# Patient Record
Sex: FEMALE | Race: WHITE | NOT HISPANIC OR LATINO | URBAN - METROPOLITAN AREA
[De-identification: names, ages, dates, MRNs, and addresses within clinical notes are randomized per-mention and may not be internally consistent; named-entity substitution may affect disease eponyms.]

---

## 2023-02-23 ENCOUNTER — EMERGENCY (EMERGENCY)
Facility: HOSPITAL | Age: 83
LOS: 1 days | Discharge: HOME | End: 2023-02-23
Attending: EMERGENCY MEDICINE | Admitting: EMERGENCY MEDICINE
Payer: MEDICARE

## 2023-02-23 VITALS
RESPIRATION RATE: 18 BRPM | HEART RATE: 76 BPM | DIASTOLIC BLOOD PRESSURE: 75 MMHG | TEMPERATURE: 98 F | HEIGHT: 59.84 IN | SYSTOLIC BLOOD PRESSURE: 152 MMHG | OXYGEN SATURATION: 100 % | WEIGHT: 134.04 LBS

## 2023-02-23 VITALS
TEMPERATURE: 98 F | DIASTOLIC BLOOD PRESSURE: 66 MMHG | OXYGEN SATURATION: 97 % | HEART RATE: 77 BPM | RESPIRATION RATE: 17 BRPM | SYSTOLIC BLOOD PRESSURE: 128 MMHG

## 2023-02-23 DIAGNOSIS — R91.1 SOLITARY PULMONARY NODULE: ICD-10-CM

## 2023-02-23 DIAGNOSIS — Z86.018 PERSONAL HISTORY OF OTHER BENIGN NEOPLASM: ICD-10-CM

## 2023-02-23 DIAGNOSIS — Z88.6 ALLERGY STATUS TO ANALGESIC AGENT: ICD-10-CM

## 2023-02-23 DIAGNOSIS — M40.204 UNSPECIFIED KYPHOSIS, THORACIC REGION: ICD-10-CM

## 2023-02-23 DIAGNOSIS — K44.9 DIAPHRAGMATIC HERNIA WITHOUT OBSTRUCTION OR GANGRENE: ICD-10-CM

## 2023-02-23 DIAGNOSIS — Z87.19 PERSONAL HISTORY OF OTHER DISEASES OF THE DIGESTIVE SYSTEM: ICD-10-CM

## 2023-02-23 DIAGNOSIS — R06.02 SHORTNESS OF BREATH: ICD-10-CM

## 2023-02-23 DIAGNOSIS — Z90.49 ACQUIRED ABSENCE OF OTHER SPECIFIED PARTS OF DIGESTIVE TRACT: Chronic | ICD-10-CM

## 2023-02-23 DIAGNOSIS — Z20.822 CONTACT WITH AND (SUSPECTED) EXPOSURE TO COVID-19: ICD-10-CM

## 2023-02-23 DIAGNOSIS — R10.10 UPPER ABDOMINAL PAIN, UNSPECIFIED: ICD-10-CM

## 2023-02-23 DIAGNOSIS — Z90.49 ACQUIRED ABSENCE OF OTHER SPECIFIED PARTS OF DIGESTIVE TRACT: ICD-10-CM

## 2023-02-23 DIAGNOSIS — R51.9 HEADACHE, UNSPECIFIED: ICD-10-CM

## 2023-02-23 DIAGNOSIS — R11.0 NAUSEA: ICD-10-CM

## 2023-02-23 DIAGNOSIS — I25.10 ATHEROSCLEROTIC HEART DISEASE OF NATIVE CORONARY ARTERY WITHOUT ANGINA PECTORIS: ICD-10-CM

## 2023-02-23 DIAGNOSIS — R13.10 DYSPHAGIA, UNSPECIFIED: ICD-10-CM

## 2023-02-23 DIAGNOSIS — Z88.0 ALLERGY STATUS TO PENICILLIN: ICD-10-CM

## 2023-02-23 DIAGNOSIS — Z98.890 OTHER SPECIFIED POSTPROCEDURAL STATES: Chronic | ICD-10-CM

## 2023-02-23 DIAGNOSIS — Z88.1 ALLERGY STATUS TO OTHER ANTIBIOTIC AGENTS STATUS: ICD-10-CM

## 2023-02-23 DIAGNOSIS — R07.89 OTHER CHEST PAIN: ICD-10-CM

## 2023-02-23 DIAGNOSIS — R10.13 EPIGASTRIC PAIN: ICD-10-CM

## 2023-02-23 DIAGNOSIS — I10 ESSENTIAL (PRIMARY) HYPERTENSION: ICD-10-CM

## 2023-02-23 DIAGNOSIS — R10.11 RIGHT UPPER QUADRANT PAIN: ICD-10-CM

## 2023-02-23 DIAGNOSIS — I70.0 ATHEROSCLEROSIS OF AORTA: ICD-10-CM

## 2023-02-23 DIAGNOSIS — Z86.16 PERSONAL HISTORY OF COVID-19: ICD-10-CM

## 2023-02-23 DIAGNOSIS — J47.9 BRONCHIECTASIS, UNCOMPLICATED: ICD-10-CM

## 2023-02-23 DIAGNOSIS — M95.4 ACQUIRED DEFORMITY OF CHEST AND RIB: ICD-10-CM

## 2023-02-23 PROBLEM — Z00.00 ENCOUNTER FOR PREVENTIVE HEALTH EXAMINATION: Status: ACTIVE | Noted: 2023-02-23

## 2023-02-23 LAB
ALBUMIN SERPL ELPH-MCNC: 4 G/DL — SIGNIFICANT CHANGE UP (ref 3.3–5)
ALP SERPL-CCNC: 109 U/L — SIGNIFICANT CHANGE UP (ref 40–120)
ALT FLD-CCNC: 21 U/L — SIGNIFICANT CHANGE UP (ref 10–45)
ANION GAP SERPL CALC-SCNC: 13 MMOL/L — SIGNIFICANT CHANGE UP (ref 5–17)
APPEARANCE UR: CLEAR — SIGNIFICANT CHANGE UP
APTT BLD: 33.2 SEC — SIGNIFICANT CHANGE UP (ref 27.5–35.5)
AST SERPL-CCNC: 23 U/L — SIGNIFICANT CHANGE UP (ref 10–40)
BASOPHILS # BLD AUTO: 0.03 K/UL — SIGNIFICANT CHANGE UP (ref 0–0.2)
BASOPHILS NFR BLD AUTO: 0.4 % — SIGNIFICANT CHANGE UP (ref 0–2)
BILIRUB SERPL-MCNC: 0.6 MG/DL — SIGNIFICANT CHANGE UP (ref 0.2–1.2)
BILIRUB UR-MCNC: NEGATIVE — SIGNIFICANT CHANGE UP
BUN SERPL-MCNC: 14 MG/DL — SIGNIFICANT CHANGE UP (ref 7–23)
CALCIUM SERPL-MCNC: 9.7 MG/DL — SIGNIFICANT CHANGE UP (ref 8.4–10.5)
CHLORIDE SERPL-SCNC: 100 MMOL/L — SIGNIFICANT CHANGE UP (ref 96–108)
CO2 SERPL-SCNC: 26 MMOL/L — SIGNIFICANT CHANGE UP (ref 22–31)
COLOR SPEC: YELLOW — SIGNIFICANT CHANGE UP
CREAT SERPL-MCNC: 0.96 MG/DL — SIGNIFICANT CHANGE UP (ref 0.5–1.3)
DIFF PNL FLD: NEGATIVE — SIGNIFICANT CHANGE UP
EGFR: 59 ML/MIN/1.73M2 — LOW
EOSINOPHIL # BLD AUTO: 0.2 K/UL — SIGNIFICANT CHANGE UP (ref 0–0.5)
EOSINOPHIL NFR BLD AUTO: 2.7 % — SIGNIFICANT CHANGE UP (ref 0–6)
GLUCOSE SERPL-MCNC: 129 MG/DL — HIGH (ref 70–99)
GLUCOSE UR QL: NEGATIVE — SIGNIFICANT CHANGE UP
HCT VFR BLD CALC: 41.6 % — SIGNIFICANT CHANGE UP (ref 34.5–45)
HGB BLD-MCNC: 14.1 G/DL — SIGNIFICANT CHANGE UP (ref 11.5–15.5)
IMM GRANULOCYTES NFR BLD AUTO: 0.1 % — SIGNIFICANT CHANGE UP (ref 0–0.9)
INR BLD: 1.1 — SIGNIFICANT CHANGE UP (ref 0.88–1.16)
KETONES UR-MCNC: NEGATIVE — SIGNIFICANT CHANGE UP
LEUKOCYTE ESTERASE UR-ACNC: NEGATIVE — SIGNIFICANT CHANGE UP
LIDOCAIN IGE QN: 52 U/L — SIGNIFICANT CHANGE UP (ref 7–60)
LYMPHOCYTES # BLD AUTO: 1.78 K/UL — SIGNIFICANT CHANGE UP (ref 1–3.3)
LYMPHOCYTES # BLD AUTO: 24.4 % — SIGNIFICANT CHANGE UP (ref 13–44)
MAGNESIUM SERPL-MCNC: 2.1 MG/DL — SIGNIFICANT CHANGE UP (ref 1.6–2.6)
MCHC RBC-ENTMCNC: 29.6 PG — SIGNIFICANT CHANGE UP (ref 27–34)
MCHC RBC-ENTMCNC: 33.9 GM/DL — SIGNIFICANT CHANGE UP (ref 32–36)
MCV RBC AUTO: 87.2 FL — SIGNIFICANT CHANGE UP (ref 80–100)
MONOCYTES # BLD AUTO: 0.49 K/UL — SIGNIFICANT CHANGE UP (ref 0–0.9)
MONOCYTES NFR BLD AUTO: 6.7 % — SIGNIFICANT CHANGE UP (ref 2–14)
NEUTROPHILS # BLD AUTO: 4.8 K/UL — SIGNIFICANT CHANGE UP (ref 1.8–7.4)
NEUTROPHILS NFR BLD AUTO: 65.7 % — SIGNIFICANT CHANGE UP (ref 43–77)
NITRITE UR-MCNC: NEGATIVE — SIGNIFICANT CHANGE UP
NRBC # BLD: 0 /100 WBCS — SIGNIFICANT CHANGE UP (ref 0–0)
NT-PROBNP SERPL-SCNC: 120 PG/ML — SIGNIFICANT CHANGE UP (ref 0–300)
PH UR: 6 — SIGNIFICANT CHANGE UP (ref 5–8)
PLATELET # BLD AUTO: 346 K/UL — SIGNIFICANT CHANGE UP (ref 150–400)
POTASSIUM SERPL-MCNC: 3.7 MMOL/L — SIGNIFICANT CHANGE UP (ref 3.5–5.3)
POTASSIUM SERPL-SCNC: 3.7 MMOL/L — SIGNIFICANT CHANGE UP (ref 3.5–5.3)
PROT SERPL-MCNC: 8.5 G/DL — HIGH (ref 6–8.3)
PROT UR-MCNC: NEGATIVE MG/DL — SIGNIFICANT CHANGE UP
PROTHROM AB SERPL-ACNC: 13.1 SEC — SIGNIFICANT CHANGE UP (ref 10.5–13.4)
RBC # BLD: 4.77 M/UL — SIGNIFICANT CHANGE UP (ref 3.8–5.2)
RBC # FLD: 11.9 % — SIGNIFICANT CHANGE UP (ref 10.3–14.5)
SARS-COV-2 RNA SPEC QL NAA+PROBE: NEGATIVE — SIGNIFICANT CHANGE UP
SODIUM SERPL-SCNC: 139 MMOL/L — SIGNIFICANT CHANGE UP (ref 135–145)
SP GR SPEC: <=1.005 — SIGNIFICANT CHANGE UP (ref 1–1.03)
TROPONIN T SERPL-MCNC: 0.01 NG/ML — SIGNIFICANT CHANGE UP (ref 0–0.01)
UROBILINOGEN FLD QL: 0.2 E.U./DL — SIGNIFICANT CHANGE UP
WBC # BLD: 7.31 K/UL — SIGNIFICANT CHANGE UP (ref 3.8–10.5)
WBC # FLD AUTO: 7.31 K/UL — SIGNIFICANT CHANGE UP (ref 3.8–10.5)

## 2023-02-23 PROCEDURE — 84484 ASSAY OF TROPONIN QUANT: CPT

## 2023-02-23 PROCEDURE — 96365 THER/PROPH/DIAG IV INF INIT: CPT | Mod: XU

## 2023-02-23 PROCEDURE — 70450 CT HEAD/BRAIN W/O DYE: CPT | Mod: MA

## 2023-02-23 PROCEDURE — 99285 EMERGENCY DEPT VISIT HI MDM: CPT | Mod: CS

## 2023-02-23 PROCEDURE — 99285 EMERGENCY DEPT VISIT HI MDM: CPT | Mod: 25

## 2023-02-23 PROCEDURE — 87635 SARS-COV-2 COVID-19 AMP PRB: CPT

## 2023-02-23 PROCEDURE — 71045 X-RAY EXAM CHEST 1 VIEW: CPT | Mod: 26

## 2023-02-23 PROCEDURE — 85025 COMPLETE CBC W/AUTO DIFF WBC: CPT

## 2023-02-23 PROCEDURE — 71275 CT ANGIOGRAPHY CHEST: CPT | Mod: 26,MA

## 2023-02-23 PROCEDURE — 83880 ASSAY OF NATRIURETIC PEPTIDE: CPT

## 2023-02-23 PROCEDURE — 76705 ECHO EXAM OF ABDOMEN: CPT

## 2023-02-23 PROCEDURE — 85610 PROTHROMBIN TIME: CPT

## 2023-02-23 PROCEDURE — 71045 X-RAY EXAM CHEST 1 VIEW: CPT

## 2023-02-23 PROCEDURE — 80053 COMPREHEN METABOLIC PANEL: CPT

## 2023-02-23 PROCEDURE — 93005 ELECTROCARDIOGRAM TRACING: CPT

## 2023-02-23 PROCEDURE — 76705 ECHO EXAM OF ABDOMEN: CPT | Mod: 26

## 2023-02-23 PROCEDURE — 85730 THROMBOPLASTIN TIME PARTIAL: CPT

## 2023-02-23 PROCEDURE — 36415 COLL VENOUS BLD VENIPUNCTURE: CPT

## 2023-02-23 PROCEDURE — 70450 CT HEAD/BRAIN W/O DYE: CPT | Mod: 26,MA

## 2023-02-23 PROCEDURE — 96367 TX/PROPH/DG ADDL SEQ IV INF: CPT

## 2023-02-23 PROCEDURE — 96361 HYDRATE IV INFUSION ADD-ON: CPT

## 2023-02-23 PROCEDURE — 96375 TX/PRO/DX INJ NEW DRUG ADDON: CPT | Mod: XU

## 2023-02-23 PROCEDURE — 83735 ASSAY OF MAGNESIUM: CPT

## 2023-02-23 PROCEDURE — 71275 CT ANGIOGRAPHY CHEST: CPT | Mod: MA

## 2023-02-23 PROCEDURE — 81003 URINALYSIS AUTO W/O SCOPE: CPT

## 2023-02-23 PROCEDURE — 83690 ASSAY OF LIPASE: CPT

## 2023-02-23 RX ORDER — METOCLOPRAMIDE HCL 10 MG
10 TABLET ORAL ONCE
Refills: 0 | Status: COMPLETED | OUTPATIENT
Start: 2023-02-23 | End: 2023-02-23

## 2023-02-23 RX ORDER — DEXAMETHASONE 0.5 MG/5ML
8 ELIXIR ORAL ONCE
Refills: 0 | Status: COMPLETED | OUTPATIENT
Start: 2023-02-23 | End: 2023-02-23

## 2023-02-23 RX ORDER — FAMOTIDINE 10 MG/ML
1 INJECTION INTRAVENOUS
Qty: 28 | Refills: 0
Start: 2023-02-23 | End: 2023-03-08

## 2023-02-23 RX ORDER — SUCRALFATE 1 G
1 TABLET ORAL ONCE
Refills: 0 | Status: COMPLETED | OUTPATIENT
Start: 2023-02-23 | End: 2023-02-23

## 2023-02-23 RX ORDER — MORPHINE SULFATE 50 MG/1
2 CAPSULE, EXTENDED RELEASE ORAL ONCE
Refills: 0 | Status: DISCONTINUED | OUTPATIENT
Start: 2023-02-23 | End: 2023-02-23

## 2023-02-23 RX ORDER — ACETAMINOPHEN 500 MG
1000 TABLET ORAL ONCE
Refills: 0 | Status: COMPLETED | OUTPATIENT
Start: 2023-02-23 | End: 2023-02-23

## 2023-02-23 RX ORDER — FAMOTIDINE 10 MG/ML
20 INJECTION INTRAVENOUS ONCE
Refills: 0 | Status: COMPLETED | OUTPATIENT
Start: 2023-02-23 | End: 2023-02-23

## 2023-02-23 RX ORDER — ONDANSETRON 8 MG/1
4 TABLET, FILM COATED ORAL ONCE
Refills: 0 | Status: COMPLETED | OUTPATIENT
Start: 2023-02-23 | End: 2023-02-23

## 2023-02-23 RX ORDER — ONDANSETRON 8 MG/1
1 TABLET, FILM COATED ORAL
Qty: 12 | Refills: 0
Start: 2023-02-23 | End: 2023-02-26

## 2023-02-23 RX ORDER — SUCRALFATE 1 G
10 TABLET ORAL
Qty: 200 | Refills: 0
Start: 2023-02-23 | End: 2023-02-27

## 2023-02-23 RX ORDER — SODIUM CHLORIDE 9 MG/ML
1000 INJECTION INTRAMUSCULAR; INTRAVENOUS; SUBCUTANEOUS ONCE
Refills: 0 | Status: COMPLETED | OUTPATIENT
Start: 2023-02-23 | End: 2023-02-23

## 2023-02-23 RX ADMIN — SODIUM CHLORIDE 1000 MILLILITER(S): 9 INJECTION INTRAMUSCULAR; INTRAVENOUS; SUBCUTANEOUS at 07:34

## 2023-02-23 RX ADMIN — Medication 10 MILLIGRAM(S): at 05:10

## 2023-02-23 RX ADMIN — Medication 104 MILLIGRAM(S): at 04:40

## 2023-02-23 RX ADMIN — Medication 1000 MILLIGRAM(S): at 03:57

## 2023-02-23 RX ADMIN — FAMOTIDINE 20 MILLIGRAM(S): 10 INJECTION INTRAVENOUS at 03:43

## 2023-02-23 RX ADMIN — MORPHINE SULFATE 2 MILLIGRAM(S): 50 CAPSULE, EXTENDED RELEASE ORAL at 06:34

## 2023-02-23 RX ADMIN — Medication 101.6 MILLIGRAM(S): at 03:42

## 2023-02-23 RX ADMIN — Medication 1000 MILLIGRAM(S): at 03:55

## 2023-02-23 RX ADMIN — ONDANSETRON 4 MILLIGRAM(S): 8 TABLET, FILM COATED ORAL at 03:42

## 2023-02-23 RX ADMIN — Medication 8 MILLIGRAM(S): at 04:12

## 2023-02-23 RX ADMIN — Medication 400 MILLIGRAM(S): at 03:42

## 2023-02-23 RX ADMIN — SODIUM CHLORIDE 1000 MILLILITER(S): 9 INJECTION INTRAMUSCULAR; INTRAVENOUS; SUBCUTANEOUS at 06:34

## 2023-02-23 RX ADMIN — MORPHINE SULFATE 2 MILLIGRAM(S): 50 CAPSULE, EXTENDED RELEASE ORAL at 07:00

## 2023-02-23 NOTE — ED PROVIDER NOTE - PROGRESS NOTE DETAILS
[id number] Mark    no gallstones on US, no PE on CT. pt c/o headache. states has had headache since having covid in January. will give ivf, morphine and get CT head. pt will need to f/u with GI and neuro Ree: pt received from Dr. Toth at s/o; pt p/w multiple complaints including abd pain, nausea, chest discomfort, sob, ha. Labs wnl, ekg non-ischemic, ruq sono neg for gallstones/ cholecystitis, cta chest neg for pe. Pending ct head given persistent ha ever since having covid last month. If neg for acute findings, anticipate dc home. Will continue to monitor. PA Hellerman:  CTH:  No acute intracranial hemorrhage, mass effect, or   hydrocephalus.  Discussed results with pt and pt's  with Cuban    Discussed importance of close f/u with PMD and possibly neurologist for ha.  Pt states feeling better after receiving medications.  Answered all questions.  Provided strict return precautions.  Will DC

## 2023-02-23 NOTE — ED ADULT NURSE NOTE - OBJECTIVE STATEMENT
82F hx htn, gastritis, c/o upper abd pain. pt states ongoing for past 2 days. states started after she was taking amoxicillin 875mg tid after getting a tooth pulled. states also feeling short of breath, with mid chest discomfort and difficulty swallowing for the same period of time. +nausea, no vomiting, no diarrhea. no fevers. no cough. states has not felt completely well after having had covid in early January.

## 2023-02-23 NOTE — ED PROVIDER NOTE - ENMT, MLM
Airway patent, Nasal mucosa clear. Mouth with normal mucosa. Throat has no vesicles, no oropharyngeal exudates and uvula is midline. no intraoral swelling, no drooling, no trismus, no stridor

## 2023-02-23 NOTE — ED PROVIDER NOTE - PROVIDER TOKENS
PROVIDER:[TOKEN:[[id number]]],PROVIDER:[TOKEN:[[id number]]],PROVIDER:[TOKEN:[[id number]]],PROVIDER:[TOKEN:[[id number]]],PROVIDER:[TOKEN:[[id number]]],PROVIDER:[TOKEN:[[id number]]]

## 2023-02-23 NOTE — ED PROVIDER NOTE - PATIENT PORTAL LINK FT
You can access the FollowMyHealth Patient Portal offered by [facility] by registering at the following website: [email or web address]. By joining [facility]’s FollowMyHealth portal, you will also be able to view your health information using other applications (apps) compatible with our system.

## 2023-02-23 NOTE — ED ADULT NURSE REASSESSMENT NOTE - NS ED NURSE REASSESS COMMENT FT1
Dr. Toth at bedside discussing findings with pt. and spouse
Dr. Toth at bedside for evaluation
pt. ambulated to restroom with steady gait noted, ccms sample was obtained and sent for analysis
pt. asleep, nad, resps even/unlabored, assessment on-going, awaiting further orders, spouse remains at bedside
spoke with CT tech, states ready for pt., pt. to CT accompanied by EDT and spouse
xrt at bedside, pcxr obtained
pt. medicated for nausea and h/a as noted, assessment on-going, spouse remains at bedside

## 2023-02-23 NOTE — ED ADULT NURSE NOTE - CAS ELECT INFOMATION PROVIDED
Pt verbalized understanding of d/c instructions. VSS. PIV removed. Pt ambulated out of ED with ./DC instructions

## 2023-02-23 NOTE — ED PROVIDER NOTE - CLINICAL SUMMARY MEDICAL DECISION MAKING FREE TEXT BOX
epigastric/ruq abd pain, midsternal chest discomfort, SOB and difficulty swallowing. no airway compromise, no resp distress on exam. speaking in full sentences, lungs clear on exam. no guarding, no rebound  gallstones vs. gastritis vs. pancreatitis vs. GERD. doubt allergic reaction, doubt ACS  -check labs  -ekg  -CXR  -US to evaluate RUQ  -tylenol, zofran, pepcid

## 2023-02-23 NOTE — ED PROVIDER NOTE - CARE PLAN
1 Principal Discharge DX:	Abdominal pain  Secondary Diagnosis:	SOB (shortness of breath)  Secondary Diagnosis:	Headache

## 2023-02-23 NOTE — ED PROVIDER NOTE - NSFOLLOWUPINSTRUCTIONS_ED_ALL_ED_FT
Follow-up with gastroenterology and neurology    Mary michael gastroenterologom i nevrologom      Gastroesophageal Reflux Disease, Adult    Gastroesophageal reflux (COLLIN) happens when acid from the stomach flows up into the tube that connects the mouth and the stomach (esophagus). Normally, food travels down the esophagus and stays in the stomach to be digested. With COLLIN, food and stomach acid sometimes move back up into the esophagus.    You may have a disease called gastroesophageal reflux disease (GERD) if the reflux:  •Happens often.    •Causes frequent or very bad symptoms.    •Causes problems such as damage to the esophagus.    When this happens, the esophagus becomes sore and swollen. Over time, GERD can make small holes (ulcers) in the lining of the esophagus.    What are the causes?    This condition is caused by a problem with the muscle between the esophagus and the stomach. When this muscle is weak or not normal, it does not close properly to keep food and acid from coming back up from the stomach.    The muscle can be weak because of:  •Tobacco use.     •Pregnancy.     •Having a certain type of hernia (hiatal hernia).    •Alcohol use.     •Certain foods and drinks, such as coffee, chocolate, onions, and peppermint.    What increases the risk?    •Being overweight.    •Having a disease that affects your connective tissue.    •Taking NSAIDs, such a ibuprofen.    What are the signs or symptoms?    •Heartburn.     •Difficult or painful swallowing.     •The feeling of having a lump in the throat.     •A bitter taste in the mouth.     •Bad breath.     •Having a lot of saliva.    •Having an upset or bloated stomach.     •Burping.    •Chest pain. Different conditions can cause chest pain. Make sure you see your doctor if you have chest pain.    •Shortness of breath or wheezing.    •A long-term cough or a cough at night.    •Wearing away of the surface of teeth (tooth enamel).    •Weight loss    How is this treated?    •Making changes to your diet.    •Taking medicine.    •Having surgery.    Treatment will depend on how bad your symptoms are.    Follow these instructions at home:    Eating and drinking    •Follow a diet as told by your doctor. You may need to avoid foods and drinks such as:  •Coffee and tea, with or without caffeine.    •Drinks that contain alcohol.    •Energy drinks and sports drinks.    •Bubbly (carbonated) drinks or sodas.    •Chocolate and cocoa.    •Peppermint and mint flavorings.    •Garlic and onions.    •Horseradish.    •Spicy and acidic foods. These include peppers, chili powder, argueta powder, vinegar, hot sauces, and BBQ sauce.    •Citrus fruit juices and citrus fruits, such as oranges, fareed, and limes.    •Tomato-based foods. These include red sauce, chili, salsa, and pizza with red sauce.    •Fried and fatty foods. These include donuts, french fries, potato chips, and high-fat dressings.    •High-fat meats. These include hot dogs, rib eye steak, sausage, ham, and murphy.    •High-fat dairy items, such as whole milk, butter, and cream cheese.    •Eat small meals often. Avoid eating large meals.    •Avoid drinking large amounts of liquid with your meals.    •Avoid eating meals during the 2–3 hours before bedtime.    •Avoid lying down right after you eat.    • Do not exercise right after you eat.    Lifestyle      • Do not smoke or use any products that contain nicotine or tobacco. If you need help quitting, ask your doctor.    •Try to lower your stress. If you need help doing this, ask your doctor.    •If you are overweight, lose an amount of weight that is healthy for you. Ask your doctor about a safe weight loss goal.    General instructions     •Pay attention to any changes in your symptoms.    •Take over-the-counter and prescription medicines only as told by your doctor.    • Do not take aspirin, ibuprofen, or other NSAIDs unless your doctor says it is okay.    •Wear loose clothes. Do not wear anything tight around your waist.    •Raise (elevate) the head of your bed about 6 inches (15 cm). You may need to use a wedge to do this.    •Avoid bending over if this makes your symptoms worse.    •Keep all follow-up visits.    Contact a doctor if:    •You have new symptoms.    •You lose weight and you do not know why.    •You have trouble swallowing or it hurts to swallow.    •You have wheezing or a cough that keeps happening.    •You have a hoarse voice.    •Your symptoms do not get better with treatment.    Get help right away if:    •You have sudden pain in your arms, neck, jaw, teeth, or back.    •You suddenly feel sweaty, dizzy, or light-headed.    •You have chest pain or shortness of breath.    •You vomit and the vomit is green, yellow, or black, or it looks like blood or coffee grounds.    •You faint.    •Your poop (stool) is red, bloody, or black.    •You cannot swallow, drink, or eat.    These symptoms may represent a serious problem that is an emergency. Do not wait to see if the symptoms will go away. Get medical help right away. Call your local emergency services (911 in the [place] Do not drive yourself to the hospital.     Summary    •If a person has gastroesophageal reflux disease (GERD), food and stomach acid move back up into the esophagus and cause symptoms or problems such as damage to the esophagus.    •Treatment will depend on how bad your symptoms are.    •Follow a diet as told by your doctor.    •Take all medicines only as told by your doctor.    This information is not intended to replace advice given to you by your health care provider. Make sure you discuss any questions you have with your health care provider.      General Headache    WHAT YOU NEED TO KNOW:    Headache pain may be mild or severe. Common causes include stress, medicines, and head injuries. Sleep problems, allergies, and hormone changes can also cause a headache. You may have frequent headaches that have no clear cause. Pain may start in another part of your body and move to your head. Headache pain can also move to other parts of your body. A headache can cause other symptoms, such as nausea and vomiting. A severe headache may be a sign of a stroke or other serious problem that needs immediate treatment.    DISCHARGE INSTRUCTIONS:    Have someone call your local emergency number (911 in the [place]) for any of the following:   •You have any of the following signs of a stroke: ?Numbness or drooping on one side of your face     ?Weakness in an arm or leg    ?Confusion or difficulty speaking    ?Dizziness, a severe headache, or vision loss    Return to the emergency department if:   •You have a headache with neck stiffness and a fever.    •You have a constant headache and are vomiting.    •You have severe pain that does not get better after you take pain medicine.    •You have a headache and the pain worsens when you look into light.    •You have a headache and vision changes, such as blurred vision.    •You have a headache and are forgetful or confused.    Call your doctor if:   •You have a headache each day that does not get better, even after treatment.    •You have changes in your headaches, or new symptoms that occur when you have a headache.    •Others you live or work with also have headaches.    •You have questions or concerns about your condition or care.    Medicines: You may need any of the following:   •Medicines may be given to prevent or treat headache pain. Do not wait until the pain is severe to take your medicine. Ask your healthcare provider how to take the medicine safely.    •NSAIDs, such as ibuprofen, help decrease swelling, pain, and fever. This medicine is available with or without a doctor's order. NSAIDs can cause stomach bleeding or kidney problems in certain people. If you take blood thinner medicine, always ask if NSAIDs are safe for you. Always read the medicine label and follow directions. Do not give these medicines to children younger than 6 months without direction from a healthcare provider.    •Acetaminophen decreases pain and fever. It is available without a doctor's order. Ask how much to take and how often to take it. Follow directions. Read the labels of all other medicines you are using to see if they also contain acetaminophen, or ask your doctor or pharmacist. Acetaminophen can cause liver damage if not taken correctly.    •Antinausea medicine may be given to calm your stomach and help prevent vomiting.    •Take your medicine as directed. Contact your healthcare provider if you think your medicine is not helping or if you have side effects. Tell your provider if you are allergic to any medicine. Keep a list of the medicines, vitamins, and herbs you take. Include the amounts, and when and why you take them. Bring the list or the pill bottles to follow-up visits. Carry your medicine list with you in case of an emergency.    Manage your symptoms:   •Rest in a dark and quiet room. This may help decrease your pain.    •Apply heat or ice as directed. Heat or ice may help decrease pain or muscle spasms. Apply heat or ice on the area for 20 minutes every 2 hours for as many days as directed. Your healthcare provider may recommend that you alternate heat and ice.    •Relax your muscles to help relieve a headache. Lie down in a comfortable position and close your eyes. Relax your muscles slowly. Start at your toes and work your way up your body. A massage or warm bath may also help relax your muscles.    Keep a headache record: Record the dates and times that you get headaches, and what you were doing before the headache started. Also record what you ate and drank in the 24 hours before the headache started. This might help your healthcare provider find the cause of your headaches and make a treatment plan. The record can also help you avoid headache triggers or manage your symptoms.    Get enough sleep: You should get 8 to 10 hours of sleep each night. Create a sleep schedule. Go to bed and wake up at the same times each day. It may be helpful to do something relaxing before bed. Do not watch television right before bed.    Do not smoke: Nicotine and other chemicals in cigarettes and cigars can trigger a headache or make it worse. Ask your healthcare provider for information if you currently smoke and need help to quit. E-cigarettes or smokeless tobacco still contain nicotine. Talk to your healthcare provider before you use these products.    Drink liquids as directed: You may need to drink more liquid to prevent dehydration. Dehydration can cause a headache. Ask your healthcare provider how much liquid to drink each day and which liquids are best for you.    Limit caffeine and alcohol as directed: Your headaches may be triggered by caffeine or alcohol. You may also develop a headache if you drink caffeine regularly and suddenly stop.    Eat a variety of healthy foods: Do not skip meals. Too little food can trigger a headache. Include fruits, vegetables, whole-grain breads, low-fat dairy products, beans, lean meat, and fish. Do not have trigger foods, such as chocolate and red wine. Foods that contain gluten, nitrates, MSG, or artificial sweeteners may also trigger a headache.    Follow up with your doctor as directed: Write down your questions so you remember to ask them during your visits.

## 2023-02-23 NOTE — ED PROVIDER NOTE - OBJECTIVE STATEMENT
[id number] Rina    82F hx htn, gastritis, c/o upper abd pain. pt states ongoing for past 2 days. states started after she was taking amoxicillin 875mg tid after getting a tooth pulled. states also feeling short of breath, with mid chest discomfort and difficulty swallowing for the same period of time. +nausea, no vomiting, no diarrhea. no fevers. no cough. states has not felt completely well after having had covid in early January.

## 2023-02-23 NOTE — ED PROVIDER NOTE - CARE PROVIDERS DIRECT ADDRESSES
,[email or web address],DirectAddress_Unknown,DirectAddress_Unknown,[email or web address]

## 2023-02-23 NOTE — ED PROVIDER NOTE - CARE PROVIDER_API CALL
Holland Hudson)  Gastroenterology  [address], [address]  [place] [address]  Phone: [phone]  Fax: [phone]  Follow Up Time:     Thony Evans  [facility] [address]  [address], [address]th [place] [address]  Phone: [phone]  Fax: [phone]  Follow Up Time:     Elisa Holt  GASTROENTEROLOGY  [address] [place] [address], [address]  [place] [address]  Phone: [phone]  Fax: [phone]  Follow Up Time:     Greg Cummings)  Neurology; Neuromuscular Medicine  [address], [facility] 8th [place] [address]  Phone: [phone]  Fax: [phone]  Follow Up Time:     Sienna Samuel (DO)  EEGEpilepsy; Neurology  [address], [place] [address]  Phone: [phone]  Fax: [phone]  Follow Up Time:     Serena Santana)  Neurology  [address], [address] [place] [address]  Phone: [phone]  Fax: [phone]  Follow Up Time:

## 2023-03-11 ENCOUNTER — INPATIENT (INPATIENT)
Facility: HOSPITAL | Age: 83
LOS: 2 days | Discharge: HOME | DRG: 313 | End: 2023-03-14
Attending: INTERNAL MEDICINE | Admitting: INTERNAL MEDICINE
Payer: MEDICARE

## 2023-03-11 VITALS
HEIGHT: 59.84 IN | SYSTOLIC BLOOD PRESSURE: 131 MMHG | HEART RATE: 96 BPM | OXYGEN SATURATION: 100 % | DIASTOLIC BLOOD PRESSURE: 69 MMHG | TEMPERATURE: 98 F | RESPIRATION RATE: 16 BRPM

## 2023-03-11 DIAGNOSIS — R07.9 CHEST PAIN, UNSPECIFIED: ICD-10-CM

## 2023-03-11 DIAGNOSIS — Z90.49 ACQUIRED ABSENCE OF OTHER SPECIFIED PARTS OF DIGESTIVE TRACT: Chronic | ICD-10-CM

## 2023-03-11 DIAGNOSIS — E78.5 HYPERLIPIDEMIA, UNSPECIFIED: ICD-10-CM

## 2023-03-11 DIAGNOSIS — Z98.890 OTHER SPECIFIED POSTPROCEDURAL STATES: Chronic | ICD-10-CM

## 2023-03-11 DIAGNOSIS — I20.0 UNSTABLE ANGINA: ICD-10-CM

## 2023-03-11 DIAGNOSIS — I10 ESSENTIAL (PRIMARY) HYPERTENSION: ICD-10-CM

## 2023-03-11 DIAGNOSIS — K29.70 GASTRITIS, UNSPECIFIED, WITHOUT BLEEDING: ICD-10-CM

## 2023-03-11 LAB
ALBUMIN SERPL ELPH-MCNC: 3.8 G/DL — SIGNIFICANT CHANGE UP (ref 3.3–5)
ALBUMIN SERPL ELPH-MCNC: 4 G/DL — SIGNIFICANT CHANGE UP (ref 3.3–5)
ALP SERPL-CCNC: 87 U/L — SIGNIFICANT CHANGE UP (ref 40–120)
ALP SERPL-CCNC: 98 U/L — SIGNIFICANT CHANGE UP (ref 40–120)
ALT FLD-CCNC: 21 U/L — SIGNIFICANT CHANGE UP (ref 10–45)
ALT FLD-CCNC: SIGNIFICANT CHANGE UP (ref 10–45)
ANION GAP SERPL CALC-SCNC: 10 MMOL/L — SIGNIFICANT CHANGE UP (ref 5–17)
ANION GAP SERPL CALC-SCNC: 18 MMOL/L — HIGH (ref 5–17)
AST SERPL-CCNC: 23 U/L — SIGNIFICANT CHANGE UP (ref 10–40)
AST SERPL-CCNC: SIGNIFICANT CHANGE UP (ref 10–40)
BASOPHILS # BLD AUTO: 0.01 K/UL — SIGNIFICANT CHANGE UP (ref 0–0.2)
BASOPHILS NFR BLD AUTO: 0.1 % — SIGNIFICANT CHANGE UP (ref 0–2)
BILIRUB SERPL-MCNC: 0.4 MG/DL — SIGNIFICANT CHANGE UP (ref 0.2–1.2)
BILIRUB SERPL-MCNC: 0.4 MG/DL — SIGNIFICANT CHANGE UP (ref 0.2–1.2)
BUN SERPL-MCNC: 15 MG/DL — SIGNIFICANT CHANGE UP (ref 7–23)
BUN SERPL-MCNC: 16 MG/DL — SIGNIFICANT CHANGE UP (ref 7–23)
CALCIUM SERPL-MCNC: 9.4 MG/DL — SIGNIFICANT CHANGE UP (ref 8.4–10.5)
CALCIUM SERPL-MCNC: 9.7 MG/DL — SIGNIFICANT CHANGE UP (ref 8.4–10.5)
CHLORIDE SERPL-SCNC: 105 MMOL/L — SIGNIFICANT CHANGE UP (ref 96–108)
CHLORIDE SERPL-SCNC: 106 MMOL/L — SIGNIFICANT CHANGE UP (ref 96–108)
CK MB CFR SERPL CALC: 1.4 NG/ML — SIGNIFICANT CHANGE UP (ref 0–6.7)
CK MB CFR SERPL CALC: 1.4 NG/ML — SIGNIFICANT CHANGE UP (ref 0–6.7)
CK SERPL-CCNC: 36 U/L — SIGNIFICANT CHANGE UP (ref 25–170)
CK SERPL-CCNC: 37 U/L — SIGNIFICANT CHANGE UP (ref 25–170)
CO2 SERPL-SCNC: 18 MMOL/L — LOW (ref 22–31)
CO2 SERPL-SCNC: 27 MMOL/L — SIGNIFICANT CHANGE UP (ref 22–31)
CREAT SERPL-MCNC: 0.97 MG/DL — SIGNIFICANT CHANGE UP (ref 0.5–1.3)
CREAT SERPL-MCNC: 1 MG/DL — SIGNIFICANT CHANGE UP (ref 0.5–1.3)
EGFR: 56 ML/MIN/1.73M2 — LOW
EGFR: 58 ML/MIN/1.73M2 — LOW
EOSINOPHIL # BLD AUTO: 0.18 K/UL — SIGNIFICANT CHANGE UP (ref 0–0.5)
EOSINOPHIL NFR BLD AUTO: 2.6 % — SIGNIFICANT CHANGE UP (ref 0–6)
GLUCOSE SERPL-MCNC: 118 MG/DL — HIGH (ref 70–99)
GLUCOSE SERPL-MCNC: 125 MG/DL — HIGH (ref 70–99)
HCT VFR BLD CALC: 38 % — SIGNIFICANT CHANGE UP (ref 34.5–45)
HGB BLD-MCNC: 12.9 G/DL — SIGNIFICANT CHANGE UP (ref 11.5–15.5)
IMM GRANULOCYTES NFR BLD AUTO: 0.1 % — SIGNIFICANT CHANGE UP (ref 0–0.9)
LYMPHOCYTES # BLD AUTO: 2.47 K/UL — SIGNIFICANT CHANGE UP (ref 1–3.3)
LYMPHOCYTES # BLD AUTO: 35.1 % — SIGNIFICANT CHANGE UP (ref 13–44)
MCHC RBC-ENTMCNC: 30 PG — SIGNIFICANT CHANGE UP (ref 27–34)
MCHC RBC-ENTMCNC: 33.9 GM/DL — SIGNIFICANT CHANGE UP (ref 32–36)
MCV RBC AUTO: 88.4 FL — SIGNIFICANT CHANGE UP (ref 80–100)
MONOCYTES # BLD AUTO: 0.45 K/UL — SIGNIFICANT CHANGE UP (ref 0–0.9)
MONOCYTES NFR BLD AUTO: 6.4 % — SIGNIFICANT CHANGE UP (ref 2–14)
NEUTROPHILS # BLD AUTO: 3.91 K/UL — SIGNIFICANT CHANGE UP (ref 1.8–7.4)
NEUTROPHILS NFR BLD AUTO: 55.7 % — SIGNIFICANT CHANGE UP (ref 43–77)
NRBC # BLD: 0 /100 WBCS — SIGNIFICANT CHANGE UP (ref 0–0)
PLATELET # BLD AUTO: 278 K/UL — SIGNIFICANT CHANGE UP (ref 150–400)
POTASSIUM SERPL-MCNC: 4.1 MMOL/L — SIGNIFICANT CHANGE UP (ref 3.5–5.3)
POTASSIUM SERPL-MCNC: SIGNIFICANT CHANGE UP (ref 3.5–5.3)
POTASSIUM SERPL-SCNC: 4.1 MMOL/L — SIGNIFICANT CHANGE UP (ref 3.5–5.3)
POTASSIUM SERPL-SCNC: SIGNIFICANT CHANGE UP (ref 3.5–5.3)
PROT SERPL-MCNC: 7.6 G/DL — SIGNIFICANT CHANGE UP (ref 6–8.3)
PROT SERPL-MCNC: 8 G/DL — SIGNIFICANT CHANGE UP (ref 6–8.3)
RBC # BLD: 4.3 M/UL — SIGNIFICANT CHANGE UP (ref 3.8–5.2)
RBC # FLD: 12.1 % — SIGNIFICANT CHANGE UP (ref 10.3–14.5)
SARS-COV-2 RNA SPEC QL NAA+PROBE: SIGNIFICANT CHANGE UP
SODIUM SERPL-SCNC: 141 MMOL/L — SIGNIFICANT CHANGE UP (ref 135–145)
SODIUM SERPL-SCNC: 143 MMOL/L — SIGNIFICANT CHANGE UP (ref 135–145)
TROPONIN T SERPL-MCNC: 0.01 NG/ML — SIGNIFICANT CHANGE UP (ref 0–0.01)
TROPONIN T SERPL-MCNC: 0.01 NG/ML — SIGNIFICANT CHANGE UP (ref 0–0.01)
TROPONIN T SERPL-MCNC: <0.01 NG/ML — SIGNIFICANT CHANGE UP (ref 0–0.01)
WBC # BLD: 7.03 K/UL — SIGNIFICANT CHANGE UP (ref 3.8–10.5)
WBC # FLD AUTO: 7.03 K/UL — SIGNIFICANT CHANGE UP (ref 3.8–10.5)

## 2023-03-11 PROCEDURE — 99285 EMERGENCY DEPT VISIT HI MDM: CPT | Mod: FS,CS

## 2023-03-11 PROCEDURE — 71045 X-RAY EXAM CHEST 1 VIEW: CPT | Mod: 26

## 2023-03-11 RX ORDER — ACETAMINOPHEN 500 MG
1000 TABLET ORAL ONCE
Refills: 0 | Status: COMPLETED | OUTPATIENT
Start: 2023-03-11 | End: 2023-03-11

## 2023-03-11 RX ORDER — ACETAMINOPHEN 500 MG
650 TABLET ORAL ONCE
Refills: 0 | Status: COMPLETED | OUTPATIENT
Start: 2023-03-11 | End: 2023-03-11

## 2023-03-11 RX ORDER — PANTOPRAZOLE SODIUM 20 MG/1
40 TABLET, DELAYED RELEASE ORAL
Refills: 0 | Status: DISCONTINUED | OUTPATIENT
Start: 2023-03-12 | End: 2023-03-14

## 2023-03-11 RX ORDER — AMLODIPINE BESYLATE 2.5 MG/1
10 TABLET ORAL DAILY
Refills: 0 | Status: DISCONTINUED | OUTPATIENT
Start: 2023-03-11 | End: 2023-03-13

## 2023-03-11 RX ORDER — LOSARTAN POTASSIUM 100 MG/1
25 TABLET, FILM COATED ORAL DAILY
Refills: 0 | Status: DISCONTINUED | OUTPATIENT
Start: 2023-03-11 | End: 2023-03-11

## 2023-03-11 RX ORDER — PANTOPRAZOLE SODIUM 20 MG/1
40 TABLET, DELAYED RELEASE ORAL ONCE
Refills: 0 | Status: DISCONTINUED | OUTPATIENT
Start: 2023-03-11 | End: 2023-03-11

## 2023-03-11 RX ORDER — METOPROLOL TARTRATE 50 MG
50 TABLET ORAL DAILY
Refills: 0 | Status: DISCONTINUED | OUTPATIENT
Start: 2023-03-12 | End: 2023-03-12

## 2023-03-11 RX ORDER — ATORVASTATIN CALCIUM 80 MG/1
80 TABLET, FILM COATED ORAL AT BEDTIME
Refills: 0 | Status: DISCONTINUED | OUTPATIENT
Start: 2023-03-11 | End: 2023-03-14

## 2023-03-11 RX ADMIN — AMLODIPINE BESYLATE 10 MILLIGRAM(S): 2.5 TABLET ORAL at 22:46

## 2023-03-11 RX ADMIN — Medication 1000 MILLIGRAM(S): at 19:20

## 2023-03-11 RX ADMIN — Medication 400 MILLIGRAM(S): at 18:20

## 2023-03-11 NOTE — ED ADULT NURSE NOTE - OBJECTIVE STATEMENT
Never smoker
Pt presented to ED with c/o of feeling short of breath, with mid chest discomfort and difficulty swallowing accompanied by intermittent headaches ever since she had Covid in January of this year. AOX4. VSS. Any form of distress not noted. Patient oriented to ED area. All needs attended. Pt on cardiac monitor. Rounding in progress. Fall risk precautions maintained.

## 2023-03-11 NOTE — H&P ADULT - NSICDXPASTSURGICALHX_GEN_ALL_CORE_FT
PAST SURGICAL HISTORY:  S/P appendectomy     S/P lumpectomy, left breast benign     PAST SURGICAL HISTORY:  S/P appendectomy     S/P lumpectomy, right breast

## 2023-03-11 NOTE — H&P ADULT - PROBLEM SELECTOR PLAN 1
p/w worsening ____cp radiated to the jaw and left shoulder x 30 mins, Trop 0.01 x 1, EKG   -f/u 2nd set of CE @ 6pm         -TTE ordered  -NST vs CCTA on Monday   -continue tele monitor p/w worsening chest discomfort from the jaw down to the midsternal radiating to the jaw and left shoulder x 30 mins, Trop 0.01 x 1, EKG NSR   -f/u 2nd set of CE @ 6pm   -s/p IV Tylenol in the ED  -per pt's , stated seen the cardiologist first time in 2/2023 and was scheduled for echo in late March and NST in April   -TTE ordered  -NST vs CCTA on Monday 3/13/2023  -continue tele monitor

## 2023-03-11 NOTE — ED PROVIDER NOTE - PHYSICAL EXAMINATION
CONSTITUTIONAL: NAD   SKIN: Normal color and turgor.    HEAD: NC/AT.  EYES: Conjunctiva clear. EOMI. PERRL.    ENT: Airway clear. Normal voice.   RESPIRATORY:  Normal work of breathing. Lungs CTAB.  CARDIOVASCULAR:  RRR, S1S2. No M/R/G.      GI:  Abdomen soft, nontender.    MSK: Neck supple.  No LE edema or calf tenderness. No joint swelling or ROM limitation.  NEURO: Alert; CN: grossly intact. Speech clear.  GUEVARA. Gait steady.

## 2023-03-11 NOTE — PATIENT PROFILE ADULT - FALL HARM RISK - HARM RISK INTERVENTIONS

## 2023-03-11 NOTE — H&P ADULT - PROBLEM SELECTOR PLAN 2
p/w 131/69 p/w 131/69  -Home meds: Chlorthalidone 25mg daily, Norvasc 10mg, Clonidine 0.1mg, Metoprolol Succinate 25mg, Losartan 50mg, Furosemide 20mg    -CONT Norvasc 10mg   -CONT Metoprolol Succinate  -CONT Clonidine 0.1mg   -CONT Losartan 50mg   -will hold Chlorthalidone 25mg and Furosemide 20mg and monitor bp p/w 131/69  -Home meds confirmed with pharmacy: Chlorthalidone 25mg daily, Norvasc 10mg, Clonidine 0.1mg, Metoprolol Succinate 25mg, Losartan 50mg, Furosemide 20mg. However pt stated she only take Metoprolol in the morning and Norvasc at night  -CONT Norvasc 10mg   -CONT Metoprolol Succinate  -Will hold the other medication until collateral obtained from PCP.   -Continue bp monitor

## 2023-03-11 NOTE — ED PROVIDER NOTE - CLINICAL SUMMARY MEDICAL DECISION MAKING FREE TEXT BOX
elderly female with hypertension hyperlipidemia who has been experiencing chest pain shortness of breath and headaches for several weeks; recently seen in the ER and subsequently seen by cardiologist.  Coming in with worsening chest pain and shortness of breath, also severe headaches at the onset of her pain.  Her blood pressure medications were adjusted recently but not helping.  Her cardiologist recommended stress test but she is unable to have one until April.  Hemodynamically stable in the ED with a nonischemic EKG, first troponin negative.  Given her risk factors and worsening symptoms, and inability to maintain provocative testing in a timely fashion, will admit for cardiology work-up.

## 2023-03-11 NOTE — ED PROVIDER NOTE - NS ED ROS FT
CONSTITUTIONAL: No fever, chills, or weakness  NEURO: No dizziness, no syncope; No focal weakness/tingling/numbness  EYES: No visual changes  ENT: No rhinorrhea or sore throat  PULM: No cough or hemoptysis  CV: HPI  GI: No abdominal pain, vomiting, or diarrhea  : No dysuria, hematuria, frequency  MSK: No neck pain or back pain, no joint pain  SKIN: no rash or unusual bruising

## 2023-03-11 NOTE — H&P ADULT - ASSESSMENT
83 yo Belarusian speaking female with PMHx of HTN, HLD, gastritis, COVID, ? seizure admitted to cardiology/tele to r/o ACS with plan for NST vs CCTA on Monday.

## 2023-03-11 NOTE — ED PROVIDER NOTE - OBJECTIVE STATEMENT
Patient is an 82-year-old female with a history of hypertension, hyperlipidemia, gastritis who was seen here few weeks ago for chest pain and headaches.  She reports having "heart pain" that radiates to her jaw and left shoulder, and is accompanied by severe headaches and shortness of breath.  This morning she felt like she could not swallow she had some change in her voice after taking Tylenol with codeine that her doctor prescribed a few days ago.  She has never had a cardiac stress test.   During her last ED visit she had a negative head CT scan, and a CTA PE protocol that was negative for PE, and suggestive of small airways disease.   Since then she followed up with her cardiologist and was told that she should have a cardiac stress test, however she is not able to have it until April.  She says her symptoms are getting worse, and that she has an "achy" pain in her chest almost every day; usually lasts apx half and hour before subsiding on its own.    She takes metoprolol in the morning and Norvasc in the evening.  Family Hx: both parents had HTN, no heart disease. Mother lived to age 86, father to apx age 76  Social Hx: never smoker, no drug use.

## 2023-03-11 NOTE — H&P ADULT - HISTORY OF PRESENT ILLNESS
83 yo Kittitian speaking female with PMHx of HTN, HLD, gastritis, COVID who was recently seen at [facility] ED for abdominal pain assoiate with mid chest discomfort, headache, and difficult swallowing s/p IVF and morphine and was dc'd home. Pt came back to [facility] ED and again c/o worsening midsternal cp describing it as achy that radiates to her jaw and left shoulder, lasting for about 30 mins before is subsiding on its own and have been having it for almost everyday. Associate with severe headache and SOB.  Since her last, she had followed up with her cardiologist,  _________________ and was told she need to get a cardiac stress, however she is not able to get one until April.     Upon arrival to the ED: 131/69, 96, 16, 98.2F, 100%RA. Lab significant for Trop 0.01, BUN/Cr 16/0.97. EKG___________, CXR 3/11/2023: No evidence of acute cardiopulmonary disease    CT PE protocol 2/23/2023: No PE, mild mosaic attenuation of the lungs is suggestive of small airways disease.  CTH 2/23/2023: No acute intracranial hemorrhage, mass effect, or hydrocephalus.  US abdomen 2/23/2023: Somewhat limited evaluation of gallbladder secondary to overlying bowel gas. No acute pathology is visualized.    Pt is admitted to cardiology/tele to r/o ACS   81 yo Eritrean speaking female with PMHx of HTN, HLD, gastritis, COVID, ? seizure who was recently seen at [facility] ED on 2/23/2023 for abdominal pain associate with mid chest discomfort, headache, and difficult swallowing s/p IVF and morphine and was dc'd home. Pt came back to [facility] ED and again c/o worsening chest discomfort from her jaw down to the midsternal radiating to left side of the heart and down her left arm. Describing it as aching lasting for about 30 mins before is subsiding on its own and have been having it for almost everyday.  Associate with severe headache and SOB. Per pt and pt's  stated she had one episode this morning after taking Tylenol with codeine. Since her last, she had followed up with her cardiologist (seen only once) and was told she need to get a cardiac stress, however she is not able to get one until April.     Upon arrival to the ED: 131/69, 96, 16, 98.2F, 100%RA. Lab significant for Trop 0.01, BUN/Cr 16/0.97. EKG NSR, CXR 3/11/2023: No evidence of acute cardiopulmonary disease    CT PE protocol 2/23/2023: No PE, mild mosaic attenuation of the lungs is suggestive of small airways disease.  CTH 2/23/2023: No acute intracranial hemorrhage, mass effect, or hydrocephalus.  US abdomen 2/23/2023: Somewhat limited evaluation of gallbladder secondary to overlying bowel gas. No acute pathology is visualized.    Given in the ED: IV Tylenol     Pt is admitted to cardiology/tele to r/o ACS

## 2023-03-11 NOTE — H&P ADULT - PROBLEM SELECTOR PLAN 4
-home meds: omeprazole 20mg   -s/p Maalox   -s/p Pantoprazole 40mg IV   -CONT Pantoprazole 40mg PO     F: none  E: Replete if K+ <4 and Mg <2  N: DASH diet  Dispo: NST vs CCTA -home meds: omeprazole 20mg   -s/p Maalox   -s/p Pantoprazole 40mg IV   -CONT Pantoprazole 40mg PO     F: none  E: Replete if K+ <4 and Mg <2  N: DASH diet  Dispo: NST vs CCTA on Monday 3/13/2023 -home meds: omeprazole 20mg   -Ordered Maalox 30mg PO x 1 (pt refused)   -Ordered pantoprazole 40mg IV (pt refused)  -CONT Pantoprazole 40mg PO     F: none  E: Replete if K+ <4 and Mg <2  N: DASH diet  Dispo: NST vs CCTA on Monday 3/13/2023

## 2023-03-11 NOTE — ED PROVIDER NOTE - ATTENDING APP SHARED VISIT CONTRIBUTION OF CARE
Patient is an 82-year-old female with a history of hypertension, hyperlipidemia, gastritis who was seen here few weeks ago for chest pain and headaches.  She reports having "heart pain" that radiates to her jaw and left shoulder, and is accompanied by severe headaches and shortness of breath.  This morning she felt like she could not swallow she had some change in her voice after taking Tylenol with codeine that her doctor prescribed a few days ago.  She has never had a cardiac stress test.   During her last ED visit she had a negative head CT scan, and a CTA PE protocol that was negative for PE, and suggestive of small airways disease.   Since then she followed up with her cardiologist and was told that she should have a cardiac stress test, however she is not able to have it until April.  She says her symptoms are getting worse, and that she has an "achy" pain in her chest almost every day; usually lasts apx half and hour before subsiding on its own.    She takes metoprolol in the morning and Norvasc in the evening.  Family Hx: both parents had HTN, no heart disease. Mother lived to age 86, father to apx age 76  Social Hx: never smoker, no drug use.    elderly female with hypertension hyperlipidemia who has been experiencing chest pain shortness of breath and headaches for several weeks; recently seen in the ER and subsequently seen by cardiologist.  Coming in with worsening chest pain and shortness of breath, also severe headaches at the onset of her pain.  Her blood pressure medications were adjusted recently but not helping.  Her cardiologist recommended stress test but she is unable to have one until April.  Hemodynamically stable in the ED with a nonischemic EKG, first troponin negative.  Given her risk factors and worsening symptoms, and inability to maintain provocative testing in a timely fashion, will admit for cardiology work-up.    Agree with above note as documented by PA.  JACQUI was available as the supervising attending during patient's ER evaluation.

## 2023-03-11 NOTE — H&P ADULT - NSHPLABSRESULTS_GEN_ALL_CORE
12.9   7.03  )-----------( 278      ( 11 Mar 2023 13:07 )             38.0       03-11    143  |  106  |  16  ----------------------------<  125<H>  4.1   |  27  |  0.97    Ca    9.4      11 Mar 2023 14:24    TPro  8.0  /  Alb  4.0  /  TBili  0.4  /  DBili  x   /  AST  23  /  ALT  21  /  AlkPhos  98  03-11          CARDIAC MARKERS ( 11 Mar 2023 13:07 )  x     / 0.01 ng/mL / x     / x     / x                EKG:

## 2023-03-12 DIAGNOSIS — R51.9 HEADACHE, UNSPECIFIED: ICD-10-CM

## 2023-03-12 LAB
A1C WITH ESTIMATED AVERAGE GLUCOSE RESULT: 5.2 % — SIGNIFICANT CHANGE UP (ref 4–5.6)
ANION GAP SERPL CALC-SCNC: 10 MMOL/L — SIGNIFICANT CHANGE UP (ref 5–17)
BUN SERPL-MCNC: 15 MG/DL — SIGNIFICANT CHANGE UP (ref 7–23)
CALCIUM SERPL-MCNC: 9.7 MG/DL — SIGNIFICANT CHANGE UP (ref 8.4–10.5)
CHLORIDE SERPL-SCNC: 104 MMOL/L — SIGNIFICANT CHANGE UP (ref 96–108)
CHOLEST SERPL-MCNC: 250 MG/DL — HIGH
CO2 SERPL-SCNC: 25 MMOL/L — SIGNIFICANT CHANGE UP (ref 22–31)
CREAT SERPL-MCNC: 0.91 MG/DL — SIGNIFICANT CHANGE UP (ref 0.5–1.3)
EGFR: 63 ML/MIN/1.73M2 — SIGNIFICANT CHANGE UP
ESTIMATED AVERAGE GLUCOSE: 103 MG/DL — SIGNIFICANT CHANGE UP (ref 68–114)
GLUCOSE SERPL-MCNC: 105 MG/DL — HIGH (ref 70–99)
HCT VFR BLD CALC: 40.6 % — SIGNIFICANT CHANGE UP (ref 34.5–45)
HDLC SERPL-MCNC: 57 MG/DL — SIGNIFICANT CHANGE UP
HGB BLD-MCNC: 13.7 G/DL — SIGNIFICANT CHANGE UP (ref 11.5–15.5)
LIPID PNL WITH DIRECT LDL SERPL: 175 MG/DL — HIGH
MAGNESIUM SERPL-MCNC: 2.2 MG/DL — SIGNIFICANT CHANGE UP (ref 1.6–2.6)
MCHC RBC-ENTMCNC: 30.2 PG — SIGNIFICANT CHANGE UP (ref 27–34)
MCHC RBC-ENTMCNC: 33.7 GM/DL — SIGNIFICANT CHANGE UP (ref 32–36)
MCV RBC AUTO: 89.4 FL — SIGNIFICANT CHANGE UP (ref 80–100)
NON HDL CHOLESTEROL: 193 MG/DL — HIGH
NRBC # BLD: 0 /100 WBCS — SIGNIFICANT CHANGE UP (ref 0–0)
PLATELET # BLD AUTO: 285 K/UL — SIGNIFICANT CHANGE UP (ref 150–400)
POTASSIUM SERPL-MCNC: 4.1 MMOL/L — SIGNIFICANT CHANGE UP (ref 3.5–5.3)
POTASSIUM SERPL-SCNC: 4.1 MMOL/L — SIGNIFICANT CHANGE UP (ref 3.5–5.3)
RBC # BLD: 4.54 M/UL — SIGNIFICANT CHANGE UP (ref 3.8–5.2)
RBC # FLD: 12.2 % — SIGNIFICANT CHANGE UP (ref 10.3–14.5)
SODIUM SERPL-SCNC: 139 MMOL/L — SIGNIFICANT CHANGE UP (ref 135–145)
TRIGL SERPL-MCNC: 92 MG/DL — SIGNIFICANT CHANGE UP
TSH SERPL-MCNC: 1.91 UIU/ML — SIGNIFICANT CHANGE UP (ref 0.27–4.2)
WBC # BLD: 6.36 K/UL — SIGNIFICANT CHANGE UP (ref 3.8–10.5)
WBC # FLD AUTO: 6.36 K/UL — SIGNIFICANT CHANGE UP (ref 3.8–10.5)

## 2023-03-12 PROCEDURE — 99222 1ST HOSP IP/OBS MODERATE 55: CPT

## 2023-03-12 RX ORDER — ACETAMINOPHEN 500 MG
1000 TABLET ORAL ONCE
Refills: 0 | Status: COMPLETED | OUTPATIENT
Start: 2023-03-12 | End: 2023-03-12

## 2023-03-12 RX ORDER — AMITRIPTYLINE HCL 25 MG
10 TABLET ORAL AT BEDTIME
Refills: 0 | Status: DISCONTINUED | OUTPATIENT
Start: 2023-03-12 | End: 2023-03-13

## 2023-03-12 RX ORDER — DIPHENHYDRAMINE HCL 50 MG
50 CAPSULE ORAL ONCE
Refills: 0 | Status: COMPLETED | OUTPATIENT
Start: 2023-03-12 | End: 2023-03-12

## 2023-03-12 RX ORDER — METOPROLOL TARTRATE 50 MG
25 TABLET ORAL DAILY
Refills: 0 | Status: DISCONTINUED | OUTPATIENT
Start: 2023-03-12 | End: 2023-03-14

## 2023-03-12 RX ORDER — PANTOPRAZOLE SODIUM 20 MG/1
40 TABLET, DELAYED RELEASE ORAL ONCE
Refills: 0 | Status: COMPLETED | OUTPATIENT
Start: 2023-03-12 | End: 2023-03-12

## 2023-03-12 RX ORDER — SUMATRIPTAN SUCCINATE 4 MG/.5ML
50 INJECTION, SOLUTION SUBCUTANEOUS ONCE
Refills: 0 | Status: COMPLETED | OUTPATIENT
Start: 2023-03-12 | End: 2023-03-12

## 2023-03-12 RX ORDER — LORATADINE 10 MG/1
10 TABLET ORAL ONCE
Refills: 0 | Status: COMPLETED | OUTPATIENT
Start: 2023-03-12 | End: 2023-03-12

## 2023-03-12 RX ADMIN — Medication 25 MILLIGRAM(S): at 14:40

## 2023-03-12 RX ADMIN — SUMATRIPTAN SUCCINATE 50 MILLIGRAM(S): 4 INJECTION, SOLUTION SUBCUTANEOUS at 19:30

## 2023-03-12 RX ADMIN — Medication 400 MILLIGRAM(S): at 13:21

## 2023-03-12 RX ADMIN — Medication 1000 MILLIGRAM(S): at 14:30

## 2023-03-12 RX ADMIN — Medication 0.5 MILLIGRAM(S): at 21:12

## 2023-03-12 RX ADMIN — SUMATRIPTAN SUCCINATE 50 MILLIGRAM(S): 4 INJECTION, SOLUTION SUBCUTANEOUS at 18:23

## 2023-03-12 RX ADMIN — Medication 30 MILLILITER(S): at 11:01

## 2023-03-12 RX ADMIN — PANTOPRAZOLE SODIUM 40 MILLIGRAM(S): 20 TABLET, DELAYED RELEASE ORAL at 11:01

## 2023-03-12 RX ADMIN — AMLODIPINE BESYLATE 10 MILLIGRAM(S): 2.5 TABLET ORAL at 22:53

## 2023-03-12 RX ADMIN — LORATADINE 10 MILLIGRAM(S): 10 TABLET ORAL at 18:23

## 2023-03-12 NOTE — PROGRESS NOTE ADULT - PROBLEM SELECTOR PLAN 3
-f/u Lipid panel   -Home meds: Rosuvastatin 20mg   -CONT Atorvastatin 80 mg qhs -Chol 250, TG 92, HDL 57,   -CONT Atorvastatin 80 mg qhs

## 2023-03-12 NOTE — PROGRESS NOTE ADULT - SUBJECTIVE AND OBJECTIVE BOX
Interventional Cardiology PA Adult Progress Note      Subjective Assessment:  Pt seen and examined at bedside this morning with Language Line ID # [id number] c/o worsening CP from her throat down to her chest radiates to the left and to arm, repeat EKG unchange, had three set of CE which were neg. Pt have been refusing medication thru out the night. Pt finally agree for Maalox and pantoprazole IV.  denies any palpitations, dizziness, syncope, diaphoresis, fatigue, LE edema, SOB, MILLARD, orthopnea, PND, N/V/D, abd pain, cough, congestion, fever, chills or recent sick contact. All other ROS Negative except as per Subjective and HPI  	  MEDICATIONS:  amLODIPine   Tablet 10 milliGRAM(s) Oral daily  metoprolol succinate ER 50 milliGRAM(s) Oral daily  aluminum hydroxide/magnesium hydroxide/simethicone Suspension 30 milliLiter(s) Oral once PRN  pantoprazole    Tablet 40 milliGRAM(s) Oral before breakfast  pantoprazole  Injectable 40 milliGRAM(s) IV Push once  atorvastatin 80 milliGRAM(s) Oral at bedtime        	    [PHYSICAL EXAM:  TELEMETRY:  T(C): 36.3 (03-12-23 @ 05:50), Max: 36.8 (03-11-23 @ 12:33)  HR: 86 (03-12-23 @ 09:31) (67 - 96)  BP: 136/66 (03-12-23 @ 09:31) (131/69 - 146/70)  RR: 17 (03-12-23 @ 09:31) (14 - 19)  SpO2: 100% (03-12-23 @ 09:31) (96% - 100%)  Wt(kg): --  I&O's Summary    11 Mar 2023 06:01  -  12 Mar 2023 07:00  --------------------------------------------------------  IN: 200 mL / OUT: 300 mL / NET: -100 mL      Height (cm): 152 (03-11 @ 19:00)  Weight (kg): 62.5 (03-11 @ 19:00)  BMI (kg/m2): 27.1 (03-11 @ 19:00)  BSA (m2): 1.59 (03-11 @ 19:00)  Bill:  Central/PICC/Mid Line:                                         Appearance: laying in bed comfortably in NAD	  HEENT: EOMI	  Neck: - JVD   Cardiovascular: Normal S1 S2, No murmurs,   Respiratory: Lungs clear to auscultation, no Rales, Rhonchi, or Wheezing	  Gastrointestinal:  Soft, Non-tender, + BS x 4 quads	  Skin: No rashes, No ecchymoses, No cyanosis  Extremities: chronic b/l ankle edema. Normal range of motion, No clubbing, or cyanosis  Vascular: Peripheral pulses palpable 2+ bilaterally  Neurologic: A & O x 3  Psychiatry: Mood & affect appropriate    LABS:	 	                      13.7   6.36  )-----------( 285      ( 12 Mar 2023 05:30 )             40.6     03-12    139  |  104  |  15  ----------------------------<  105<H>  4.1   |  25  |  0.91    Ca    9.7      12 Mar 2023 05:30  Mg     2.2     03-12    TPro  8.0  /  Alb  4.0  /  TBili  0.4  /  DBili  x   /  AST  23  /  ALT  21  /  AlkPhos  98  03-11    proBNP:   Lipid Profile:   HgA1c:   TSH: Thyroid Stimulating Hormone, Serum: 1.910 uIU/mL (03-12 @ 05:30)

## 2023-03-12 NOTE — CHART NOTE - NSCHARTNOTEFT_GEN_A_CORE
Patient reported 5/10 chest pain radiating to abdomen. VSS /68 HR 72 RR 14 O2 sat 99% RA. Patient currently ordered for Protonix and Tylenol and is refusing medication until her  arrives. CE negative x 3. Repeat EKG to be done if patient allows. Continue to monitor closely

## 2023-03-12 NOTE — PROGRESS NOTE ADULT - ASSESSMENT
81 yo Cymro speaking female with PMHx of HTN, HLD, gastritis, COVID, ? seizure admitted to cardiology/tele for ischemia workup with plan for NST vs CCTA on Monday.   81 yo Argentine speaking female with PMHx of HTN, HLD, gastritis, COVID 1/2023 and since covid been having chronic headache, admitted to cardiology/tele for ischemia workup with plan for NST on Monday 3/13/2023.

## 2023-03-12 NOTE — PROGRESS NOTE ADULT - PROBLEM SELECTOR PLAN 1
p/w worsening chest discomfort from the jaw down to the midsternal radiating to the jaw and left shoulder x 30 mins, Trop 0.01 x 1, EKG NSR   -Trop neg x 3, CKMB & CK WNL  -s/p IV Tylenol in the ED  -per pt's , stated seen the cardiologist first time in 2/2023 and was scheduled for echo in late March and NST in April   -TTE ordered  -NST vs CCTA on Monday 3/13/2023  -NPO after MN  -continue tele monitor p/w worsening chest discomfort from the jaw down to the midsternal radiating to the jaw and left shoulder x 30 mins, CP is reproducible   -Trop neg x 3, CKMB & CK WNL, EKG NSR  -s/p IV Tylenol in the ED  -per pt's , stated seen the cardiologist first time in 2/2023 and was scheduled for echo in late March and NST in April   -TTE ordered  -NST on Monday 3/13/2023  -NPO after MN  -continue tele monitor

## 2023-03-12 NOTE — PROGRESS NOTE ADULT - PROBLEM SELECTOR PLAN 2
SBP 130s-140s  -Home meds confirmed with pharmacy: Chlorthalidone 25mg daily, Norvasc 10mg, Clonidine 0.1mg, Metoprolol Succinate 25mg, Losartan 50mg, Furosemide 20mg. However pt stated she only take Metoprolol in the morning and Norvasc at night  -CONT Norvasc 10mg   -CONT Metoprolol Succinate   -Will hold the other medication until collateral obtained from PCP.   -Continue bp monitor SBP 130s-140s  -Home meds confirmed with pharmacy: Chlorthalidone 25mg daily, Norvasc 10mg, Clonidine 0.1mg, Metoprolol Succinate 25mg, Losartan 50mg, Furosemide 20mg. However pt stated she only take Metoprolol in the morning and Norvasc at night  -CONT Norvasc 10mg   -CONT Metoprolol Succinate 25mg daily  -Will hold the other medication until collateral obtained from PCP.   -Continue bp monitor SBP 130s-140s  -Home meds confirmed with  and pills bottle: Norvasc 10mg, Metoprolol Succinate 25mg, Losartan 50mg (full dc'd). Only takes Metoprolol in the morning and Norvasc at night  -CONT Norvasc 10mg PM  -CONT Metoprolol Succinate 25mg AM  -Continue bp monitor

## 2023-03-12 NOTE — PROGRESS NOTE ADULT - PROBLEM SELECTOR PLAN 5
-per Pt and pt's family ever since she had COVID she been having severe headache   -f/u with outpatient Neurologist, Dr. Fady Elliott and was recently rx Depakote 250mg for presumed seizure per daughter, pt took 3 pills and self dc'd 2/2 making her feel "not well"  -CT 2/23/2023: No acute intracranial hemorrhage, mass effect, or hydrocephalus  -s/p Acetaminophen 1g IV x 1 with improvement   -awaiting for pt's  to bring pt's medication list       F: none  E: Replete if K+ <4 and Mg <2  N: NPO after MN  Dispo: NST  on Monday 3/13/2023    Case discussed with Dr. Hale 3/13/2023 -per Pt and pt's family ever since she had COVID she been having severe headache   -f/u with outpatient Neurologist, Dr. Fady Elliott and was recently rx Depakote 250mg for presumed seizure per daughter, pt took 3 pills and self dc'd 2/2 making her feel "not well"  -CT 2/23/2023: No acute intracranial hemorrhage, mass effect, or hydrocephalus  -s/p Acetaminophen 1g IV x 1 with improvement   -Home meds: Amitriptyline HCL 10mg   -CONT Amitriptyline HCL 10mg     F: none  E: Replete if K+ <4 and Mg <2  N: NPO after MN  Dispo: NST  on Monday 3/13/2023    Case discussed with Dr. Hale 3/13/2023

## 2023-03-12 NOTE — PROGRESS NOTE ADULT - PROBLEM SELECTOR PLAN 4
-home meds: omeprazole 20mg   -Reordered Maalox 30mg PO x 1   -Reordered pantoprazole 40mg IV x 1  -CONT Pantoprazole 40mg PO     F: none  E: Replete if K+ <4 and Mg <2  N: NPO after MN  Dispo: NST vs CCTA on Monday 3/13/2023 -home meds: omeprazole 20mg   -Reordered Maalox 30mg PO x 1   -Reordered pantoprazole 40mg IV x 1  -CONT Pantoprazole 40mg PO     F: none  E: Replete if K+ <4 and Mg <2  N: NPO after MN  Dispo: NST  on Monday 3/13/2023    Case discussed with Dr. Brizuela -home meds: omeprazole 20mg   -Reordered Maalox 30mg PO x 1   -Reordered pantoprazole 40mg IV x 1  -CONT Pantoprazole 40mg PO daily interchangable for Omeprazole

## 2023-03-13 LAB
ANION GAP SERPL CALC-SCNC: 8 MMOL/L — SIGNIFICANT CHANGE UP (ref 5–17)
BUN SERPL-MCNC: 18 MG/DL — SIGNIFICANT CHANGE UP (ref 7–23)
CALCIUM SERPL-MCNC: 9.5 MG/DL — SIGNIFICANT CHANGE UP (ref 8.4–10.5)
CHLORIDE SERPL-SCNC: 102 MMOL/L — SIGNIFICANT CHANGE UP (ref 96–108)
CO2 SERPL-SCNC: 26 MMOL/L — SIGNIFICANT CHANGE UP (ref 22–31)
CREAT SERPL-MCNC: 0.96 MG/DL — SIGNIFICANT CHANGE UP (ref 0.5–1.3)
EGFR: 59 ML/MIN/1.73M2 — LOW
GLUCOSE SERPL-MCNC: 116 MG/DL — HIGH (ref 70–99)
HCT VFR BLD CALC: 41.7 % — SIGNIFICANT CHANGE UP (ref 34.5–45)
HGB BLD-MCNC: 13.9 G/DL — SIGNIFICANT CHANGE UP (ref 11.5–15.5)
MAGNESIUM SERPL-MCNC: 2.1 MG/DL — SIGNIFICANT CHANGE UP (ref 1.6–2.6)
MCHC RBC-ENTMCNC: 29.8 PG — SIGNIFICANT CHANGE UP (ref 27–34)
MCHC RBC-ENTMCNC: 33.3 GM/DL — SIGNIFICANT CHANGE UP (ref 32–36)
MCV RBC AUTO: 89.3 FL — SIGNIFICANT CHANGE UP (ref 80–100)
NRBC # BLD: 0 /100 WBCS — SIGNIFICANT CHANGE UP (ref 0–0)
PLATELET # BLD AUTO: 297 K/UL — SIGNIFICANT CHANGE UP (ref 150–400)
POTASSIUM SERPL-MCNC: 3.6 MMOL/L — SIGNIFICANT CHANGE UP (ref 3.5–5.3)
POTASSIUM SERPL-SCNC: 3.6 MMOL/L — SIGNIFICANT CHANGE UP (ref 3.5–5.3)
RBC # BLD: 4.67 M/UL — SIGNIFICANT CHANGE UP (ref 3.8–5.2)
RBC # FLD: 12.1 % — SIGNIFICANT CHANGE UP (ref 10.3–14.5)
SODIUM SERPL-SCNC: 136 MMOL/L — SIGNIFICANT CHANGE UP (ref 135–145)
WBC # BLD: 7.27 K/UL — SIGNIFICANT CHANGE UP (ref 3.8–10.5)
WBC # FLD AUTO: 7.27 K/UL — SIGNIFICANT CHANGE UP (ref 3.8–10.5)

## 2023-03-13 PROCEDURE — 99223 1ST HOSP IP/OBS HIGH 75: CPT

## 2023-03-13 PROCEDURE — 99233 SBSQ HOSP IP/OBS HIGH 50: CPT

## 2023-03-13 PROCEDURE — 93306 TTE W/DOPPLER COMPLETE: CPT | Mod: 26

## 2023-03-13 RX ORDER — VERAPAMIL HCL 240 MG
40 CAPSULE, EXTENDED RELEASE PELLETS 24 HR ORAL THREE TIMES A DAY
Refills: 0 | Status: DISCONTINUED | OUTPATIENT
Start: 2023-03-13 | End: 2023-03-14

## 2023-03-13 RX ORDER — SUMATRIPTAN SUCCINATE 4 MG/.5ML
50 INJECTION, SOLUTION SUBCUTANEOUS ONCE
Refills: 0 | Status: COMPLETED | OUTPATIENT
Start: 2023-03-13 | End: 2023-03-13

## 2023-03-13 RX ADMIN — Medication 25 MILLIGRAM(S): at 13:03

## 2023-03-13 RX ADMIN — PANTOPRAZOLE SODIUM 40 MILLIGRAM(S): 20 TABLET, DELAYED RELEASE ORAL at 13:03

## 2023-03-13 NOTE — PROGRESS NOTE ADULT - PROBLEM SELECTOR PLAN 4
-home meds: omeprazole 20mg   -Reordered Maalox 30mg PO x 1   -Reordered pantoprazole 40mg IV x 1  -CONT Pantoprazole 40mg PO daily interchangable for Omeprazole

## 2023-03-13 NOTE — PROGRESS NOTE ADULT - PROBLEM SELECTOR PLAN 1
p/w initially came in with worsening chest discomfort from the jaw down to the midsternal radiating to the jaw and left shoulder x 30 mins, CP is reproducible   -Trop neg x 3, CKMB & CK WNL, EKG NSR,   -ECHO 3/13 with Mild symmetric LV Hypertrophy Normal LV and RV size and function, grade I   diastolic dysfunction.  -per pt's , stated seen the cardiologist first time in 2/2023 and was scheduled for echo in late March and NST in April. Patient and family refused NST and can do outpatient stress   -TTE ordered Mild symmetric left ventricular hypertrophy. Normal left and right ventricular size and systolic function.

## 2023-03-13 NOTE — PROGRESS NOTE ADULT - NS ATTEND OPT1A GEN_ALL_CORE
Problem: Knowledge Deficit  Goal: Knowledge of disease process/condition, treatment plan, diagnostic tests, and medications will improve  Outcome: PROGRESSING AS EXPECTED  Encourage patient and family to ask questions and be involved in plan of care. Provide education on treatment plan, diagnostic testing, and medications; have patient and family verbalize understanding.       
Problem: Safety  Goal: Will remain free from injury  Outcome: PROGRESSING AS EXPECTED  Remind patient to use call light and provide assistance. Bed in low position, bed locked, and appropriate alarms set. Patient wearing non-slip socks. Call light and personal belongings are within reach.       
History/Exam/Medical decision making
History/Exam/Medical decision making

## 2023-03-13 NOTE — PROGRESS NOTE ADULT - PROBLEM SELECTOR PLAN 5
Patient with neuro consult for migraines and started on veramapril 40mg TID, DC Norvasc   -per Pt and pt's family ever since she had COVID she been having severe headache   -f/u with outpatient Neurologist, Dr. Fady Elliott and was recently rx Depakote 250mg for presumed seizure per daughter, pt took 3 pills and self dc'd 2/2 making her feel "not well"  -CTH 2/23/2023: No acute intracranial hemorrhage, mass effect, or hydrocephalus  -s/p Acetaminophen 1g IV x 1 with improvement   -Home meds: Amitriptyline HCL 10mg   -Holding off Amitriptyline and started on Veramapril     F: none  E: Replete if K+ <4 and Mg <2  N: NPO after MN  Dispo: NST  on Monday 3/13/2023    Case discussed with Dr. Hale 3/13/2023

## 2023-03-13 NOTE — PROGRESS NOTE ADULT - PROBLEM SELECTOR PLAN 2
SBP 130s-140s  -Home meds confirmed with  and pills bottle: Norvasc 10mg, Metoprolol Succinate 25mg, Losartan 50mg (full dc'd). Only takes Metoprolol in the morning and Norvasc at night  -CONT with Verapamil 40mg TID for Htn and headaches DC norvasc   -CONT Metoprolol Succinate 25mg AM and Verapamil 40mg TID, of note patient refused first dose will take in morning 3/13/2023  -Continue bp monitor

## 2023-03-13 NOTE — PROGRESS NOTE ADULT - SUBJECTIVE AND OBJECTIVE BOX
INCOMPLETE    OVERNIGHT EVENTS:  No acute events overnight.    SUBJECTIVE / INTERVAL HPI: Patient seen and examined at bedside.    Denies CP, SOB, dizziness/lightheadedness, palpitations    12 point ROS otherwise negative    VITAL SIGNS:  Vital Signs Last 24 Hrs  T(C): 36.6 (13 Mar 2023 13:07), Max: 36.6 (13 Mar 2023 13:07)  T(F): 97.9 (13 Mar 2023 13:07), Max: 97.9 (13 Mar 2023 13:07)  HR: 72 (13 Mar 2023 12:26) (68 - 76)  BP: 167/73 (13 Mar 2023 12:26) (144/70 - 177/81)  BP(mean): 105 (13 Mar 2023 12:26) (100 - 105)  RR: 16 (13 Mar 2023 12:26) (14 - 17)  SpO2: 97% (13 Mar 2023 12:26) (97% - 100%)    Parameters below as of 13 Mar 2023 12:26  Patient On (Oxygen Delivery Method): room air          I&O's Summary    12 Mar 2023 07:01  -  13 Mar 2023 07:00  --------------------------------------------------------  IN: 720 mL / OUT: 550 mL / NET: 170 mL    13 Mar 2023 07:01  -  13 Mar 2023 15:20  --------------------------------------------------------  IN: 0 mL / OUT: 0 mL / NET: 0 mL          PHYSICAL EXAM:    General: sitting up in bed, NAD  HEENT: conjunctiva clear; MMM  Neck: supple, no JVD  Cardiovascular: RRR, no murmurs  Respiratory: CTA B/L  Gastrointestinal: soft, NT/ND, +BS  Extremities: WWP, no edema or cyanosis  Vascular: Peripheral pulses palpable 2+ bilaterally/ carotid 2+ b/l, no bruits; radial 2+ b/l; femoral 2+ b/l no bruits; DP/PT 2+ b/l  Neurological: AAOx3, no focal deficits    MEDICATIONS:  MEDICATIONS  (STANDING):  amitriptyline 10 milliGRAM(s) Oral at bedtime  amLODIPine   Tablet 10 milliGRAM(s) Oral daily  atorvastatin 80 milliGRAM(s) Oral at bedtime  metoprolol succinate ER 25 milliGRAM(s) Oral daily  pantoprazole    Tablet 40 milliGRAM(s) Oral before breakfast  SUMAtriptan 50 milliGRAM(s) Oral once    MEDICATIONS  (PRN):  aluminum hydroxide/magnesium hydroxide/simethicone Suspension 30 milliLiter(s) Oral once PRN Dyspepsia  LORazepam     Tablet 0.5 milliGRAM(s) Oral at bedtime PRN Anxiety      LABS:                        13.9   7.27  )-----------( 297      ( 13 Mar 2023 08:18 )             41.7       03-13    136  |  102  |  18  ----------------------------<  116<H>  3.6   |  26  |  0.96    Ca    9.5      13 Mar 2023 08:18  Mg     2.1     03-13            CARDIAC MARKERS ( 11 Mar 2023 23:01 )  x     / 0.01 ng/mL / 36 U/L / x     / 1.4 ng/mL  CARDIAC MARKERS ( 11 Mar 2023 18:37 )  x     / <0.01 ng/mL / 37 U/L / x     / 1.4 ng/mL        TELEMETRY:    RADIOLOGY & ADDITIONAL TESTS: Reviewed. OVERNIGHT EVENTS:  No acute events overnight.    SUBJECTIVE / INTERVAL HPI: Patient seen and examined at bedside.    Denies CP, SOB, dizziness/lightheadedness, palpitations    12 point ROS otherwise negative    VITAL SIGNS:  Vital Signs Last 24 Hrs  T(C): 36.6 (13 Mar 2023 13:07), Max: 36.6 (13 Mar 2023 13:07)  T(F): 97.9 (13 Mar 2023 13:07), Max: 97.9 (13 Mar 2023 13:07)  HR: 72 (13 Mar 2023 12:26) (68 - 76)  BP: 167/73 (13 Mar 2023 12:26) (144/70 - 177/81)  BP(mean): 105 (13 Mar 2023 12:26) (100 - 105)  RR: 16 (13 Mar 2023 12:26) (14 - 17)  SpO2: 97% (13 Mar 2023 12:26) (97% - 100%)    Parameters below as of 13 Mar 2023 12:26  Patient On (Oxygen Delivery Method): room air          I&O's Summary    12 Mar 2023 07:01  -  13 Mar 2023 07:00  --------------------------------------------------------  IN: 720 mL / OUT: 550 mL / NET: 170 mL    13 Mar 2023 07:01  -  13 Mar 2023 15:20  --------------------------------------------------------  IN: 0 mL / OUT: 0 mL / NET: 0 mL          PHYSICAL EXAM:    General: sitting up in bed, NAD  HEENT: conjunctiva clear; MMM  Neck: supple, no JVD  Cardiovascular: RRR, no murmurs  Respiratory: CTA B/L  Gastrointestinal: soft, NT/ND, +BS  Extremities: WWP, no edema or cyanosis  Vascular: Peripheral pulses palpable 2+ bilaterally/ carotid 2+ b/l, no bruits; radial 2+ b/l; femoral 2+ b/l no bruits; DP/PT 2+ b/l  Neurological: AAOx3, no focal deficits    MEDICATIONS:  MEDICATIONS  (STANDING):  amitriptyline 10 milliGRAM(s) Oral at bedtime  amLODIPine   Tablet 10 milliGRAM(s) Oral daily  atorvastatin 80 milliGRAM(s) Oral at bedtime  metoprolol succinate ER 25 milliGRAM(s) Oral daily  pantoprazole    Tablet 40 milliGRAM(s) Oral before breakfast  SUMAtriptan 50 milliGRAM(s) Oral once    MEDICATIONS  (PRN):  aluminum hydroxide/magnesium hydroxide/simethicone Suspension 30 milliLiter(s) Oral once PRN Dyspepsia  LORazepam     Tablet 0.5 milliGRAM(s) Oral at bedtime PRN Anxiety      LABS:                        13.9   7.27  )-----------( 297      ( 13 Mar 2023 08:18 )             41.7       03-13    136  |  102  |  18  ----------------------------<  116<H>  3.6   |  26  |  0.96    Ca    9.5      13 Mar 2023 08:18  Mg     2.1     03-13            CARDIAC MARKERS ( 11 Mar 2023 23:01 )  x     / 0.01 ng/mL / 36 U/L / x     / 1.4 ng/mL  CARDIAC MARKERS ( 11 Mar 2023 18:37 )  x     / <0.01 ng/mL / 37 U/L / x     / 1.4 ng/mL        TELEMETRY:    RADIOLOGY & ADDITIONAL TESTS: Reviewed.

## 2023-03-13 NOTE — PROGRESS NOTE ADULT - NS ATTEND AMEND GEN_ALL_CORE FT
Initial attending contact date  3/12/23    . See PA note written above for details. I reviewed the PA documentation. I have personally seen and examined this patient. I reviewed vitals, labs, medications, cardiac studies, and additional imaging. I agree with the above PA's findings and plans as written above with the following additions/statements.    81 yo Australian speaking female with PMHx of HTN, HLD, gastritis, COVID 1/2023 and since covid been having chronic headache, admitted with CP  -EKG NSR, nonischemic  -Trop negative x 2  -On exam with reproducible CP  -Pt scheduled for ischemic work up with outpatient cardiologist   -ECHO with normal LVEF, grade I DD  -Refused NST today, plan to have it performed with outpatient cardiologist  -With persistent headache unrelieved with tylenol/NSAID. CT head negative. Family requesting neuro to consult.   -BP controlled, cont home BP meds  -Stable for DC post neuro recs
Initial attending contact date  3/12/23    . See PA note written above for details. I reviewed the PA documentation. I have personally seen and examined this patient. I reviewed vitals, labs, medications, cardiac studies, and additional imaging. I agree with the above PA's findings and plans as written above with the following additions/statements.    83 yo Greenlandic speaking female with PMHx of HTN, HLD, gastritis, COVID 1/2023 and since covid been having chronic headache, admitted with CP  -EKG NSR, nonischemic  -Trop negative x 2  -On exam with reproducible CP  -Pt scheduled for ischemic work up with outpatient cardiologist   -Plan for ECHO, lexicsan NST monday  -If negative plan to dc monday   -BP controlled, cont home BP meds

## 2023-03-13 NOTE — PROGRESS NOTE ADULT - ASSESSMENT
83 yo Gabonese speaking female with PMHx of HTN, HLD, gastritis, COVID 1/2023 and since covid been having chronic headache, admitted to cardiology/tele for ischemia workup. Patient had negative trops x3, ECHO with no concerns of reduced LVEF or WMA, patient refused NST and will get stress echo or ETT with outpatient cardiologist. Patient was stable for DC however patient with complaints of severe headaches and neuro consulted and recommended Verapamil TID 40mg. Patient plan for discharge if headaches improved.

## 2023-03-13 NOTE — PROVIDER CONTACT NOTE (OTHER) - BACKGROUND
Patient and daughter verbalized that patient is willing to have Verapamil started only in the morning in the presence of the family. Re Lipitor, daughter said patient does not need it.
Pt admitted with CP. Plan for echocardiogram tomorrow.

## 2023-03-13 NOTE — CONSULT NOTE ADULT - ATTENDING COMMENTS
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  I spent 75 minutes on her care today.

## 2023-03-13 NOTE — CONSULT NOTE ADULT - ASSESSMENT
This is an 83 yo Faroese speaking F with PMHx of HTN, HLD, gastritis, COVID 1/2023 and since COVID had been having near daily chronic headaches, was initially admitted to cardiology/tele for chest pain and for ischemia workup with plan for NST, which has since been deferred. Neurology was consulted for evaluation of daily headaches since January after having COVID. She describes the headaches as tension type headaches, like a "band" around her temples. She also endorses nausea and also states bright lights and sounds bother her. She had seen an outpatient Neurologist who prescribed Amitriptyline 10 mg qhs, Gabapentin 100 mg TID. She has received IV Acetaminophen x2 in the hospital, as well as sumatriptan 50 mg po with little improvement. She states she was here in February 2023 for chest pain, had received IV morphine which improved her headaches slightly. CT Head was done at that time which was unremarkable.    Plan:  - CT Head reviewed, showed no acute abnormalities  - Recommend to d/c sumatriptan  - Recommend to switch Amlodipine to a trial of 40 mg Verapamil IR TID , can give first dose now  - Continue to monitor BPs  - May benefit from outpatient Botox injections  - Please call Neurology with any additional questions or concerns  - Continue rest of care as per primary team   This is an 83 yo Chadian speaking F with PMHx of HTN, HLD, gastritis, COVID 1/2023 and since COVID had been having near daily chronic headaches, was initially admitted to cardiology/tele for chest pain and for ischemia workup with plan for NST, which has since been deferred. Neurology was consulted for evaluation of daily headaches since January after having COVID. She describes the headaches as tension type headaches, like a "band" around her temples. She also endorses nausea and also states bright lights and sounds bother her. She had seen an outpatient Neurologist who prescribed Amitriptyline 10 mg qhs, Gabapentin 100 mg TID. She has received IV Acetaminophen x2 in the hospital, as well as sumatriptan 50 mg po with little improvement. She states she was here in February 2023 for chest pain, had received IV morphine which improved her headaches slightly. CT Head was done at that time which was unremarkable.    Plan:  - CT Head reviewed, showed no acute abnormalities  - Recommend to d/c sumatriptan  - Recommend to switch Amlodipine to a trial of 40 mg Verapamil IR TID , can give first dose now  - Continue to monitor BPs  - May benefit from outpatient Botox injections  - Discussed role of adequate sleep habits, avoiding screens at night  - Can consider anti-psychotic medication such as Remeron, after Botox  - Will discuss with outpatient Neurologist Dr. Fady Steinberg  - No need for further inpatient neurologic work-up at this time  - Please call Neurology with any additional questions or concerns  - Continue rest of care as per primary team

## 2023-03-13 NOTE — CONSULT NOTE ADULT - SUBJECTIVE AND OBJECTIVE BOX
Neurology Consult Note     HPI:  81 yo Mongolian speaking female with PMHx of HTN, HLD, gastritis, COVID, ? seizure who was recently seen at [facility] ED on 2/23/2023 for abdominal pain associate with mid chest discomfort, headache, and difficult swallowing s/p IVF and morphine and was dc'd home. Pt came back to [facility] ED and again c/o worsening chest discomfort from her jaw down to the midsternal radiating to left side of the heart and down her left arm. Describing it as aching lasting for about 30 mins before is subsiding on its own and have been having it for almost everyday.  Associate with severe headache and SOB. Per pt and pt's  stated she had one episode this morning after taking Tylenol with codeine. Since her last, she had followed up with her cardiologist (seen only once) and was told she need to get a cardiac stress, however she is not able to get one until April.     Upon arrival to the ED: 131/69, 96, 16, 98.2F, 100%RA. Lab significant for Trop 0.01, BUN/Cr 16/0.97. EKG NSR, CXR 3/11/2023: No evidence of acute cardiopulmonary disease    CT PE protocol 2/23/2023: No PE, mild mosaic attenuation of the lungs is suggestive of small airways disease.  CTH 2/23/2023: No acute intracranial hemorrhage, mass effect, or hydrocephalus.  US abdomen 2/23/2023: Somewhat limited evaluation of gallbladder secondary to overlying bowel gas. No acute pathology is visualized.    Given in the ED: IV Tylenol     Pt is admitted to cardiology/tele to r/o ACS      PAST MEDICAL & SURGICAL HISTORY:  HTN (hypertension)  Gastritis  S/P appendectomy  S/P lumpectomy, right breast      Medications:  aluminum hydroxide/magnesium hydroxide/simethicone Suspension 30 milliLiter(s) Oral once PRN  amitriptyline 10 milliGRAM(s) Oral at bedtime  amLODIPine   Tablet 10 milliGRAM(s) Oral daily  atorvastatin 80 milliGRAM(s) Oral at bedtime  LORazepam     Tablet 0.5 milliGRAM(s) Oral at bedtime PRN  metoprolol succinate ER 25 milliGRAM(s) Oral daily  pantoprazole    Tablet 40 milliGRAM(s) Oral before breakfast      Vital Signs Last 24 Hrs  T(C): 36.6 (13 Mar 2023 13:07), Max: 36.6 (13 Mar 2023 13:07)  T(F): 97.9 (13 Mar 2023 13:07), Max: 97.9 (13 Mar 2023 13:07)  HR: 78 (13 Mar 2023 15:45) (68 - 78)  BP: 126/68 (13 Mar 2023 15:45) (126/68 - 177/81)  BP(mean): 91 (13 Mar 2023 15:45) (91 - 105)  RR: 16 (13 Mar 2023 15:45) (14 - 17)  SpO2: 98% (13 Mar 2023 15:45) (97% - 100%)    Parameters below as of 13 Mar 2023 15:45  Patient On (Oxygen Delivery Method): room air      Neurological Exam:   Mental status: Awake, alert and oriented x3. No dysarthria, no aphasia. Follows commands.  Cranial nerves: Pupils equally round and reactive to light, no nystagmus, extraocular muscles intact, V1 through V3 intact bilaterally and symmetric, face symmetric, hearing intact to finger rub, palate elevation symmetric, tongue was midline.  Motor: MRC grading 5/5 B/L UE/LE.  strength 5/5. Normal tone and bulk. No abnormal movements.    Sensation: Intact to light touch, temperature, vibration in all extremities.  Coordination: No dysmetria on finger-to-nose testing.  Reflexes: 2+ in bilateral UE, 0 in B/L patellar and 1+ in B/L ankles, downgoing toes bilaterally.  Gait: Deferred.      Labs:  CBC Full  -  ( 13 Mar 2023 08:18 )  WBC Count : 7.27 K/uL  RBC Count : 4.67 M/uL  Hemoglobin : 13.9 g/dL  Hematocrit : 41.7 %  Platelet Count - Automated : 297 K/uL  Mean Cell Volume : 89.3 fl  Mean Cell Hemoglobin : 29.8 pg  Mean Cell Hemoglobin Concentration : 33.3 gm/dL      03-13    136  |  102  |  18  ----------------------------<  116<H>  3.6   |  26  |  0.96    Ca    9.5      13 Mar 2023 08:18  Mg     2.1     03-13        < from: CT Head No Cont (02.23.23 @ 07:18) >  PROCEDURE DATE:  02/23/2023          INTERPRETATION:  PROCEDURE: CT head without intravenous contrast    INDICATIONS: Headache.    TECHNIQUE:  Serial axial images were obtained from the skull base to the   vertex without the use of intravenous contrast. Coronal and sagittal   reformatted images were obtained.    COMPARISON EXAMINATION: None.    FINDINGS: There is density in intracranial vessels secondary to recent   contrast administration.  VENTRICLES AND SULCI: Prominent sulci and cisternal spaces secondary to   age-appropriate volume loss The ventricles are inserted in size.  INTRA-AXIAL: No acute parenchymal hemorrhage, mass effect, or midline   shift is present. The gray-white matter differentiation is preserved   without evidence of recent transcortical infarct. There is confluent   hypodensity within the periventricular and subcortical white matter,   consistent with sequelae of chronic small vessel ischemic disease.  EXTRA-AXIAL: No fluid collection is present.  VISUALIZED SINUSES: Moderate mucosal thickening of the anterior ethmoid   air cells. Mild mucosal thickening of the sphenoid sinuses and right   maxillary sinus.  VISUALIZED MASTOIDS: Well-aerated bilaterally.  CALVARIUM: Unremarkable.  MISCELLANEOUS:  Status post right ocular lens replacement    IMPRESSION: No acute intracranial hemorrhage, mass effect, or   hydrocephalus.

## 2023-03-13 NOTE — PROVIDER CONTACT NOTE (OTHER) - SITUATION
Pt c/o midsternal CP 5/10 radiating to abdomen.
Patient refused Atorvastatin and Verapamil 10 PM dose. She kept asking for her Norvasc. She and her daughter on the phone were both informed that Norvasc was discontinued today

## 2023-03-14 ENCOUNTER — TRANSCRIPTION ENCOUNTER (OUTPATIENT)
Age: 83
End: 2023-03-14

## 2023-03-14 VITALS
RESPIRATION RATE: 16 BRPM | DIASTOLIC BLOOD PRESSURE: 76 MMHG | HEART RATE: 78 BPM | OXYGEN SATURATION: 98 % | SYSTOLIC BLOOD PRESSURE: 136 MMHG

## 2023-03-14 LAB
ANION GAP SERPL CALC-SCNC: 8 MMOL/L — SIGNIFICANT CHANGE UP (ref 5–17)
BUN SERPL-MCNC: 28 MG/DL — HIGH (ref 7–23)
CALCIUM SERPL-MCNC: 9.7 MG/DL — SIGNIFICANT CHANGE UP (ref 8.4–10.5)
CHLORIDE SERPL-SCNC: 105 MMOL/L — SIGNIFICANT CHANGE UP (ref 96–108)
CO2 SERPL-SCNC: 24 MMOL/L — SIGNIFICANT CHANGE UP (ref 22–31)
CREAT SERPL-MCNC: 0.98 MG/DL — SIGNIFICANT CHANGE UP (ref 0.5–1.3)
EGFR: 57 ML/MIN/1.73M2 — LOW
GLUCOSE SERPL-MCNC: 113 MG/DL — HIGH (ref 70–99)
HCT VFR BLD CALC: 39.8 % — SIGNIFICANT CHANGE UP (ref 34.5–45)
HGB BLD-MCNC: 13.4 G/DL — SIGNIFICANT CHANGE UP (ref 11.5–15.5)
MAGNESIUM SERPL-MCNC: 2 MG/DL — SIGNIFICANT CHANGE UP (ref 1.6–2.6)
MCHC RBC-ENTMCNC: 29.9 PG — SIGNIFICANT CHANGE UP (ref 27–34)
MCHC RBC-ENTMCNC: 33.7 GM/DL — SIGNIFICANT CHANGE UP (ref 32–36)
MCV RBC AUTO: 88.8 FL — SIGNIFICANT CHANGE UP (ref 80–100)
NRBC # BLD: 0 /100 WBCS — SIGNIFICANT CHANGE UP (ref 0–0)
PLATELET # BLD AUTO: 303 K/UL — SIGNIFICANT CHANGE UP (ref 150–400)
POTASSIUM SERPL-MCNC: 4.2 MMOL/L — SIGNIFICANT CHANGE UP (ref 3.5–5.3)
POTASSIUM SERPL-SCNC: 4.2 MMOL/L — SIGNIFICANT CHANGE UP (ref 3.5–5.3)
RBC # BLD: 4.48 M/UL — SIGNIFICANT CHANGE UP (ref 3.8–5.2)
RBC # FLD: 12 % — SIGNIFICANT CHANGE UP (ref 10.3–14.5)
SODIUM SERPL-SCNC: 137 MMOL/L — SIGNIFICANT CHANGE UP (ref 135–145)
WBC # BLD: 8.68 K/UL — SIGNIFICANT CHANGE UP (ref 3.8–10.5)
WBC # FLD AUTO: 8.68 K/UL — SIGNIFICANT CHANGE UP (ref 3.8–10.5)

## 2023-03-14 PROCEDURE — 80048 BASIC METABOLIC PNL TOTAL CA: CPT

## 2023-03-14 PROCEDURE — 99285 EMERGENCY DEPT VISIT HI MDM: CPT

## 2023-03-14 PROCEDURE — 85025 COMPLETE CBC W/AUTO DIFF WBC: CPT

## 2023-03-14 PROCEDURE — 84484 ASSAY OF TROPONIN QUANT: CPT

## 2023-03-14 PROCEDURE — 84443 ASSAY THYROID STIM HORMONE: CPT

## 2023-03-14 PROCEDURE — 82550 ASSAY OF CK (CPK): CPT

## 2023-03-14 PROCEDURE — 83735 ASSAY OF MAGNESIUM: CPT

## 2023-03-14 PROCEDURE — 36415 COLL VENOUS BLD VENIPUNCTURE: CPT

## 2023-03-14 PROCEDURE — 83036 HEMOGLOBIN GLYCOSYLATED A1C: CPT

## 2023-03-14 PROCEDURE — 82553 CREATINE MB FRACTION: CPT

## 2023-03-14 PROCEDURE — 80053 COMPREHEN METABOLIC PANEL: CPT

## 2023-03-14 PROCEDURE — 85027 COMPLETE CBC AUTOMATED: CPT

## 2023-03-14 PROCEDURE — 93306 TTE W/DOPPLER COMPLETE: CPT

## 2023-03-14 PROCEDURE — 71045 X-RAY EXAM CHEST 1 VIEW: CPT

## 2023-03-14 PROCEDURE — 80061 LIPID PANEL: CPT

## 2023-03-14 PROCEDURE — 99239 HOSP IP/OBS DSCHRG MGMT >30: CPT

## 2023-03-14 PROCEDURE — 87635 SARS-COV-2 COVID-19 AMP PRB: CPT

## 2023-03-14 RX ORDER — VERAPAMIL HCL 240 MG
1 CAPSULE, EXTENDED RELEASE PELLETS 24 HR ORAL
Qty: 90 | Refills: 2
Start: 2023-03-14 | End: 2023-06-11

## 2023-03-14 RX ORDER — LORATADINE 10 MG/1
10 TABLET ORAL DAILY
Refills: 0 | Status: DISCONTINUED | OUTPATIENT
Start: 2023-03-14 | End: 2023-03-14

## 2023-03-14 RX ORDER — VERAPAMIL HCL 240 MG
1 CAPSULE, EXTENDED RELEASE PELLETS 24 HR ORAL
Qty: 90 | Refills: 0
Start: 2023-03-14 | End: 2023-04-12

## 2023-03-14 RX ORDER — ATORVASTATIN CALCIUM 80 MG/1
1 TABLET, FILM COATED ORAL
Qty: 30 | Refills: 3
Start: 2023-03-14 | End: 2023-07-11

## 2023-03-14 RX ADMIN — Medication 40 MILLIGRAM(S): at 10:00

## 2023-03-14 RX ADMIN — Medication 25 MILLIGRAM(S): at 06:00

## 2023-03-14 NOTE — DISCHARGE NOTE PROVIDER - CARE PROVIDER_API CALL
JULISSA MCDONALD  Internal Medicine  [address] [place], [place] [address]  Phone: [phone]  Fax: [phone]  Follow Up Time:

## 2023-03-14 NOTE — DISCHARGE NOTE NURSING/CASE MANAGEMENT/SOCIAL WORK - PATIENT PORTAL LINK FT
You can access the FollowMyHealth Patient Portal offered by [facility] by registering at the following website: [email or web address]. By joining [employer]’s FollowMyHealth portal, you will also be able to view your health information using other applications (apps) compatible with our system.

## 2023-03-14 NOTE — DISCHARGE NOTE PROVIDER - CARE PROVIDERS DIRECT ADDRESSES
[email or web address]

## 2023-03-14 NOTE — DISCHARGE NOTE NURSING/CASE MANAGEMENT/SOCIAL WORK - NSDCPEFALRISK_GEN_ALL_CORE
For information on Fall & Injury Prevention, visit: [email or web address] OR  [email or web address] OR  https://www.cdc.gov/steadi/patient.html

## 2023-03-14 NOTE — DISCHARGE NOTE PROVIDER - NSDCCPCAREPLAN_GEN_ALL_CORE_FT
PRINCIPAL DISCHARGE DIAGNOSIS  Diagnosis: Chest pain  Assessment and Plan of Treatment: You had normal heart enzyme labwork, the chest pain was NOT due to a heart attack. You had a sonogram (echocardiogram) which showed normal pumping function of the heart a normal heart valves. Please call to schedule a follow up with your outpatient cardiologist Dr Chiang in 1-2 weeks for further evaluation.      SECONDARY DISCHARGE DIAGNOSES  Diagnosis: HTN (hypertension)  Assessment and Plan of Treatment: Please STOP amlodipine (norvasc) 10 mg QD. Please continue verapamil 40 mg three times per day.    Diagnosis: Chronic headache  Assessment and Plan of Treatment: Please follow up with your Neurologist Dr Elliott in 1-2 weeks.     PRINCIPAL DISCHARGE DIAGNOSIS  Diagnosis: Chest pain  Assessment and Plan of Treatment: You had normal heart enzyme labwork, the chest pain was NOT due to a heart attack. You had a sonogram (echocardiogram) which showed normal pumping function of the heart a normal heart valves. Please call to schedule a follow up with your outpatient cardiologist Dr Chiang in 1-2 weeks for further evaluation.      SECONDARY DISCHARGE DIAGNOSES  Diagnosis: HTN (hypertension)  Assessment and Plan of Treatment: Please STOP amlodipine (norvasc) 10 mg QD. Please continue verapamil 40 mg three times per day.    Diagnosis: Chronic headache  Assessment and Plan of Treatment: Please follow up with your Neurologist Dr Fady Steinberg in 1-2 weeks.     PRINCIPAL DISCHARGE DIAGNOSIS  Diagnosis: Chest pain  Assessment and Plan of Treatment: You had normal heart enzyme labwork, the chest pain was NOT due to a heart attack. You had a sonogram (echocardiogram) which showed normal pumping function of the heart a normal heart valves. Please call to schedule a follow up with your outpatient cardiologist Dr Chiang in 1-2 weeks for further evaluation.      SECONDARY DISCHARGE DIAGNOSES  Diagnosis: HTN (hypertension)  Assessment and Plan of Treatment: Please STOP amlodipine (norvasc) 10 mg QD. Please continue verapamil 40 mg three times per day.    Diagnosis: Chronic headache  Assessment and Plan of Treatment: Please follow up with your Neurologist Dr Fady Steinberg in 1-2 weeks.    Diagnosis: HLD (hyperlipidemia)  Assessment and Plan of Treatment: Please START lipitor 80 mg daily for your elevated cholesterol

## 2023-03-14 NOTE — DISCHARGE NOTE PROVIDER - NSDCMRMEDTOKEN_GEN_ALL_CORE_FT
amitriptyline 10 mg oral tablet: 1 tab(s) orally once a day (at bedtime)  cetirizine 10 mg oral tablet: 1 tab(s) orally once a day  hydrOXYzine hydrochloride 25 mg oral tablet: 1 tab(s) orally 4 times a day, As Needed  LORazepam 0.5 mg oral tablet: 1 tab(s) orally every 8 hours  metoprolol succinate 25 mg oral tablet, extended release: 1 tab(s) orally once a day  Norvasc 10 mg oral tablet: 1 tab(s) orally once a day  omeprazole 40 mg oral delayed release capsule: 1 cap(s) orally once a day   amitriptyline 10 mg oral tablet: 1 tab(s) orally once a day (at bedtime)  cetirizine 10 mg oral tablet: 1 tab(s) orally once a day  hydrOXYzine hydrochloride 25 mg oral tablet: 1 tab(s) orally 4 times a day, As Needed  LORazepam 0.5 mg oral tablet: 1 tab(s) orally every 8 hours  metoprolol succinate 25 mg oral tablet, extended release: 1 tab(s) orally once a day  Norvasc 10 mg oral tablet: 1 tab(s) orally once a day  omeprazole 40 mg oral delayed release capsule: 1 cap(s) orally once a day  verapamil 40 mg oral tablet: 1 tab(s) orally 3 times a day   amitriptyline 10 mg oral tablet: 1 tab(s) orally once a day (at bedtime)  atorvastatin 80 mg oral tablet: 1 tab(s) orally once a day (at bedtime)  cetirizine 10 mg oral tablet: 1 tab(s) orally once a day  hydrOXYzine hydrochloride 25 mg oral tablet: 1 tab(s) orally 4 times a day, As Needed  LORazepam 0.5 mg oral tablet: 1 tab(s) orally every 8 hours  metoprolol succinate 25 mg oral tablet, extended release: 1 tab(s) orally once a day  omeprazole 40 mg oral delayed release capsule: 1 cap(s) orally once a day  verapamil 40 mg oral tablet: 1 tab(s) orally 3 times a day

## 2023-03-14 NOTE — DISCHARGE NOTE PROVIDER - HOSPITAL COURSE
81 yo Marshallese speaking female with PMHx of HTN, HLD, gastritis, COVID, ? seizure who was recently seen at [facility] ED on 2/23/2023 for abdominal pain associate with mid chest discomfort, headache, and difficult swallowing s/p IVF and morphine and was dc'd home. Pt came back to [facility] ED and again c/o worsening chest discomfort from her jaw down to the midsternal radiating to left side of the heart and down her left arm. Describing it as aching lasting for about 30 mins before is subsiding on its own and have been having it for almost everyday.  Associate with severe headache and SOB. Per pt and pt's  stated she had one episode this morning after taking Tylenol with codeine. Since her last, she had followed up with her cardiologist (seen only once) and was told she need to get a cardiac stress, however she is not able to get one until April.  Upon arrival to the ED: 131/69, 96, 16, 98.2F, 100%RA. Lab significant for Trop 0.01, BUN/Cr 16/0.97. EKG NSR, CXR 3/11/2023: No evidence of acute cardiopulmonary disease.  CT PE protocol 2/23/2023: No PE, mild mosaic attenuation of the lungs is suggestive of small airways disease.  CTH 2/23/2023: No acute intracranial hemorrhage, mass effect, or hydrocephalus. US abdomen 2/23/2023: Somewhat limited evaluation of gallbladder secondary to overlying bowel gas. No acute pathology is visualized. Pt is admitted to cardiology/tele to r/o ACS. Trop negative x3, ECHO 3/13/23 with Mild symmetric LV Hypertrophy Normal LV and RV size and function, grade I diastolic dysfunction. Per pt's , stated seen the cardiologist first time in 2/2023 and was scheduled for echo in late March and NST in April. Patient and family refused NST while inpatient and will do outpatient stress.  Pt was medically ready for discharge on 3/13/23 but patient's family refused due to worsening of chronic headache. s/p Tylenol 650 mg x1 with improvement. Neurology team consulted and recommended discontinuing amlodipine and started on verapamil 40 mg TID. Per neuro, no further inpt w/u needed, stable for dc and f/u with oupt neurologist Dr Elliott. Pt given appropriate d/c instructions and verbalized understanding. Medications sent to preferred pharmacy. Pt deemed stable for d/c per Dr. Brizuela and will f/u with Dr. Dusty Chiang in 1-2 weeks.         81 yo [place] Islander speaking female with PMHx of HTN, HLD, gastritis, COVID, ? seizure who was recently seen at [facility] ED on 2/23/2023 for abdominal pain associate with mid chest discomfort, headache, and difficult swallowing s/p IVF and morphine and was dc'd home. Pt came back to [facility] ED and again c/o worsening chest discomfort from her jaw down to the midsternal radiating to left side of the heart and down her left arm. Describing it as aching lasting for about 30 mins before is subsiding on its own and have been having it for almost everyday.  Associate with severe headache and SOB. Per pt and pt's  stated she had one episode this morning after taking Tylenol with codeine. Since her last, she had followed up with her cardiologist (seen only once) and was told she need to get a cardiac stress, however she is not able to get one until April.  Upon arrival to the ED: 131/69, 96, 16, 98.2F, 100%RA. Lab significant for Trop 0.01, BUN/Cr 16/0.97. EKG NSR, CXR 3/11/2023: No evidence of acute cardiopulmonary disease.  CT PE protocol 2/23/2023: No PE, mild mosaic attenuation of the lungs is suggestive of small airways disease.  CTH 2/23/2023: No acute intracranial hemorrhage, mass effect, or hydrocephalus. US abdomen 2/23/2023: Somewhat limited evaluation of gallbladder secondary to overlying bowel gas. No acute pathology is visualized. Pt is admitted to cardiology/tele to r/o ACS. Trop negative x3, ECHO 3/13/23 with Mild symmetric LV Hypertrophy Normal LV and RV size and function, grade I diastolic dysfunction. Per pt's , stated seen the cardiologist first time in 2/2023 and was scheduled for echo in late March and NST in April. Patient and family refused NST while inpatient and will do outpatient stress.  Pt was medically ready for discharge on 3/13/23 but patient's family refused due to worsening of chronic headache. s/p Tylenol 650 mg x1 with improvement. Neurology team consulted and recommended discontinuing amlodipine and started on verapamil 40 mg TID. Per neuro, no further inpt w/u needed, stable for dc and f/u with oupt neurologist Dr Fady Steinberg. Pt given appropriate d/c instructions and verbalized understanding. Medications sent to preferred pharmacy. Pt deemed stable for d/c per Dr. Brizuela and will f/u with Dr. Dusty Chiang in 1-2 weeks.

## 2023-03-20 DIAGNOSIS — I10 ESSENTIAL (PRIMARY) HYPERTENSION: ICD-10-CM

## 2023-03-20 DIAGNOSIS — R07.9 CHEST PAIN, UNSPECIFIED: ICD-10-CM

## 2023-03-20 DIAGNOSIS — Z86.16 PERSONAL HISTORY OF COVID-19: ICD-10-CM

## 2023-03-20 DIAGNOSIS — E78.5 HYPERLIPIDEMIA, UNSPECIFIED: ICD-10-CM

## 2023-03-20 DIAGNOSIS — Z88.0 ALLERGY STATUS TO PENICILLIN: ICD-10-CM

## 2023-03-20 DIAGNOSIS — Z88.1 ALLERGY STATUS TO OTHER ANTIBIOTIC AGENTS STATUS: ICD-10-CM

## 2023-03-20 DIAGNOSIS — Z88.6 ALLERGY STATUS TO ANALGESIC AGENT: ICD-10-CM

## 2023-03-20 DIAGNOSIS — R51.9 HEADACHE, UNSPECIFIED: ICD-10-CM

## 2023-03-20 DIAGNOSIS — K29.70 GASTRITIS, UNSPECIFIED, WITHOUT BLEEDING: ICD-10-CM

## 2023-03-29 RX ORDER — ROSUVASTATIN CALCIUM 5 MG/1
1 TABLET ORAL
Qty: 0 | Refills: 0 | DISCHARGE

## 2023-03-29 RX ORDER — ESCITALOPRAM OXALATE 10 MG/1
1 TABLET, FILM COATED ORAL
Qty: 0 | Refills: 0 | DISCHARGE

## 2023-03-29 RX ORDER — AMLODIPINE BESYLATE 2.5 MG/1
1 TABLET ORAL
Qty: 0 | Refills: 0 | DISCHARGE

## 2023-03-29 RX ORDER — LOSARTAN POTASSIUM 100 MG/1
1 TABLET, FILM COATED ORAL
Qty: 0 | Refills: 0 | DISCHARGE

## 2023-03-29 RX ORDER — METOPROLOL TARTRATE 50 MG
0 TABLET ORAL
Qty: 0 | Refills: 0 | DISCHARGE

## 2023-03-29 RX ORDER — FUROSEMIDE 40 MG
1 TABLET ORAL
Qty: 0 | Refills: 0 | DISCHARGE

## 2023-03-29 RX ORDER — CHLORTHALIDONE 50 MG
1 TABLET ORAL
Qty: 0 | Refills: 0 | DISCHARGE

## 2023-03-29 RX ORDER — DIVALPROEX SODIUM 500 MG/1
1 TABLET, DELAYED RELEASE ORAL
Qty: 0 | Refills: 0 | DISCHARGE

## 2023-05-07 ENCOUNTER — EMERGENCY (EMERGENCY)
Facility: HOSPITAL | Age: 83
LOS: 1 days | Discharge: HOME | End: 2023-05-07
Attending: STUDENT IN AN ORGANIZED HEALTH CARE EDUCATION/TRAINING PROGRAM | Admitting: STUDENT IN AN ORGANIZED HEALTH CARE EDUCATION/TRAINING PROGRAM
Payer: MEDICARE

## 2023-05-07 VITALS
TEMPERATURE: 98 F | WEIGHT: 110.01 LBS | SYSTOLIC BLOOD PRESSURE: 149 MMHG | DIASTOLIC BLOOD PRESSURE: 74 MMHG | OXYGEN SATURATION: 97 % | HEIGHT: 61 IN | HEART RATE: 71 BPM | RESPIRATION RATE: 16 BRPM

## 2023-05-07 VITALS
OXYGEN SATURATION: 100 % | HEART RATE: 71 BPM | TEMPERATURE: 98 F | RESPIRATION RATE: 18 BRPM | DIASTOLIC BLOOD PRESSURE: 77 MMHG | SYSTOLIC BLOOD PRESSURE: 164 MMHG

## 2023-05-07 DIAGNOSIS — Z90.49 ACQUIRED ABSENCE OF OTHER SPECIFIED PARTS OF DIGESTIVE TRACT: Chronic | ICD-10-CM

## 2023-05-07 DIAGNOSIS — Z98.890 OTHER SPECIFIED POSTPROCEDURAL STATES: Chronic | ICD-10-CM

## 2023-05-07 PROBLEM — K29.70 GASTRITIS, UNSPECIFIED, WITHOUT BLEEDING: Chronic | Status: ACTIVE | Noted: 2023-02-23

## 2023-05-07 PROBLEM — I10 ESSENTIAL (PRIMARY) HYPERTENSION: Chronic | Status: ACTIVE | Noted: 2023-02-23

## 2023-05-07 LAB
ALBUMIN SERPL ELPH-MCNC: 4.1 G/DL — SIGNIFICANT CHANGE UP (ref 3.3–5)
ALP SERPL-CCNC: 116 U/L — SIGNIFICANT CHANGE UP (ref 40–120)
ALT FLD-CCNC: 13 U/L — SIGNIFICANT CHANGE UP (ref 10–45)
ANION GAP SERPL CALC-SCNC: 10 MMOL/L — SIGNIFICANT CHANGE UP (ref 5–17)
APTT BLD: 27.4 SEC — LOW (ref 27.5–35.5)
AST SERPL-CCNC: 20 U/L — SIGNIFICANT CHANGE UP (ref 10–40)
BASOPHILS # BLD AUTO: 0.03 K/UL — SIGNIFICANT CHANGE UP (ref 0–0.2)
BASOPHILS NFR BLD AUTO: 0.3 % — SIGNIFICANT CHANGE UP (ref 0–2)
BILIRUB SERPL-MCNC: 0.5 MG/DL — SIGNIFICANT CHANGE UP (ref 0.2–1.2)
BUN SERPL-MCNC: 12 MG/DL — SIGNIFICANT CHANGE UP (ref 7–23)
CALCIUM SERPL-MCNC: 9.9 MG/DL — SIGNIFICANT CHANGE UP (ref 8.4–10.5)
CHLORIDE SERPL-SCNC: 100 MMOL/L — SIGNIFICANT CHANGE UP (ref 96–108)
CO2 SERPL-SCNC: 25 MMOL/L — SIGNIFICANT CHANGE UP (ref 22–31)
CREAT SERPL-MCNC: 1.08 MG/DL — SIGNIFICANT CHANGE UP (ref 0.5–1.3)
EGFR: 51 ML/MIN/1.73M2 — LOW
EOSINOPHIL # BLD AUTO: 0.21 K/UL — SIGNIFICANT CHANGE UP (ref 0–0.5)
EOSINOPHIL NFR BLD AUTO: 2.3 % — SIGNIFICANT CHANGE UP (ref 0–6)
GLUCOSE SERPL-MCNC: 107 MG/DL — HIGH (ref 70–99)
HCT VFR BLD CALC: 40.6 % — SIGNIFICANT CHANGE UP (ref 34.5–45)
HGB BLD-MCNC: 13.8 G/DL — SIGNIFICANT CHANGE UP (ref 11.5–15.5)
IMM GRANULOCYTES NFR BLD AUTO: 0.1 % — SIGNIFICANT CHANGE UP (ref 0–0.9)
INR BLD: 1.06 — SIGNIFICANT CHANGE UP (ref 0.88–1.16)
LYMPHOCYTES # BLD AUTO: 2.01 K/UL — SIGNIFICANT CHANGE UP (ref 1–3.3)
LYMPHOCYTES # BLD AUTO: 22.2 % — SIGNIFICANT CHANGE UP (ref 13–44)
MCHC RBC-ENTMCNC: 29.6 PG — SIGNIFICANT CHANGE UP (ref 27–34)
MCHC RBC-ENTMCNC: 34 GM/DL — SIGNIFICANT CHANGE UP (ref 32–36)
MCV RBC AUTO: 86.9 FL — SIGNIFICANT CHANGE UP (ref 80–100)
MONOCYTES # BLD AUTO: 0.55 K/UL — SIGNIFICANT CHANGE UP (ref 0–0.9)
MONOCYTES NFR BLD AUTO: 6.1 % — SIGNIFICANT CHANGE UP (ref 2–14)
NEUTROPHILS # BLD AUTO: 6.25 K/UL — SIGNIFICANT CHANGE UP (ref 1.8–7.4)
NEUTROPHILS NFR BLD AUTO: 69 % — SIGNIFICANT CHANGE UP (ref 43–77)
NRBC # BLD: 0 /100 WBCS — SIGNIFICANT CHANGE UP (ref 0–0)
PLATELET # BLD AUTO: 311 K/UL — SIGNIFICANT CHANGE UP (ref 150–400)
POTASSIUM SERPL-MCNC: 4.3 MMOL/L — SIGNIFICANT CHANGE UP (ref 3.5–5.3)
POTASSIUM SERPL-SCNC: 4.3 MMOL/L — SIGNIFICANT CHANGE UP (ref 3.5–5.3)
PROT SERPL-MCNC: 8.1 G/DL — SIGNIFICANT CHANGE UP (ref 6–8.3)
PROTHROM AB SERPL-ACNC: 12.6 SEC — SIGNIFICANT CHANGE UP (ref 10.5–13.4)
RBC # BLD: 4.67 M/UL — SIGNIFICANT CHANGE UP (ref 3.8–5.2)
RBC # FLD: 11.7 % — SIGNIFICANT CHANGE UP (ref 10.3–14.5)
SODIUM SERPL-SCNC: 135 MMOL/L — SIGNIFICANT CHANGE UP (ref 135–145)
TROPONIN T SERPL-MCNC: 0.01 NG/ML — SIGNIFICANT CHANGE UP (ref 0–0.01)
WBC # BLD: 9.06 K/UL — SIGNIFICANT CHANGE UP (ref 3.8–10.5)
WBC # FLD AUTO: 9.06 K/UL — SIGNIFICANT CHANGE UP (ref 3.8–10.5)

## 2023-05-07 PROCEDURE — 84484 ASSAY OF TROPONIN QUANT: CPT

## 2023-05-07 PROCEDURE — 70450 CT HEAD/BRAIN W/O DYE: CPT | Mod: MA

## 2023-05-07 PROCEDURE — 85610 PROTHROMBIN TIME: CPT

## 2023-05-07 PROCEDURE — 93005 ELECTROCARDIOGRAM TRACING: CPT

## 2023-05-07 PROCEDURE — 70496 CT ANGIOGRAPHY HEAD: CPT | Mod: MA

## 2023-05-07 PROCEDURE — 70496 CT ANGIOGRAPHY HEAD: CPT | Mod: 26,MA

## 2023-05-07 PROCEDURE — 0042T: CPT | Mod: MA

## 2023-05-07 PROCEDURE — 82962 GLUCOSE BLOOD TEST: CPT

## 2023-05-07 PROCEDURE — 80053 COMPREHEN METABOLIC PANEL: CPT

## 2023-05-07 PROCEDURE — 85025 COMPLETE CBC W/AUTO DIFF WBC: CPT

## 2023-05-07 PROCEDURE — 36415 COLL VENOUS BLD VENIPUNCTURE: CPT

## 2023-05-07 PROCEDURE — 85730 THROMBOPLASTIN TIME PARTIAL: CPT

## 2023-05-07 PROCEDURE — 96374 THER/PROPH/DIAG INJ IV PUSH: CPT

## 2023-05-07 PROCEDURE — 99285 EMERGENCY DEPT VISIT HI MDM: CPT

## 2023-05-07 PROCEDURE — 70498 CT ANGIOGRAPHY NECK: CPT | Mod: MA

## 2023-05-07 PROCEDURE — 70498 CT ANGIOGRAPHY NECK: CPT | Mod: 26,MA

## 2023-05-07 PROCEDURE — 99285 EMERGENCY DEPT VISIT HI MDM: CPT | Mod: 25

## 2023-05-07 RX ORDER — MORPHINE SULFATE 50 MG/1
7.5 CAPSULE, EXTENDED RELEASE ORAL ONCE
Refills: 0 | Status: DISCONTINUED | OUTPATIENT
Start: 2023-05-07 | End: 2023-05-07

## 2023-05-07 RX ORDER — SODIUM CHLORIDE 9 MG/ML
1000 INJECTION, SOLUTION INTRAVENOUS ONCE
Refills: 0 | Status: COMPLETED | OUTPATIENT
Start: 2023-05-07 | End: 2023-05-07

## 2023-05-07 RX ORDER — METOCLOPRAMIDE HCL 10 MG
10 TABLET ORAL ONCE
Refills: 0 | Status: COMPLETED | OUTPATIENT
Start: 2023-05-07 | End: 2023-05-07

## 2023-05-07 RX ORDER — METOCLOPRAMIDE HCL 10 MG
10 TABLET ORAL ONCE
Refills: 0 | Status: DISCONTINUED | OUTPATIENT
Start: 2023-05-07 | End: 2023-05-07

## 2023-05-07 RX ORDER — ACETAMINOPHEN 500 MG
650 TABLET ORAL ONCE
Refills: 0 | Status: COMPLETED | OUTPATIENT
Start: 2023-05-07 | End: 2023-05-07

## 2023-05-07 RX ADMIN — MORPHINE SULFATE 7.5 MILLIGRAM(S): 50 CAPSULE, EXTENDED RELEASE ORAL at 18:20

## 2023-05-07 RX ADMIN — Medication 104 MILLIGRAM(S): at 16:15

## 2023-05-07 RX ADMIN — SODIUM CHLORIDE 1000 MILLILITER(S): 9 INJECTION, SOLUTION INTRAVENOUS at 16:14

## 2023-05-07 NOTE — ED PROVIDER NOTE - PATIENT PORTAL LINK FT
You can access the FollowMyHealth Patient Portal offered by [facility] by registering at the following website: [email or web address]. By joining [employer]’s FollowMyHealth portal, you will also be able to view your health information using other applications (apps) compatible with our system.

## 2023-05-07 NOTE — ED ADULT NURSE NOTE - NIH STROKE SCALE: 6B. MOTOR LEG, RIGHT, QM
----- Message from Baylee Leigh sent at 7/30/2019 11:42 AM EDT -----  Regarding: Dr. Karo Ndiaye refill  Pt is requesting a refill on \"clonazepam 1 mg\"  To be sent to the pharmacy on file. Best contact number [phone].
(0) No drift; leg holds 30 degree position for full 5 secs

## 2023-05-07 NOTE — ED ADULT TRIAGE NOTE - CHIEF COMPLAINT QUOTE
Pt presents to ED by EMS C/O acute onset HA, jaw numbness and L sided arm/ leg numbness starting at 10pm last night. Denies blood thinners. Took metoprolol today. Reports BP at home of 170's/90's. Pt presents to ED by EMS C/O acute onset HA, jaw numbness and L sided arm/ leg numbness starting at 10pm last night. Pt A&O x4 upon arrival, denies vision changes. Denies blood thinners. Took metoprolol today. Reports BP at home of 170's/90's. Pt Citizen of [place] Speaking, hospital  used. Stroke Code called.

## 2023-05-07 NOTE — ED ADULT NURSE NOTE - CHIEF COMPLAINT QUOTE
Pt presents to ED by EMS C/O acute onset HA, jaw numbness and L sided arm/ leg numbness starting at 10pm last night. Pt A&O x4 upon arrival, denies vision changes. Denies blood thinners. Took metoprolol today. Reports BP at home of 170's/90's. Pt [place]ese Speaking, hospital  used. Stroke Code called.

## 2023-05-07 NOTE — CONSULT NOTE ADULT - ASSESSMENT
82y Female with PMHx of HTN, HLD, gastritis COVID 1/2023 presents with elevated BP, HA and left sided numbness. Patient and  shares that patient since getting COVID, she has been having daily headaches with associated chest pain radiating to left arm and epigastric region and left face, arm and leg numbness. Patient was concerned about her SBP 180s today along with HA and left sided numbness so EMS was called. On presentation, /74. She continues to endorse left sided face, arm and leg numbness. CTH/CTP with no significant findings. 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 NIHSS 1.    Symptoms are chronic x 4 months since COVID diagnosis. No suspicion of stroke. Recommend cardiac w/u. No further stroke w/u needed. Patient can continue to f/u with outpatient neurologist for headache management.    Case discussed with Dr. Brizuela

## 2023-05-07 NOTE — ED ADULT NURSE NOTE - OBJECTIVE STATEMENT
Pt present to ED via EMS, Pt presents to ED via EMS, d/t severe headache, L sided numbness to face arm and leg, and chest pain starting at approx. 2200 on 5/6/23. During exam pt reported that all these symptoms have been present for months now, but headache was so bad, she needed to come to the ED. Pt is A+ox4, Citizen of [place] speaking, accompanied by . BEFAST positive for diminished sensation/numbness. Denies dizziness, vision change. Gait is steady, no nystagmus, facial droop, slurred speech, aphasia or extremity drop noted. Denies SOB, n/v/d, fever, chills, urinary or GI symptoms. Ambulates with minimal assist. Stroke code called at 1418, CT done, IV line placed, labs done, MD Littlejohn and SANTIAGO Banuelos at bedside assessing pt. On cardiac monitor.

## 2023-05-07 NOTE — ED PROVIDER NOTE - PHYSICAL EXAMINATION
CONST: nontoxic NAD speaking in full sentences  HEAD: atraumatic  EYES: conjunctivae clear, PERRL, EOMI  ENT: mmm  NECK: supple/FROM, nttp  CARD: rrr   CHEST: no stridor/retractions/tripoding  ABD: soft, nd, nttp, no rebound/guarding  EXT: FROM, symmetric distal pulses intact  SKIN: warm, dry, no rash  NEURO: a+ox3, no facial asymmetry, no aphasia, PERRL, EOMI, no neglect, CN II-XII intact, ftn wnl, neg pronator, 5/5 strength x4, gross sensation intact x4 however reports subjective decrease to L side, normal gait

## 2023-05-07 NOTE — ED PROVIDER NOTE - CLINICAL SUMMARY MEDICAL DECISION MAKING FREE TEXT BOX
Stroke code called upon arrival due to headache and left-sided numbness within 24 hours.  Patient without focal neuro deficits but does endorse decreased sensation to the left side.  Taken emergently to CT scan, CT/CTA without acute stroke, hemorrhage, brain mass.  EKG NSR, troponin negative, other labs unremarkable.  Patient declined Tylenol, was given Reglan and morphine for symptom management.  Patient has a neurologist and cardiologist, cleared by stroke team, discussed with patient and  need to follow-up outpatient for further management of symptoms

## 2023-05-07 NOTE — ED ADULT NURSE NOTE - CAS EDN DISCHARGE ASSESSMENT
----- Message from Shannon Murillo sent at 9/14/2020 10:37 AM CDT -----  Contact: SELF [phone]  Patient would like to speak with you about getting test results. Please advise.     Alert and oriented to person, place and time/Awake

## 2023-05-07 NOTE — CONSULT NOTE ADULT - SUBJECTIVE AND OBJECTIVE BOX
**STROKE CODE CONSULT NOTE**  Mexican  [id number]  Last known well time/Time of onset of symptoms: 5/6 10PM    HPI: 82y Female with PMHx of HTN, HLD, gastritis COVID 1/2023 presents with elevated BP, HA and left sided numbness. Patient and  shares that patient since getting COVID, she has been having daily headaches with associated chest pain radiating to left arm and epigastric region and left face, arm and leg numbness. No weakness. Headaches will always start first and associated symptoms will follow. BP at home has been fluctuating with highest in the 150s, baseline 120-130. Patient was concerned about her SBP 180s today along with HA and left sided numbness so EMS was called. On presentation, /74. She continues to endorse left sided face, arm and leg numbness. No weakness, changes in vision, slurred speech. CTH/CTP with no significant findings. CTA with 2.5mm R Acomm artery, focal narrowing of R P2 from intracranial athero. NIHSS 1.    Patient was recently discharged 2/2023 and 3/2023 after presenting with similar symptoms.   2/2023: p/w abd pain with chest discomfort that resolved after IVF and morphine. Discharged home.   4/2023: p/w chest pain from jaw to midsternum radiating to left arm. Trop x 3 neg. Echo LV hypertrophy, normal LV/RV function. CT PE neg. CTH neg. US abd neg. Saw cardiologist x1 2/2023 and scheduled for NST April. Neurology consulted for HA management and discharged with verapamil and f/u with Dr. Fady Steinberg.     T(C): 36.7 (05-07-23 @ 14:05), Max: 36.7 (05-07-23 @ 14:05)  HR: 76 (05-07-23 @ 14:50) (71 - 76)  BP: 166/85 (05-07-23 @ 14:50) (149/74 - 166/85)  RR: 19 (05-07-23 @ 14:50) (16 - 19)  SpO2: 100% (05-07-23 @ 14:50) (97% - 100%)    PAST MEDICAL & SURGICAL HISTORY:  HTN (hypertension)      Gastritis      S/P appendectomy      S/P lumpectomy, right breast          FAMILY HISTORY:  FH: HTN (hypertension) (Father, Mother)        SOCIAL HISTORY:   Patient lives with   Smoking status: no  Drinking: no  Drug Use: no    ROS:   Constitutional: No fever, weight loss or fatigue  Eyes: No eye pain, visual disturbances, or discharge  ENMT:  No difficulty hearing, tinnitus; No sinus or throat pain  Neck: No pain or stiffness  Respiratory: No cough, wheezing, chills or hemoptysis  Cardiovascular: +CP  Gastrointestinal: No abdominal pain. No nausea, vomiting or hematemesis; No diarrhea or constipation. Nohematochezia.  Genitourinary: No dysuria, frequency, hematuria or incontinence  Neurological: As per HPI    MEDICATIONS  (STANDING):    MEDICATIONS  (PRN):    Allergies    aspirin (Angioedema)  sulfa drugs (Anaphylaxis)  tetracycline (Unknown)  penicillin (Unknown)    Intolerances      Vital Signs Last 24 Hrs  T(C): 36.7 (07 May 2023 14:05), Max: 36.7 (07 May 2023 14:05)  T(F): 98 (07 May 2023 14:05), Max: 98 (07 May 2023 14:05)  HR: 76 (07 May 2023 14:50) (71 - 76)  BP: 166/85 (07 May 2023 14:50) (149/74 - 166/85)  BP(mean): --  RR: 19 (07 May 2023 14:50) (16 - 19)  SpO2: 100% (07 May 2023 14:50) (97% - 100%)    Parameters below as of 07 May 2023 14:50  Patient On (Oxygen Delivery Method): room air        Physical exam:  Constitutional: No acute distress, conversant  Eyes: Anicteric sclerae, moist conjunctivae, see below for CNs  Neck: trachea midline, FROM, supple, no thyromegaly or lymphadenopathy  Cardiovascular: Regular rate and rhythm on tele  Pulmonary: No use of accessory muscles  GI: Abdomen soft, non-distended, non-tender  Extremities: no edema    Neurologic:  -Mental status: Awake, alert, oriented to person, place, and time. Speech is fluent with intact naming, repetition, and comprehension, no dysarthria. Recent and remote memory intact. Follows commands. Attention/concentration intact. Fund of knowledge appropriate.  -Cranial nerves:   II: Visual fields are full to confrontation.  III, IV, VI: Extraocular movements are intact without nystagmus. Pupils equally round and reactive to light  V: Decreased sensation along L V1  VII: Face is symmetric with normal eye closure and smile  VIII: Hearing is bilaterally intact   Motor: Normal bulk and tone. No pronator drift. Strength bilateral upper extremity 5/5, bilateral lower extremities 5/5.  Sensation: Decreased sensation along left arm and leg. No neglect or extinction on double simultaneous testing.  Coordination: No dysmetria on finger-to-nose bilaterally    NIHSS: 1    Fingerstick Blood Glucose: CAPILLARY BLOOD GLUCOSE      POCT Blood Glucose.: 94 mg/dL (07 May 2023 14:05)    LABS:                        13.8   9.06  )-----------( 311      ( 07 May 2023 14:09 )             40.6           PT/INR - ( 07 May 2023 14:09 )   PT: 12.6 sec;   INR: 1.06          PTT - ( 07 May 2023 14:09 )  PTT:27.4 sec          RADIOLOGY & ADDITIONAL STUDIES:  < from: CT Brain Stroke Protocol (05.07.23 @ 14:27) >  INTRA-AXIAL: No intracranial mass effect, acute hemorrhage, midline shift   or acute transcortical infarct is seen. There are moderate patchy and   confluent areas of hypodensity in the periventricular white matter,   nonspecific but most likely result of small vessel ischemic disease in   this patient.    < end of copied text >  < from: CT Brain Perfusion Maps Stroke (05.07.23 @ 14:28) >    IMPRESSION: Normal CT perfusion study.    < end of copied text >  < from: CT Angio Brain Stroke Protocol  w/ IV Cont (05.07.23 @ 14:29) >  IMPRESSION: No large vessel occlusion. 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 Focal   area of moderate narrowing involving the right P2 segment.    < end of copied text >  < from: CT Angio Neck Stroke Protocol w/ IV Cont (05.07.23 @ 14:30) >  FINDINGS: The CTA examination demonstrates the right common carotid   artery to be normal in caliber. There is a normal bifurcation into the   right internal and external carotid arteries. There is no hemodynamically   significant stenosis.    The left common carotid artery is normal in caliber. There is a normal   bifurcation into the left internal and external carotid arteries. There   is no hemodynamically significant stenosis.    The vertebral arteries are normal in caliber.    The aortic arch appears intact without narrowing of the origin of the   great vessels.      < end of copied text >

## 2023-05-07 NOTE — ED PROVIDER NOTE - OBJECTIVE STATEMENT
81-year-old female with HTN, HLD, COVID 1/23 comes in for headache and left-sided numbness since 10 PM on 5/6.  Patient and  state that patient has been getting the symptoms for the past 4 months ever since she had COVID with severe headaches and tingling to the left side of her body.  No weakness, no speech deficits, no vision changes.  Patient had 2 prior visits here for similar symptoms.

## 2023-05-07 NOTE — ED PROVIDER NOTE - NSFOLLOWUPINSTRUCTIONS_ED_ALL_ED_FT
There was no sign of stroke or bleeding in your brain. No masses. Please follow up with your neurologist for further management of your headaches and tingling x 4 months.    Headache    A headache is pain or discomfort felt around the head or neck area. The specific cause of a headache may not be found as there are many types including tension headaches, migraine headaches, and cluster headaches. Watch your condition for any changes. Things you can do to manage your pain include taking over the counter and prescription medications as instructed by your health care provider, lying down in a dark quiet room, limiting stress, getting regular sleep, and refraining from alcohol and tobacco products.    SEEK IMMEDIATE MEDICAL CARE IF YOU HAVE ANY OF THE FOLLOWING SYMPTOMS: fever, vomiting, stiff neck, loss of vision, problems with speech, muscle weakness, loss of balance, trouble walking, passing out, or confusion.

## 2023-05-09 DIAGNOSIS — Z88.0 ALLERGY STATUS TO PENICILLIN: ICD-10-CM

## 2023-05-09 DIAGNOSIS — R51.9 HEADACHE, UNSPECIFIED: ICD-10-CM

## 2023-05-09 DIAGNOSIS — Z86.16 PERSONAL HISTORY OF COVID-19: ICD-10-CM

## 2023-05-09 DIAGNOSIS — I10 ESSENTIAL (PRIMARY) HYPERTENSION: ICD-10-CM

## 2023-05-09 DIAGNOSIS — Z87.19 PERSONAL HISTORY OF OTHER DISEASES OF THE DIGESTIVE SYSTEM: ICD-10-CM

## 2023-05-09 DIAGNOSIS — Z88.2 ALLERGY STATUS TO SULFONAMIDES: ICD-10-CM

## 2023-05-09 DIAGNOSIS — E78.5 HYPERLIPIDEMIA, UNSPECIFIED: ICD-10-CM

## 2023-05-09 DIAGNOSIS — R10.13 EPIGASTRIC PAIN: ICD-10-CM

## 2023-05-09 DIAGNOSIS — Z88.1 ALLERGY STATUS TO OTHER ANTIBIOTIC AGENTS STATUS: ICD-10-CM

## 2023-05-09 DIAGNOSIS — R20.2 PARESTHESIA OF SKIN: ICD-10-CM

## 2023-05-09 DIAGNOSIS — Z90.49 ACQUIRED ABSENCE OF OTHER SPECIFIED PARTS OF DIGESTIVE TRACT: ICD-10-CM

## 2023-05-09 DIAGNOSIS — Z88.6 ALLERGY STATUS TO ANALGESIC AGENT: ICD-10-CM

## 2023-05-09 DIAGNOSIS — Z90.11 ACQUIRED ABSENCE OF RIGHT BREAST AND NIPPLE: ICD-10-CM

## 2023-07-06 ENCOUNTER — INPATIENT (INPATIENT)
Facility: HOSPITAL | Age: 83
LOS: 1 days | Discharge: HOME | DRG: 103 | End: 2023-07-08
Attending: PSYCHIATRY & NEUROLOGY | Admitting: PSYCHIATRY & NEUROLOGY
Payer: MEDICARE

## 2023-07-06 VITALS
OXYGEN SATURATION: 96 % | DIASTOLIC BLOOD PRESSURE: 70 MMHG | HEIGHT: 61 IN | TEMPERATURE: 98 F | HEART RATE: 71 BPM | SYSTOLIC BLOOD PRESSURE: 112 MMHG | WEIGHT: 132.06 LBS | RESPIRATION RATE: 18 BRPM

## 2023-07-06 DIAGNOSIS — Z90.49 ACQUIRED ABSENCE OF OTHER SPECIFIED PARTS OF DIGESTIVE TRACT: Chronic | ICD-10-CM

## 2023-07-06 DIAGNOSIS — Z98.890 OTHER SPECIFIED POSTPROCEDURAL STATES: Chronic | ICD-10-CM

## 2023-07-06 LAB
ALBUMIN SERPL ELPH-MCNC: 3.8 G/DL — SIGNIFICANT CHANGE UP (ref 3.3–5)
ALBUMIN SERPL ELPH-MCNC: 4 G/DL — SIGNIFICANT CHANGE UP (ref 3.3–5)
ALP SERPL-CCNC: 103 U/L — SIGNIFICANT CHANGE UP (ref 40–120)
ALP SERPL-CCNC: 106 U/L — SIGNIFICANT CHANGE UP (ref 40–120)
ALT FLD-CCNC: 15 U/L — SIGNIFICANT CHANGE UP (ref 10–45)
ALT FLD-CCNC: 9 U/L — LOW (ref 10–45)
ANION GAP SERPL CALC-SCNC: 14 MMOL/L — SIGNIFICANT CHANGE UP (ref 5–17)
ANION GAP SERPL CALC-SCNC: 9 MMOL/L — SIGNIFICANT CHANGE UP (ref 5–17)
APTT BLD: 24.4 SEC — LOW (ref 27.5–35.5)
APTT BLD: 31.6 SEC — SIGNIFICANT CHANGE UP (ref 27.5–35.5)
AST SERPL-CCNC: 15 U/L — SIGNIFICANT CHANGE UP (ref 10–40)
AST SERPL-CCNC: 33 U/L — SIGNIFICANT CHANGE UP (ref 10–40)
BASOPHILS # BLD AUTO: 0.02 K/UL — SIGNIFICANT CHANGE UP (ref 0–0.2)
BASOPHILS # BLD AUTO: 0.02 K/UL — SIGNIFICANT CHANGE UP (ref 0–0.2)
BASOPHILS NFR BLD AUTO: 0.3 % — SIGNIFICANT CHANGE UP (ref 0–2)
BASOPHILS NFR BLD AUTO: 0.3 % — SIGNIFICANT CHANGE UP (ref 0–2)
BILIRUB SERPL-MCNC: 0.3 MG/DL — SIGNIFICANT CHANGE UP (ref 0.2–1.2)
BILIRUB SERPL-MCNC: 0.3 MG/DL — SIGNIFICANT CHANGE UP (ref 0.2–1.2)
BUN SERPL-MCNC: 14 MG/DL — SIGNIFICANT CHANGE UP (ref 7–23)
BUN SERPL-MCNC: 15 MG/DL — SIGNIFICANT CHANGE UP (ref 7–23)
CALCIUM SERPL-MCNC: 8.9 MG/DL — SIGNIFICANT CHANGE UP (ref 8.4–10.5)
CALCIUM SERPL-MCNC: 9.6 MG/DL — SIGNIFICANT CHANGE UP (ref 8.4–10.5)
CHLORIDE SERPL-SCNC: 95 MMOL/L — LOW (ref 96–108)
CHLORIDE SERPL-SCNC: 99 MMOL/L — SIGNIFICANT CHANGE UP (ref 96–108)
CO2 SERPL-SCNC: 22 MMOL/L — SIGNIFICANT CHANGE UP (ref 22–31)
CO2 SERPL-SCNC: 25 MMOL/L — SIGNIFICANT CHANGE UP (ref 22–31)
CREAT SERPL-MCNC: 0.98 MG/DL — SIGNIFICANT CHANGE UP (ref 0.5–1.3)
CREAT SERPL-MCNC: 1.02 MG/DL — SIGNIFICANT CHANGE UP (ref 0.5–1.3)
EGFR: 54 ML/MIN/1.73M2 — LOW
EGFR: 57 ML/MIN/1.73M2 — LOW
EOSINOPHIL # BLD AUTO: 0.2 K/UL — SIGNIFICANT CHANGE UP (ref 0–0.5)
EOSINOPHIL # BLD AUTO: 0.2 K/UL — SIGNIFICANT CHANGE UP (ref 0–0.5)
EOSINOPHIL NFR BLD AUTO: 2.7 % — SIGNIFICANT CHANGE UP (ref 0–6)
EOSINOPHIL NFR BLD AUTO: 3 % — SIGNIFICANT CHANGE UP (ref 0–6)
GLUCOSE SERPL-MCNC: 104 MG/DL — HIGH (ref 70–99)
GLUCOSE SERPL-MCNC: 94 MG/DL — SIGNIFICANT CHANGE UP (ref 70–99)
HCT VFR BLD CALC: 36.5 % — SIGNIFICANT CHANGE UP (ref 34.5–45)
HCT VFR BLD CALC: 37.6 % — SIGNIFICANT CHANGE UP (ref 34.5–45)
HGB BLD-MCNC: 12.9 G/DL — SIGNIFICANT CHANGE UP (ref 11.5–15.5)
HGB BLD-MCNC: 13 G/DL — SIGNIFICANT CHANGE UP (ref 11.5–15.5)
IMM GRANULOCYTES NFR BLD AUTO: 0 % — SIGNIFICANT CHANGE UP (ref 0–0.9)
IMM GRANULOCYTES NFR BLD AUTO: 0.3 % — SIGNIFICANT CHANGE UP (ref 0–0.9)
INR BLD: 1.02 — SIGNIFICANT CHANGE UP (ref 0.88–1.16)
INR BLD: 1.06 — SIGNIFICANT CHANGE UP (ref 0.88–1.16)
LYMPHOCYTES # BLD AUTO: 2.05 K/UL — SIGNIFICANT CHANGE UP (ref 1–3.3)
LYMPHOCYTES # BLD AUTO: 2.1 K/UL — SIGNIFICANT CHANGE UP (ref 1–3.3)
LYMPHOCYTES # BLD AUTO: 28.2 % — SIGNIFICANT CHANGE UP (ref 13–44)
LYMPHOCYTES # BLD AUTO: 30.5 % — SIGNIFICANT CHANGE UP (ref 13–44)
MCHC RBC-ENTMCNC: 29.3 PG — SIGNIFICANT CHANGE UP (ref 27–34)
MCHC RBC-ENTMCNC: 29.6 PG — SIGNIFICANT CHANGE UP (ref 27–34)
MCHC RBC-ENTMCNC: 34.6 GM/DL — SIGNIFICANT CHANGE UP (ref 32–36)
MCHC RBC-ENTMCNC: 35.3 GM/DL — SIGNIFICANT CHANGE UP (ref 32–36)
MCV RBC AUTO: 83.7 FL — SIGNIFICANT CHANGE UP (ref 80–100)
MCV RBC AUTO: 84.7 FL — SIGNIFICANT CHANGE UP (ref 80–100)
MONOCYTES # BLD AUTO: 0.39 K/UL — SIGNIFICANT CHANGE UP (ref 0–0.9)
MONOCYTES # BLD AUTO: 0.49 K/UL — SIGNIFICANT CHANGE UP (ref 0–0.9)
MONOCYTES NFR BLD AUTO: 5.8 % — SIGNIFICANT CHANGE UP (ref 2–14)
MONOCYTES NFR BLD AUTO: 6.6 % — SIGNIFICANT CHANGE UP (ref 2–14)
NEUTROPHILS # BLD AUTO: 4.07 K/UL — SIGNIFICANT CHANGE UP (ref 1.8–7.4)
NEUTROPHILS # BLD AUTO: 4.62 K/UL — SIGNIFICANT CHANGE UP (ref 1.8–7.4)
NEUTROPHILS NFR BLD AUTO: 60.4 % — SIGNIFICANT CHANGE UP (ref 43–77)
NEUTROPHILS NFR BLD AUTO: 61.9 % — SIGNIFICANT CHANGE UP (ref 43–77)
NRBC # BLD: 0 /100 WBCS — SIGNIFICANT CHANGE UP (ref 0–0)
NRBC # BLD: 0 /100 WBCS — SIGNIFICANT CHANGE UP (ref 0–0)
PLATELET # BLD AUTO: 283 K/UL — SIGNIFICANT CHANGE UP (ref 150–400)
PLATELET # BLD AUTO: 317 K/UL — SIGNIFICANT CHANGE UP (ref 150–400)
POTASSIUM SERPL-MCNC: 4.3 MMOL/L — SIGNIFICANT CHANGE UP (ref 3.5–5.3)
POTASSIUM SERPL-MCNC: 5.3 MMOL/L — SIGNIFICANT CHANGE UP (ref 3.5–5.3)
POTASSIUM SERPL-SCNC: 4.3 MMOL/L — SIGNIFICANT CHANGE UP (ref 3.5–5.3)
POTASSIUM SERPL-SCNC: 5.3 MMOL/L — SIGNIFICANT CHANGE UP (ref 3.5–5.3)
PROT SERPL-MCNC: 7.2 G/DL — SIGNIFICANT CHANGE UP (ref 6–8.3)
PROT SERPL-MCNC: 7.9 G/DL — SIGNIFICANT CHANGE UP (ref 6–8.3)
PROTHROM AB SERPL-ACNC: 12.2 SEC — SIGNIFICANT CHANGE UP (ref 10.5–13.4)
PROTHROM AB SERPL-ACNC: 12.6 SEC — SIGNIFICANT CHANGE UP (ref 10.5–13.4)
RBC # BLD: 4.36 M/UL — SIGNIFICANT CHANGE UP (ref 3.8–5.2)
RBC # BLD: 4.44 M/UL — SIGNIFICANT CHANGE UP (ref 3.8–5.2)
RBC # FLD: 12 % — SIGNIFICANT CHANGE UP (ref 10.3–14.5)
RBC # FLD: 12.1 % — SIGNIFICANT CHANGE UP (ref 10.3–14.5)
SODIUM SERPL-SCNC: 129 MMOL/L — LOW (ref 135–145)
SODIUM SERPL-SCNC: 135 MMOL/L — SIGNIFICANT CHANGE UP (ref 135–145)
TROPONIN T, HIGH SENSITIVITY RESULT: 13 NG/L — SIGNIFICANT CHANGE UP (ref 0–51)
TROPONIN T, HIGH SENSITIVITY RESULT: 7 NG/L — SIGNIFICANT CHANGE UP (ref 0–51)
TROPONIN T, HIGH SENSITIVITY RESULT: 9 NG/L — SIGNIFICANT CHANGE UP (ref 0–51)
WBC # BLD: 6.73 K/UL — SIGNIFICANT CHANGE UP (ref 3.8–10.5)
WBC # BLD: 7.45 K/UL — SIGNIFICANT CHANGE UP (ref 3.8–10.5)
WBC # FLD AUTO: 6.73 K/UL — SIGNIFICANT CHANGE UP (ref 3.8–10.5)
WBC # FLD AUTO: 7.45 K/UL — SIGNIFICANT CHANGE UP (ref 3.8–10.5)

## 2023-07-06 PROCEDURE — 70450 CT HEAD/BRAIN W/O DYE: CPT | Mod: 26,MA,XU

## 2023-07-06 PROCEDURE — 70498 CT ANGIOGRAPHY NECK: CPT | Mod: 26,MA

## 2023-07-06 PROCEDURE — 99291 CRITICAL CARE FIRST HOUR: CPT

## 2023-07-06 PROCEDURE — 70496 CT ANGIOGRAPHY HEAD: CPT | Mod: 26,MA

## 2023-07-06 PROCEDURE — 0042T: CPT | Mod: MA

## 2023-07-06 RX ORDER — METOCLOPRAMIDE HCL 10 MG
10 TABLET ORAL ONCE
Refills: 0 | Status: COMPLETED | OUTPATIENT
Start: 2023-07-06 | End: 2023-07-06

## 2023-07-06 RX ORDER — MAGNESIUM SULFATE 500 MG/ML
1 VIAL (ML) INJECTION ONCE
Refills: 0 | Status: DISCONTINUED | OUTPATIENT
Start: 2023-07-06 | End: 2023-07-06

## 2023-07-06 RX ORDER — SODIUM CHLORIDE 9 MG/ML
1000 INJECTION INTRAMUSCULAR; INTRAVENOUS; SUBCUTANEOUS ONCE
Refills: 0 | Status: COMPLETED | OUTPATIENT
Start: 2023-07-06 | End: 2023-07-06

## 2023-07-06 RX ORDER — ATORVASTATIN CALCIUM 80 MG/1
80 TABLET, FILM COATED ORAL AT BEDTIME
Refills: 0 | Status: DISCONTINUED | OUTPATIENT
Start: 2023-07-06 | End: 2023-07-08

## 2023-07-06 RX ORDER — ENOXAPARIN SODIUM 100 MG/ML
40 INJECTION SUBCUTANEOUS EVERY 24 HOURS
Refills: 0 | Status: DISCONTINUED | OUTPATIENT
Start: 2023-07-06 | End: 2023-07-08

## 2023-07-06 RX ORDER — ACETAMINOPHEN 500 MG
1000 TABLET ORAL ONCE
Refills: 0 | Status: DISCONTINUED | OUTPATIENT
Start: 2023-07-06 | End: 2023-07-06

## 2023-07-06 RX ADMIN — Medication 104 MILLIGRAM(S): at 14:55

## 2023-07-06 RX ADMIN — Medication 1 CAPSULE(S): at 15:32

## 2023-07-06 RX ADMIN — SODIUM CHLORIDE 1000 MILLILITER(S): 9 INJECTION INTRAMUSCULAR; INTRAVENOUS; SUBCUTANEOUS at 15:17

## 2023-07-06 NOTE — H&P ADULT - NSICDXPASTMEDICALHX_GEN_ALL_CORE_FT
Internal Medicine Office Visit  [facility]   Patient Name: Marni Fonseca  changed Marni Fonseca's codeine-guaiFENesin. Aileen Fink MD;  Location: [facility];  Service: General     [place] Tyra Rodriguez, CNP     PAST MEDICAL HISTORY:  Gastritis     HTN (hypertension)

## 2023-07-06 NOTE — CONSULT NOTE ADULT - SUBJECTIVE AND OBJECTIVE BOX
**STROKE CODE CONSULT NOTE**    Last known well time/Time of onset of symptoms:    HPI: 83y Female with PMHx of HTN, HLD, gastritis COVID 1/2023 presents with  HA and left sided numbness. Pt states  that she felt fine last night when she went to bed at 9:30pm (LKW) however when she woke up at 2am she had a headache and started to feel her L face, arm and leg numbness. She checked her BP later in the day while she still had a headache and BP was ~180. She then took clonidine however the L face, arm and leg numbness and headache continued. Patient and  state that she went to an ED ([facility]) for headache and L sided numbness x 1 week ago. Pt was discharged as having a migraine and given Quilpta. Pt states that this headache and numbness is worse than last time. Pt was also here in May with elevated BP, head and L face. arm and leg numbness. Pt also endorses throat, chest, and epigastric pain with these headaches and numbness. On presentation, /74. She continues to endorse left sided face, arm and leg numbness. No weakness, changes in vision, slurred speech. CTH/CTP with no significant findings. 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 CTA today pending. NIHSS 1.    T(C): 36.7 (07-06-23 @ 14:01), Max: 36.9 (07-06-23 @ 13:18)  HR: 73 (07-06-23 @ 14:01) (71 - 73)  BP: 170/72 (07-06-23 @ 14:01) (112/70 - 170/72)  RR: 16 (07-06-23 @ 14:01) (16 - 18)  SpO2: 98% (07-06-23 @ 14:01) (96% - 98%)    PAST MEDICAL & SURGICAL HISTORY:  HTN (hypertension)  Complex Migraine  Gastritis  S/P appendectomy    S/P lumpectomy, right breast          FAMILY HISTORY:  FH: HTN (hypertension) (Father, Mother)        SOCIAL HISTORY:   Patient lives with  at home    ROS:   Constitutional: No fever, weight loss or fatigue  Eyes: No eye pain, visual disturbances, or discharge  ENMT:  No difficulty hearing, tinnitus; No sinus or throat pain  Neck: No pain or stiffness  Respiratory: No cough, wheezing, chills or hemoptysis  Cardiovascular: No chest pain, palpitations, shortness of breath, or leg swelling  Gastrointestinal: No abdominal pain. No nausea, vomiting or hematemesis; No diarrhea or constipation. Nohematochezia.  Genitourinary: No dysuria, frequency, hematuria or incontinence  Neurological: As per HPI  Skin: No itching, burning, rashes or lesions   Endocrine: No heat or cold intolerance; No hair loss  Musculoskeletal: No joint pain or swelling; No muscle, back or extremity pain  Heme/Lymph: No easy bruising or bleeding gums    MEDICATIONS  (STANDING):  acetaminophen 300 mG/butalbital 50 mG/ caffeine 40 mG 1 Capsule(s) Oral Once  metoclopramide  IVPB 10 milliGRAM(s) IV Intermittent Once  sodium chloride 0.9% Bolus 1000 milliLiter(s) IV Bolus once    MEDICATIONS  (PRN):    Allergies    tetracycline (Unknown)  aspirin (Angioedema)  penicillin (Unknown)  sulfa drugs (Anaphylaxis)    Intolerances      Vital Signs Last 24 Hrs  T(C): 36.7 (06 Jul 2023 14:01), Max: 36.9 (06 Jul 2023 13:18)  T(F): 98 (06 Jul 2023 14:01), Max: 98.4 (06 Jul 2023 13:18)  HR: 73 (06 Jul 2023 14:01) (71 - 73)  BP: 170/72 (06 Jul 2023 14:01) (112/70 - 170/72)  BP(mean): --  RR: 16 (06 Jul 2023 14:01) (16 - 18)  SpO2: 98% (06 Jul 2023 14:01) (96% - 98%)    Parameters below as of 06 Jul 2023 14:01  Patient On (Oxygen Delivery Method): room air        Physical exam:  Constitutional: No acute distress, conversant  Eyes: Anicteric sclerae, moist conjunctivae, see below for CNs  Neck: trachea midline, FROM, supple, no thyromegaly or lymphadenopathy  Cardiovascular: Regular rate and rhythm, no murmurs, rubs, or gallops. No carotid bruits.   Pulmonary: Anterior breath sounds clear bilaterally, no crackles or wheezing. No use of accessory muscles  GI: Abdomen soft, non-distended, non-tender  Extremities: Radial and DP pulses +2, no edema    Neurologic:  -Mental status: Awake, alert, oriented to person, place, and time. Speech is fluent with intact naming, repetition, and comprehension, no dysarthria. Recent and remote memory intact. Follows commands. Attention/concentration intact. Fund of knowledge appropriate.  -Cranial nerves:   II: Visual fields are full to confrontation.  III, IV, VI: Extraocular movements are intact without nystagmus. Pupils equally round and reactive to light  V:  Facial sensation V1-V3 equal and intact   VII: Face is symmetric with normal eye closure and smile  VIII: Hearing is bilaterally intact to finger rub  IX, X: Uvula is midline and soft palate rises symmetrically  XI: Head turning and shoulder shrug are intact.  XII: Tongue protrudes midline  Motor: Normal bulk and tone. No pronator drift. Strength bilateral upper extremity 5/5, bilateral lower extremities 5/5.  Rapid alternating movements intact and symmetric  Sensation: Intact to light touch bilaterally. No neglect or extinction on double simultaneous testing.  Coordination: No dysmetria on finger-to-nose and heel-to-shin bilaterally  Reflexes: Downgoing toes bilaterally   Gait: Narrow gait and steady    NIHSS: **** ASPECT Score: ***** ICH Score: ****** (GCS)    Fingerstick Blood Glucose: CAPILLARY BLOOD GLUCOSE  93 (06 Jul 2023 14:31)      POCT Blood Glucose.: 93 mg/dL (06 Jul 2023 13:21)    LABS:                        12.9   6.73  )-----------( 283      ( 06 Jul 2023 13:37 )             36.5           PT/INR - ( 06 Jul 2023 13:37 )   PT: 12.6 sec;   INR: 1.06          PTT - ( 06 Jul 2023 13:37 )  PTT:31.6 sec          RADIOLOGY & ADDITIONAL STUDIES:      -----------------------------------------------------------------------------------------------------------------  IV-tNK (Y/N):    ***                              Bolus time:    Tenecteplase Dose Verification w/ RN:  Reason IV-tNK not given: ***    **STROKE CODE CONSULT NOTE**    [place]  ID #[id number]    Last known well time: 7/5/23 at 930pm                        Time of onset of symptoms: 7/5/23 at 2am    HPI: 83y [place] speaking Female with PMHx of HTN, HLD, gastritis COVID 1/2023 presents with  HA and left sided numbness. Pt states  that she felt fine last night when she went to bed at 9:30pm (LKW) however when she woke up at 2am she had a headache and started to feel her L face, arm and leg numbness. She checked her BP later in the day while she still had a headache and BP was ~180. She then took clonidine however the L face, arm and leg numbness and headache continued. Patient and  state that she went to an ED ([facility]) for headache and L sided numbness x 1 week ago. Pt was discharged as having a migraine and given Quilpta. Pt states that this headache and numbness is worse than last time. Pt was also here in May with elevated BP, head and L face. arm and leg numbness. Pt also endorses throat, chest, and epigastric pain with these headaches and numbness. On presentation, /74. She continues to endorse left sided face, arm and leg numbness. No weakness, changes in vision, slurred speech. CTH/CTP with no significant findings. CTA with no large vessel occlusion. 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 Focal area of moderate narrowing involving the right P2 segment. NIHSS 1.    T(C): 36.7 (07-06-23 @ 14:01), Max: 36.9 (07-06-23 @ 13:18)  HR: 73 (07-06-23 @ 14:01) (71 - 73)  BP: 170/72 (07-06-23 @ 14:01) (112/70 - 170/72)  RR: 16 (07-06-23 @ 14:01) (16 - 18)  SpO2: 98% (07-06-23 @ 14:01) (96% - 98%)    PAST MEDICAL & SURGICAL HISTORY:  HTN (hypertension)  Complex Migraine  Gastritis  S/P appendectomy    S/P lumpectomy, right breast          FAMILY HISTORY:  FH: HTN (hypertension) (Father, Mother)        SOCIAL HISTORY:   Patient lives with  at home    ROS:   Constitutional: No fever, weight loss or fatigue  Eyes: No eye pain, visual disturbances, or discharge  ENMT:  No difficulty hearing, tinnitus; No sinus or throat pain  Neck: No pain or stiffness  Respiratory: No cough, wheezing, chills or hemoptysis  Cardiovascular: No chest pain, palpitations, shortness of breath, or leg swelling  Gastrointestinal: No abdominal pain. No nausea, vomiting or hematemesis; No diarrhea or constipation. No hematochezia.  Genitourinary: No dysuria, frequency, hematuria or incontinence  Neurological: As per HPI  Skin: No itching, burning, rashes or lesions   Endocrine: No heat or cold intolerance; No hair loss  Musculoskeletal: No joint pain or swelling; No muscle, back or extremity pain  Heme/Lymph: No easy bruising or bleeding gums    MEDICATIONS  (STANDING):  acetaminophen 300 mG/butalbital 50 mG/ caffeine 40 mG 1 Capsule(s) Oral Once  metoclopramide  IVPB 10 milliGRAM(s) IV Intermittent Once  sodium chloride 0.9% Bolus 1000 milliLiter(s) IV Bolus once    MEDICATIONS  (PRN):    Allergies    tetracycline (Unknown)  aspirin (Angioedema)  penicillin (Unknown)  sulfa drugs (Anaphylaxis)    Intolerances      Vital Signs Last 24 Hrs  T(C): 36.7 (06 Jul 2023 14:01), Max: 36.9 (06 Jul 2023 13:18)  T(F): 98 (06 Jul 2023 14:01), Max: 98.4 (06 Jul 2023 13:18)  HR: 73 (06 Jul 2023 14:01) (71 - 73)  BP: 170/72 (06 Jul 2023 14:01) (112/70 - 170/72)  BP(mean): --  RR: 16 (06 Jul 2023 14:01) (16 - 18)  SpO2: 98% (06 Jul 2023 14:01) (96% - 98%)    Parameters below as of 06 Jul 2023 14:01  Patient On (Oxygen Delivery Method): room air        Physical exam:  Constitutional: No acute distress, conversant  Eyes: Anicteric sclerae, moist conjunctivae, see below for CNs  Neck: trachea midline  Cardiovascular: Regular rate and rhythm  Pulmonary: Anterior breath sounds clear bilaterally, no crackles or wheezing. No use of accessory muscles  GI: Abdomen soft, non-distended, non-tender  Extremities: no edema    Neurologic:  -Mental status: Awake, alert, oriented to person, place, and time. Speech is fluent with intact naming, repetition, and comprehension, no dysarthria. Recent and remote memory intact. Follows commands. Attention/concentration intact. Fund of knowledge appropriate.  -Cranial nerves:   II: Visual fields are full to confrontation.  III, IV, VI: Extraocular movements are intact without nystagmus. Pupils equally round and reactive to light  V:  Decreased sensation V1-V3 (50%)   VII: Face is symmetric with normal eye closure and smile  VIII: Hearing is bilaterally intact to voice  XI: Head turning and shoulder shrug are intact.  XII: Tongue protrudes midline  Motor: Normal bulk and tone. No pronator drift. Strength bilateral upper extremity 5/5, bilateral lower extremities 5/5.  Rapid alternating movements intact and symmetric  Sensation: Decreased sensation in L arm and leg (50%) No neglect or extinction on double simultaneous testing.  Coordination: No dysmetria on finger-to-nose and heel-to-shin bilaterally  Gait: Narrow gait and steady    NIHSS: 1    Fingerstick Blood Glucose: CAPILLARY BLOOD GLUCOSE  93 (06 Jul 2023 14:31)      POCT Blood Glucose.: 93 mg/dL (06 Jul 2023 13:21)    LABS:                        12.9   6.73  )-----------( 283      ( 06 Jul 2023 13:37 )             36.5           PT/INR - ( 06 Jul 2023 13:37 )   PT: 12.6 sec;   INR: 1.06          PTT - ( 06 Jul 2023 13:37 )  PTT:31.6 sec          RADIOLOGY & ADDITIONAL STUDIES:      CT Brain Stroke Protocol (07.06.23 @ 13:48) >  No acute intracranial hemorrhage or transcortical infarction.      CT Angio Brain Stroke Protocol  w/ IV Cont (07.06.23 @ 13:53) >  IMPRESSION: No large vessel occlusion. Approximately 2.6 mm lobular   aneurysm involving the right anterior communicating artery/A1/A2 complex.   Focal area of moderate narrowing involving the right P2 segment.      -----------------------------------------------------------------------------------------------------------------  IV-tNK (Y/N): N                              Bolus time:    Tenecteplase Dose Verification w/ RN:  Reason IV-tNK not given: low stroke severity score

## 2023-07-06 NOTE — ED ADULT NURSE NOTE - OTHER COMPLAINTS
Reports symptoms started at 2am. Patient also reports dizziness, vision change and left sided numbness at 2am, reports dizziness and vision have improved but continues to have weakness on left side of body. Reports taking Clonidine prior to coming to ED.

## 2023-07-06 NOTE — ED PROVIDER NOTE - PHYSICAL EXAMINATION
VITAL SIGNS: I have reviewed nursing notes and confirm.  CONSTITUTIONAL: Well appearing, in no acute distress.   SKIN:  warm and dry, no acute rash.   HEAD:  normocephalic, atraumatic.  EYES: EOM intact; conjunctiva and sclera clear.  ENT: No nasal discharge; airway clear.   NECK: Supple.  CARD: S1, S2, Regular rate and rhythm.   RESP:  Clear to auscultation b/l, no wheezes, rales or rhonchi.  ABD: Normal bowel sounds; soft; non-distended; non-tender; no guarding/ rebound.  EXT: Normal ROM. No peripheral edema. Pulses intact and equal b/l.  NEURO: Decreased sensation to left upper face and left extremities. AAO x 3, CN II-XII intact, normal speech, strength 5/5 bilateral upper and lower extremities, cerebellum intact, no ataxia, follows commands appropriately   PSYCH: Cooperative, mood and affect appropriate. VITAL SIGNS: I have reviewed nursing notes and confirm.  CONSTITUTIONAL: Well appearing, in no acute distress.   SKIN:  warm and dry, no acute rash.   HEAD:  normocephalic, atraumatic.  EYES: PERRLA. EOM intact; conjunctiva and sclera clear.  ENT: No nasal discharge; airway clear.   NECK: Supple.  CARD: S1, S2, Regular rate and rhythm.   RESP:  Clear to auscultation b/l, no wheezes, rales or rhonchi.  ABD: Normal bowel sounds; soft; non-distended; non-tender; no guarding/ rebound.  EXT: Normal ROM. No peripheral edema. Pulses intact and equal b/l.  NEURO: Decreased sensation to left face, left upper and lower extremities. AAO x 3, CN II-XII intact, normal speech, strength 5/5 bilateral upper and lower extremities, no ataxia, follows commands appropriately.  PSYCH: Cooperative, mood and affect appropriate.

## 2023-07-06 NOTE — ED ADULT NURSE NOTE - OBJECTIVE STATEMENT
Last well known time was last night at approx 2100. pt woke up this am at 0200 w/ migraine and reports htn at home. pt took medication for migraine this AM but woke up w/ numbness on left side. Pt denies vomiting, cp, fevers, dizziness, chills. Pt has 6/10 pain in head. Pt reports nausea.  Pt reports numbness to left side since this am. Pt A&Ox4 and able to speak in complete sentences. pt breathing even and unlabored with equal chest rise and fall. pt endorsed headache and migraine. pt arrived d/t worsening of headache. pt hx of migraine but numbness is new and left is decrease on the left side. pt having difficulty ambulating. pt denies blurry vision. pt pupils PERRLA and no deficits present. pt motor strength unchanged.

## 2023-07-06 NOTE — H&P ADULT - HISTORY OF PRESENT ILLNESS
**STROKE HPI***    HPI: 83y English speaking Female with PMHx of HTN, HLD, gastritis COVID 1/2023 presents with  HA and left sided numbness. Pt states  that she felt fine last night when she went to bed at 9:30pm (LKW) however when she woke up at 2am she had a headache and started to feel her L face, arm and leg numbness. She checked her BP later in the day while she still had a headache and BP was ~180. She then took clonidine however the L face, arm and leg numbness and headache continued. Patient and  state that she went to an ED ([facility]) for headache and L sided numbness x 1 week ago. Pt was discharged as having a migraine and given Quilpta. Pt states that this headache and numbness is worse than last time. Pt was also here in May with elevated BP, head and L face. arm and leg numbness. Pt also endorses throat, chest, and epigastric pain with these headaches and numbness.  **STROKE HPI***    HPI: 83y [place] citizen speaking Female with PMHx of HTN, HLD, gastritis COVID 1/2023 presents with  HA and left sided numbness. Pt states  that she felt fine last night when she went to bed at 9:30pm (LKW) however when she woke up at 2am she had a headache and started to feel her L face, arm and leg numbness. She checked her BP later in the day while she still had a headache and BP was ~180. She then took clonidine however the L face, arm and leg numbness and headache continued. Pt presented to [facility] ED 5/2023 with elevated BP, head and L face. arm and leg numbness at this time pt also endorsed epigastric and L arm/chest pain, cardiology workup was done with no findings. Patient  brought discharge instructions (in [place] citizen) that show she went to  [facility] ED on 6/28/2023  for headache and syncope. Pt discharge instructions state to "ask how to take" Eliquis and Quilpta as current medications. Pt states she does not take eliquis. When asked if she takes blood thinners she states her cardiologist prescribed her a month ago but she hasn't picked it up. Pt denies it being aspirin and denies having irregular heart beats. When asked if it was apixiban pt states "something like that". Unclear if she was ever taken eliquis or why it was prescribed. Pt states today's headache and numbness is worse than last time. Pt also endorses throat, chest, and epigastric pain with these headaches and numbness at this visit.

## 2023-07-06 NOTE — ED ADULT NURSE NOTE - NSFALLRISKINTERV_ED_ALL_ED

## 2023-07-06 NOTE — ED PROVIDER NOTE - OBJECTIVE STATEMENT
83 fevers, with a PMHx of HTN, HLD, gastritis, COVID 1/2023 presents to the ED c/o severe headaches which began last night. Pt states she awoke at 2am today with left sided facial, arm, and leg numbness. No associated speech changes, gait difficulty, visual changes, focal weakness, chest pain, or SOB. Pt BP was noted to be elevated at home. Of note pt seen  in ED on 5/7 for similar complaints and had a negative stroke work up. A [place]  was used ([id number]). 83 fevers, with a PMHx of HTN, HLD, gastritis, COVID 1/2023 presents to the ED c/o severe headache which began last night. Pt states she awoke at 2am today with left sided facial, arm, and leg numbness. No associated speech changes, gait difficulty, visual changes, focal weakness, chest pain, or SOB. Pt's BP was noted to be elevated at home and pt took her prn clonidine (in addition to her usual antihypertensives) with improvement. Of note pt was seen  in ED on 5/7/23 for similar complaints and had a negative stroke work up at the time. A [place]ian  was used for translation ([id number]).

## 2023-07-06 NOTE — H&P ADULT - ASSESSMENT
83y Female with PMHx of Complex migraines, HTN, HLD, gastritis COVID 1/2023 presents to [facility] ED with HA and left sided numbness since 2am this AM.  On presentation, /72, pt continues to endorse left sided face, arm and leg numbness. NIHSS: 1.  No weakness, changes in vision, slurred speech. CTH/CTP with no significant findings. CTA with no large vessel occlusion. 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 Focal area of moderate narrowing involving the right P2 segment. NIHSS 1. Pt has previously presented with HA and left-sided numbness several times and previously been referred for outpatient management. Admitted for further work up and ruling out vascular etiology given P2 focal narrowing on CTA.    83y Female with PMHx of Complex migraines, HTN, HLD, gastritis COVID 1/2023 presents to [facility] ED with HA and left sided numbness since 2am this AM.  On presentation, /72, pt continues to endorse left sided face, arm and leg numbness. NIHSS: 1.  No weakness, changes in vision, slurred speech. CTH/CTP with no significant findings. CTA with no large vessel occlusion. 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 Focal area of moderate narrowing involving the right P2 segment. NIHSS 1. Pt has previously presented with HA and left-sided numbness several times and previously been referred for outpatient management. Admitted for further work up and ruling out vascular etiology given P2 focal narrowing on CTA.     Neuro  #CVA workup  - continue aspirin 81mg and plavix 75mg daily  - continue atorvastatin 80mg daily  - q4hr stroke neuro checks and vitals  - obtain MRI Brain without contrast  - Stroke Code HCT Results:   - Stroke Code CTA Results:  - Stroke education    Cards  #HTN  - permissive hypertension, Goal -180  - hold home blood pressure medication for now  - obtain TTE with bubble  - Stroke Code EKG Results:    #HLD  - high dose statin as above in CVA  - LDL results:     Pulm  - call provider if SPO2 < 94%    GI  #Nutrition/Fluids/Electrolytes   - replete K<4 and Mg <2  - Diet:  - IVF:     Renal  - monitor and trend Cr    Infectious Disease  - Stroke Code CXR results:   - afebrile on admission, no leukocytosis    Endocrine  #DM  - A1C results:   - ISS    - TSH results:    DVT Prophylaxis  - lovenox sq for DVT prophylaxis   - SCDs for DVT prophylaxis       IDR Goals: Goals reviewed at interdisciplinary rounds with case management, social work, physical therapy, occupational therapy, and speech language pathology.   Please see specific therapy  notes for in depth goals.  Dispo: **********(Acute rehab - can tolerate 3 hours of therapy)     Discussed daily hospital plans and goals with patient and family at bedside. (Called and updated family.)    Discussed with Stroke Fellow Dr. Jacques, Stroke Attending Dr. Trevino  83y Female with PMHx of Complex migraines, HTN, HLD, gastritis COVID 1/2023 presents to [facility] ED with HA and left sided numbness since 2am this AM.  On presentation, /72, pt continues to endorse left sided face, arm and leg numbness. NIHSS: 1.  No weakness, changes in vision, slurred speech. CTH/CTP with no significant findings. CTA with no large vessel occlusion. 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 Focal area of moderate narrowing involving the right P2 segment. NIHSS 1. Pt has previously presented with HA and left-sided numbness several times and previously been referred for outpatient management. Admitted for further work up and ruling out vascular etiology given P2 focal narrowing on CTA.     Neuro  #CVA workup  - continue eliquis 2.5mg BID - determine reasoning for eliquis   - start atorvastatin 80mg daily  - q4hr stroke neuro checks and vitals  - obtain MRI Brain without contrast  - Stroke Code HCT Results: negative  - Stroke Code CTA Results: focal area of moderate narrowing of right P2. 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 complex   - Stroke education    Cards  #HTN  - permissive hypertension, Goal -180  - hold home blood pressure medication for now  - TTE 03/23 with EF 60-65%, normal sized left atrium     #HLD  - high dose statin as above in CVA  - LDL results: pending     Pulm  - call provider if SPO2 < 94%    GI  #Nutrition/Fluids/Electrolytes   - replete K<4 and Mg <2  - Diet: DASH    Renal  - monitor and trend Cr    Infectious Disease  - Stroke Code CXR results:   - afebrile on admission, no leukocytosis    Endocrine  #DM  - A1C results: pending    - TSH results: pending     DVT Prophylaxis  - on Eliquis 2.5mg BID   - SCDs for DVT prophylaxis       IDR Goals: Goals reviewed at interdisciplinary rounds with case management, social work, physical therapy, occupational therapy, and speech language pathology.   Please see specific therapy  notes for in depth goals.  Dispo: pending PT/OT     Discussed daily hospital plans and goals with patient and family at bedside    Discussed with Stroke Fellow Dr. Jacques, Stroke Attending Dr. Trevino  83y Female with PMHx of Complex migraines, HTN, HLD, gastritis COVID 1/2023 presents to [facility] ED with HA and left sided numbness since 2am this AM.  On presentation, /72, pt continues to endorse left sided face, arm and leg numbness. NIHSS: 1.  No weakness, changes in vision, slurred speech. CTH/CTP with no significant findings. CTA with no large vessel occlusion. 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 Focal area of moderate narrowing involving the right P2 segment. NIHSS 1. Pt has previously presented with HA and left-sided numbness several times and previously been referred for outpatient management. Admitted for further work up and ruling out vascular etiology given P2 focal narrowing on CTA.     Neuro  #CVA workup  - Start ASA 81mg   - start atorvastatin 80mg daily  - q4hr stroke neuro checks and vitals  - obtain MRI Brain without contrast  - Stroke Code HCT Results: negative  - Stroke Code CTA Results: focal area of moderate narrowing of right P2. 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 complex   - Call cardiologist and pharmacy to see if eliquis was ever prescribed/picked up; unclear   - Stroke education    Cards  #HTN  - permissive hypertension, Goal -180  - hold home blood pressure medication for now  - TTE 03/23 with EF 60-65%, normal sized left atrium     #HLD  - high dose statin as above in CVA  - LDL results: pending     Pulm  - call provider if SPO2 < 94%    GI  #Nutrition/Fluids/Electrolytes   - replete K<4 and Mg <2  - Diet: DASH    Renal  - monitor and trend Cr    Infectious Disease  - Stroke Code CXR results:   - afebrile on admission, no leukocytosis    Endocrine  #DM  - A1C results: pending    - TSH results: pending     DVT Prophylaxis  - Start lovenox SQ   - SCDs for DVT prophylaxis       IDR Goals: Goals reviewed at interdisciplinary rounds with case management, social work, physical therapy, occupational therapy, and speech language pathology.   Please see specific therapy  notes for in depth goals.    Dispo: pending PT/OT     Discussed daily hospital plans and goals with patient and family at bedside    Discussed with Stroke Fellow Dr. Jacques, Stroke Attending Dr. Trevino  83y Female with PMHx of Complex migraines, HTN, HLD, gastritis COVID 1/2023 presents to [facility] ED with HA and left sided numbness since 2am this AM.  On presentation, /72, pt continues to endorse left sided face, arm and leg numbness. NIHSS: 1.  No weakness, changes in vision, slurred speech. CTH/CTP with no significant findings. CTA with no large vessel occlusion. 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 Focal area of moderate narrowing involving the right P2 segment. NIHSS 1. Pt has previously presented with HA and left-sided numbness several times and previously been referred for outpatient management. Admitted for further work up and ruling out vascular etiology given P2 focal narrowing on CTA.     Neuro  #CVA workup  - start atorvastatin 80mg daily  - q4hr stroke neuro checks and vitals  - obtain MRI Brain without contrast  - Stroke Code HCT Results: negative  - Stroke Code CTA Results: focal area of moderate narrowing of right P2. 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 complex   - Call cardiologist and pharmacy to see if eliquis was ever prescribed/picked up; unclear   - Pt with allergy to ASA; "angioedema"  - Stroke education    Cards  #HTN  - permissive hypertension, Goal -180  - hold home blood pressure medication for now  - TTE 03/23 with EF 60-65%, normal sized left atrium     #HLD  - high dose statin as above in CVA  - LDL results: pending     Pulm  - call provider if SPO2 < 94%    GI  #Nutrition/Fluids/Electrolytes   - replete K<4 and Mg <2  - Diet: DASH    Renal  - monitor and trend Cr    Infectious Disease  - Stroke Code CXR results:   - afebrile on admission, no leukocytosis    Endocrine  #DM  - A1C results: pending    - TSH results: pending     DVT Prophylaxis  - Start lovenox SQ   - SCDs for DVT prophylaxis       IDR Goals: Goals reviewed at interdisciplinary rounds with case management, social work, physical therapy, occupational therapy, and speech language pathology.   Please see specific therapy  notes for in depth goals.    Dispo: pending PT/OT     Discussed daily hospital plans and goals with patient and family at bedside    Discussed with Stroke Fellow Dr. Jacques, Stroke Attending Dr. Trevino

## 2023-07-06 NOTE — H&P ADULT - NSHPREVIEWOFSYSTEMS_GEN_ALL_CORE
ROS:   Constitutional: No fever, weight loss or fatigue  Eyes: No eye pain, visual disturbances, or discharge  ENMT:  No difficulty hearing, tinnitus, vertigo; + throat pain + swelling   Neck: No pain or stiffness  Respiratory: No cough, wheezing, chills or hemoptysis  Cardiovascular: No chest pain, palpitations, shortness of breath, dizziness or leg swelling  Gastrointestinal: No abdominal pain. No nausea, vomiting or hematemesis; No diarrhea or constipation. No hematochezia.  Genitourinary: No dysuria, frequency, hematuria or incontinence  Neurological: As per HPI  Skin: No itching, burning, rashes or lesions   Endocrine: No heat or cold intolerance; No hair loss  Musculoskeletal: No joint pain or swelling; No muscle, back or extremity pain  Psychiatric: No depression, anxiety, mood swings or difficulty sleeping  Heme/Lymph: No easy bruising or bleeding gums

## 2023-07-06 NOTE — H&P ADULT - NSHPLABSRESULTS_GEN_ALL_CORE
Vital Signs Last 24 Hrs  T(C): 36.7 (06 Jul 2023 14:01), Max: 36.9 (06 Jul 2023 13:18)  T(F): 98 (06 Jul 2023 14:01), Max: 98.4 (06 Jul 2023 13:18)  HR: 73 (06 Jul 2023 14:01) (71 - 73)  BP: 170/72 (06 Jul 2023 14:01) (112/70 - 170/72)  BP(mean): --  RR: 16 (06 Jul 2023 14:01) (16 - 18)  SpO2: 98% (06 Jul 2023 14:01) (96% - 98%)    Parameters below as of 06 Jul 2023 14:01  Patient On (Oxygen Delivery Method): room air    Fingerstick Blood Glucose: CAPILLARY BLOOD GLUCOSE  93 (06 Jul 2023 14:31)      POCT Blood Glucose.: 93 mg/dL (06 Jul 2023 13:21)    LABS:                        12.9   6.73  )-----------( 283      ( 06 Jul 2023 13:37 )             36.5     07-06    129<L>  |  95<L>  |  14  ----------------------------<  94  4.3   |  25  |  0.98    Ca    8.9      06 Jul 2023 13:37    TPro  7.2  /  Alb  4.0  /  TBili  0.3  /  DBili  x   /  AST  15  /  ALT  9<L>  /  AlkPhos  103  07-06    PT/INR - ( 06 Jul 2023 13:37 )   PT: 12.6 sec;   INR: 1.06          PTT - ( 06 Jul 2023 13:37 )  PTT:31.6 sec      Urinalysis Basic - ( 06 Jul 2023 13:37 )    Color: x / Appearance: x / SG: x / pH: x  Gluc: 94 mg/dL / Ketone: x  / Bili: x / Urobili: x   Blood: x / Protein: x / Nitrite: x   Leuk Esterase: x / RBC: x / WBC x   Sq Epi: x / Non Sq Epi: x / Bacteria: x        RADIOLOGY & ADDITIONAL STUDIES:    HCT:     CTA: Vital Signs Last 24 Hrs  T(C): 36.7 (06 Jul 2023 14:01), Max: 36.9 (06 Jul 2023 13:18)  T(F): 98 (06 Jul 2023 14:01), Max: 98.4 (06 Jul 2023 13:18)  HR: 73 (06 Jul 2023 14:01) (71 - 73)  BP: 170/72 (06 Jul 2023 14:01) (112/70 - 170/72)  BP(mean): --  RR: 16 (06 Jul 2023 14:01) (16 - 18)  SpO2: 98% (06 Jul 2023 14:01) (96% - 98%)    Parameters below as of 06 Jul 2023 14:01  Patient On (Oxygen Delivery Method): room air    Fingerstick Blood Glucose: CAPILLARY BLOOD GLUCOSE  93 (06 Jul 2023 14:31)      POCT Blood Glucose.: 93 mg/dL (06 Jul 2023 13:21)    LABS:                        12.9   6.73  )-----------( 283      ( 06 Jul 2023 13:37 )             36.5     07-06    129<L>  |  95<L>  |  14  ----------------------------<  94  4.3   |  25  |  0.98    Ca    8.9      06 Jul 2023 13:37    TPro  7.2  /  Alb  4.0  /  TBili  0.3  /  DBili  x   /  AST  15  /  ALT  9<L>  /  AlkPhos  103  07-06    PT/INR - ( 06 Jul 2023 13:37 )   PT: 12.6 sec;   INR: 1.06          PTT - ( 06 Jul 2023 13:37 )  PTT:31.6 sec      Urinalysis Basic - ( 06 Jul 2023 13:37 )    Color: x / Appearance: x / SG: x / pH: x  Gluc: 94 mg/dL / Ketone: x  / Bili: x / Urobili: x   Blood: x / Protein: x / Nitrite: x   Leuk Esterase: x / RBC: x / WBC x   Sq Epi: x / Non Sq Epi: x / Bacteria: x        RADIOLOGY & ADDITIONAL STUDIES:    < from: CT Brain Stroke Protocol (07.06.23 @ 13:48) >  IMPRESSION:  No acute intracranial hemorrhage or transcortical infarction.    < from: CT Brain Perfusion Maps Stroke (07.06.23 @ 13:52) >  IMPRESSION:  Negative CT perfusion study.    < from: CT Angio Brain Stroke Protocol  w/ IV Cont (07.06.23 @ 13:53) >  IMPRESSION: No large vessel occlusion. Approximately 2.6 mm lobular   aneurysm involving the right anterior communicating artery/A1/A2 complex.   Focal area of moderate narrowing involving the right P2 segment.    < from: CT Angio Brain Stroke Protocol  w/ IV Cont (07.06.23 @ 13:53) >  IMPRESSION: Normal CTA of the neck.

## 2023-07-06 NOTE — ED ADULT TRIAGE NOTE - OTHER COMPLAINTS
Reports symptoms started at 2am. Patient also reports dizziness, vision change and left sided numbness at 2am, denies at present time. Reports taking Clonidine prior to coming to ED. Reports symptoms started at 2am. Patient also reports dizziness, vision change and left sided numbness at 2am, reports dizziness and vision have improved but continues to have weakness on left side of body. Reports taking Clonidine prior to coming to ED.

## 2023-07-06 NOTE — PATIENT PROFILE ADULT - FALL HARM RISK - HARM RISK INTERVENTIONS

## 2023-07-06 NOTE — H&P ADULT - NSHPPHYSICALEXAM_GEN_ALL_CORE
Physical exam:  General: No acute distress, awake and alert  Cardiovascular: Regular rate and rhythm on monitor  Pulmonary: No use of accessory muscles  GI: Abdomen soft, non-tender, non-distended    Neurologic:  -Mental status: Awake, alert, oriented to person, place, and time. Speech is fluent with intact naming, repetition, and comprehension, no dysarthria. Recent and remote memory intact with probing. Follows simple and complex commands with repeated instruction. Attention/concentration intact.   -Cranial nerves:   II: Visual fields are full to confrontation.  III, IV, VI: Extraocular movements are intact without nystagmus. Pupils equally round and reactive to light  V:  Decreased sensation (50%) in L V1-V3  VII: Face is symmetric with normal eye closure and smile  VIII: Hearing is bilaterally intact to finger rub  XI: Head turning and shoulder shrug are intact.  XII: Tongue protrudes midline  Motor: Normal bulk and tone. No pronator drift. Strength bilateral upper extremity 5/5, bilateral lower extremities 5/5.  Rapid alternating movements intact and symmetric  Sensation: Decreased sensation (50%) in L arm and leg. No neglect or extinction on double simultaneous testing.  Coordination: No dysmetria on finger-to-nose and heel-to-shin bilaterally  Gait: Narrow gait and steady    NIHSS: 1

## 2023-07-06 NOTE — ED PROVIDER NOTE - CLINICAL SUMMARY MEDICAL DECISION MAKING FREE TEXT BOX
Impression:   Pt with a PMHx of HTN, HLD, gastritis, and COVID 1/2023 here with onset of headaches since last night and left sided numbness upon waking later. Last known normal at 9pm before going to bed. Decreased sensation to left side of face and left extremities on exam. Stroke code activated. Obtained CT head which was negative for acute findings. Low suspicions for ischemic etiology. Suspect symptoms are due to complex migraine. Pt hasn't responded well to magnesium and Tylenol in the past. Will give Ofirmev, Fioricet, and Reglan. Impression:   Pt with a PMHx of HTN, HLD, gastritis, and COVID 1/2023 here with onset of headaches since last night and left sided numbness upon waking later. Last known normal at 9pm before going to bed. Decreased sensation to left side of face and left extremities on exam. Stroke code activated. Obtained CT head which was negative for acute findings. Low suspicions for ischemic etiology. Suspect symptoms are due to complex migraine. Pt hasn't responded well to magnesium and Tylenol in the past. Will give Ofirmev, Fioricet, and Reglan.    Sx's improved after medications with no more dizziness. Ha now 4/10 pain scale (down from 10/10 on arrival). Spoke w/ stroke who will admit to [facility] for MRI as inpt. Impression:   Pt with a PMHx of HTN, HLD, gastritis, and COVID 1/2023 here with onset of headache since last night and left sided numbness upon awakening at 2a today. Last known normal at 9pm before going to bed. Decreased sensation to left side of face and left upper and loewr extremities on exam. Stroke code activated. CTH negative for acute bleed/ infarct. CTA H/N neg for large vessel occlusion; + approximately 2.6 mm lobular aneurysm involving the right anterior communicating artery/A1/A2 complex; focal area of moderate narrowing involving the right P2 segment. Pt not a TNK candidate as sx's outside of window w/ low suspicion for ischemic etiology. ? symptoms due to complex migraine. Pt hasn't responded well to magnesium and Tylenol in the past. Will give Fioricet, Reglan and IVF.    Sx's improved after medications with no more dizziness. Ha now 4/10 pain scale (down from 10/10 on arrival). Spoke w/ stroke who will admit to [facility] for MRI brain as inpt.

## 2023-07-07 ENCOUNTER — TRANSCRIPTION ENCOUNTER (OUTPATIENT)
Age: 83
End: 2023-07-07

## 2023-07-07 LAB
A1C WITH ESTIMATED AVERAGE GLUCOSE RESULT: 5.3 % — SIGNIFICANT CHANGE UP (ref 4–5.6)
ANION GAP SERPL CALC-SCNC: 9 MMOL/L — SIGNIFICANT CHANGE UP (ref 5–17)
BUN SERPL-MCNC: 15 MG/DL — SIGNIFICANT CHANGE UP (ref 7–23)
CALCIUM SERPL-MCNC: 9.2 MG/DL — SIGNIFICANT CHANGE UP (ref 8.4–10.5)
CHLORIDE SERPL-SCNC: 103 MMOL/L — SIGNIFICANT CHANGE UP (ref 96–108)
CHOLEST SERPL-MCNC: 233 MG/DL — HIGH
CO2 SERPL-SCNC: 27 MMOL/L — SIGNIFICANT CHANGE UP (ref 22–31)
CREAT SERPL-MCNC: 0.98 MG/DL — SIGNIFICANT CHANGE UP (ref 0.5–1.3)
CRP SERPL-MCNC: 3.3 MG/L — SIGNIFICANT CHANGE UP (ref 0–4)
EGFR: 57 ML/MIN/1.73M2 — LOW
ERYTHROCYTE [SEDIMENTATION RATE] IN BLOOD: 36 MM/HR — HIGH
ESTIMATED AVERAGE GLUCOSE: 105 MG/DL — SIGNIFICANT CHANGE UP (ref 68–114)
GLUCOSE SERPL-MCNC: 104 MG/DL — HIGH (ref 70–99)
HCT VFR BLD CALC: 37.5 % — SIGNIFICANT CHANGE UP (ref 34.5–45)
HDLC SERPL-MCNC: 58 MG/DL — SIGNIFICANT CHANGE UP
HGB BLD-MCNC: 12.7 G/DL — SIGNIFICANT CHANGE UP (ref 11.5–15.5)
LIPID PNL WITH DIRECT LDL SERPL: 167 MG/DL — HIGH
MAGNESIUM SERPL-MCNC: 2.1 MG/DL — SIGNIFICANT CHANGE UP (ref 1.6–2.6)
MCHC RBC-ENTMCNC: 29.1 PG — SIGNIFICANT CHANGE UP (ref 27–34)
MCHC RBC-ENTMCNC: 33.9 GM/DL — SIGNIFICANT CHANGE UP (ref 32–36)
MCV RBC AUTO: 85.8 FL — SIGNIFICANT CHANGE UP (ref 80–100)
NON HDL CHOLESTEROL: 175 MG/DL — HIGH
NRBC # BLD: 0 /100 WBCS — SIGNIFICANT CHANGE UP (ref 0–0)
PHOSPHATE SERPL-MCNC: 3.1 MG/DL — SIGNIFICANT CHANGE UP (ref 2.5–4.5)
PLATELET # BLD AUTO: 296 K/UL — SIGNIFICANT CHANGE UP (ref 150–400)
POTASSIUM SERPL-MCNC: 3.7 MMOL/L — SIGNIFICANT CHANGE UP (ref 3.5–5.3)
POTASSIUM SERPL-SCNC: 3.7 MMOL/L — SIGNIFICANT CHANGE UP (ref 3.5–5.3)
RBC # BLD: 4.37 M/UL — SIGNIFICANT CHANGE UP (ref 3.8–5.2)
RBC # FLD: 12.1 % — SIGNIFICANT CHANGE UP (ref 10.3–14.5)
SODIUM SERPL-SCNC: 139 MMOL/L — SIGNIFICANT CHANGE UP (ref 135–145)
TRIGL SERPL-MCNC: 42 MG/DL — SIGNIFICANT CHANGE UP
TSH SERPL-MCNC: 2.31 UIU/ML — SIGNIFICANT CHANGE UP (ref 0.27–4.2)
WBC # BLD: 6.46 K/UL — SIGNIFICANT CHANGE UP (ref 3.8–10.5)
WBC # FLD AUTO: 6.46 K/UL — SIGNIFICANT CHANGE UP (ref 3.8–10.5)

## 2023-07-07 PROCEDURE — 99223 1ST HOSP IP/OBS HIGH 75: CPT

## 2023-07-07 PROCEDURE — 70551 MRI BRAIN STEM W/O DYE: CPT | Mod: 26

## 2023-07-07 RX ORDER — LOSARTAN POTASSIUM 100 MG/1
50 TABLET, FILM COATED ORAL DAILY
Refills: 0 | Status: DISCONTINUED | OUTPATIENT
Start: 2023-07-07 | End: 2023-07-07

## 2023-07-07 RX ORDER — HYDRALAZINE HCL 50 MG
10 TABLET ORAL ONCE
Refills: 0 | Status: DISCONTINUED | OUTPATIENT
Start: 2023-07-07 | End: 2023-07-07

## 2023-07-07 RX ORDER — HYDROXYZINE HCL 10 MG
1 TABLET ORAL
Refills: 0 | DISCHARGE

## 2023-07-07 RX ORDER — OMEPRAZOLE 10 MG/1
1 CAPSULE, DELAYED RELEASE ORAL
Qty: 0 | Refills: 0 | DISCHARGE

## 2023-07-07 RX ORDER — METOPROLOL TARTRATE 50 MG
1 TABLET ORAL
Qty: 0 | Refills: 0 | DISCHARGE

## 2023-07-07 RX ORDER — ACETAMINOPHEN 500 MG
650 TABLET ORAL EVERY 6 HOURS
Refills: 0 | Status: DISCONTINUED | OUTPATIENT
Start: 2023-07-07 | End: 2023-07-08

## 2023-07-07 RX ORDER — HYDROXYZINE HCL 10 MG
1 TABLET ORAL
Qty: 0 | Refills: 0 | DISCHARGE

## 2023-07-07 RX ORDER — CLOPIDOGREL BISULFATE 75 MG/1
75 TABLET, FILM COATED ORAL DAILY
Refills: 0 | Status: DISCONTINUED | OUTPATIENT
Start: 2023-07-07 | End: 2023-07-08

## 2023-07-07 RX ORDER — AMLODIPINE BESYLATE 2.5 MG/1
5 TABLET ORAL DAILY
Refills: 0 | Status: DISCONTINUED | OUTPATIENT
Start: 2023-07-07 | End: 2023-07-08

## 2023-07-07 RX ADMIN — CLOPIDOGREL BISULFATE 75 MILLIGRAM(S): 75 TABLET, FILM COATED ORAL at 19:23

## 2023-07-07 RX ADMIN — Medication 1 CAPSULE(S): at 05:48

## 2023-07-07 RX ADMIN — Medication 1 CAPSULE(S): at 06:48

## 2023-07-07 RX ADMIN — AMLODIPINE BESYLATE 5 MILLIGRAM(S): 2.5 TABLET ORAL at 13:24

## 2023-07-07 RX ADMIN — ATORVASTATIN CALCIUM 80 MILLIGRAM(S): 80 TABLET, FILM COATED ORAL at 22:06

## 2023-07-07 RX ADMIN — Medication 1 CAPSULE(S): at 19:23

## 2023-07-07 RX ADMIN — Medication 1 CAPSULE(S): at 20:18

## 2023-07-07 NOTE — DISCHARGE NOTE PROVIDER - NSDCCPCAREPLAN_GEN_ALL_CORE_FT
PRINCIPAL DISCHARGE DIAGNOSIS  Diagnosis: Headache  Assessment and Plan of Treatment:   Tension Headache  WHAT YOU NEED TO KNOW:  Tension headaches are most often caused by stress, eye strain, or muscle tightness. The pain of a tension headache may start in the forehead or the back of the head. The pain often spreads over the whole head and down into the neck and shoulders. Over-the-counter pain medicine is the most useful and common treatment for a tension headache. Exercise, biofeedback, meditation, or relaxation techniques may also decrease your headache pain.  DISCHARGE INSTRUCTIONS:  Call your local emergency number (911 in the ) if:  You have difficulty seeing, speaking, or moving.  You have a seizure.  Call your doctor if:  You pass out or become confused.  You have a sudden headache that seems different or much worse than your usual headaches.  You have a headache, fever, and a stiff neck.  Your headaches continue to get worse.  Your headaches happen so often that they affect your ability to do your work or normal activities.  You need to take medicine to help your headaches more often than your healthcare provider says you should.  Your headaches get so bad that they cause you to vomit.  You have questions or concerns about your condition or care.  Medicines:  NSAIDs, such as ibuprofen, help decrease swelling, pain, and fever. This medicine is available with or without a doctor's order. NSAIDs can cause stomach bleeding or kidney problems in certain people. If you take blood thinner medicine, always ask your healthcare provider if NSAIDs are safe for you. Always read the medicine label and follow directions.  Acetaminophen decreases pain and fever. It is available without a doctor's order. Ask how much to take and how often to take it. Follow directions. Read the labels of all other medicines you are using to see if they also contain acetaminophen, or ask your doctor or pharmacist. Acetaminophen can cause liver damage if not taken correctly.  Take your medicine as directed. Contact your healthcare provider if you think your medicine is not he      SECONDARY DISCHARGE DIAGNOSES  Diagnosis: Numbness  Assessment and Plan of Treatment:

## 2023-07-07 NOTE — PHYSICAL THERAPY INITIAL EVALUATION ADULT - ADDITIONAL COMMENTS
Pt states she lives with her  in elevator building with 4 JUANI. Pt was independent with ADLs and amb PTA.

## 2023-07-07 NOTE — DISCHARGE NOTE PROVIDER - CARE PROVIDER_API CALL
Ag Greenfield  Neurology  [address] [place] [address], [address]  [place] [address]  Phone: [phone]  Fax: [phone]  Follow Up Time:

## 2023-07-07 NOTE — CONSULT NOTE ADULT - ASSESSMENT
83F with PMH of gastritis, HTN, chronic headaches and HLD presenting with headache and left sided numbness.  Admitted to stroke service for further workup.     #Acute on chronic headache  #Left sided numbness  #Stroke workup  - has been seen on multiple occasions at difference emergency rooms for the same symptoms.  per , there appears to be issues with medication compliance and asked me to explain to her the importance of taking medications  - plan per stroke team  - troponin negative  - obtaining MRI to officially rule out stroke  - confirm medication list  - there is a question of patient being on eliquis, but she is not sure.  Obtain collateral information.   - continue on atorvastatin    #HTN  - continue on home dose of amlodipine and losartan    #Gastritis - chronic  #chronic lower abdominal pain  - confirm med rec - if on PPI, please start  - also start on PRN bowel regimen for constipation (senna and miralax ok to start)  - asked patient if she had any UTI symptoms, and denied    #Throat pain  #Tongue pain/?lumps  - I palpated her thyroid, but didn't really appreciate anything.  No stridor on exam.  This has been ongoing for at least a month.  Outpatient follow up.  TSH normal  - Did not appreciate anything particularly abnormal on inspection of her tongue.  No obvious lesions.  Again, this has been going on for over a month.  Outpatient follow up.        80 minutes spent on this encounter, including face to face with patient, care coordination and documentation. Plan of care discussed with stroke team.   
Neurology    83 y o Female with PMHx of Complex migraines, HTN, HLD, gastritis COVID 1/2023 presents to [facility] ED with HA and left sided numbness since 2am this AM.  On presentation, /72, pt continues to endorse left sided face, arm and leg numbness. NIHSS: 1.  No weakness, changes in vision, slurred speech. CTH/CTP with no significant findings. CTA with no large vessel occlusion. 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 Focal area of moderate narrowing involving the right P2 segment. NIHSS 1. Pt has previously presented with HA and left-sided numbness several times and previously been referred for outpatient management. Admitted for further work up and ruling out vascular etiology given P2 focal narrowing on CTA.     Neuro  #CVA workup  - start atorvastatin 80mg daily  - q4hr stroke neuro checks and vitals  - obtain MRI Brain without contrast  - Stroke Code HCT Results: negative  - Stroke Code CTA Results: focal area of moderate narrowing of right P2. 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 complex   - Call cardiologist and pharmacy to see if eliquis was ever prescribed/picked up; unclear   - Pt with allergy to ASA; "angioedema"  - Stroke education    Cards  #HTN  - permissive hypertension, Goal -180  - hold home blood pressure medication for now  - TTE 03/23 with EF 60-65%, normal sized left atrium     #HLD  - high dose statin as above in CVA  - LDL results: pending     Pulm  - call provider if SPO2 < 94%    GI  #Nutrition/Fluids/Electrolytes   - replete K<4 and Mg <2  - Diet: DASH    Renal  - monitor and trend Cr    Infectious Disease  - Stroke Code CXR results:   - afebrile on admission, no leukocytosis    Endocrine  #DM  - A1C results: pending    - TSH results: pending     DVT Prophylaxis  - Start lovenox SQ   - SCDs for DVT prophylaxis       IDR Goals: Goals reviewed at interdisciplinary rounds with case management, social work, physical therapy, occupational therapy, and speech language pathology.   Please see specific therapy  notes for in depth goals.    Dispo: pending PM&R  , PT/OT   
 83y Female with PMHx of Complex migraines, HTN, HLD, gastritis COVID 1/2023 presents to [facility] ED with HA and left sided numbness since 2am this AM. Pt with recurrent migraines with L sided numbness and associated HTN due to pain. Pt presented with similar symptoms 2/2023, 3/2023 and 5/2023. CTH: negative. CTA with no new findings. NIHSS: 1.     Given cyclical/recurrent episodes of headache with L sided numbness, recommend outpatient MRI brain given low likelihood of ischemic etiology     - Symptomatic management for migraine per ED   - Continued follow-up with outpatient neurology for migraines   - Continued follow-up and monitoring of stable aneurysm        Discussed with Neurology Fellow Dr. Jacques and Attending Dr. Trevino

## 2023-07-07 NOTE — CONSULT NOTE ADULT - SUBJECTIVE AND OBJECTIVE BOX
Patient is a 83y old  Female who presents with a chief complaint of L sided face, arm and leg paresthesias (06 Jul 2023 16:56)      HPI:   **STROKE HPI***    HPI: 83y Indian speaking Female with PMHx of HTN, HLD, gastritis COVID 1/2023 presents with  HA and left sided numbness. Pt states  that she felt fine last night when she went to bed at 9:30pm (LKW) however when she woke up at 2am she had a headache and started to feel her L face, arm and leg numbness. She checked her BP later in the day while she still had a headache and BP was ~180. She then took clonidine however the L face, arm and leg numbness and headache continued. Pt presented to [facility] ED 5/2023 with elevated BP, head and L face. arm and leg numbness at this time pt also endorsed epigastric and L arm/chest pain, cardiology workup was done with no findings. Patient  brought discharge instructions (in Indian) that show she went to  [facility] ED on 6/28/2023  for headache and syncope. Pt discharge instructions state to "ask how to take" Eliquis and Quilpta as current medications. Pt states she does not take eliquis. When asked if she takes blood thinners she states her cardiologist prescribed her a month ago but she hasn't picked it up. Pt denies it being aspirin and denies having irregular heart beats. When asked if it was apixiban pt states "something like that". Unclear if she was ever taken eliquis or why it was prescribed. Pt states today's headache and numbness is worse than last time. Pt also endorses throat, chest, and epigastric pain with these headaches and numbness at this visit.  (06 Jul 2023 16:56)    PAST MEDICAL & SURGICAL HISTORY:  HTN (hypertension)      Gastritis      S/P appendectomy      S/P lumpectomy, right breast        MEDICATIONS  (STANDING):  atorvastatin 80 milliGRAM(s) Oral at bedtime  enoxaparin Injectable 40 milliGRAM(s) SubCutaneous every 24 hours    MEDICATIONS  (PRN):          FAMILY HISTORY:  FH: HTN (hypertension) (Father, Mother)        CBC Full  -  ( 07 Jul 2023 08:09 )  WBC Count : 6.46 K/uL  RBC Count : 4.37 M/uL  Hemoglobin : 12.7 g/dL  Hematocrit : 37.5 %  Platelet Count - Automated : 296 K/uL  Mean Cell Volume : 85.8 fl  Mean Cell Hemoglobin : 29.1 pg  Mean Cell Hemoglobin Concentration : 33.9 gm/dL  Auto Neutrophil # : x  Auto Lymphocyte # : x  Auto Monocyte # : x  Auto Eosinophil # : x  Auto Basophil # : x  Auto Neutrophil % : x  Auto Lymphocyte % : x  Auto Monocyte % : x  Auto Eosinophil % : x  Auto Basophil % : x      07-07    139  |  103  |  15  ----------------------------<  104<H>  3.7   |  27  |  0.98    Ca    9.2      07 Jul 2023 08:09  Phos  3.1     07-07  Mg     2.1     07-07    TPro  7.2  /  Alb  4.0  /  TBili  0.3  /  DBili  x   /  AST  15  /  ALT  9<L>  /  AlkPhos  103  07-06      Urinalysis Basic - ( 07 Jul 2023 08:09 )    Color: x / Appearance: x / SG: x / pH: x  Gluc: 104 mg/dL / Ketone: x  / Bili: x / Urobili: x   Blood: x / Protein: x / Nitrite: x   Leuk Esterase: x / RBC: x / WBC x   Sq Epi: x / Non Sq Epi: x / Bacteria: x        Radiology :     < from: CT Brain Stroke Protocol (07.06.23 @ 13:48) >  ACC: [id number] EXAM:  CT BRAIN STROKE PROTOCOL   ORDERED BY: KATHY BERNAL     PROCEDURE DATE:  07/06/2023          INTERPRETATION:  PROCEDURE: CT head without intravenous contrast    CLINICAL INDICATION: 2 am onset of left cheek and arm numbness with   headache.    TECHNIQUE:  Multiple axial images were obtained and viewed at 5 mm   intervals from the skull base to the vertex. The images were reviewed in   brain and bone windows. Sagittal and coronal reformations are provided.    COMPARISON EXAMINATION: CT head 5/7/2023    FINDINGS:  VENTRICLES AND SULCI: Age-appropriate volume loss. No hydrocephalus.  INTRA-AXIAL: No intracranial mass, acute hemorrhage, or midline shift is   present. No acute transcortical infarction. There are moderate patchy and   confluent hypodensities in the periventricular white matter, nonspecific   but most likely result small vessel ischemic disease.  EXTRA-AXIAL: No extra-axial fluid collection is present.  VISUALIZED SINUSES: No air-fluid levels are identified.  VISUALIZED MASTOIDS: Well aerated.  CALVARIUM: No fracture.  OTHER: Absent right lens.    IMPRESSION:    No acute intracranial hemorrhage or transcortical infarction.    < from: CT Brain Perfusion Maps Stroke (07.06.23 @ 13:52) >  ACC: [id number] EXAM:  CT BRAIN PERFUSION MAPS STROKE   ORDERED BY: JUNE H   REE     PROCEDURE DATE:  07/06/2023          INTERPRETATION:  PROCEDURE: CT perfusion with contrast.    INDICATION: 2:00 AM onset of left-sided weakness with headache.    TECHNIQUE:    Following the intravenous administration of?40 cc Isovue-370, serial   axial images were obtained through the brain. The CT perfusion data set   was post processed per [facility] protocol generating color maps of   CBF, CBV, MTT, andTmax.    COMPARISON: CT perfusion from 5/7/2023    FINDINGS:    On CT perfusion, there is no reported defect in CBF or region of elevated   Tmax > 6 seconds, nor mismatch in volume thereof.    IMPRESSION:    Negative CT perfusion study.      < from: CT Angio Brain Stroke Protocol  w/ IV Cont (07.06.23 @ 13:53) >  ACC: [id number] EXAM:  CT ANGIO BRAIN STROKE PROTC IC   ORDERED BY: JUNE H   GABE     ACC: [id number] EXAM:  CT ANGIO NECK STROKE PROTCL IC   ORDERED BY: JUNE H   GABE     PROCEDURE DATE:  07/06/2023          INTERPRETATION:  PROCEDURE: CTA brain with and without intravenous   contrast.    INDICATION: 2:00 AM onset of left-sided weakness with headache.    TECHNIQUE: Multiple thin section axial images were obtained through the   [place] following the intravenous injection of 80 ml of   Isovue-370. Multiple 3-D reformatted images were generated from the axial   cuts.    COMPARISON: CTA neck 5/7/2023    FINDINGS: The CTA examination demonstrates the internal carotid arteries   to be normal in caliber. There is a normal bifurcation into the A1 and M1   segments. There is an approximately 2.6 mm right A1/A2 anterior   communicating artery complex aneurysm (series 6 image 272). The aneurysm   demonstrates a small nodular contour along its apex. When accounting for   differences in angulation technique the aneurysm appears stable. There is   a normal MCA bifurcation. The vertebral arteries are normal in caliber.   The basilar artery is normal in caliber. There is a normal bifurcation   into the posterior cerebral arteries. There is moderate narrowing   involving the right P2 segment which may be secondary to atherosclerotic   disease (series 6 image).      IMPRESSION: No large vessel occlusion. Approximately 2.6 mm lobular   aneurysm involving the right anterior communicating artery/A1/A2 complex.   Focal area of moderate narrowing involving the right P2 segment.      PROCEDURE: CTA neck with and without intravenous contrast.    INDICATION: 2:00 AM onset of left-sided weakness with headache.    TECHNIQUE: Multiple axial thin section were obtained through the neck   following the intravenous injection of 80 ml of Isovue-370. Multiple 3-D   reformatted images were generated from the axial cuts.    COMPARISON: CTA neck 5/7/2023    FINDINGS: The CTA examination demonstrates the right common carotid   artery to be normal in caliber. There is a normal bifurcation into the   right internal and external carotid arteries. There is no hemodynamically   significant stenosis.    The left common carotid artery is normal in caliber. There is a normal   bifurcation into the left internal and external carotid arteries. There   is no hemodynamically significant stenosis.    The vertebral arteries are normal in caliber.    The aortic arch appears intact without narrowing of the originof the   great vessels.    IMPRESSION: Normal CTA of the neck.          Review of Systems : per HPI               Vital Signs Last 24 Hrs  T(C): 36.6 (07 Jul 2023 09:00), Max: 37.6 (06 Jul 2023 22:23)  T(F): 97.9 (07 Jul 2023 09:00), Max: 99.7 (06 Jul 2023 22:23)  HR: 59 (07 Jul 2023 04:30) (55 - 74)  BP: 149/67 (07 Jul 2023 04:30) (112/70 - 170/72)  BP(mean): 96 (07 Jul 2023 04:30) (90 - 105)  RR: 14 (07 Jul 2023 04:30) (14 - 18)  SpO2: 100% (07 Jul 2023 04:30) (96% - 100%)    Parameters below as of 07 Jul 2023 04:30  Patient On (Oxygen Delivery Method): room air            Physical Exam :  83 y o woman lying comfortably in semi Pina's position , awake , alert , no acute complaints     Head : normocephalic , atraumatic    Eyes : PERRLA , EOMI , no nystagmus , sclera anicteric    ENT / FACE : neg nasal discharge , uvula midline , no oropharyngeal erythema / exudate    Neck : supple , negative JVD , negative carotid bruits , no thyromegaly    Chest : CTA bilaterally , neg wheeze / rhonchi / rales / crackles / egophany    Cardiovascular : regular rate and rhythm , neg murmurs / rubs / gallops    Abdomen : soft , non distended , non tender to palpation in all 4 quadrants ,  normal bowel sounds     Extremities : WWP , neg cyanosis /clubbing / edema     Neurologic Exam :    Alert and oriented to person , place , date/year , speech fluent w/o dysarthria , follows commands     Cranial Nerves:     II :                         pupils equal , round and reactive to light , visual fields intact   III/ IV/VI :              extraocular movements intact , neg nystagmus , neg ptosis  V :                       L V1-3 75%  VII :                     L NLFF  , normal eye closure and smile  VIII :                     hearing intact to finger rub bilaterally  IX and X :             no hoarseness , gag intact , palate/ uvula rise symmetrically  XI :                       SCM / trapezius strength intact bilateral  XII :                      no tongue deviation    Motor Exam:          Right UE:               no focal weakness ,  > 3+/5 throughout  , no drift     Left UE:                 no focal weakness ,  > 3+/5 throughout  , no drift         Right LE:    no focal weakness ,  > 3+/5 throughout        Left LE:    no focal weakness ,  > 3+/5 throughout         Sensation :          L arm/leg 75 %                            no neglect or extinction on double simultaneous testing      DTR :     biceps/brachioradialis : equal                      patella/ankle : equal     neg Babinski       Coordination :      Finger to Nose :  neg dysmetria bilaterally       Gait :  not tested          PM&R Impression :     admitted for c/o HA and  left sided face, arm and leg numbness     - no acute pathology on CT brain imaging , MRI pending      - no focal weakness        Recommendations / Plan :                1) Physical / Occupational therapy focusing on therapeutic exercises , equipment evaluation , bed mobility/transfer out of bed evaluation , progressive ambulation with assistive devices prn .    2) Current disposition plan recommendation  :    pending functional progress

## 2023-07-07 NOTE — OCCUPATIONAL THERAPY INITIAL EVALUATION ADULT - LIVES WITH, PROFILE
Pt lives with her  in apt with 4 steps to enter. Pt at baseline is ind for ADLs and functional mobility. No DME needed. + R handed and no glasses needed./spouse

## 2023-07-07 NOTE — PHYSICAL THERAPY INITIAL EVALUATION ADULT - PERTINENT HX OF CURRENT PROBLEM, REHAB EVAL
83y Female with PMHx of Complex migraines, HTN, HLD, gastritis COVID 1/2023 presents to [facility] ED with HA and left sided numbness since 2am this AM.  On presentation, /72, pt continues to endorse left sided face, arm and leg numbness. NIHSS: 1.  No weakness, changes in vision, slurred speech. CTH/CTP with no significant findings. CTA with no large vessel occlusion. 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 Focal area of moderate narrowing involving the right P2 segment. NIHSS 1. Pt has previously presented with HA and left-sided numbness several times and previously been referred for outpatient management. Admitted for further work up and ruling out vascular etiology given P2 focal narrowing on CTA

## 2023-07-07 NOTE — PROGRESS NOTE ADULT - ASSESSMENT
83y Female with PMHx of Complex migraines, HTN, HLD, gastritis COVID 1/2023 presents to [facility] ED with HA and left sided numbness since 2am this AM.  On presentation, /72, pt continues to endorse left sided face, arm and leg numbness. NIHSS: 1.  No weakness, changes in vision, slurred speech. CTH/CTP with no significant findings. CTA with no large vessel occlusion. 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 Focal area of moderate narrowing involving the right P2 segment. NIHSS 1. Pt has previously presented with HA and left-sided numbness several times and previously been referred for outpatient management. Admitted for further work up and ruling out vascular etiology given P2 focal narrowing on CTA.     Neuro  #CVA workup  - start atorvastatin 80mg daily  - q4hr stroke neuro checks and vitals  - obtain MRI Brain without contrast  - Stroke Code HCT Results: negative  - Stroke Code CTA Results: focal area of moderate narrowing of right P2. 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 complex   - Call cardiologist and pharmacy to see if eliquis was ever prescribed/picked up; unclear   - Pt with allergy to ASA; "angioedema"  - Stroke education    Cards  #HTN  - permissive hypertension, Goal -180  - hold home blood pressure medication for now  - TTE 03/23 with EF 60-65%, normal sized left atrium     #HLD  - high dose statin as above in CVA  - LDL results: pending     Pulm  - call provider if SPO2 < 94%    GI  #Nutrition/Fluids/Electrolytes   - replete K<4 and Mg <2  - Diet: DASH    Renal  - monitor and trend Cr    Infectious Disease  - Stroke Code CXR results:   - afebrile on admission, no leukocytosis    Endocrine  #DM  - A1C results: pending    - TSH results: pending     DVT Prophylaxis  - Start lovenox SQ   - SCDs for DVT prophylaxis       IDR Goals: Goals reviewed at interdisciplinary rounds with case management, social work, physical therapy, occupational therapy, and speech language pathology.   Please see specific therapy  notes for in depth goals.    Dispo: pending PT/OT     Discussed daily hospital plans and goals with patient and family at bedside    Discussed with Stroke Fellow Dr. Jacques, Stroke Attending Dr. Trevino    83y Female with PMHx of Complex migraines, HTN, HLD, gastritis COVID 1/2023 presents to [facility] ED with HA and left sided numbness since 2am this AM.  On presentation, /72, pt continues to endorse left sided face, arm and leg numbness. NIHSS: 1.  No weakness, changes in vision, slurred speech. CTH/CTP with no significant findings. CTA with no large vessel occlusion. 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 Focal area of moderate narrowing involving the right P2 segment. NIHSS 1. Pt has previously presented with HA and left-sided numbness several times and previously been referred for outpatient management. Admitted for further work up and ruling out vascular etiology given P2 focal narrowing on CTA.     Neuro  #CVA workup  - start atorvastatin 80mg daily  - q4hr stroke neuro checks and vitals  - obtain MRI Brain without contrast  - Stroke Code HCT Results: negative  - Stroke Code CTA Results: focal area of moderate narrowing of right P2. 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 Hobson. Pharmacy has a prescription for Eliquis 2.5mg BID  - Pt with allergy to ASA; "angioedema"  - Stroke education    Cards  #HTN  - permissive hypertension, Goal -180  - hold home blood pressure medication for now  - TTE 03/23 with EF 60-65%, normal sized left atrium     #HLD  - high dose statin as above in CVA  - LDL results: 167    Pulm  - call provider if SPO2 < 94%    GI  #Nutrition/Fluids/Electrolytes   - replete K<4 and Mg <2  - Diet: DASH    Renal  - monitor and trend Cr    Infectious Disease  - Stroke Code CXR results:   - afebrile on admission, no leukocytosis    Endocrine  #DM  - A1C results: 5.3%    - TSH results: pending     DVT Prophylaxis  - Lovenox SQ  - SCDs for DVT prophylaxis     IDR Goals: Goals reviewed at interdisciplinary rounds with case management, social work, physical therapy, occupational therapy, and speech language pathology.   Please see specific therapy  notes for in depth goals.    Dispo: home PT/OT - scripts given to SW     Discussed daily hospital plans and goals with patient and family at bedside    Discussed with Dr. Indu Trevino   83y Female with PMHx of Complex migraines, HTN, HLD, gastritis COVID 1/2023 presents to [facility] ED with HA and left sided numbness since 2am this AM.  On presentation, /72, pt continues to endorse left sided face, arm and leg numbness. NIHSS: 1.  No weakness, changes in vision, slurred speech. CTH/CTP with no significant findings. CTA with no large vessel occlusion. 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 Focal area of moderate narrowing involving the right P2 segment. NIHSS 1. Pt has previously presented with HA and left-sided numbness several times and previously been referred for outpatient management. Admitted for further work up and ruling out vascular etiology given P2 focal narrowing on CTA.     Neuro  #CVA workup  - start atorvastatin 80mg daily  - q4hr stroke neuro checks and vitals  - obtain MRI Brain without contrast  - Stroke Code HCT Results: negative  - Stroke Code CTA Results: focal area of moderate narrowing of right P2. 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 Hobson. Pharmacy has a prescription for Eliquis 2.5mg BID - PRESCRIBED BY DR. SULMA VALLEJO  - Pt with allergy to ASA; "angioedema"  - Stroke education    Cards  #HTN  - permissive hypertension, Goal -180  - hold home blood pressure medication for now  - TTE 03/23 with EF 60-65%, normal sized left atrium     #HLD  - high dose statin as above in CVA  - LDL results: 167    Pulm  - call provider if SPO2 < 94%    GI  #Nutrition/Fluids/Electrolytes   - replete K<4 and Mg <2  - Diet: DASH    Renal  - monitor and trend Cr    Infectious Disease  - Stroke Code CXR results:   - afebrile on admission, no leukocytosis    Endocrine  #DM  - A1C results: 5.3%    - TSH results: pending     DVT Prophylaxis  - Lovenox SQ  - SCDs for DVT prophylaxis     IDR Goals: Goals reviewed at interdisciplinary rounds with case management, social work, physical therapy, occupational therapy, and speech language pathology.   Please see specific therapy  notes for in depth goals.    Dispo: home PT/OT - scripts given to      Discussed daily hospital plans and goals with patient and family at bedside    Discussed with Dr. Indu Trevino

## 2023-07-07 NOTE — OCCUPATIONAL THERAPY INITIAL EVALUATION ADULT - DIAGNOSIS, OT EVAL
Pt p/w L UE/LE and facial numbness demonstrating decrease in L UE/LE strength affecting ADLs and functional mobility

## 2023-07-07 NOTE — OCCUPATIONAL THERAPY INITIAL EVALUATION ADULT - MANUAL MUSCLE TESTING RESULTS, REHAB EVAL
R UE/LE 4+/5, L shoulder 3-/5, elbow/ strength 3/5, L LE 3/5 (durring MMT assessment however no buckling noted when ambulating)

## 2023-07-07 NOTE — OCCUPATIONAL THERAPY INITIAL EVALUATION ADULT - MODALITIES TREATMENT COMMENTS
Cranial Nerves II - XII: II: PERRLA; visual fields are full to confrontation III, IV, VI: EOMI, no nystagmus appreciated; SKIPPING PRESENT WITH TRACKING V: LEFT SIDE facial sensation IMPAIRED to light touch V1-V3 b/l VII: no ptosis, no facial droop, symmetric eyebrow raise and closure VIII: hearing IMPAIRED ON RIGHT SIDE, HOWEVER UNSURE ONSET XI: head turning and shoulder shrug intact b/l XII: tongue protrusion midline

## 2023-07-07 NOTE — DISCHARGE NOTE PROVIDER - NSDCMRMEDTOKEN_GEN_ALL_CORE_FT
ALPRAZolam 0.5 mg oral tablet: 1 orally once a day  amitriptyline 10 mg oral tablet: 1 tab(s) orally once a day (at bedtime)  amLODIPine 5 mg oral tablet: 1 orally once a day  atorvastatin 80 mg oral tablet: 1 tab(s) orally once a day (at bedtime)  Benicar HCT 20 mg-12.5 mg oral tablet: 1 orally once a day  cetirizine 10 mg oral tablet: 1 tab(s) orally once a day  cloNIDine 0.1 mg oral tablet: 1 orally once a day  Creon 36,000 units oral delayed release capsule: 1 orally 3 times a day  DilTIAZem (Eqv-Cardizem CD) 180 mg/24 hours oral capsule, extended release: 1 orally once a day  Eliquis 2.5 mg oral tablet: 1 orally 2 times a day  gabapentin 600 mg/24 hours oral tablet, extended release: 1 orally once a day  lamoTRIgine 25 mg oral tablet: 1 orally 2 times a day  nebivolol 2.5 mg oral tablet: 1 orally once a day  omeprazole-sodium bicarbonate 40 mg-1680 mg oral powder for reconstitution: 1 orally once a day  pitavastatin (as calcium) 4 mg oral tablet: 1 orally once a day  prazosin 1 mg oral capsule: 1 orally once a day (at bedtime)   acetaminophen/butalbital/caffeine 300 mg-50 mg-40 mg oral capsule: 1 cap(s) orally every 8 hours As needed Headache  ALPRAZolam 0.5 mg oral tablet: 1 orally once a day  amitriptyline 10 mg oral tablet: 1 tab(s) orally once a day (at bedtime)  amLODIPine 5 mg oral tablet: 1 orally once a day  Benicar HCT 20 mg-12.5 mg oral tablet: 1 orally once a day  cetirizine 10 mg oral tablet: 1 tab(s) orally once a day  cloNIDine 0.1 mg oral tablet: 1 orally once a day  clopidogrel 75 mg oral tablet: 1 tab(s) orally once a day  Creon 36,000 units oral delayed release capsule: 1 orally 3 times a day  DilTIAZem (Eqv-Cardizem CD) 180 mg/24 hours oral capsule, extended release: 1 orally once a day  gabapentin 600 mg/24 hours oral tablet, extended release: 1 orally once a day  lamoTRIgine 25 mg oral tablet: 1 orally 2 times a day  nebivolol 2.5 mg oral tablet: 1 orally once a day  omeprazole-sodium bicarbonate 40 mg-1680 mg oral powder for reconstitution: 1 orally once a day  pitavastatin (as calcium) 4 mg oral tablet: 1 orally once a day  prazosin 1 mg oral capsule: 1 orally once a day (at bedtime)

## 2023-07-07 NOTE — PHYSICAL THERAPY INITIAL EVALUATION ADULT - GENERAL OBSERVATIONS, REHAB EVAL
Pt received semi supine in bed +hep lock +tele. EUSEBIO Simms aware of intent to treat. Pt tolerated session well amb with no AD supervision to CGA, mild L UE and LE weakness and decreased sensation found on neuroexam.. Pt was left as received with call bell in reach, VSS, and in NAD.

## 2023-07-07 NOTE — DISCHARGE NOTE PROVIDER - HOSPITAL COURSE
Hospital course:  83y Kuwaiti speaking Female with PMHx of HTN, HLD, gastritis, anxiety, COVID infection in 1/2023 presented with  HA and left kevin arm and leg decreased sensation. Symptoms improved during admission, however, did not completely subside. MRI brain performed and negative for acute infarction. MRI brain does demonstrate chronic external capsule, basal ganglia, and left thalamic lacunar infarct. Patient is reportedly on Eliquis 2.5mg BID (prescribed by a Dr. Jiménez), unable to get into contact with him as to the indication and patient is unsure why she is on it. Patient's symptoms are most likely secondary to ********    Patient had the following workup done in house:  CT Head: No acute intracranial hemorrhage or transcortical infarction.  MR Head Non Contrast: No acute infarction. Moderate microvascular disease. Tiny external capsule, basal ganglia and left thalamic chronic lacunar infarcts. Pontine chronic lacunar infarcts versus microvascular disease. Mild sinus inflammatory disease.  CT Angio Head: No large vessel occlusion. 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 Focal area of moderate narrowing involving the right P2 segment.  CT Angio Neck: Normal CTA of the neck.  [x]echo (3/2023)-   1. Mild symmetric left ventricular hypertrophy.   2. Normal left and right ventricular size and systolic function.   3. Doppler findings are not conclusive but are suggestive of grade I   diastolc dysfunction.   4. Normal atria.   5. No significant valvular disease.   6. No evidence of pulmonary hypertension.   7. No pericardial effusion.   8. No prior echo is available for comparison.  [x]labs - A1C: 5.3%, LDL: 167, TSH: 2.310  []other    Physical exam at discharge:    New medications on discharge:  Labs to be followed up: n/a  Imaging to be done as outpatient: n/a  Further outpatient workup:   Hospital course:  83y Emirati speaking Female with PMHx of HTN, HLD, gastritis, anxiety, COVID infection in 1/2023 presented with  HA and left kevin arm and leg decreased sensation. Symptoms improved during admission, however, did not completely subside. MRI brain performed and negative for acute infarction. MRI brain does demonstrate chronic external capsule, basal ganglia, and left thalamic lacunar infarct. Patient is reportedly on Eliquis 2.5mg BID (prescribed by a Dr. Jiménez), unable to get into contact with him as to the indication and patient is unsure why she is on it. From a stroke standpoint and due to chronic strokes seen on MRI without clear indication for anticoagulation, will discharge patient on Plavix 75mg daily.     Patient had the following workup done in house:  CT Head: No acute intracranial hemorrhage or transcortical infarction.  MR Head Non Contrast: No acute infarction. Moderate microvascular disease. Tiny external capsule, basal ganglia and left thalamic chronic lacunar infarcts. Pontine chronic lacunar infarcts versus microvascular disease. Mild sinus inflammatory disease.  CT Angio Head: No large vessel occlusion. 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 Focal area of moderate narrowing involving the right P2 segment.  CT Angio Neck: Normal CTA of the neck.  [x]echo (3/2023)-   1. Mild symmetric left ventricular hypertrophy.   2. Normal left and right ventricular size and systolic function.   3. Doppler findings are not conclusive but are suggestive of grade I   diastolc dysfunction.   4. Normal atria.   5. No significant valvular disease.   6. No evidence of pulmonary hypertension.   7. No pericardial effusion.   8. No prior echo is available for comparison.  [x]labs - A1C: 5.3%, LDL: 167, TSH: 2.310  []other    New medications on discharge: Plavix 75mg daily  Labs to be followed up: n/a  Imaging to be done as outpatient: n/a  Further outpatient workup: headache specialist   Hospital course:  83y Filipino speaking Female with PMHx of HTN, HLD, gastritis, anxiety, COVID infection in 1/2023 presented with  HA and left kevin arm and leg decreased sensation. Symptoms improved during admission, however, did not completely subside. MRI brain performed and negative for acute infarction. MRI brain does demonstrate chronic external capsule, basal ganglia, and left thalamic lacunar infarct. Patient is reportedly on Eliquis 2.5mg BID (prescribed by a Dr. Jiménez), unable to get into contact with him as to the indication and patient is unsure why she is on it. From a stroke standpoint and due to chronic strokes seen on MRI without clear indication for anticoagulation, will discharge patient on Plavix 75mg daily.     Patient had the following workup done in house:  CT Head: No acute intracranial hemorrhage or transcortical infarction.  MR Head Non Contrast: No acute infarction. Moderate microvascular disease. Tiny external capsule, basal ganglia and left thalamic chronic lacunar infarcts. Pontine chronic lacunar infarcts versus microvascular disease. Mild sinus inflammatory disease.  CT Angio Head: No large vessel occlusion. 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 Focal area of moderate narrowing involving the right P2 segment.  CT Angio Neck: Normal CTA of the neck.  [x]echo (3/2023)-   1. Mild symmetric left ventricular hypertrophy.   2. Normal left and right ventricular size and systolic function.   3. Doppler findings are not conclusive but are suggestive of grade I   diastolc dysfunction.   4. Normal atria.   5. No significant valvular disease.   6. No evidence of pulmonary hypertension.   7. No pericardial effusion.   8. No prior echo is available for comparison.  [x]labs - A1C: 5.3%, LDL: 167, TSH: 2.310  []other    New medications on discharge: Plavix 75mg daily, Fiorecet  Labs to be followed up: n/a  Imaging to be done as outpatient: n/a  Further outpatient workup: headache specialist   Hospital course:  83y Haitian speaking Female with PMHx of HTN, HLD, gastritis, anxiety, COVID infection in 1/2023 presented with  HA and left kevin arm and leg decreased sensation. Symptoms improved during admission, however, did not completely subside. MRI brain performed and negative for acute infarction. MRI brain does demonstrate chronic external capsule, basal ganglia, and left thalamic lacunar infarct. Patient is reportedly on Eliquis 2.5mg BID (prescribed by Dr. Jiménez), unable to get into contact with him as to the indication and patient is unsure why she is on it. From a stroke standpoint and due to chronic strokes seen on MRI without clear indication for anticoagulation, will discharge patient on Plavix 75mg daily.     Patient had the following workup done in house:  CT Head: No acute intracranial hemorrhage or transcortical infarction.  MR Head Non Contrast: No acute infarction. Moderate microvascular disease. Tiny external capsule, basal ganglia and left thalamic chronic lacunar infarcts. Pontine chronic lacunar infarcts versus microvascular disease. Mild sinus inflammatory disease.  CT Angio Head: No large vessel occlusion. 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 Focal area of moderate narrowing involving the right P2 segment.  CT Angio Neck: Normal CTA of the neck.  [x]echo (3/2023)-   1. Mild symmetric left ventricular hypertrophy.   2. Normal left and right ventricular size and systolic function.   3. Doppler findings are not conclusive but are suggestive of grade I   diastolc dysfunction.   4. Normal atria.   5. No significant valvular disease.   6. No evidence of pulmonary hypertension.   7. No pericardial effusion.   8. No prior echo is available for comparison.  [x]labs - A1C: 5.3%, LDL: 167, TSH: 2.310  []other    New medications on discharge: Plavix 75mg daily, Fiorecet  Labs to be followed up: n/a  Imaging to be done as outpatient: n/a  Further outpatient workup: headache specialist

## 2023-07-07 NOTE — OCCUPATIONAL THERAPY INITIAL EVALUATION ADULT - PERTINENT HX OF CURRENT PROBLEM, REHAB EVAL
83y Female with PMHx of Complex migraines, HTN, HLD, gastritis COVID 1/2023 presents to [facility] ED with HA and left sided numbness since 2am this AM.  On presentation, /72, pt continues to endorse left sided face, arm and leg numbness. NIHSS: 1.  No weakness, changes in vision, slurred speech. CTH/CTP with no significant findings. CTA with no large vessel occlusion. 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 Focal area of moderate narrowing involving the right P2 segment. NIHSS 1. Pt has previously presented with HA and left-sided numbness several times and previously been referred for outpatient management. Admitted for further work up and ruling out vascular etiology given P2 focal narrowing on CTA

## 2023-07-07 NOTE — CONSULT NOTE ADULT - SUBJECTIVE AND OBJECTIVE BOX
See below for stroke HPI:  "83y Stateless speaking Female with PMHx of HTN, HLD, gastritis COVID 1/2023 presents with  HA and left sided numbness. Pt states  that she felt fine last night when she went to bed at 9:30pm (LKW) however when she woke up at 2am she had a headache and started to feel her L face, arm and leg numbness. She checked her BP later in the day while she still had a headache and BP was ~180. She then took clonidine however the L face, arm and leg numbness and headache continued. Pt presented to [facility] ED 5/2023 with elevated BP, head and L face. arm and leg numbness at this time pt also endorsed epigastric and L arm/chest pain, cardiology workup was done with no findings. Patient  brought discharge instructions (in [employer]) that show she went to  [facility] ED on 6/28/2023  for headache and syncope. Pt discharge instructions state to "ask how to take" Eliquis and Quilpta as current medications. Pt states she does not take eliquis. When asked if she takes blood thinners she states her cardiologist prescribed her a month ago but she hasn't picked it up. Pt denies it being aspirin and denies having irregular heart beats. When asked if it was apixiban pt states "something like that". Unclear if she was ever taken eliquis or why it was prescribed. Pt states today's headache and numbness is worse than last time. Pt also endorses throat, chest, and epigastric pain with these headaches and numbness at this visit. "    ROS notable for chronic epigastric pain and lower abdominal pain (which both started after her outpatient doctor started her on a headache medication (patient not sure of which one), lumps on her tongue and a lump in her throat.  She has been instructed to follow up with her outpatient provider about these chronic issues.     Remaining ROS negative       PHYSICAL EXAM:    General: anxious affect, lying in bed  HEENT: NC/AT MMM  Neck: supple  Cardiovascular: +S1/S2, RRR, no mrg  Respiratory: CTA B/L; no W/R/R  Gastrointestinal: soft, nondistended, normoactive bowel sounds, no rebound tenderness, mild epigastric tenderness which is chronic  Extremities: WWP; no edema, clubbing or cyanosis  Neurological: speech is fluent, no facial asymmetry, follows commands  Psychiatric: pleasant mood and affect  Dermatologic: no appreciable wounds or damage to the skin    VITAL SIGNS:  Vital Signs Last 24 Hrs  T(C): 36.6 (07 Jul 2023 09:00), Max: 37.6 (06 Jul 2023 22:23)  T(F): 97.9 (07 Jul 2023 09:00), Max: 99.7 (06 Jul 2023 22:23)  HR: 62 (07 Jul 2023 10:51) (55 - 75)  BP: 150/73 (07 Jul 2023 10:51) (136/63 - 199/74)  BP(mean): 105 (07 Jul 2023 10:51) (90 - 126)  RR: 16 (07 Jul 2023 10:51) (14 - 22)  SpO2: 98% (07 Jul 2023 10:51) (97% - 100%)    Parameters below as of 07 Jul 2023 10:51  Patient On (Oxygen Delivery Method): room air          MEDICATIONS:  MEDICATIONS  (STANDING):  amLODIPine   Tablet 5 milliGRAM(s) Oral daily  atorvastatin 80 milliGRAM(s) Oral at bedtime  enoxaparin Injectable 40 milliGRAM(s) SubCutaneous every 24 hours  losartan 50 milliGRAM(s) Oral daily    MEDICATIONS  (PRN):      ALLERGIES:  Allergies    tetracycline (Unknown)  aspirin (Angioedema)  penicillin (Unknown)  sulfa drugs (Anaphylaxis)    Intolerances        LABS:                        12.7   6.46  )-----------( 296      ( 07 Jul 2023 08:09 )             37.5     07-07    139  |  103  |  15  ----------------------------<  104<H>  3.7   |  27  |  0.98    Ca    9.2      07 Jul 2023 08:09  Phos  3.1     07-07  Mg     2.1     07-07    TPro  7.2  /  Alb  4.0  /  TBili  0.3  /  DBili  x   /  AST  15  /  ALT  9<L>  /  AlkPhos  103  07-06    PT/INR - ( 06 Jul 2023 13:37 )   PT: 12.6 sec;   INR: 1.06          PTT - ( 06 Jul 2023 13:37 )  PTT:31.6 sec  Urinalysis Basic - ( 07 Jul 2023 08:09 )    Color: x / Appearance: x / SG: x / pH: x  Gluc: 104 mg/dL / Ketone: x  / Bili: x / Urobili: x   Blood: x / Protein: x / Nitrite: x   Leuk Esterase: x / RBC: x / WBC x   Sq Epi: x / Non Sq Epi: x / Bacteria: x      CAPILLARY BLOOD GLUCOSE  93 (06 Jul 2023 14:31)      POCT Blood Glucose.: 93 mg/dL (06 Jul 2023 13:21)      RADIOLOGY & ADDITIONAL TESTS: Reviewed.

## 2023-07-07 NOTE — PHYSICAL THERAPY INITIAL EVALUATION ADULT - MODALITIES TREATMENT COMMENTS
R hand dominant; (L) hand  4/5, (R) hand  5/5. CN Testing: B/L Frontalis intact; B/L buccinator intact; smile symmetrical; tongue protrusion at midline; B/L eyes open/close intact; Shoulder elevation: intact bilaterally; Vision H-Test: bilateral tracking and smooth pursuit intact; Convergence/Divergence: intact; Vision Quadrant Test: intact bilaterally.   Decreased sensation L face, UE, and LE

## 2023-07-07 NOTE — DISCHARGE NOTE PROVIDER - NSDCFUADDAPPT_GEN_ALL_CORE_FT
Please follow up with your PCP for further blood pressure management as you have discussed with our clinical team that you are very sensitive to certain medications.    Please no longer take Eliquis 2.5mg daily.    Follow up with your cardiologist.    Our neurology clinic will call you with the soonest appointment with our headache specialist.

## 2023-07-07 NOTE — PROGRESS NOTE ADULT - SUBJECTIVE AND OBJECTIVE BOX
Neurology Stroke Progress Note    INTERVAL HPI/OVERNIGHT EVENTS:  Patient seen and examined. Reports that symptoms have improved but not completely subsided.     MEDICATIONS  (STANDING):  amLODIPine   Tablet 5 milliGRAM(s) Oral daily  atorvastatin 80 milliGRAM(s) Oral at bedtime  enoxaparin Injectable 40 milliGRAM(s) SubCutaneous every 24 hours    MEDICATIONS  (PRN):      Allergies    tetracycline (Unknown)  aspirin (Angioedema)  penicillin (Unknown)  sulfa drugs (Anaphylaxis)    Intolerances        Vital Signs Last 24 Hrs  T(C): 36.6 (07 Jul 2023 09:00), Max: 37.6 (06 Jul 2023 22:23)  T(F): 97.9 (07 Jul 2023 09:00), Max: 99.7 (06 Jul 2023 22:23)  HR: 62 (07 Jul 2023 10:51) (55 - 75)  BP: 150/73 (07 Jul 2023 10:51) (136/63 - 199/74)  BP(mean): 105 (07 Jul 2023 10:51) (90 - 126)  RR: 16 (07 Jul 2023 10:51) (14 - 22)  SpO2: 98% (07 Jul 2023 10:51) (97% - 100%)    Parameters below as of 07 Jul 2023 10:51  Patient On (Oxygen Delivery Method): room air    General: No acute distress, awake and alert  Cardiovascular: Regular rate and rhythm on monitor  Pulmonary: No use of accessory muscles  GI: Abdomen soft, non-tender, non-distended    Neurologic:  -Mental status: Awake, alert, oriented to person, place, and time. Speech is fluent with intact naming, repetition, and comprehension, no dysarthria. Recent and remote memory intact with probing. Follows simple and complex commands with repeated instruction. Attention/concentration intact.   -Cranial nerves:   II: Visual fields are full to confrontation.  III, IV, VI: Extraocular movements are intact without nystagmus. Pupils equally round and reactive to light  V:  Decreased sensation (20%) in L V1-V3  VII: Face is symmetric with normal eye closure and smile  Motor: Normal bulk and tone. No pronator drift. Strength bilateral upper extremity 5/5, bilateral lower extremities 5/5.  Rapid alternating movements intact and symmetric  Sensation: Decreased sensation (10-15%) in L arm and leg. No neglect or extinction on double simultaneous testing.  Coordination: No dysmetria on finger-to-nose and heel-to-shin bilaterally  Gait: Narrow gait and steady    LABS:                        12.7   6.46  )-----------( 296      ( 07 Jul 2023 08:09 )             37.5     07-07    139  |  103  |  15  ----------------------------<  104<H>  3.7   |  27  |  0.98    Ca    9.2      07 Jul 2023 08:09  Phos  3.1     07-07  Mg     2.1     07-07    TPro  7.2  /  Alb  4.0  /  TBili  0.3  /  DBili  x   /  AST  15  /  ALT  9<L>  /  AlkPhos  103  07-06    PT/INR - ( 06 Jul 2023 13:37 )   PT: 12.6 sec;   INR: 1.06          PTT - ( 06 Jul 2023 13:37 )  PTT:31.6 sec  Urinalysis Basic - ( 07 Jul 2023 08:09 )    Color: x / Appearance: x / SG: x / pH: x  Gluc: 104 mg/dL / Ketone: x  / Bili: x / Urobili: x   Blood: x / Protein: x / Nitrite: x   Leuk Esterase: x / RBC: x / WBC x   Sq Epi: x / Non Sq Epi: x / Bacteria: x    RADIOLOGY & ADDITIONAL TESTS:

## 2023-07-08 ENCOUNTER — TRANSCRIPTION ENCOUNTER (OUTPATIENT)
Age: 83
End: 2023-07-08

## 2023-07-08 VITALS
RESPIRATION RATE: 17 BRPM | HEART RATE: 71 BPM | OXYGEN SATURATION: 100 % | SYSTOLIC BLOOD PRESSURE: 153 MMHG | DIASTOLIC BLOOD PRESSURE: 72 MMHG

## 2023-07-08 LAB
ANION GAP SERPL CALC-SCNC: 10 MMOL/L — SIGNIFICANT CHANGE UP (ref 5–17)
BUN SERPL-MCNC: 12 MG/DL — SIGNIFICANT CHANGE UP (ref 7–23)
CALCIUM SERPL-MCNC: 9.1 MG/DL — SIGNIFICANT CHANGE UP (ref 8.4–10.5)
CHLORIDE SERPL-SCNC: 100 MMOL/L — SIGNIFICANT CHANGE UP (ref 96–108)
CO2 SERPL-SCNC: 26 MMOL/L — SIGNIFICANT CHANGE UP (ref 22–31)
CREAT SERPL-MCNC: 0.91 MG/DL — SIGNIFICANT CHANGE UP (ref 0.5–1.3)
EGFR: 62 ML/MIN/1.73M2 — SIGNIFICANT CHANGE UP
GLUCOSE SERPL-MCNC: 149 MG/DL — HIGH (ref 70–99)
HCT VFR BLD CALC: 39.2 % — SIGNIFICANT CHANGE UP (ref 34.5–45)
HGB BLD-MCNC: 13.3 G/DL — SIGNIFICANT CHANGE UP (ref 11.5–15.5)
MAGNESIUM SERPL-MCNC: 2.1 MG/DL — SIGNIFICANT CHANGE UP (ref 1.6–2.6)
MCHC RBC-ENTMCNC: 29.3 PG — SIGNIFICANT CHANGE UP (ref 27–34)
MCHC RBC-ENTMCNC: 33.9 GM/DL — SIGNIFICANT CHANGE UP (ref 32–36)
MCV RBC AUTO: 86.3 FL — SIGNIFICANT CHANGE UP (ref 80–100)
NRBC # BLD: 0 /100 WBCS — SIGNIFICANT CHANGE UP (ref 0–0)
PHOSPHATE SERPL-MCNC: 2.7 MG/DL — SIGNIFICANT CHANGE UP (ref 2.5–4.5)
PLATELET # BLD AUTO: 313 K/UL — SIGNIFICANT CHANGE UP (ref 150–400)
POTASSIUM SERPL-MCNC: 4.4 MMOL/L — SIGNIFICANT CHANGE UP (ref 3.5–5.3)
POTASSIUM SERPL-SCNC: 4.4 MMOL/L — SIGNIFICANT CHANGE UP (ref 3.5–5.3)
RBC # BLD: 4.54 M/UL — SIGNIFICANT CHANGE UP (ref 3.8–5.2)
RBC # FLD: 12.2 % — SIGNIFICANT CHANGE UP (ref 10.3–14.5)
SODIUM SERPL-SCNC: 136 MMOL/L — SIGNIFICANT CHANGE UP (ref 135–145)
TROPONIN T, HIGH SENSITIVITY RESULT: 6 NG/L — SIGNIFICANT CHANGE UP (ref 0–51)
WBC # BLD: 6.74 K/UL — SIGNIFICANT CHANGE UP (ref 3.8–10.5)
WBC # FLD AUTO: 6.74 K/UL — SIGNIFICANT CHANGE UP (ref 3.8–10.5)

## 2023-07-08 PROCEDURE — 85025 COMPLETE CBC W/AUTO DIFF WBC: CPT

## 2023-07-08 PROCEDURE — 85730 THROMBOPLASTIN TIME PARTIAL: CPT

## 2023-07-08 PROCEDURE — 99291 CRITICAL CARE FIRST HOUR: CPT | Mod: 25

## 2023-07-08 PROCEDURE — 80053 COMPREHEN METABOLIC PANEL: CPT

## 2023-07-08 PROCEDURE — 84484 ASSAY OF TROPONIN QUANT: CPT

## 2023-07-08 PROCEDURE — 36415 COLL VENOUS BLD VENIPUNCTURE: CPT

## 2023-07-08 PROCEDURE — 0042T: CPT | Mod: MA

## 2023-07-08 PROCEDURE — 70498 CT ANGIOGRAPHY NECK: CPT | Mod: MA

## 2023-07-08 PROCEDURE — 80048 BASIC METABOLIC PNL TOTAL CA: CPT

## 2023-07-08 PROCEDURE — 70450 CT HEAD/BRAIN W/O DYE: CPT | Mod: MA

## 2023-07-08 PROCEDURE — 80061 LIPID PANEL: CPT

## 2023-07-08 PROCEDURE — 96374 THER/PROPH/DIAG INJ IV PUSH: CPT

## 2023-07-08 PROCEDURE — 86140 C-REACTIVE PROTEIN: CPT

## 2023-07-08 PROCEDURE — 85027 COMPLETE CBC AUTOMATED: CPT

## 2023-07-08 PROCEDURE — 85652 RBC SED RATE AUTOMATED: CPT

## 2023-07-08 PROCEDURE — 83036 HEMOGLOBIN GLYCOSYLATED A1C: CPT

## 2023-07-08 PROCEDURE — 70551 MRI BRAIN STEM W/O DYE: CPT

## 2023-07-08 PROCEDURE — 70496 CT ANGIOGRAPHY HEAD: CPT | Mod: MA

## 2023-07-08 PROCEDURE — 94640 AIRWAY INHALATION TREATMENT: CPT

## 2023-07-08 PROCEDURE — 84443 ASSAY THYROID STIM HORMONE: CPT

## 2023-07-08 PROCEDURE — 97165 OT EVAL LOW COMPLEX 30 MIN: CPT

## 2023-07-08 PROCEDURE — 82962 GLUCOSE BLOOD TEST: CPT

## 2023-07-08 PROCEDURE — 97162 PT EVAL MOD COMPLEX 30 MIN: CPT

## 2023-07-08 PROCEDURE — 97116 GAIT TRAINING THERAPY: CPT

## 2023-07-08 PROCEDURE — 99239 HOSP IP/OBS DSCHRG MGMT >30: CPT

## 2023-07-08 PROCEDURE — 83735 ASSAY OF MAGNESIUM: CPT

## 2023-07-08 PROCEDURE — 84100 ASSAY OF PHOSPHORUS: CPT

## 2023-07-08 PROCEDURE — 85610 PROTHROMBIN TIME: CPT

## 2023-07-08 RX ORDER — APIXABAN 2.5 MG/1
1 TABLET, FILM COATED ORAL
Refills: 0 | DISCHARGE

## 2023-07-08 RX ORDER — DILTIAZEM HCL 120 MG
180 CAPSULE, EXT RELEASE 24 HR ORAL DAILY
Refills: 0 | Status: DISCONTINUED | OUTPATIENT
Start: 2023-07-08 | End: 2023-07-08

## 2023-07-08 RX ORDER — ALBUTEROL 90 UG/1
2.5 AEROSOL, METERED ORAL ONCE
Refills: 0 | Status: COMPLETED | OUTPATIENT
Start: 2023-07-08 | End: 2023-07-08

## 2023-07-08 RX ORDER — LORATADINE 10 MG/1
10 TABLET ORAL ONCE
Refills: 0 | Status: COMPLETED | OUTPATIENT
Start: 2023-07-08 | End: 2023-07-08

## 2023-07-08 RX ORDER — CLOPIDOGREL BISULFATE 75 MG/1
1 TABLET, FILM COATED ORAL
Qty: 30 | Refills: 0
Start: 2023-07-08

## 2023-07-08 RX ADMIN — Medication 650 MILLIGRAM(S): at 01:23

## 2023-07-08 RX ADMIN — Medication 650 MILLIGRAM(S): at 00:23

## 2023-07-08 RX ADMIN — Medication 0.1 MILLIGRAM(S): at 15:44

## 2023-07-08 RX ADMIN — ALBUTEROL 2.5 MILLIGRAM(S): 90 AEROSOL, METERED ORAL at 14:51

## 2023-07-08 RX ADMIN — LORATADINE 10 MILLIGRAM(S): 10 TABLET ORAL at 14:30

## 2023-07-08 RX ADMIN — AMLODIPINE BESYLATE 5 MILLIGRAM(S): 2.5 TABLET ORAL at 05:24

## 2023-07-08 RX ADMIN — CLOPIDOGREL BISULFATE 75 MILLIGRAM(S): 75 TABLET, FILM COATED ORAL at 11:52

## 2023-07-08 RX ADMIN — Medication 180 MILLIGRAM(S): at 12:20

## 2023-07-08 RX ADMIN — ENOXAPARIN SODIUM 40 MILLIGRAM(S): 100 INJECTION SUBCUTANEOUS at 05:24

## 2023-07-08 NOTE — DISCHARGE NOTE NURSING/CASE MANAGEMENT/SOCIAL WORK - PATIENT PORTAL LINK FT
You can access the FollowMyHealth Patient Portal offered by [facility] by registering at the following website: [email or web address]. By joining [facility]’s FollowMyHealth portal, you will also be able to view your health information using other applications (apps) compatible with our system.

## 2023-07-08 NOTE — CHART NOTE - NSCHARTNOTEFT_GEN_A_CORE
PDI	My Rx	Current Rx	Drug Type	Rx Written	Rx Dispensed	Drug	Quantity	Days Supply	Prescriber Name	Prescriber INDIRA #	Payment Method	Dispenser  A	N	Y	B	06/09/2023	06/16/2023	alprazolam 0.5 mg tablet	30	30	Jordan Ambrose MD	[id number]	[facility]  A	N	N	B	05/08/2023	05/25/2023	diazepam 5 mg tablet	60	30	Petra Jiménez DO	[id number]	[facility]  A	N	N	B	05/12/2023	05/13/2023	alprazolam 0.5 mg tablet	30	30	Jordan Ambrose MD	[id number]	[facility]  A	N	N	B	03/10/2023	03/11/2023	lorazepam 0.5 mg tablet	60	30	Jordan Ambrose MD	[id number]	[facility]  A	N	N	O	03/07/2023	03/08/2023	oxycodone hcl (ir) 5 mg tablet	28	7	Petra Jiménez DO	[id number]	[facility]  A	N	N	B	01/18/2023	01/18/2023	lorazepam 0.5 mg tablet	60	30	Jordan Ambrose MD	[id number]	[facility]

## 2023-07-08 NOTE — DISCHARGE NOTE NURSING/CASE MANAGEMENT/SOCIAL WORK - NSDCPEFALRISK_GEN_ALL_CORE
For information on Fall & Injury Prevention, visit: [email or web address] OR  [email or web address] OR  https://www.cdc.gov/steadi/patient.html

## 2023-07-14 DIAGNOSIS — F41.9 ANXIETY DISORDER, UNSPECIFIED: ICD-10-CM

## 2023-07-14 DIAGNOSIS — G44.209 TENSION-TYPE HEADACHE, UNSPECIFIED, NOT INTRACTABLE: ICD-10-CM

## 2023-07-14 DIAGNOSIS — E78.5 HYPERLIPIDEMIA, UNSPECIFIED: ICD-10-CM

## 2023-07-14 DIAGNOSIS — R07.0 PAIN IN THROAT: ICD-10-CM

## 2023-07-14 DIAGNOSIS — K29.50 UNSPECIFIED CHRONIC GASTRITIS WITHOUT BLEEDING: ICD-10-CM

## 2023-07-14 DIAGNOSIS — R20.0 ANESTHESIA OF SKIN: ICD-10-CM

## 2023-07-14 DIAGNOSIS — I10 ESSENTIAL (PRIMARY) HYPERTENSION: ICD-10-CM

## 2023-07-14 DIAGNOSIS — Z86.16 PERSONAL HISTORY OF COVID-19: ICD-10-CM

## 2023-07-14 DIAGNOSIS — E11.9 TYPE 2 DIABETES MELLITUS WITHOUT COMPLICATIONS: ICD-10-CM

## 2023-08-26 ENCOUNTER — INPATIENT (INPATIENT)
Facility: HOSPITAL | Age: 83
LOS: 1 days | Discharge: HOME | DRG: 392 | End: 2023-08-28
Attending: HOSPITALIST | Admitting: STUDENT IN AN ORGANIZED HEALTH CARE EDUCATION/TRAINING PROGRAM
Payer: MEDICARE

## 2023-08-26 VITALS
WEIGHT: 130.95 LBS | OXYGEN SATURATION: 95 % | SYSTOLIC BLOOD PRESSURE: 146 MMHG | TEMPERATURE: 98 F | DIASTOLIC BLOOD PRESSURE: 76 MMHG | RESPIRATION RATE: 20 BRPM | HEART RATE: 65 BPM | HEIGHT: 61 IN

## 2023-08-26 DIAGNOSIS — Z87.898 PERSONAL HISTORY OF OTHER SPECIFIED CONDITIONS: ICD-10-CM

## 2023-08-26 DIAGNOSIS — Z90.49 ACQUIRED ABSENCE OF OTHER SPECIFIED PARTS OF DIGESTIVE TRACT: Chronic | ICD-10-CM

## 2023-08-26 DIAGNOSIS — Z29.9 ENCOUNTER FOR PROPHYLACTIC MEASURES, UNSPECIFIED: ICD-10-CM

## 2023-08-26 DIAGNOSIS — E78.5 HYPERLIPIDEMIA, UNSPECIFIED: ICD-10-CM

## 2023-08-26 DIAGNOSIS — I10 ESSENTIAL (PRIMARY) HYPERTENSION: ICD-10-CM

## 2023-08-26 DIAGNOSIS — R07.9 CHEST PAIN, UNSPECIFIED: ICD-10-CM

## 2023-08-26 DIAGNOSIS — R10.9 UNSPECIFIED ABDOMINAL PAIN: ICD-10-CM

## 2023-08-26 DIAGNOSIS — K29.70 GASTRITIS, UNSPECIFIED, WITHOUT BLEEDING: ICD-10-CM

## 2023-08-26 DIAGNOSIS — F41.9 ANXIETY DISORDER, UNSPECIFIED: ICD-10-CM

## 2023-08-26 DIAGNOSIS — E87.1 HYPO-OSMOLALITY AND HYPONATREMIA: ICD-10-CM

## 2023-08-26 DIAGNOSIS — Z98.890 OTHER SPECIFIED POSTPROCEDURAL STATES: Chronic | ICD-10-CM

## 2023-08-26 LAB
ALBUMIN SERPL ELPH-MCNC: 4.3 G/DL — SIGNIFICANT CHANGE UP (ref 3.3–5)
ALP SERPL-CCNC: 93 U/L — SIGNIFICANT CHANGE UP (ref 40–120)
ALT FLD-CCNC: 12 U/L — SIGNIFICANT CHANGE UP (ref 10–45)
ANION GAP SERPL CALC-SCNC: 8 MMOL/L — SIGNIFICANT CHANGE UP (ref 5–17)
ANION GAP SERPL CALC-SCNC: 8 MMOL/L — SIGNIFICANT CHANGE UP (ref 5–17)
ANION GAP SERPL CALC-SCNC: 9 MMOL/L — SIGNIFICANT CHANGE UP (ref 5–17)
APPEARANCE UR: CLEAR — SIGNIFICANT CHANGE UP
AST SERPL-CCNC: 17 U/L — SIGNIFICANT CHANGE UP (ref 10–40)
BASOPHILS # BLD AUTO: 0.02 K/UL — SIGNIFICANT CHANGE UP (ref 0–0.2)
BASOPHILS NFR BLD AUTO: 0.3 % — SIGNIFICANT CHANGE UP (ref 0–2)
BILIRUB SERPL-MCNC: 0.6 MG/DL — SIGNIFICANT CHANGE UP (ref 0.2–1.2)
BILIRUB UR-MCNC: NEGATIVE — SIGNIFICANT CHANGE UP
BUN SERPL-MCNC: 7 MG/DL — SIGNIFICANT CHANGE UP (ref 7–23)
BUN SERPL-MCNC: 8 MG/DL — SIGNIFICANT CHANGE UP (ref 7–23)
BUN SERPL-MCNC: 9 MG/DL — SIGNIFICANT CHANGE UP (ref 7–23)
CALCIUM SERPL-MCNC: 10.2 MG/DL — SIGNIFICANT CHANGE UP (ref 8.4–10.5)
CALCIUM SERPL-MCNC: 9 MG/DL — SIGNIFICANT CHANGE UP (ref 8.4–10.5)
CALCIUM SERPL-MCNC: 9.2 MG/DL — SIGNIFICANT CHANGE UP (ref 8.4–10.5)
CHLORIDE SERPL-SCNC: 88 MMOL/L — LOW (ref 96–108)
CHLORIDE SERPL-SCNC: 92 MMOL/L — LOW (ref 96–108)
CHLORIDE SERPL-SCNC: 96 MMOL/L — SIGNIFICANT CHANGE UP (ref 96–108)
CO2 SERPL-SCNC: 24 MMOL/L — SIGNIFICANT CHANGE UP (ref 22–31)
CO2 SERPL-SCNC: 25 MMOL/L — SIGNIFICANT CHANGE UP (ref 22–31)
CO2 SERPL-SCNC: 26 MMOL/L — SIGNIFICANT CHANGE UP (ref 22–31)
COLOR SPEC: YELLOW — SIGNIFICANT CHANGE UP
CREAT ?TM UR-MCNC: 19 MG/DL — SIGNIFICANT CHANGE UP
CREAT SERPL-MCNC: 0.87 MG/DL — SIGNIFICANT CHANGE UP (ref 0.5–1.3)
CREAT SERPL-MCNC: 0.96 MG/DL — SIGNIFICANT CHANGE UP (ref 0.5–1.3)
CREAT SERPL-MCNC: 1.08 MG/DL — SIGNIFICANT CHANGE UP (ref 0.5–1.3)
DIFF PNL FLD: NEGATIVE — SIGNIFICANT CHANGE UP
EGFR: 51 ML/MIN/1.73M2 — LOW
EGFR: 58 ML/MIN/1.73M2 — LOW
EGFR: 66 ML/MIN/1.73M2 — SIGNIFICANT CHANGE UP
EOSINOPHIL # BLD AUTO: 0.11 K/UL — SIGNIFICANT CHANGE UP (ref 0–0.5)
EOSINOPHIL NFR BLD AUTO: 1.6 % — SIGNIFICANT CHANGE UP (ref 0–6)
GLUCOSE SERPL-MCNC: 111 MG/DL — HIGH (ref 70–99)
GLUCOSE SERPL-MCNC: 114 MG/DL — HIGH (ref 70–99)
GLUCOSE SERPL-MCNC: 115 MG/DL — HIGH (ref 70–99)
GLUCOSE UR QL: NEGATIVE — SIGNIFICANT CHANGE UP
HCT VFR BLD CALC: 36.5 % — SIGNIFICANT CHANGE UP (ref 34.5–45)
HGB BLD-MCNC: 13.1 G/DL — SIGNIFICANT CHANGE UP (ref 11.5–15.5)
IMM GRANULOCYTES NFR BLD AUTO: 0.1 % — SIGNIFICANT CHANGE UP (ref 0–0.9)
KETONES UR-MCNC: NEGATIVE — SIGNIFICANT CHANGE UP
LEUKOCYTE ESTERASE UR-ACNC: NEGATIVE — SIGNIFICANT CHANGE UP
LIDOCAIN IGE QN: 53 U/L — SIGNIFICANT CHANGE UP (ref 7–60)
LYMPHOCYTES # BLD AUTO: 1.45 K/UL — SIGNIFICANT CHANGE UP (ref 1–3.3)
LYMPHOCYTES # BLD AUTO: 21.2 % — SIGNIFICANT CHANGE UP (ref 13–44)
MCHC RBC-ENTMCNC: 29.6 PG — SIGNIFICANT CHANGE UP (ref 27–34)
MCHC RBC-ENTMCNC: 35.9 GM/DL — SIGNIFICANT CHANGE UP (ref 32–36)
MCV RBC AUTO: 82.6 FL — SIGNIFICANT CHANGE UP (ref 80–100)
MONOCYTES # BLD AUTO: 0.44 K/UL — SIGNIFICANT CHANGE UP (ref 0–0.9)
MONOCYTES NFR BLD AUTO: 6.4 % — SIGNIFICANT CHANGE UP (ref 2–14)
NEUTROPHILS # BLD AUTO: 4.81 K/UL — SIGNIFICANT CHANGE UP (ref 1.8–7.4)
NEUTROPHILS NFR BLD AUTO: 70.4 % — SIGNIFICANT CHANGE UP (ref 43–77)
NITRITE UR-MCNC: NEGATIVE — SIGNIFICANT CHANGE UP
NRBC # BLD: 0 /100 WBCS — SIGNIFICANT CHANGE UP (ref 0–0)
OSMOLALITY UR: 138 MOSM/KG — LOW (ref 300–900)
PH UR: 7 — SIGNIFICANT CHANGE UP (ref 5–8)
PLATELET # BLD AUTO: 314 K/UL — SIGNIFICANT CHANGE UP (ref 150–400)
POTASSIUM SERPL-MCNC: 3.8 MMOL/L — SIGNIFICANT CHANGE UP (ref 3.5–5.3)
POTASSIUM SERPL-MCNC: 3.8 MMOL/L — SIGNIFICANT CHANGE UP (ref 3.5–5.3)
POTASSIUM SERPL-MCNC: 4.7 MMOL/L — SIGNIFICANT CHANGE UP (ref 3.5–5.3)
POTASSIUM SERPL-SCNC: 3.8 MMOL/L — SIGNIFICANT CHANGE UP (ref 3.5–5.3)
POTASSIUM SERPL-SCNC: 3.8 MMOL/L — SIGNIFICANT CHANGE UP (ref 3.5–5.3)
POTASSIUM SERPL-SCNC: 4.7 MMOL/L — SIGNIFICANT CHANGE UP (ref 3.5–5.3)
PROT SERPL-MCNC: 8.1 G/DL — SIGNIFICANT CHANGE UP (ref 6–8.3)
PROT UR-MCNC: NEGATIVE MG/DL — SIGNIFICANT CHANGE UP
RBC # BLD: 4.42 M/UL — SIGNIFICANT CHANGE UP (ref 3.8–5.2)
RBC # FLD: 11.6 % — SIGNIFICANT CHANGE UP (ref 10.3–14.5)
SODIUM SERPL-SCNC: 122 MMOL/L — LOW (ref 135–145)
SODIUM SERPL-SCNC: 125 MMOL/L — LOW (ref 135–145)
SODIUM SERPL-SCNC: 129 MMOL/L — LOW (ref 135–145)
SODIUM UR-SCNC: 40 MMOL/L — SIGNIFICANT CHANGE UP
SP GR SPEC: <=1.005 — SIGNIFICANT CHANGE UP (ref 1–1.03)
TROPONIN T, HIGH SENSITIVITY RESULT: 9 NG/L — SIGNIFICANT CHANGE UP (ref 0–51)
UROBILINOGEN FLD QL: 0.2 E.U./DL — SIGNIFICANT CHANGE UP
WBC # BLD: 6.84 K/UL — SIGNIFICANT CHANGE UP (ref 3.8–10.5)
WBC # FLD AUTO: 6.84 K/UL — SIGNIFICANT CHANGE UP (ref 3.8–10.5)

## 2023-08-26 PROCEDURE — 99223 1ST HOSP IP/OBS HIGH 75: CPT

## 2023-08-26 PROCEDURE — 71260 CT THORAX DX C+: CPT | Mod: 26,MC

## 2023-08-26 PROCEDURE — 74177 CT ABD & PELVIS W/CONTRAST: CPT | Mod: 26,MC

## 2023-08-26 PROCEDURE — 99285 EMERGENCY DEPT VISIT HI MDM: CPT

## 2023-08-26 RX ORDER — ACETAMINOPHEN 500 MG
650 TABLET ORAL EVERY 6 HOURS
Refills: 0 | Status: DISCONTINUED | OUTPATIENT
Start: 2023-08-26 | End: 2023-08-27

## 2023-08-26 RX ORDER — PANTOPRAZOLE SODIUM 20 MG/1
40 TABLET, DELAYED RELEASE ORAL
Refills: 0 | Status: DISCONTINUED | OUTPATIENT
Start: 2023-08-26 | End: 2023-08-27

## 2023-08-26 RX ORDER — ENOXAPARIN SODIUM 100 MG/ML
40 INJECTION SUBCUTANEOUS ONCE
Refills: 0 | Status: COMPLETED | OUTPATIENT
Start: 2023-08-26 | End: 2023-08-27

## 2023-08-26 RX ORDER — ACETAMINOPHEN 500 MG
1000 TABLET ORAL ONCE
Refills: 0 | Status: COMPLETED | OUTPATIENT
Start: 2023-08-26 | End: 2023-08-26

## 2023-08-26 RX ORDER — ESCITALOPRAM OXALATE 10 MG/1
5 TABLET, FILM COATED ORAL DAILY
Refills: 0 | Status: DISCONTINUED | OUTPATIENT
Start: 2023-08-26 | End: 2023-08-28

## 2023-08-26 RX ORDER — FAMOTIDINE 10 MG/ML
20 INJECTION INTRAVENOUS ONCE
Refills: 0 | Status: COMPLETED | OUTPATIENT
Start: 2023-08-26 | End: 2023-08-26

## 2023-08-26 RX ORDER — PANTOPRAZOLE SODIUM 20 MG/1
40 TABLET, DELAYED RELEASE ORAL ONCE
Refills: 0 | Status: COMPLETED | OUTPATIENT
Start: 2023-08-26 | End: 2023-08-26

## 2023-08-26 RX ORDER — SODIUM CHLORIDE 9 MG/ML
1000 INJECTION INTRAMUSCULAR; INTRAVENOUS; SUBCUTANEOUS ONCE
Refills: 0 | Status: COMPLETED | OUTPATIENT
Start: 2023-08-26 | End: 2023-08-26

## 2023-08-26 RX ADMIN — Medication 1 CAPSULE(S): at 10:13

## 2023-08-26 RX ADMIN — Medication 0.1 MILLIGRAM(S): at 21:04

## 2023-08-26 RX ADMIN — Medication 400 MILLIGRAM(S): at 09:16

## 2023-08-26 RX ADMIN — SODIUM CHLORIDE 1000 MILLILITER(S): 9 INJECTION INTRAMUSCULAR; INTRAVENOUS; SUBCUTANEOUS at 09:16

## 2023-08-26 RX ADMIN — Medication 30 MILLILITER(S): at 10:14

## 2023-08-26 RX ADMIN — FAMOTIDINE 20 MILLIGRAM(S): 10 INJECTION INTRAVENOUS at 09:16

## 2023-08-26 RX ADMIN — PANTOPRAZOLE SODIUM 40 MILLIGRAM(S): 20 TABLET, DELAYED RELEASE ORAL at 09:16

## 2023-08-26 RX ADMIN — ESCITALOPRAM OXALATE 5 MILLIGRAM(S): 10 TABLET, FILM COATED ORAL at 21:05

## 2023-08-26 NOTE — H&P ADULT - ASSESSMENT
83y Trinidadian speaking Female with PMHx of HTN, HLD, gastritis, anxiety, who presents for headache, abdominal pain, chest pain, nausea for 7 months, acutely worsened overnight. Patient was found to be hyponatremic and was admitted for further management of pain.  83y Belizean speaking Female with PMHx of HTN, HLD, gastritis, anxiety, who presents for headache, abdominal pain, chest pain, nausea for 7 months, acutely worsened overnight. Patient was found to be hyponatremic and was admitted for further workup of abdominal/chest pain.

## 2023-08-26 NOTE — H&P ADULT - HISTORY OF PRESENT ILLNESS
83y Dutch speaking Female with PMHx of HTN, HLD, gastritis, anxiety,? seizure  who presents for abdominal pain and nausea.   VS in ED: T98.3, HR 62, /80, RR 17 SPO2 97 on RA.   Labs: Na 122 (repeat Na 125), Serum osm 264, U na 40, U osm 138, UA neg, WBC 6.8 wnl, Hgb 13.1 wnl   Imagign: Ct of abdomen pelvis and chest showed: No acute findings.No acute fracture.  No pleural effusion or pneumothorax.  ECG: NSR  Treatment: Acetaminophen 1g IV, Fioricet 1 capsule, MAALOX 30ml 1x, Famotidine 2o IV 1x, Pantoprazole 40mg IV 1x, NS 1L bolus           83y Jamaican speaking Female with PMHx of HTN, HLD, gastritis, anxiety, who presents for headache, abdominal pain, chest pain, nausea for 7 months, acutely worsened overnight. Patient reports all the symptoms started after she had COVID in Jan 2023, and has been progressively worsening. What brought her to the hospital today was that she had severe epigastric and central chest pain that lasted throughout the night  for more than 6 hours. Chest pain described as burning, radiating to throat and lower abdomen, no known relieving factors. She also reports that this morning her headache was "unbearable" diffusely throughout the head. She took one tablet of acetaminophen/butalbital/caffeine with some relief. Of note patient reports decreased appetite over the past 2 weeks, with limited oral intake of cereal and soups. Patient endorses mild nausea. On ROS, she denies dizziness, blurry vision, vomiting, diarrhea, constipation hematochezia or dysuria.     VS in ED: T98.3, HR 62, /80, RR 17 SPO2 97 on RA.   Labs: Na 122 (repeat Na 125), Serum osm 264, U na 40, U osm 138, UA neg, WBC 6.8 wnl, Hgb 13.1 wnl   Imaging Ct of abdomen pelvis and chest showed: No acute findings. No acute fracture.  No pleural effusion or pneumothorax.  ECG: NSR  Treatment: Acetaminophen 1g IV, Fioricet 1 capsule, MAALOX 30ml 1x, Famotidine 2o IV 1x, Pantoprazole 40mg IV 1x, NS 1L bolus           83y Scottish speaking Female with PMHx of HTN, HLD, gastritis, anxiety, who presents for headache, abdominal pain, chest pain, nausea for 7 months, acutely worsened overnight. Patient reports all the symptoms started after she had COVID in Jan 2023, and has been progressively worsening. What brought her to the hospital today was that she had severe epigastric and central chest pain that lasted throughout the night  for more than 6 hours. Chest pain described as burning, radiating to throat and lower abdomen, no known relieving factors. She also reports that this morning her headache was "unbearable" diffusely throughout the head. She notes the chest pain and headache are much improved at this time. She took one tablet of acetaminophen/butalbital/caffeine with some relief. Of note patient reports decreased appetite over the past 2 weeks, with limited oral intake of cereal and soups. Patient endorses mild nausea. She also reports a mechanical fall 10 days ago, at home she tripped and fell, did not LOC or hit her head, was able to walk on her own after. On ROS, she denies dizziness, blurry vision, vomiting, diarrhea, constipation hematochezia or dysuria.     VS in ED: T98.3, HR 62, /80, RR 17 SPO2 97 on RA.   Labs: Na 122 (repeat Na 125), Serum osm 264, U na 40, U osm 138, UA neg, WBC 6.8 wnl, Hgb 13.1 wnl   Imaging Ct of abdomen pelvis and chest showed: No acute findings. No acute fracture.  No pleural effusion or pneumothorax.  ECG: NSR  Treatment: Acetaminophen 1g IV, Fioricet 1 capsule, MAALOX 30ml 1x, Famotidine 2o IV 1x, Pantoprazole 40mg IV 1x, NS 1L bolus           83y Surinamese speaking Female with PMHx of HTN, HLD, gastritis, anxiety, who presents for headache, abdominal pain, chest pain, nausea for 7 months, acutely worsened overnight. Patient reports all the symptoms started after she had COVID in Jan 2023, and has been progressively worsening. What brought her to the hospital today was that she had severe epigastric and central chest pain that lasted throughout the night  for more than 6 hours. Chest pain described as burning, radiating to throat and lower abdomen, no known relieving factors. She also reports that this morning her headache was "unbearable" diffusely throughout the head. She notes the chest pain and headache are much improved at this time. She took one tablet of acetaminophen/butalbital/caffeine with some relief. Of note patient reports decreased appetite over the past 2 weeks, with limited oral intake of cereal and soups. Patient endorses mild nausea. She also reports a mechanical fall 10 days ago, at home she tripped and fell, did not LOC or hit her head, was able to walk on her own after. On ROS, she denies dizziness, blurry vision, vomiting, diarrhea, constipation hematochezia or dysuria.   She was recently admitted for HA and left kevin arm and leg decreased sensation, MRI brain performed and negative for acute infarction, but showed chronic infarcts. Patient was reportedly on Eliquis 2.5mg BID (prescribed by Dr. Jiménez), unclear indication and unable to clarify with prescribing Dr. At the time, Eliquis was discontinued and pt was started on Plavix 75mg daily.       VS in ED: T98.3, HR 62, /80, RR 17 SPO2 97 on RA.   Labs: Na 122 (repeat Na 125), Serum osm 264, U na 40, U osm 138, UA neg, WBC 6.8 wnl, Hgb 13.1 wnl   Imaging Ct of abdomen pelvis and chest showed: No acute findings. No acute fracture.  No pleural effusion or pneumothorax.  ECG: NSR  Treatment: Acetaminophen 1g IV, Fioricet 1 capsule, MAALOX 30ml 1x, Famotidine 2o IV 1x, Pantoprazole 40mg IV 1x, NS 1L bolus           83y Salvadorean speaking Female with PMHx of HTN, HLD, gastritis, anxiety, who presents for headache, abdominal pain, chest pain, nausea for 7 months, acutely worsened overnight. Patient reports all the symptoms started after she had COVID in Jan 2023, and has been progressively worsening. What brought her to the hospital today was that she had severe epigastric and central chest pain that lasted throughout the night  for more than 6 hours. Chest pain described as burning, radiating to throat and lower abdomen, no known relieving factors. She also reports that this morning her headache was "unbearable" diffusely throughout the head. She notes the chest pain and headache are much improved at this time. She took one tablet of acetaminophen/butalbital/caffeine with some relief. Of note patient reports decreased appetite over the past 2 weeks, with limited oral intake of cereal and soups. Patient endorses mild nausea. She also reports a mechanical fall 10 days ago, at home she tripped and fell, did not LOC or hit her head, was able to walk on her own after. On ROS, she denies dizziness, blurry vision, vomiting, diarrhea, constipation hematochezia or dysuria.   She was recently admitted for HA and left kevin arm and leg decreased sensation, MRI brain performed and negative for acute infarction, but showed chronic infarcts. Patient was reportedly on Eliquis 2.5mg BID (prescribed by Dr. Jiménez), unclear indication and unable to clarify with prescribing Dr. At the time, Eliquis was discontinued and pt was started on Plavix 75mg daily.       VS in ED: T98.3, HR 62, /80, RR 17 SPO2 97 on RA.   Labs: Na 122 (repeat Na 125), Serum osm 264, U na 40, U osm 138, UA neg, WBC 6.8 wnl, Hgb 13.1 wnl   Imaging Ct of abdomen pelvis and chest showed: No acute findings. No acute fracture.  No pleural effusion or pneumothorax.  ECG: NSR  Treatment: Acetaminophen 1g IV, Fioricet 1 capsule, MAALOX 30ml 1x, Famotidine 2o IV 1x, Pantoprazole 40mg IV 1x, NS 1L bolus

## 2023-08-26 NOTE — ED ADULT TRIAGE NOTE - CHIEF COMPLAINT QUOTE
Pt presents to ED BIBa from home here for multiple medical complaints, cp, hypertension, headache and abd pain. Pt is Chilean speaking only. Pt A&Ox4. Pt is conversive in full sentences and ambulatory. Neuro intact. PT has hx of HTN and HLD. EKG in prog. Pt presents to ED BIBa from home here for multiple medical complaints, chest pain, hypertension, headache and abd pain. Pt is Turkmen speaking only. Pt A&Ox4. Pt is conversive in full sentences and ambulatory. Neuro intact. PT has hx of HTN and HLD. EKG in prog.

## 2023-08-26 NOTE — H&P ADULT - PROBLEM SELECTOR PLAN 1
Patient presents with chronic chest pain for 7 months since COVID infection in Jan 2023. Last night she had central chest pain that lasted throughout the night  for more than 6 hours. Chest pain described as burning, radiating to throat and lower abdomen, no known relieving factors. Improved today. ECG in ED NS. Echo in 3/23 showed normal left and right ventricular size and systolic function. She follows up with cardiologist outpatient, has stress test scheduled for sept 2023, per patient.     Plan: Patient presents with chronic chest pain for 7 months since COVID infection in Jan 2023. Last night she had central chest pain that lasted throughout the night  for more than 6 hours. Chest pain described as burning, radiating to throat and lower abdomen, no known relieving factors. Improved today. ECG in ED NS. Echo in 3/23 showed normal left and right ventricular size and systolic function. She follows up with cardiologist outpatient, has stress test scheduled for sept 2023, per patient.     Plan:  -Pain management with Tylenol 625 mg PRN

## 2023-08-26 NOTE — H&P ADULT - ATTENDING COMMENTS
84 yo Austrian-speaking F with PMHx HTN, HLD, gastritis, anxiety BIBEMS from home with 6-month hx of worsening epigastric pain radiating to the chest with associated nausea admitted for further management of likely GERD and symptomatic hyponatremia.      #Epigastric pain – Likely GERD. Pt with hx of gastritis. CT Chest, Abd, Pelvis in ED unremarkable for acute pathology. No ischemic changes on EKG and cardiac enzymes negative. Lipase negative. Continue PPI Q24 while admitted. Advance diet as tolerated. Anti-emetic PRN. Outpatient GI follow-up on discharge.      #Hyponatremia – Na 122 on initial labs in ED, repeat Na 125 s/p 1L NS. Euovolemic-hypovolemic on exam. Poor PO intake 2/2 nausea in setting of GERD-like sx as above. 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 Pt unable to corroborate full list of current medications, will need formal med rec in AM. Hold offending medications per now. 1L fluid restriction. Repeat BMP with urine lytes.      Agree with remainder of resident plan as above.

## 2023-08-26 NOTE — H&P ADULT - PROBLEM SELECTOR PLAN 2
Patient presents with epigastric pain, radiating to the throat, described as burning, worse when lying in bed at night since COVID infection in Jan 2023. She reports she has completed EGD 5 years ago, which showed erosive gastritis. She also reportedly completed colonoscopy 5 years ago, reported as normal. In the ED treated with  MAALOX 30ml 1x, Famotidine 2o IV 1x, Pantoprazole 40mg IV 1x  -C/Tof abdomen pelvis and chest showed: No acute findings. No acute fracture.  On home meds: omeprazole-sodium bicarb 40 mg-1000mg qd.  Plan:   -c/w home med Patient presents with epigastric pain, radiating to the throat, described as burning, worse when lying in bed at night since COVID infection in Jan 2023. She reports she has completed EGD 5 years ago, which showed erosive gastritis. She also reportedly completed colonoscopy 5 years ago, reported as normal. In the ED treated with  MAALOX 30ml 1x, Famotidine 2o IV 1x, Pantoprazole 40mg IV 1x  -C/Tof abdomen pelvis and chest showed: No acute findings. No acute fracture.  On home meds: omeprazole-sodium bicarb 40 mg-1000mg qd.  Plan:   -c/w Maalox 30mg PO  PRN  -Pantoprazole 40mg  PO q d

## 2023-08-26 NOTE — ED PROVIDER NOTE - OBJECTIVE STATEMENT
82 y/o f with PMH of HTN, HLD, gastritis, anxiety presents to ED with 6 month hx of epigastric pain radiating to her chest associated with nausea no vomiting, decreased appetite.  Pt states she had mechanical fall 10 days prior and this exacerbated her symptoms.  Denies diarrhea, urinary symptoms, fever, chills.  Pt has been seen by her PMD regarding symptoms and prescribed omeprazole, but has not seen gastroenterologist recently.  Prior colonoscopy several years prior has been normal.  Pt also states she feels nodule in her left breast and wants that checked.  Translation video ipad used for Mauritian translation.

## 2023-08-26 NOTE — H&P ADULT - NSHPPHYSICALEXAM_GEN_ALL_CORE
GENERAL: NAD, lying in bed comfortably, in no acute distress  EYES: EOMI, conjunctiva and sclera clear  HEART: Regular rate and rhythm, no murmurs, rubs, or gallops  LUNGS: Unlabored respirations.  Clear to auscultation bilaterally, no crackles, wheezing, or rhonchi  ABDOMEN: Soft, nondistended, +BS. There is tenderness to palpation over epigastric area  EXTREMITIES:  No clubbing, cyanosis, or edema. Muscular strength is 5/5 in UE and LE severiano  NEUROLOGICAL: AAOx3. No aphasia, dysarthria or facial droop. Muscular strength is 5/5 in UE and LE severiano,   MSK: There is tenderness to palpation over the costochondral joints   SKIN: No rashes or lesions

## 2023-08-26 NOTE — H&P ADULT - PROBLEM SELECTOR PLAN 3
Patient reports chronic headaches since COVID January 2023, Acutely worsened today in the AM, improved now.     Plan;   c/w home acetaminophen/butalbital/caffeine [id number] Patient reports chronic headaches since COVID January 2023, Acutely worsened today in the AM, improved now.     Plan;   -Tylenol PRN as above

## 2023-08-26 NOTE — PATIENT PROFILE ADULT - FUNCTIONAL ASSESSMENT - BASIC MOBILITY 6.
4-calculated by average/Not able to assess (calculate score using [facility] averaging method)

## 2023-08-26 NOTE — H&P ADULT - PROBLEM SELECTOR PLAN 7
F: none  E: Replete if K+ <4 and Mg <2  N: Regular Diet  Dispo: RMF DVT prophylaxis: Lovenox 40  F: none  E: Replete if K+ <4 and Mg <2  N: Regular Diet with 1 L fluid restriction  Dispo: RMF

## 2023-08-26 NOTE — H&P ADULT - PROBLEM SELECTOR PLAN 4
Acute Hypotonic hyponatremia. Na 122 (repeat Na 125 s/p NS 1L blous), Serum osm 264.   On 7/6/23, Na 135  Ulytes suggestive of SIADH: U Na 40, U osm 138, However, sodium improved s/p fluids and patient reports decreased oral intake  DDX: Hypovolemic hyponatremia vs SIADH     Plan:   -salt tablets Hypotonic hyponatremia. Na 122 (repeat Na 125 s/p NS 1L blous), Serum osm 264.   On 7/6/23, Na 135  Ulytes suggestive of SIADH: U Na 40, U osm 138, However, sodium improved s/p fluids and patient reports decreased oral intake  DDX: Hypovolemic hyponatremia vs SIADH     Plan:   -1L Hypotonic hyponatremia. Na 122 (repeat Na 125 s/p NS 1L blous), Serum osm 264.   On 7/6/23, Na 135  Ulytes suggestive of SIADH: U Na 40, U osm 138, However, sodium improved s/p fluids and patient reports decreased oral intake. Upon chart review patient was recently prescribed medications that may cause SIADH including  DDX: Hypovolemic hyponatremia vs SIADH     Plan:   -1L fluid restriction   -med rec Hypotonic hyponatremia. Na 122 (repeat Na 125 s/p NS 1L blous), Serum osm 264.   On 7/6/23, Na 135  Ulytes suggestive of SIADH: U Na 40, U osm 138, However, sodium improved s/p fluids and patient reports decreased oral intake. Upon chart review patient was recently prescribed medications that may cause SIADH including SSRI.     DDX: Hypovolemic hyponatremia vs SIADH     Plan:   -1L fluid restriction   -med rec

## 2023-08-26 NOTE — ED PROVIDER NOTE - CLINICAL SUMMARY MEDICAL DECISION MAKING FREE TEXT BOX
Pt with HTN, HLD, anxiety, gastritis presents to ED with multiple complaints including chest pain and abd pain x 6months - mildly worse after falling 10 days ago.  Pt with stable vitals, slight lower abd tenderness on exam, normal ekg, will check labs, CT chest/abd/pelvis and give meds for gastritis and reevaluate. Pt with HTN, HLD, anxiety, gastritis presents to ED with multiple complaints including chest pain and abd pain x 6months - mildly worse after falling 10 days ago.  Pt with stable vitals, slight lower abd tenderness on exam, normal ekg, will check labs, CT chest/abd/pelvis and give meds for gastritis and reevaluate.    Labs sig for Na 122 improved to 125 after NS bolus, serum osmolality and urine lytes sent.  Symptoms of nausea and reflux could be secondary to Na.  Imaging negative.  Will admit for correction of Na and relief of symptoms.  Discussed with patient and  with  phone.  Discussed with BRENDON for admission.

## 2023-08-26 NOTE — PATIENT PROFILE ADULT - FALL HARM RISK - HARM RISK INTERVENTIONS

## 2023-08-26 NOTE — H&P ADULT - PROBLEM SELECTOR PLAN 5
Normotensive on admission. Home med: clonidine 0.1 mg qdaily, nebivolol 2.5 mg qd, amlodipine 10mg,    Plan:   -hold for now as normotensive. Normotensive on admission. Home med: clonidine 0.1 mg qdaily, nebivolol 2.5 mg qd, amlodipine 10mg,    Plan:   -c/w clonidine 0.1mg qd  -hold for now as normotensive.

## 2023-08-26 NOTE — ED ADULT NURSE NOTE - CHIEF COMPLAINT QUOTE
Pt presents to ED BIBa from home here for multiple medical complaints, chest pain, hypertension, headache and abd pain. Pt is Taiwanese speaking only. Pt A&Ox4. Pt is conversive in full sentences and ambulatory. Neuro intact. PT has hx of HTN and HLD. EKG in prog.

## 2023-08-27 LAB
ALBUMIN SERPL ELPH-MCNC: 3.7 G/DL — SIGNIFICANT CHANGE UP (ref 3.3–5)
ALP SERPL-CCNC: 85 U/L — SIGNIFICANT CHANGE UP (ref 40–120)
ALT FLD-CCNC: 11 U/L — SIGNIFICANT CHANGE UP (ref 10–45)
ANION GAP SERPL CALC-SCNC: 10 MMOL/L — SIGNIFICANT CHANGE UP (ref 5–17)
ANION GAP SERPL CALC-SCNC: 7 MMOL/L — SIGNIFICANT CHANGE UP (ref 5–17)
ANION GAP SERPL CALC-SCNC: 8 MMOL/L — SIGNIFICANT CHANGE UP (ref 5–17)
AST SERPL-CCNC: 18 U/L — SIGNIFICANT CHANGE UP (ref 10–40)
BILIRUB SERPL-MCNC: 0.3 MG/DL — SIGNIFICANT CHANGE UP (ref 0.2–1.2)
BUN SERPL-MCNC: 12 MG/DL — SIGNIFICANT CHANGE UP (ref 7–23)
BUN SERPL-MCNC: 13 MG/DL — SIGNIFICANT CHANGE UP (ref 7–23)
BUN SERPL-MCNC: 9 MG/DL — SIGNIFICANT CHANGE UP (ref 7–23)
CALCIUM SERPL-MCNC: 8.9 MG/DL — SIGNIFICANT CHANGE UP (ref 8.4–10.5)
CALCIUM SERPL-MCNC: 9.1 MG/DL — SIGNIFICANT CHANGE UP (ref 8.4–10.5)
CALCIUM SERPL-MCNC: 9.4 MG/DL — SIGNIFICANT CHANGE UP (ref 8.4–10.5)
CHLORIDE SERPL-SCNC: 95 MMOL/L — LOW (ref 96–108)
CHLORIDE SERPL-SCNC: 96 MMOL/L — SIGNIFICANT CHANGE UP (ref 96–108)
CHLORIDE SERPL-SCNC: 97 MMOL/L — SIGNIFICANT CHANGE UP (ref 96–108)
CO2 SERPL-SCNC: 23 MMOL/L — SIGNIFICANT CHANGE UP (ref 22–31)
CO2 SERPL-SCNC: 24 MMOL/L — SIGNIFICANT CHANGE UP (ref 22–31)
CO2 SERPL-SCNC: 27 MMOL/L — SIGNIFICANT CHANGE UP (ref 22–31)
CREAT SERPL-MCNC: 1.03 MG/DL — SIGNIFICANT CHANGE UP (ref 0.5–1.3)
CREAT SERPL-MCNC: 1.03 MG/DL — SIGNIFICANT CHANGE UP (ref 0.5–1.3)
CREAT SERPL-MCNC: 1.08 MG/DL — SIGNIFICANT CHANGE UP (ref 0.5–1.3)
EGFR: 51 ML/MIN/1.73M2 — LOW
EGFR: 54 ML/MIN/1.73M2 — LOW
EGFR: 54 ML/MIN/1.73M2 — LOW
GLUCOSE SERPL-MCNC: 104 MG/DL — HIGH (ref 70–99)
GLUCOSE SERPL-MCNC: 123 MG/DL — HIGH (ref 70–99)
GLUCOSE SERPL-MCNC: 91 MG/DL — SIGNIFICANT CHANGE UP (ref 70–99)
HCT VFR BLD CALC: 36.2 % — SIGNIFICANT CHANGE UP (ref 34.5–45)
HGB BLD-MCNC: 12.4 G/DL — SIGNIFICANT CHANGE UP (ref 11.5–15.5)
MAGNESIUM SERPL-MCNC: 2 MG/DL — SIGNIFICANT CHANGE UP (ref 1.6–2.6)
MCHC RBC-ENTMCNC: 29.2 PG — SIGNIFICANT CHANGE UP (ref 27–34)
MCHC RBC-ENTMCNC: 34.3 GM/DL — SIGNIFICANT CHANGE UP (ref 32–36)
MCV RBC AUTO: 85.2 FL — SIGNIFICANT CHANGE UP (ref 80–100)
NRBC # BLD: 0 /100 WBCS — SIGNIFICANT CHANGE UP (ref 0–0)
OSMOLALITY UR: 168 MOSM/KG — LOW (ref 300–900)
OSMOLALITY UR: 331 MOSM/KG — SIGNIFICANT CHANGE UP (ref 300–900)
OSMOLALITY UR: 367 MOSM/KG — SIGNIFICANT CHANGE UP (ref 300–900)
OSMOLALITY UR: 373 MOSM/KG — SIGNIFICANT CHANGE UP (ref 300–900)
PHOSPHATE SERPL-MCNC: 2.9 MG/DL — SIGNIFICANT CHANGE UP (ref 2.5–4.5)
PLATELET # BLD AUTO: 275 K/UL — SIGNIFICANT CHANGE UP (ref 150–400)
POTASSIUM SERPL-MCNC: 4 MMOL/L — SIGNIFICANT CHANGE UP (ref 3.5–5.3)
POTASSIUM SERPL-MCNC: 4.1 MMOL/L — SIGNIFICANT CHANGE UP (ref 3.5–5.3)
POTASSIUM SERPL-MCNC: 4.2 MMOL/L — SIGNIFICANT CHANGE UP (ref 3.5–5.3)
POTASSIUM SERPL-SCNC: 4 MMOL/L — SIGNIFICANT CHANGE UP (ref 3.5–5.3)
POTASSIUM SERPL-SCNC: 4.1 MMOL/L — SIGNIFICANT CHANGE UP (ref 3.5–5.3)
POTASSIUM SERPL-SCNC: 4.2 MMOL/L — SIGNIFICANT CHANGE UP (ref 3.5–5.3)
PROT SERPL-MCNC: 6.9 G/DL — SIGNIFICANT CHANGE UP (ref 6–8.3)
RBC # BLD: 4.25 M/UL — SIGNIFICANT CHANGE UP (ref 3.8–5.2)
RBC # FLD: 11.9 % — SIGNIFICANT CHANGE UP (ref 10.3–14.5)
SODIUM SERPL-SCNC: 128 MMOL/L — LOW (ref 135–145)
SODIUM SERPL-SCNC: 128 MMOL/L — LOW (ref 135–145)
SODIUM SERPL-SCNC: 131 MMOL/L — LOW (ref 135–145)
SODIUM UR-SCNC: 20 MMOL/L — SIGNIFICANT CHANGE UP
SODIUM UR-SCNC: 27 MMOL/L — SIGNIFICANT CHANGE UP
SODIUM UR-SCNC: <20 MMOL/L — SIGNIFICANT CHANGE UP
SODIUM UR-SCNC: <20 MMOL/L — SIGNIFICANT CHANGE UP
WBC # BLD: 6.65 K/UL — SIGNIFICANT CHANGE UP (ref 3.8–10.5)
WBC # FLD AUTO: 6.65 K/UL — SIGNIFICANT CHANGE UP (ref 3.8–10.5)

## 2023-08-27 PROCEDURE — 99233 SBSQ HOSP IP/OBS HIGH 50: CPT | Mod: GC

## 2023-08-27 RX ORDER — ENOXAPARIN SODIUM 100 MG/ML
40 INJECTION SUBCUTANEOUS EVERY 24 HOURS
Refills: 0 | Status: DISCONTINUED | OUTPATIENT
Start: 2023-08-27 | End: 2023-08-28

## 2023-08-27 RX ORDER — SUCRALFATE 1 G
1 TABLET ORAL
Refills: 0 | Status: DISCONTINUED | OUTPATIENT
Start: 2023-08-27 | End: 2023-08-28

## 2023-08-27 RX ORDER — SODIUM CHLORIDE 9 MG/ML
500 INJECTION INTRAMUSCULAR; INTRAVENOUS; SUBCUTANEOUS
Refills: 0 | Status: DISCONTINUED | OUTPATIENT
Start: 2023-08-27 | End: 2023-08-28

## 2023-08-27 RX ORDER — PANTOPRAZOLE SODIUM 20 MG/1
40 TABLET, DELAYED RELEASE ORAL EVERY 12 HOURS
Refills: 0 | Status: DISCONTINUED | OUTPATIENT
Start: 2023-08-27 | End: 2023-08-28

## 2023-08-27 RX ORDER — ACETAMINOPHEN 500 MG
650 TABLET ORAL EVERY 6 HOURS
Refills: 0 | Status: DISCONTINUED | OUTPATIENT
Start: 2023-08-27 | End: 2023-08-28

## 2023-08-27 RX ADMIN — Medication 650 MILLIGRAM(S): at 04:44

## 2023-08-27 RX ADMIN — Medication 650 MILLIGRAM(S): at 00:36

## 2023-08-27 RX ADMIN — ENOXAPARIN SODIUM 40 MILLIGRAM(S): 100 INJECTION SUBCUTANEOUS at 06:21

## 2023-08-27 RX ADMIN — ESCITALOPRAM OXALATE 5 MILLIGRAM(S): 10 TABLET, FILM COATED ORAL at 11:31

## 2023-08-27 RX ADMIN — PANTOPRAZOLE SODIUM 40 MILLIGRAM(S): 20 TABLET, DELAYED RELEASE ORAL at 06:21

## 2023-08-27 RX ADMIN — Medication 30 MILLILITER(S): at 13:43

## 2023-08-27 RX ADMIN — Medication 650 MILLIGRAM(S): at 11:31

## 2023-08-27 RX ADMIN — Medication 650 MILLIGRAM(S): at 05:44

## 2023-08-27 RX ADMIN — Medication 650 MILLIGRAM(S): at 01:36

## 2023-08-27 RX ADMIN — Medication 1 GRAM(S): at 18:18

## 2023-08-27 RX ADMIN — Medication 1 GRAM(S): at 23:18

## 2023-08-27 RX ADMIN — ENOXAPARIN SODIUM 40 MILLIGRAM(S): 100 INJECTION SUBCUTANEOUS at 14:42

## 2023-08-27 RX ADMIN — Medication 0.1 MILLIGRAM(S): at 06:21

## 2023-08-27 RX ADMIN — PANTOPRAZOLE SODIUM 40 MILLIGRAM(S): 20 TABLET, DELAYED RELEASE ORAL at 18:18

## 2023-08-27 RX ADMIN — Medication 650 MILLIGRAM(S): at 12:01

## 2023-08-27 RX ADMIN — SODIUM CHLORIDE 100 MILLILITER(S): 9 INJECTION INTRAMUSCULAR; INTRAVENOUS; SUBCUTANEOUS at 19:05

## 2023-08-27 NOTE — PROGRESS NOTE ADULT - PROBLEM SELECTOR PLAN 4
Hypotonic hyponatremia. Na 122 (repeat Na 125 s/p NS 1L blous), Serum osm 264. Previous was 135.  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 Upon chart review patient was recently prescribed medications that may cause SIADH including SSRI.   DDX: Hypovolemic hyponatremia vs SIADH. Patient appears clinically dry on exam today and reports the sensation of a dry mouth.  Plan:   -med rec Hypotonic hyponatremia. Na 122 (repeat Na 125 s/p NS 1L blous), Serum osm 264. Previous was 135.  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 Upon chart review patient was recently prescribed medications that may cause SIADH including SSRI.   DDX: Hypovolemic hyponatremia vs SIADH. Patient appears clinically dry on exam today and reports the sensation of a dry mouth. Repeat urine studies consistent with hypovolemic hyponatremia  Plan:  -remove fluid restriction on diet and recheck BMP in afternoon  -med rec pending, unable to reach pharmacy

## 2023-08-27 NOTE — PROGRESS NOTE ADULT - SUBJECTIVE AND OBJECTIVE BOX
OVERNIGHT EVENTS: KEY    SUBJECTIVE:  Patient seen and examined at bedside. Burkinan  used. Pt reports feeing like she has a very dry mouth and continues to have mild chest "burning." Denies fevers, chills, chest pain, shortness of breath, abdominal pain, n/v/d, leg pain or swelling.    Vital Signs Last 12 Hrs  T(F): 97.6 (08-27-23 @ 05:58), Max: 97.6 (08-27-23 @ 05:58)  HR: 59 (08-27-23 @ 05:58) (59 - 59)  BP: 118/70 (08-27-23 @ 05:58) (118/70 - 118/70)  BP(mean): --  RR: 16 (08-27-23 @ 05:58) (16 - 16)  SpO2: 100% (08-27-23 @ 05:58) (100% - 100%)  I&O's Summary      PHYSICAL EXAM:  Constitutional: mildly anxious in bed, speaking in full sentences  HEENT: NC/AT,, MM dry  Neck: Supple  Respiratory: CTA B/L. No w/r/r.   Cardiovascular: RRR, normal S1 and S2, no m/r/g.   Gastrointestinal: +BS, soft NTND, no guarding or rebound tenderness, no palpable masses   Extremities: wwp; no cyanosis, clubbing or edema.   Vascular: Pulses equal and strong throughout.   Neurological: AAOx3, no CN deficits.   Skin: No gross skin abnormalities or rashes        LABS:                        12.4   6.65  )-----------( 275      ( 27 Aug 2023 05:30 )             36.2     08-27    128<L>  |  95<L>  |  9   ----------------------------<  91  4.1   |  23  |  1.03    Ca    9.4      27 Aug 2023 05:30  Phos  2.9     08-27  Mg     2.0     08-27    TPro  6.9  /  Alb  3.7  /  TBili  0.3  /  DBili  x   /  AST  18  /  ALT  11  /  AlkPhos  85  08-27      Urinalysis Basic - ( 27 Aug 2023 05:30 )    Color: x / Appearance: x / SG: x / pH: x  Gluc: 91 mg/dL / Ketone: x  / Bili: x / Urobili: x   Blood: x / Protein: x / Nitrite: x   Leuk Esterase: x / RBC: x / WBC x   Sq Epi: x / Non Sq Epi: x / Bacteria: x          RADIOLOGY & ADDITIONAL TESTS:    MEDICATIONS  (STANDING):  cloNIDine 0.1 milliGRAM(s) Oral daily  escitalopram 5 milliGRAM(s) Oral daily  pantoprazole    Tablet 40 milliGRAM(s) Oral before breakfast    MEDICATIONS  (PRN):  acetaminophen     Tablet .. 650 milliGRAM(s) Oral every 6 hours PRN Moderate Pain (4 - 6), Severe Pain (7 - 10)  aluminum hydroxide/magnesium hydroxide/simethicone Suspension 30 milliLiter(s) Oral every 6 hours PRN Dyspepsia

## 2023-08-27 NOTE — PROGRESS NOTE ADULT - PROBLEM SELECTOR PLAN 7
DVT prophylaxis: Lovenox 40  F: none  E: Replete if K+ <4 and Mg <2  N: Regular Diet with 1 L fluid restriction  Dispo: RMF DVT prophylaxis: Lovenox 40  F: none  E: Replete if K+ <4 and Mg <2  N: Regular Diet  Dispo: F

## 2023-08-28 ENCOUNTER — TRANSCRIPTION ENCOUNTER (OUTPATIENT)
Age: 83
End: 2023-08-28

## 2023-08-28 ENCOUNTER — RESULT REVIEW (OUTPATIENT)
Age: 83
End: 2023-08-28

## 2023-08-28 ENCOUNTER — APPOINTMENT (OUTPATIENT)
Dept: NEUROLOGY | Facility: CLINIC | Age: 83
End: 2023-08-28

## 2023-08-28 VITALS
TEMPERATURE: 98 F | RESPIRATION RATE: 18 BRPM | DIASTOLIC BLOOD PRESSURE: 75 MMHG | HEART RATE: 67 BPM | WEIGHT: 130.95 LBS | OXYGEN SATURATION: 98 % | HEIGHT: 61 IN | SYSTOLIC BLOOD PRESSURE: 149 MMHG

## 2023-08-28 DIAGNOSIS — R07.9 CHEST PAIN, UNSPECIFIED: ICD-10-CM

## 2023-08-28 LAB
ANION GAP SERPL CALC-SCNC: 7 MMOL/L — SIGNIFICANT CHANGE UP (ref 5–17)
BUN SERPL-MCNC: 14 MG/DL — SIGNIFICANT CHANGE UP (ref 7–23)
CALCIUM SERPL-MCNC: 9.2 MG/DL — SIGNIFICANT CHANGE UP (ref 8.4–10.5)
CHLORIDE SERPL-SCNC: 100 MMOL/L — SIGNIFICANT CHANGE UP (ref 96–108)
CK SERPL-CCNC: 62 U/L — SIGNIFICANT CHANGE UP (ref 25–170)
CO2 SERPL-SCNC: 27 MMOL/L — SIGNIFICANT CHANGE UP (ref 22–31)
CREAT SERPL-MCNC: 0.97 MG/DL — SIGNIFICANT CHANGE UP (ref 0.5–1.3)
EGFR: 58 ML/MIN/1.73M2 — LOW
GLUCOSE SERPL-MCNC: 108 MG/DL — HIGH (ref 70–99)
HCT VFR BLD CALC: 36.2 % — SIGNIFICANT CHANGE UP (ref 34.5–45)
HGB BLD-MCNC: 12.6 G/DL — SIGNIFICANT CHANGE UP (ref 11.5–15.5)
MAGNESIUM SERPL-MCNC: 2.2 MG/DL — SIGNIFICANT CHANGE UP (ref 1.6–2.6)
MCHC RBC-ENTMCNC: 29.9 PG — SIGNIFICANT CHANGE UP (ref 27–34)
MCHC RBC-ENTMCNC: 34.8 GM/DL — SIGNIFICANT CHANGE UP (ref 32–36)
MCV RBC AUTO: 86 FL — SIGNIFICANT CHANGE UP (ref 80–100)
NRBC # BLD: 0 /100 WBCS — SIGNIFICANT CHANGE UP (ref 0–0)
PHOSPHATE SERPL-MCNC: 2.1 MG/DL — LOW (ref 2.5–4.5)
PLATELET # BLD AUTO: 299 K/UL — SIGNIFICANT CHANGE UP (ref 150–400)
POTASSIUM SERPL-MCNC: 4.6 MMOL/L — SIGNIFICANT CHANGE UP (ref 3.5–5.3)
POTASSIUM SERPL-SCNC: 4.6 MMOL/L — SIGNIFICANT CHANGE UP (ref 3.5–5.3)
RBC # BLD: 4.21 M/UL — SIGNIFICANT CHANGE UP (ref 3.8–5.2)
RBC # FLD: 11.9 % — SIGNIFICANT CHANGE UP (ref 10.3–14.5)
SODIUM SERPL-SCNC: 134 MMOL/L — LOW (ref 135–145)
WBC # BLD: 8.1 K/UL — SIGNIFICANT CHANGE UP (ref 3.8–10.5)
WBC # FLD AUTO: 8.1 K/UL — SIGNIFICANT CHANGE UP (ref 3.8–10.5)

## 2023-08-28 PROCEDURE — 36415 COLL VENOUS BLD VENIPUNCTURE: CPT

## 2023-08-28 PROCEDURE — 71260 CT THORAX DX C+: CPT | Mod: MC

## 2023-08-28 PROCEDURE — 83930 ASSAY OF BLOOD OSMOLALITY: CPT

## 2023-08-28 PROCEDURE — 82550 ASSAY OF CK (CPK): CPT

## 2023-08-28 PROCEDURE — 85025 COMPLETE CBC W/AUTO DIFF WBC: CPT

## 2023-08-28 PROCEDURE — 43239 EGD BIOPSY SINGLE/MULTIPLE: CPT

## 2023-08-28 PROCEDURE — 99239 HOSP IP/OBS DSCHRG MGMT >30: CPT

## 2023-08-28 PROCEDURE — 99285 EMERGENCY DEPT VISIT HI MDM: CPT

## 2023-08-28 PROCEDURE — 88305 TISSUE EXAM BY PATHOLOGIST: CPT | Mod: 26

## 2023-08-28 PROCEDURE — 88342 IMHCHEM/IMCYTCHM 1ST ANTB: CPT | Mod: 26

## 2023-08-28 PROCEDURE — 93005 ELECTROCARDIOGRAM TRACING: CPT

## 2023-08-28 PROCEDURE — 81003 URINALYSIS AUTO W/O SCOPE: CPT

## 2023-08-28 PROCEDURE — 93010 ELECTROCARDIOGRAM REPORT: CPT

## 2023-08-28 PROCEDURE — 82570 ASSAY OF URINE CREATININE: CPT

## 2023-08-28 PROCEDURE — 96375 TX/PRO/DX INJ NEW DRUG ADDON: CPT

## 2023-08-28 PROCEDURE — 84484 ASSAY OF TROPONIN QUANT: CPT

## 2023-08-28 PROCEDURE — 74177 CT ABD & PELVIS W/CONTRAST: CPT | Mod: MC

## 2023-08-28 PROCEDURE — 83735 ASSAY OF MAGNESIUM: CPT

## 2023-08-28 PROCEDURE — 85027 COMPLETE CBC AUTOMATED: CPT

## 2023-08-28 PROCEDURE — 96374 THER/PROPH/DIAG INJ IV PUSH: CPT

## 2023-08-28 PROCEDURE — 80048 BASIC METABOLIC PNL TOTAL CA: CPT

## 2023-08-28 PROCEDURE — 80053 COMPREHEN METABOLIC PANEL: CPT

## 2023-08-28 PROCEDURE — 84100 ASSAY OF PHOSPHORUS: CPT

## 2023-08-28 PROCEDURE — 83935 ASSAY OF URINE OSMOLALITY: CPT

## 2023-08-28 PROCEDURE — 83690 ASSAY OF LIPASE: CPT

## 2023-08-28 PROCEDURE — 88341 IMHCHEM/IMCYTCHM EA ADD ANTB: CPT

## 2023-08-28 PROCEDURE — 88305 TISSUE EXAM BY PATHOLOGIST: CPT

## 2023-08-28 PROCEDURE — 84300 ASSAY OF URINE SODIUM: CPT

## 2023-08-28 RX ORDER — ALPRAZOLAM 0.25 MG
1 TABLET ORAL
Refills: 0 | DISCHARGE

## 2023-08-28 RX ORDER — PANTOPRAZOLE SODIUM 20 MG/1
1 TABLET, DELAYED RELEASE ORAL
Qty: 60 | Refills: 2
Start: 2023-08-28 | End: 2023-11-25

## 2023-08-28 RX ORDER — GABAPENTIN 400 MG/1
1 CAPSULE ORAL
Refills: 0 | DISCHARGE

## 2023-08-28 RX ORDER — DILTIAZEM HCL 120 MG
1 CAPSULE, EXT RELEASE 24 HR ORAL
Refills: 0 | DISCHARGE

## 2023-08-28 RX ORDER — ACETAMINOPHEN 500 MG
1000 TABLET ORAL ONCE
Refills: 0 | Status: COMPLETED | OUTPATIENT
Start: 2023-08-28 | End: 2023-08-28

## 2023-08-28 RX ORDER — LAMOTRIGINE 25 MG/1
1 TABLET, ORALLY DISINTEGRATING ORAL
Refills: 0 | DISCHARGE

## 2023-08-28 RX ORDER — OMEPRAZOLE AND SODIUM BICARBONATE 40; 1100 MG/1; MG/1
1 CAPSULE, GELATIN COATED ORAL
Refills: 0 | DISCHARGE

## 2023-08-28 RX ORDER — LIPASE/PROTEASE/AMYLASE 16-48-48K
1 CAPSULE,DELAYED RELEASE (ENTERIC COATED) ORAL
Refills: 0 | DISCHARGE

## 2023-08-28 RX ORDER — OLMESARTAN MEDOXOMIL-HYDROCHLOROTHIAZIDE 25; 40 MG/1; MG/1
1 TABLET, FILM COATED ORAL
Refills: 0 | DISCHARGE

## 2023-08-28 RX ORDER — PRAZOSIN HCL 2 MG
1 CAPSULE ORAL
Refills: 0 | DISCHARGE

## 2023-08-28 RX ORDER — AMITRIPTYLINE HCL 25 MG
1 TABLET ORAL
Qty: 0 | Refills: 0 | DISCHARGE

## 2023-08-28 RX ORDER — CETIRIZINE HYDROCHLORIDE 10 MG/1
1 TABLET ORAL
Qty: 0 | Refills: 0 | DISCHARGE

## 2023-08-28 RX ORDER — ACETAZOLAMIDE 250 MG/1
1 TABLET ORAL
Refills: 0 | DISCHARGE

## 2023-08-28 RX ORDER — AMLODIPINE BESYLATE 2.5 MG/1
1 TABLET ORAL
Refills: 0 | DISCHARGE

## 2023-08-28 RX ORDER — NEBIVOLOL HYDROCHLORIDE 5 MG/1
1 TABLET ORAL
Refills: 0 | DISCHARGE

## 2023-08-28 RX ADMIN — PANTOPRAZOLE SODIUM 40 MILLIGRAM(S): 20 TABLET, DELAYED RELEASE ORAL at 18:42

## 2023-08-28 RX ADMIN — Medication 85 MILLIMOLE(S): at 12:05

## 2023-08-28 RX ADMIN — Medication 0.1 MILLIGRAM(S): at 06:01

## 2023-08-28 RX ADMIN — Medication 30 MILLILITER(S): at 08:06

## 2023-08-28 RX ADMIN — PANTOPRAZOLE SODIUM 40 MILLIGRAM(S): 20 TABLET, DELAYED RELEASE ORAL at 06:01

## 2023-08-28 RX ADMIN — Medication 1 GRAM(S): at 06:01

## 2023-08-28 RX ADMIN — ENOXAPARIN SODIUM 40 MILLIGRAM(S): 100 INJECTION SUBCUTANEOUS at 15:51

## 2023-08-28 NOTE — PROGRESS NOTE ADULT - SUBJECTIVE AND OBJECTIVE BOX
**INCOMPLETE NOTE    OVERNIGHT EVENTS:    SUBJECTIVE:  Patient seen and examined at bedside.    Vital Signs Last 12 Hrs  T(F): 97.9 (08-28-23 @ 05:32), Max: 97.9 (08-28-23 @ 05:32)  HR: 81 (08-28-23 @ 05:32) (81 - 81)  BP: 157/74 (08-28-23 @ 05:32) (157/74 - 157/74)  BP(mean): --  RR: 17 (08-28-23 @ 05:32) (17 - 17)  SpO2: 99% (08-28-23 @ 05:32) (99% - 99%)  I&O's Summary    27 Aug 2023 07:01  -  28 Aug 2023 07:00  --------------------------------------------------------  IN: 0 mL / OUT: 100 mL / NET: -100 mL        PHYSICAL EXAM:  Constitutional: NAD, comfortable in bed.  HEENT: NC/AT, PERRLA, EOMI, no conjunctival pallor or scleral icterus, MMM  Neck: Supple, no JVD  Respiratory: CTA B/L. No w/r/r.   Cardiovascular: RRR, normal S1 and S2, no m/r/g.   Gastrointestinal: +BS, soft NTND, no guarding or rebound tenderness, no palpable masses   Extremities: wwp; no cyanosis, clubbing or edema.   Vascular: Pulses equal and strong throughout.   Neurological: AAOx3, no CN deficits, strength and sensation intact throughout.   Skin: No gross skin abnormalities or rashes        LABS:                        12.6   8.10  )-----------( 299      ( 28 Aug 2023 07:06 )             36.2     08-28    134<L>  |  100  |  14  ----------------------------<  108<H>  4.6   |  27  |  0.97    Ca    9.2      28 Aug 2023 07:06  Phos  2.1     08-28  Mg     2.2     08-28    TPro  6.9  /  Alb  3.7  /  TBili  0.3  /  DBili  x   /  AST  18  /  ALT  11  /  AlkPhos  85  08-27      Urinalysis Basic - ( 28 Aug 2023 07:06 )    Color: x / Appearance: x / SG: x / pH: x  Gluc: 108 mg/dL / Ketone: x  / Bili: x / Urobili: x   Blood: x / Protein: x / Nitrite: x   Leuk Esterase: x / RBC: x / WBC x   Sq Epi: x / Non Sq Epi: x / Bacteria: x          RADIOLOGY & ADDITIONAL TESTS:    MEDICATIONS  (STANDING):  cloNIDine 0.1 milliGRAM(s) Oral daily  enoxaparin Injectable 40 milliGRAM(s) SubCutaneous every 24 hours  escitalopram 5 milliGRAM(s) Oral daily  pantoprazole    Tablet 40 milliGRAM(s) Oral every 12 hours  sodium chloride 0.9%. 500 milliLiter(s) (100 mL/Hr) IV Continuous <Continuous>  sucralfate suspension 1 Gram(s) Oral four times a day    MEDICATIONS  (PRN):  acetaminophen     Tablet .. 650 milliGRAM(s) Oral every 6 hours PRN Moderate Pain (4 - 6), Severe Pain (7 - 10)  aluminum hydroxide/magnesium hydroxide/simethicone Suspension 30 milliLiter(s) Oral every 6 hours PRN Dyspepsia   OVERNIGHT EVENTS: Overnight patient reported chest pain, ECG was obtained that was non-ischemic     SUBJECTIVE:  Patient seen and examined at bedside. Patient reports she feels uncomfortable. She endorses chest pain, continues since last night associated with dyspna and dry mouth. VS at bedside wnl. Pain reported to be similar to the pain she experienced overnight. Patient was asked to sit up, given Maalox and reported symptoms improved.   She denies dizziness, headaches, vision changes, cough, dysuria.     Vital Signs Last 12 Hrs  T(F): 97.9 (08-28-23 @ 05:32), Max: 97.9 (08-28-23 @ 05:32)  HR: 81 (08-28-23 @ 05:32) (81 - 81)  BP: 157/74 (08-28-23 @ 05:32) (157/74 - 157/74)  BP(mean): --  RR: 17 (08-28-23 @ 05:32) (17 - 17)  SpO2: 99% (08-28-23 @ 05:32) (99% - 99%)  I&O's Summary    27 Aug 2023 07:01  -  28 Aug 2023 07:00  --------------------------------------------------------  IN: 0 mL / OUT: 100 mL / NET: -100 mL        Physical Exam: GENERAL: NAD, lying in bed appears uncomfortable  EYES: Conjunctiva and sclera clear  HEART: Regular rate and rhythm, no murmurs, rubs, or gallops  LUNGS: Unlabored respirations.  Clear to auscultation bilaterally, no crackles, wheezing, or rhonchi  ABDOMEN: Soft, nondistended, +BS. There is tenderness to palpation over epigastric area  EXTREMITIES:  No clubbing, cyanosis, or edema. Muscular strength is 5/5 in UE and LE severiano  NEUROLOGICAL: AAOx3. No aphasia, dysarthria or facial droop. Muscular strength is 5/5 in UE and LE severiano,   MSK: There is tenderness to palpation over the costochondral joints   SKIN: No rashes or lesions          LABS:                        12.6   8.10  )-----------( 299      ( 28 Aug 2023 07:06 )             36.2     08-28    134<L>  |  100  |  14  ----------------------------<  108<H>  4.6   |  27  |  0.97    Ca    9.2      28 Aug 2023 07:06  Phos  2.1     08-28  Mg     2.2     08-28    TPro  6.9  /  Alb  3.7  /  TBili  0.3  /  DBili  x   /  AST  18  /  ALT  11  /  AlkPhos  85  08-27      Urinalysis Basic - ( 28 Aug 2023 07:06 )    Color: x / Appearance: x / SG: x / pH: x  Gluc: 108 mg/dL / Ketone: x  / Bili: x / Urobili: x   Blood: x / Protein: x / Nitrite: x   Leuk Esterase: x / RBC: x / WBC x   Sq Epi: x / Non Sq Epi: x / Bacteria: x          RADIOLOGY & ADDITIONAL TESTS:    MEDICATIONS  (STANDING):  cloNIDine 0.1 milliGRAM(s) Oral daily  enoxaparin Injectable 40 milliGRAM(s) SubCutaneous every 24 hours  escitalopram 5 milliGRAM(s) Oral daily  pantoprazole    Tablet 40 milliGRAM(s) Oral every 12 hours  sodium chloride 0.9%. 500 milliLiter(s) (100 mL/Hr) IV Continuous <Continuous>  sucralfate suspension 1 Gram(s) Oral four times a day    MEDICATIONS  (PRN):  acetaminophen     Tablet .. 650 milliGRAM(s) Oral every 6 hours PRN Moderate Pain (4 - 6), Severe Pain (7 - 10)  aluminum hydroxide/magnesium hydroxide/simethicone Suspension 30 milliLiter(s) Oral every 6 hours PRN Dyspepsia

## 2023-08-28 NOTE — PROGRESS NOTE ADULT - ATTENDING COMMENTS
Patient was seen and examined at bedside on 8/28/2023 at 830 am. Patient reports continued abdominal pain. The epigastric pain has improved, w/ sensation of reflux. Reports suprapubic fullness. Denies SOB or N/V. ROS is otherwise negative. Vitals, labwork and pertinent imaging reviewed. 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 Greenfield; need for Plavix?  -Med Rec
Patient was seen and examined at bedside on 8/27/2023 at 930 am with  at bedside. Patient has a slew of complaints of varying timeframes. Has some chest pain on the R side that she thinks is secondary to a fall she had in the past, this pain is reproducible to palpation. Reports epigastric pain which has improved, w/ sensation of reflux. Reports suprapubic fullness. Denies SOB or N/V. ROS is otherwise negative. Vitals, labwork and pertinent imaging reviewed. 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

## 2023-08-28 NOTE — HISTORY OF PRESENT ILLNESS
[FreeTextEntry1] :  Patient is a 83 year Mosotho speaking female with PMH of HTN, HLD, Gastritis, Anxiety presenting as a post hospitalization follow up. Patient was recently admitted to the hospital on 7/6/23 for headache and left face, arm, and leg decreased sensation. Symptoms improved during admission, however did not completely subside. Patient was already on Eliquis 2.5mg BID (prescribed by Dr. Jiménez) during this event, unsure as to why she is on it.  Hospital Labs: A1C 5.3, , TSH 2.310 Hospital Imaging: CT Brain: No acute intracranial hemorrhage or transcortical infarction. CT Perfusion: Negative CT perfusion study. CTA Head: No large vessel occlusion. 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 Focal area of moderate narrowing involving the right P2 segment. CT Neck: Normal CTA of the neck. MR Head: No acute infarct.  Patient was discharged on Plavix 75mg daily as well as Fioricet 300mg/50mg/40mg oral once q8h PRN. She was previously on Pitavastatin (as calcium) 4mg once daily.  Since discharge, patient denies any new stroke like symptoms and tolerating their daily medications without any bleeding or muscle cramps.

## 2023-08-28 NOTE — PROGRESS NOTE ADULT - PROBLEM SELECTOR PLAN 3
Patient reports chronic headaches since COVID January 2023, Patient denies headache, vision changes at present, states she feels well.     Plan;   -Tylenol PRN as above
Patient reports chronic headaches since COVID January 2023, Patient denies headache, vision changes at present, states she feels well.     Plan;   -Tylenol PRN as above

## 2023-08-28 NOTE — PROGRESS NOTE ADULT - PROBLEM SELECTOR PLAN 2
Patient presents with epigastric pain, radiating to the throat, described as burning, worse when lying in bed at night since COVID infection in Jan 2023. She reports she has completed EGD 5 years ago, which showed erosive gastritis. She also reportedly completed colonoscopy 5 years ago, reported as normal. In the ED treated with  MAALOX 30ml 1x, Famotidine 2o IV 1x, Pantoprazole 40mg IV 1x  -C/Tof abdomen pelvis and chest showed: No acute findings. No acute fracture.  On home meds: omeprazole-sodium bicarb 40 mg-1000mg qd.  Plan:   -c/w Maalox 30mg PO  PRN  -Pantoprazole 40mg  PO q d
Patient presents with epigastric pain, radiating to the throat, described as burning, worse when lying in bed at night since COVID infection in Jan 2023. She reports she has completed EGD 5 years ago, which showed erosive gastritis. She also reportedly completed colonoscopy 5 years ago, reported as normal. In the ED treated with  MAALOX 30ml 1x, Famotidine 2o IV 1x, Pantoprazole 40mg IV 1x  -C/Tof abdomen pelvis and chest showed: No acute findings. No acute fracture.  On home meds: omeprazole-sodium bicarb 40 mg-1000mg qd. GI was consulted and EGD was completed on 8/28/23, which showed mild gastritis and small white nodules throughout stomach, but no significant PUD or other inflammation. Random gastric biopsies taken. Normal esophagus, no esophagitis.     Plan:   -On discharge c/w PPI BID and OTC pepcid to use PRN  -f/u with GI outpatient gastroenterologist  Dr. Bolanos

## 2023-08-28 NOTE — CHART NOTE - NSCHARTNOTEFT_GEN_A_CORE
Patient is s/p EGD in endoscopy unit.     Findings:   - normal esophagus   - mild diffuse erythema in the stomach compatible with non-erosive gastritis   - flat elevated white patches or lesions were diffusely seen throughout the stomach body and biopsied for histology   - normal duodenum   - no obvious explanation for patient's symptoms found on EGD     Recommendations:   - Protonix 40 mg IV q 12 h  - Carafate solution q6 h   - advance diet as tolerated   - no GI contraindication to discharge if tolerating PO and symptoms are improving  - consider outpatient initiation of low dose TCA or FDgard for visceral hypersensitivity  - arrange out-patient follow-up with patient's GI (Dr. Iris Dooley)     Tiffany Barros D.O.   Gastroenterology Fellow  Weekday 7am-5pm Pager: [phone] Patient is s/p EGD in endoscopy unit.     Findings:   - normal esophagus   - mild diffuse erythema in the stomach compatible with non-erosive gastritis   - flat elevated white patches or lesions were diffusely seen throughout the stomach body and biopsied for histology   - normal duodenum   - no obvious explanation for patient's symptoms found on EGD     Recommendations:   - Protonix 40 mg IV q 12 h  - Carafate solution q6 h   - advance diet as tolerated   - f/u pathology report for stomach biopsies  - no GI contraindication to discharge if tolerating PO and symptoms are improving  - consider outpatient initiation of low dose TCA or FDgard for visceral hypersensitivity  - arrange out-patient follow-up with patient's GI (Dr. Iris Dooley)     Tiffany Barros D.O.   Gastroenterology Fellow  Weekday 7am-5pm Pager: [phone]

## 2023-08-28 NOTE — PHYSICAL EXAM
[FreeTextEntry1] : The patient is alert and oriented x3, follows commands, and is able to participate fully in the history taking. Speech is normal with no evidence of dysarthria. Memory is intact: Immediate recall 3 out of 3, short-term 3 out of 3, remote memory intact. Cranial nerves II through XII intact. Motor exam: Upper and lower extremities 5 out of 5 power, normal tone. No abnormal movements noted. Sensory exam: Intact to light touch and pinprick. Romberg negative. Coordination and vestibular exam: Finger to nose intact, no evidence of ataxia, nystagmus and no vestibular symptoms elicited with head turning during ambulation. Gait: Normal gait walking without a cane.  Reflexes: One to 2+ in upper and lower extremities. No pathological reflexes. Downgoing toes.

## 2023-08-28 NOTE — PROGRESS NOTE ADULT - PROBLEM SELECTOR PLAN 1
Patient presents with chronic chest pain for 7 months since COVID infection in Jan 2023. Last night she had central chest pain that lasted throughout the night  for more than 6 hours. Chest pain described as burning, radiating to throat and lower abdomen, no known relieving factors. ECG in ED NS. Echo in 3/23 showed normal left and right ventricular size and systolic function. She follows up with cardiologist outpatient, has stress test scheduled for sept 2023, per patient.   Low suspicion for cardiac related disease as trop negative and EKG non-ischemic, suspect symptoms to be in setting of GERD. Pain is non-reproducible on exam.    Plan:  -Pain management with Tylenol 625 mg PRN  -c/w GERD management as listed below
Patient presents with chronic chest pain for 7 months since COVID infection in Jan 2023. Last night she had central chest pain that lasted throughout the night  for more than 6 hours. Chest pain described as burning, radiating to throat and lower abdomen, no known relieving factors. ECG in ED NS. Echo in 3/23 showed normal left and right ventricular size and systolic function. She follows up with cardiologist outpatient, has stress test scheduled for sept 2023, per patient.   Low suspicion for cardiac related disease as trop negative and EKG non-ischemic, suspect symptoms to be in setting of GERD. Pain is non-reproducible on exam.    Plan:  -Pain management with Tylenol 625 mg PRN  -c/w GERD management as listed below

## 2023-08-28 NOTE — ASSESSMENT
[FreeTextEntry1] :  Patient is a 83 year Kyrgyz speaking female with PMH of HTN, HLD, Gastritis, Anxiety presenting as a post hospitalization follow up.  Plan -

## 2023-08-28 NOTE — DISCHARGE NOTE PROVIDER - PROVIDER TOKENS
PROVIDER:[TOKEN:[[id number]],FOLLOWUP:[1 week]] PROVIDER:[TOKEN:[[id number]],SCHEDULEDAPPT:[09/07/2023],SCHEDULEDAPPTTIME:[09:00 AM]] PROVIDER:[TOKEN:[[id number]],SCHEDULEDAPPT:[09/07/2023],SCHEDULEDAPPTTIME:[09:00 AM]],PROVIDER:[TOKEN:[[id number]],FOLLOWUP:[2 weeks]]

## 2023-08-28 NOTE — DISCHARGE NOTE PROVIDER - ATTENDING DISCHARGE PHYSICAL EXAMINATION:
Patient was seen and examined at bedside on 8/28/2023 at 830 am. The epigastric pain has improved, although still has chronic headaches. Denies SOB or N/V. ROS is otherwise negative. Vitals, labwork and pertinent imaging reviewed. 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 Greenfield; need for Plavix?  -Med Rec - will continue patient on Lisinopril 10 mg PO qd, Plavix, Lexapro 5 mg PO qd, Atorvastatin

## 2023-08-28 NOTE — CONSULT NOTE ADULT - ATTENDING COMMENTS
As above. Admitted for severe CP/epigastric pain. On PPI. Hx of gastritis noted.   Mild improvement w/ IV PPI and H2RA.  EGD today showed mild gastritis and small white nodules throughout stomach, but no significant PUD or other inflammation. Random gastric biopsies taken. Normal esophagus, no esophagitis.    Unclear what is causing pain, or if it is truly GI in nature.   Would continue IV PPI and H2RA PRN. Transition to BID PPI x 2 weeks and then daily PPI in the AM, 30 mins prior to breakfast.   F/u pathology but this is unlikely to explain sx.   Diet as tolerated.   GI will follow.

## 2023-08-28 NOTE — DISCHARGE NOTE NURSING/CASE MANAGEMENT/SOCIAL WORK - NSDCPEFALRISK_GEN_ALL_CORE
You have a telephone follow up appointment scheduled for December 22.   will call you between 7:30a - 11a.    Discharge Instructions:  1.You should take it easy once you are home. Avoid sitting for any prolonged period of time (no longer than one hour at a time without standing up and stretching).    2. If you are uncomfortable, you may apply ice to the injection site, 20 min on, 1hr off.    3. Do not drive a vehicle or heavy machinery after the procedure.    4. You may resume your normal activity the day after the procedure. Remember that it can take up to 10 days before you begin to feel the effects of the steroid.    5. No baths, hot tubs or swimming for at least 3 days after the procedure.    6. You may restart all medications after the medication, unless noted elsewhere.    7. Be aware that if you are diabetic your blood sugar could increase due to the steroid, follow up with your primary doctor if blood sugar greater than 300.            The pain clinic phone number is [phone].        You will always get our voice mail when you call, please leave a message.   Please leave your name, date of birth and phone number on the voicemail to ensure we can identify you.    The pain clinic will return your call within 2 business days.   For information on Fall & Injury Prevention, visit: [email or web address] OR  [email or web address] OR  https://www.cdc.gov/steadi/patient.html

## 2023-08-28 NOTE — DISCHARGE NOTE PROVIDER - HOSPITAL COURSE
83y [place] speaking Female with PMHx of HTN, HLD, gastritis, anxiety, who presents for headache, abdominal pain, chest pain, nausea for 7 months, acutely worsened overnight. Patient was found to be hyponatremic and was admitted for further workup of abdominal/chest pain.       # Chest pain.   ·Patient presents with chronic chest pain for 7 months since COVID infection in Jan 2023. Last night she had central chest pain that lasted throughout the night  for more than 6 hours. Chest pain described as burning, radiating to throat and lower abdomen, no known relieving factors. ECG in ED NS. Trop was negative.  Echo in 3/23 showed normal left and right ventricular size and systolic function. She follows up with cardiologist outpatient, has stress test scheduled for sept 2023, per patient.   Low suspicion for cardiac related disease as trop negative and EKG non-ischemic, suspect symptoms to be in setting of GERD. Pain is non-reproducible on exam. She was managed for pain with Tylenol 625 mg PRN and for GERD management with Maalox 30mg PO  PRN and Pantoprazole 40mg  PO q d. On 8/27 overnight patient reported chest pain, ECG was obtained that was non-ischemic . Patient reportred similar pain in the morning, associated with dyspnea. VS wnl. Patient was asked to sit up, given Maalox and reported symptoms improved.   Plan:   -On discharge, f/u with PCP Dr. Calvillo    # Abdominal pain.   ·  Patient presents with epigastric pain, radiating to the throat, described as burning, worse when lying in bed at night since COVID infection in Jan 2023. She reports she has completed EGD 5 years ago, which showed erosive gastritis. She also reportedly completed colonoscopy 5 years ago, reported as normal. In the ED treated with  MAALOX 30ml 1x, Famotidine 2o IV 1x, Pantoprazole 40mg IV 1x. C/Tof abdomen pelvis and chest showed: No acute findings. No acute fracture.  On home meds: omeprazole-sodium bicarb 40 mg-1000mg qd. She was treated with Maalox 30mg PO  PRN and Pantoprazole 40mg  PO q d. GI was consulted and EGD was completed on 8/28/23, which showed mild gastritis and small white nodules throughout stomach, but no significant PUD or other inflammation. Random gastric biopsies taken. Normal esophagus, no esophagitis.       Plan:   -On discharge start BID PPI x 2 weeks and then daily PPI in the AM, 30 mins prior to breakfast and  H2RA PRN. {.....}  -f/u with GI outpatient     # History of headache.   Patient reports chronic headaches since COVID January 2023. Home med: acetaminophen/butalbital/caffeine 300 mg-50 mg-40 mg oral capsule: 1 cap(s) orally every 8 hours PRN. On 8/28/23 Patient denies headache, vision changes at present, states she feels well.     On discharge:   -c/w with acetaminophen/butalbital/caffeine 300 mg-50 mg-40 mg oral capsule: 1 cap(s) orally every 8 hours as needed.       #Hypotonic hyponatremia.   Na 122 (repeat Na 125 s/p NS 1L blous), Serum osm 264. Previous was 135.  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 Upon chart review patient was recently prescribed medications that may cause SIADH including SSRI.   DDX: Hypovolemic hyponatremia vs SIADH. Patient appears clinically dry on exam today and reports the sensation of a dry mouth. Repeat urine studies consistent with hypovolemic hyponatremia. She was initially managed for SIADH with fluid restriction. ON 8/27, completed fluid challenge with 500cc of NS, Na improved from 128 to 131, suggestive of hypovolemic hyponatremia     Plan:  -On discharge, encourage oral hydration  -discontinue home acetazolamide     #HTN (hypertension).   Normotensive on admission. Home med: clonidine 0.1 mg qdaily, clopidogrel 75 mg oral tablet: 1 tab(s) orally once a day, amlodipine 5mg. She was treated with clonidine 0.1mg qd and rest of meds were held as normotensive.    # Anxiety.   On home med escitalopram 5. Medication continued during hospitalization.     Plan:   -On discharge c/w escitalopram 5mg   83y [place] speaking Female with PMHx of HTN, HLD, gastritis, anxiety, who presents for headache, abdominal pain, chest pain, nausea for 7 months, acutely worsened overnight. Patient was found to be hyponatremic and was admitted for further workup of abdominal/chest pain.       # Chest pain.   ·Patient presents with chronic chest pain for 7 months since COVID infection in Jan 2023. Last night she had central chest pain that lasted throughout the night  for more than 6 hours. Chest pain described as burning, radiating to throat and lower abdomen, no known relieving factors. ECG in ED NS. Trop was negative.  Echo in 3/23 showed normal left and right ventricular size and systolic function. She follows up with cardiologist outpatient, has stress test scheduled for sept 2023, per patient.   Low suspicion for cardiac related disease as trop negative and EKG non-ischemic, suspect symptoms to be in setting of GERD. Pain is non-reproducible on exam. She was managed for pain with Tylenol 625 mg PRN and for GERD management with Maalox 30mg PO  PRN and Pantoprazole 40mg  PO q d. On 8/27 overnight patient reported chest pain, ECG was obtained that was non-ischemic . Patient reportred similar pain in the morning, associated with dyspnea. VS wnl. Patient was asked to sit up, given Maalox and reported symptoms improved.   Plan:   -On discharge, f/u with PCP Dr. Calvillo    # Abdominal pain.   ·  Patient presents with epigastric pain, radiating to the throat, described as burning, worse when lying in bed at night since COVID infection in Jan 2023. She reports she has completed EGD 5 years ago, which showed erosive gastritis. She also reportedly completed colonoscopy 5 years ago, reported as normal. In the ED treated with  MAALOX 30ml 1x, Famotidine 2o IV 1x, Pantoprazole 40mg IV 1x. C/Tof abdomen pelvis and chest showed: No acute findings. No acute fracture.  On home meds: omeprazole-sodium bicarb 40 mg-1000mg qd. She was treated with Maalox 30mg PO  PRN and Pantoprazole 40mg  PO q d. GI was consulted and EGD was completed on 8/28/23, which showed mild gastritis and small white nodules throughout stomach, but no significant PUD or other inflammation. Random gastric biopsies taken. Normal esophagus, no esophagitis.       Plan:   -On discharge start BID PPI x 2 weeks and then daily PPI in the AM, 30 mins prior to breakfast and  H2RA PRN. {.....}  -f/u with GI outpatient  Dr. Bolanos     # History of headache.   Patient reports chronic headaches since COVID January 2023. Home med: acetaminophen/butalbital/caffeine 300 mg-50 mg-40 mg oral capsule: 1 cap(s) orally every 8 hours PRN. On 8/28/23 Patient denies headache, vision changes at present, states she feels well.     On discharge:   -c/w with acetaminophen/butalbital/caffeine 300 mg-50 mg-40 mg oral capsule: 1 cap(s) orally every 8 hours as needed.       #Hypotonic hyponatremia.   Na 122 (repeat Na 125 s/p NS 1L blous), Serum osm 264. Previous was 135.  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 Upon chart review patient was recently prescribed medications that may cause SIADH including SSRI.   DDX: Hypovolemic hyponatremia vs SIADH. Patient appears clinically dry on exam today and reports the sensation of a dry mouth. Repeat urine studies consistent with hypovolemic hyponatremia. She was initially managed for SIADH with fluid restriction. ON 8/27, completed fluid challenge with 500cc of NS, Na improved from 128 to 131, suggestive of hypovolemic hyponatremia     Plan:  -On discharge, encourage oral hydration  -discontinue home acetazolamide     #HTN (hypertension).   Normotensive on admission. Home med: clonidine 0.1 mg qdaily, clopidogrel 75 mg oral tablet: 1 tab(s) orally once a day, amlodipine 5mg. She was treated with clonidine 0.1mg qd and rest of meds were held as normotensive.    # Anxiety.   On home med escitalopram 5. Medication continued during hospitalization.     Plan:   -On discharge c/w escitalopram 5mg   83y [place] speaking Female with PMHx of HTN, HLD, gastritis, anxiety, who presents for headache, abdominal pain, chest pain, nausea for 7 months, acutely worsened overnight. Patient was found to be hyponatremic and was admitted for further workup of abdominal/chest pain.       # Chest pain.   ·Patient presents with chronic chest pain for 7 months since COVID infection in Jan 2023. Last night she had central chest pain that lasted throughout the night  for more than 6 hours. Chest pain described as burning, radiating to throat and lower abdomen, no known relieving factors. ECG in ED NS. Trop was negative.  Echo in 3/23 showed normal left and right ventricular size and systolic function. She follows up with cardiologist outpatient, has stress test scheduled for sept 2023, per patient.   Low suspicion for cardiac related disease as trop negative and EKG non-ischemic, suspect symptoms to be in setting of GERD. Pain is non-reproducible on exam. She was managed for pain with Tylenol 625 mg PRN and for GERD management with Maalox 30mg PO  PRN and Pantoprazole 40mg  PO q d. On 8/27 overnight patient reported chest pain, ECG was obtained that was non-ischemic . Patient reported similar pain in the morning, associated with dyspnea. VS wnl. Patient was asked to sit up, given Maalox and reported symptoms improved.   Plan:   -On discharge, f/u with PCP Dr. Calvillo    # Abdominal pain.   ·  Patient presents with epigastric pain, radiating to the throat, described as burning, worse when lying in bed at night since COVID infection in Jan 2023. She reports she has completed EGD 5 years ago, which showed erosive gastritis. She also reportedly completed colonoscopy 5 years ago, reported as normal. In the ED treated with  MAALOX 30ml 1x, Famotidine 2o IV 1x, Pantoprazole 40mg IV 1x. C/Tof abdomen pelvis and chest showed: No acute findings. No acute fracture.  On home meds: omeprazole-sodium bicarb 40 mg-1000mg qd. She was treated with Maalox 30mg PO  PRN and Pantoprazole 40mg  PO q d. GI was consulted and EGD was completed on 8/28/23, which showed mild gastritis and small white nodules throughout stomach, but no significant PUD or other inflammation. Random gastric biopsies taken. Normal esophagus, no esophagitis.       Plan:   -On discharge start BID PPI x 2 weeks and then daily PPI in the AM, 30 mins prior to breakfast and famotidine PRN.   -f/u with GI outpatient gastroenterologist  Dr. Bolanos     # History of headache.   Patient reports chronic headaches since COVID January 2023. Home med: acetaminophen/butalbital/caffeine 300 mg-50 mg-40 mg oral capsule: 1 cap(s) orally every 8 hours PRN. On 8/28/23 Patient denies headache, vision changes at present, states she feels well.     On discharge:   -c/w with acetaminophen/butalbital/caffeine 300 mg-50 mg-40 mg oral capsule: 1 cap(s) orally every 8 hours as needed.       #Hypotonic hyponatremia.   Na 122 (repeat Na 125 s/p NS 1L blous), Serum osm 264. Previous was 135.  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 Upon chart review patient was recently prescribed medications that may cause SIADH including SSRI.   DDX: Hypovolemic hyponatremia vs SIADH. Patient appears clinically dry on exam today and reports the sensation of a dry mouth. Repeat urine studies consistent with hypovolemic hyponatremia. She was initially managed for SIADH with fluid restriction. ON 8/27, completed fluid challenge with 500cc of NS, Na improved from 128 to 131, suggestive of hypovolemic hyponatremia     Plan:  -On discharge, encourage oral hydration  -discontinue home acetazolamide     #HTN (hypertension).   Normotensive on admission. Home med: clonidine 0.1 mg qdaily, clopidogrel 75 mg oral tablet: 1 tab(s) orally once a day, amlodipine 5mg. She was treated with clonidine 0.1mg qd and rest of meds were held as normotensive.    # Anxiety.   On home med escitalopram 5. Medication continued during hospitalization.     Plan:   -On discharge c/w escitalopram 5mg   83y [place] citizen speaking Female with PMHx of HTN, HLD, gastritis, anxiety, who presents for headache, abdominal pain, chest pain, nausea for 7 months, acutely worsened overnight. Patient was found to be hyponatremic and was admitted for further workup of abdominal/chest pain.       # Chest pain.   Patient presents with chronic chest pain for 7 months since COVID infection in Jan 2023. Last night she had central chest pain that lasted throughout the night  for more than 6 hours. Chest pain described as burning, radiating to throat and lower abdomen, no known relieving factors. ECG in ED NS. Trop was negative.  Echo in 3/23 showed normal left and right ventricular size and systolic function. She follows up with cardiologist outpatient, has stress test scheduled for sept 2023, per patient.   Low suspicion for cardiac related disease as trop negative and EKG non-ischemic, suspect symptoms to be in setting of GERD. Pain is non-reproducible on exam. She was managed for pain with Tylenol 625 mg PRN and for GERD management with Maalox 30mg PO  PRN and Pantoprazole 40mg  PO q d. On 8/27 overnight patient reported chest pain, ECG was obtained that was non-ischemic . Patient reported similar pain in the morning, associated with dyspnea. VS wnl. Patient was asked to sit up, given Maalox and reported symptoms improved.   Plan:   -On discharge, f/u with PCP Dr. Calvillo    # Abdominal pain.   ·  Patient presents with epigastric pain, radiating to the throat, described as burning, worse when lying in bed at night since COVID infection in Jan 2023. She reports she has completed EGD 5 years ago, which showed erosive gastritis. She also reportedly completed colonoscopy 5 years ago, reported as normal. In the ED treated with  MAALOX 30ml 1x, Famotidine 2o IV 1x, Pantoprazole 40mg IV 1x. C/Tof abdomen pelvis and chest showed: No acute findings. No acute fracture.  On home meds: omeprazole-sodium bicarb 40 mg-1000mg qd. She was treated with Maalox 30mg PO  PRN and Pantoprazole 40mg  PO q d. GI was consulted and EGD was completed on 8/28/23, which showed mild gastritis and small white nodules throughout stomach, but no significant PUD or other inflammation. Random gastric biopsies taken. Normal esophagus, no esophagitis.       Plan:   -On discharge start BID PPI x 2 weeks and then daily PPI in the AM, 30 mins prior to breakfast and famotidine PRN.   -f/u with GI outpatient gastroenterologist  Dr. Bolanos     # History of headache.   Patient reports chronic headaches since COVID January 2023. Home med: acetaminophen/butalbital/caffeine 300 mg-50 mg-40 mg oral capsule: 1 cap(s) orally every 8 hours PRN. On 8/28/23 Patient denies headache, vision changes at present, states she feels well.     On discharge:   -c/w with acetaminophen/butalbital/caffeine 300 mg-50 mg-40 mg oral capsule: 1 cap(s) orally every 8 hours as needed.       #Hypotonic hyponatremia.   Na 122 (repeat Na 125 s/p NS 1L blous), Serum osm 264. Previous was 135.  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 Upon chart review patient was recently prescribed medications that may cause SIADH including SSRI.   DDX: Hypovolemic hyponatremia vs SIADH. Patient appears clinically dry on exam today and reports the sensation of a dry mouth. Repeat urine studies consistent with hypovolemic hyponatremia. She was initially managed for SIADH with fluid restriction. ON 8/27, completed fluid challenge with 500cc of NS, Na improved from 128 to 131, suggestive of hypovolemic hyponatremia     Plan:  -On discharge, encourage oral hydration  -discontinue home acetazolamide     #HTN (hypertension).   Normotensive on admission. Home med: clonidine 0.1 mg qdaily, clopidogrel 75 mg oral tablet: 1 tab(s) orally once a day, amlodipine 5mg. She was treated with clonidine 0.1mg qd and rest of meds were held as normotensive.    # Anxiety.   On home med escitalopram 5. Medication continued during hospitalization.     Plan:   -On discharge c/w escitalopram 5mg     83y [place]oc[place] speaking Female with PMHx of HTN, HLD, gastritis, anxiety, who presents for headache, abdominal pain, chest pain, nausea for 7 months, acutely worsened overnight. Patient was found to be hyponatremic and was admitted for further workup of abdominal/chest pain.       # Chest pain.   Patient presents with chronic chest pain for 7 months since COVID infection in Jan 2023. Last night she had central chest pain that lasted throughout the night  for more than 6 hours. Chest pain described as burning, radiating to throat and lower abdomen, no known relieving factors. ECG in ED NS. Trop was negative.  Echo in 3/23 showed normal left and right ventricular size and systolic function. She follows up with cardiologist outpatient, has stress test scheduled for sept 2023, per patient.   Low suspicion for cardiac related disease as trop negative and EKG non-ischemic, suspect symptoms to be in setting of GERD. Pain is non-reproducible on exam. She was managed for pain with Tylenol 625 mg PRN and for GERD management with Maalox 30mg PO  PRN and Pantoprazole 40mg  PO q d. On 8/27 overnight patient reported chest pain, ECG was obtained that was non-ischemic . Patient reported similar pain in the morning, associated with dyspnea. VS wnl. Patient was asked to sit up, given Maalox and reported symptoms improved.   Plan:   -On discharge, f/u with PCP Dr. Calvillo    # Abdominal pain.   ·  Patient presents with epigastric pain, radiating to the throat, described as burning, worse when lying in bed at night since COVID infection in Jan 2023. She reports she has completed EGD 5 years ago, which showed erosive gastritis. She also reportedly completed colonoscopy 5 years ago, reported as normal. In the ED treated with  MAALOX 30ml 1x, Famotidine 2o IV 1x, Pantoprazole 40mg IV 1x. C/Tof abdomen pelvis and chest showed: No acute findings. No acute fracture.  On home meds: omeprazole-sodium bicarb 40 mg-1000mg qd. She was treated with Maalox 30mg PO  PRN and Pantoprazole 40mg  PO q d. GI was consulted and EGD was completed on 8/28/23, which showed mild gastritis and small white nodules throughout stomach, but no significant PUD or other inflammation. Random gastric biopsies taken. Normal esophagus, no esophagitis.       Plan:   -On discharge c/w PPI BID and OTC pepcid to use PRN  -f/u with GI outpatient gastroenterologist  Dr. Bolanos     # History of headache.   Patient reports chronic headaches since COVID January 2023. Home med: acetaminophen/butalbital/caffeine 300 mg-50 mg-40 mg oral capsule: 1 cap(s) orally every 8 hours PRN. On 8/28/23 Patient denies headache, vision changes at present, states she feels well.     On discharge:   -c/w with acetaminophen/butalbital/caffeine 300 mg-50 mg-40 mg oral capsule: 1 cap(s) orally every 8 hours as needed.       #Hypotonic hyponatremia.   Na 122 (repeat Na 125 s/p NS 1L blous), Serum osm 264. Previous was 135.  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 Upon chart review patient was recently prescribed medications that may cause SIADH including SSRI.   DDX: Hypovolemic hyponatremia vs SIADH. Patient appears clinically dry on exam today and reports the sensation of a dry mouth. Repeat urine studies consistent with hypovolemic hyponatremia. She was initially managed for SIADH with fluid restriction. ON 8/27, completed fluid challenge with 500cc of NS, Na improved from 128 to 131, suggestive of hypovolemic hyponatremia     Plan:  -On discharge, encourage oral hydration  -discontinue home acetazolamide     #HTN (hypertension).   Normotensive on admission. Home med: clonidine 0.1 mg qdaily, clopidogrel 75 mg oral tablet: 1 tab(s) orally once a day, amlodipine 5mg. She was treated with clonidine 0.1mg qd and rest of meds were held as normotensive.    Plan:   -On discharge, d/c amlodipine and clonidine and start lisinopril 10      add EGD report into d/c papers  # Anxiety.   On home med escitalopram 5. Medication continued during hospitalization.     Plan:   -On discharge c/w escitalopram 5mg     83y [place] speaking Female with PMHx of HTN, HLD, gastritis, anxiety, who presents for headache, abdominal pain, chest pain, nausea for 7 months, acutely worsened overnight. Patient was found to be hyponatremic and was admitted for further workup of abdominal/chest pain.       # Chest pain.   Patient presents with chronic chest pain for 7 months since COVID infection in Jan 2023. The night prior to admission she had central chest pain that lasted throughout the night  for more than 6 hours. Chest pain described as burning, radiating to throat and lower abdomen, no known relieving factors. ECG in ED NS. Trop was negative.  Echo in 3/23 showed normal left and right ventricular size and systolic function. She follows up with cardiologist outpatient, has stress test scheduled for sept 2023, per patient.   Low suspicion for cardiac related disease as trop negative and EKG non-ischemic, suspect symptoms to be in setting of GERD. Pain is non-reproducible on exam. She was managed for pain with Tylenol 625 mg PRN and for GERD management with Maalox 30mg PO  PRN and Pantoprazole 40mg  PO q d. On 8/27 overnight patient reported chest pain, ECG was obtained that was non-ischemic . Patient reported similar pain in the morning, associated with dyspnea. VS wnl. Patient was asked to sit up, given Maalox and reported symptoms improved.   Plan:   -On discharge, f/u with PCP Dr. Calvillo    # Abdominal pain.   ·  Patient presents with epigastric pain, radiating to the throat, described as burning, worse when lying in bed at night since COVID infection in Jan 2023. She reports she has completed EGD 5 years ago, which showed erosive gastritis. She also reportedly completed colonoscopy 5 years ago, reported as normal. In the ED treated with  MAALOX 30ml 1x, Famotidine 2o IV 1x, Pantoprazole 40mg IV 1x. C/Tof abdomen pelvis and chest showed: No acute findings. No acute fracture.  On home meds: omeprazole-sodium bicarb 40 mg-1000mg qd. She was treated with Maalox 30mg PO  PRN and Pantoprazole 40mg  PO q d. GI was consulted and EGD was completed on 8/28/23, which showed mild gastritis and small white nodules throughout stomach, but no significant PUD or other inflammation. Random gastric biopsies taken. Normal esophagus, no esophagitis.       Plan:   -On discharge c/w PPI BID and OTC pepcid to use PRN  -f/u with GI outpatient gastroenterologist  Dr. Bolanos     # History of headache.   Patient reports chronic headaches since COVID January 2023. Home med: acetaminophen/butalbital/caffeine 300 mg-50 mg-40 mg oral capsule: 1 cap(s) orally every 8 hours PRN. On 8/28/23 Patient denies headache, vision changes at present, states she feels well.     On discharge:   -c/w with acetaminophen/butalbital/caffeine 300 mg-50 mg-40 mg oral capsule: 1 cap(s) orally every 8 hours as needed.       #Hypotonic hyponatremia.   Na 122 (repeat Na 125 s/p NS 1L blous), Serum osm 264. Previous was 135.  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 Upon chart review patient was recently prescribed medications that may cause SIADH including SSRI.   DDX: Hypovolemic hyponatremia vs SIADH. Patient appears clinically dry on exam today and reports the sensation of a dry mouth. Repeat urine studies consistent with hypovolemic hyponatremia. She was initially managed for SIADH with fluid restriction. ON 8/27, completed fluid challenge with 500cc of NS, Na improved from 128 to 131, suggestive of hypovolemic hyponatremia     Plan:  -On discharge, encourage oral hydration  -discontinue home acetazolamide     #HTN (hypertension).   Normotensive on admission. Home med: clonidine 0.1 mg qdaily, clopidogrel 75 mg oral tablet: 1 tab(s) orally once a day, amlodipine 5mg. She was treated with clonidine 0.1mg qd and rest of meds were held as normotensive.    Plan:   -On discharge, d/c amlodipine and clonidine and start lisinopril 10      add EGD report into d/c papers  # Anxiety.   On home med escitalopram 5. Medication continued during hospitalization.     Plan:   -On discharge c/w escitalopram 5mg     83y Pakistani speaking Female with PMHx of HTN, HLD, gastritis, anxiety, who presents for headache, abdominal pain, chest pain, nausea for 7 months, acutely worsened overnight. Patient was found to be hyponatremic and was admitted for further workup of abdominal/chest pain.       # Chest pain.   Patient presents with chronic chest pain for 7 months since COVID infection in Jan 2023. The night prior to admission she had central chest pain that lasted throughout the night  for more than 6 hours. Chest pain described as burning, radiating to throat and lower abdomen, no known relieving factors. ECG in ED NS. Trop was negative.  Echo in 3/23 showed normal left and right ventricular size and systolic function. She follows up with cardiologist outpatient, has stress test scheduled for sept 2023, per patient.   Low suspicion for cardiac related disease as trop negative and EKG non-ischemic, suspect symptoms to be in setting of GERD. Pain is non-reproducible on exam. She was managed for pain with Tylenol 625 mg PRN and for GERD management with Maalox 30mg PO  PRN and Pantoprazole 40mg  PO q d. On 8/27 overnight patient reported chest pain, ECG was obtained that was non-ischemic . Patient reported similar pain in the morning, associated with dyspnea. VS wnl. Patient was asked to sit up, given Maalox and reported symptoms improved.   Plan:   -On discharge, f/u with PCP Dr. Calvillo    # Abdominal pain.   ·  Patient presents with epigastric pain, radiating to the throat, described as burning, worse when lying in bed at night since COVID infection in Jan 2023. She reports she has completed EGD 5 years ago, which showed erosive gastritis. She also reportedly completed colonoscopy 5 years ago, reported as normal. In the ED treated with  MAALOX 30ml 1x, Famotidine 2o IV 1x, Pantoprazole 40mg IV 1x. C/Tof abdomen pelvis and chest showed: No acute findings. No acute fracture.  On home meds: omeprazole-sodium bicarb 40 mg-1000mg qd. She was treated with Maalox 30mg PO  PRN and Pantoprazole 40mg  PO q d. GI was consulted and EGD was completed on 8/28/23, which showed mild gastritis and small white nodules throughout stomach, but no significant PUD or other inflammation. Random gastric biopsies taken. Normal esophagus, no esophagitis.       Plan:   -On discharge c/w PPI BID and OTC pepcid to use PRN  -f/u with GI outpatient gastroenterologist  Dr. Bolanos     # History of headache.   Patient reports chronic headaches since COVID January 2023. Home med: acetaminophen/butalbital/caffeine 300 mg-50 mg-40 mg oral capsule: 1 cap(s) orally every 8 hours PRN. On 8/28/23 Patient denies headache, vision changes at present, states she feels well.     On discharge:   -c/w with acetaminophen/butalbital/caffeine 300 mg-50 mg-40 mg oral capsule: 1 cap(s) orally every 8 hours as needed.       #Hypotonic hyponatremia.   Na 122 (repeat Na 125 s/p NS 1L blous), Serum osm 264. Previous was 135.  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 Upon chart review patient was recently prescribed medications that may cause SIADH including SSRI.   DDX: Hypovolemic hyponatremia vs SIADH. Patient appears clinically dry on exam today and reports the sensation of a dry mouth. Repeat urine studies consistent with hypovolemic hyponatremia. She was initially managed for SIADH with fluid restriction. ON 8/27, completed fluid challenge with 500cc of NS, Na improved from 128 to 131, suggestive of hypovolemic hyponatremia     Plan:  -On discharge, encourage oral hydration  -discontinue home acetazolamide     #HTN (hypertension).   Normotensive on admission. Home med: clonidine 0.1 mg qdaily, clopidogrel 75 mg oral tablet: 1 tab(s) orally once a day, amlodipine 5mg. She was treated with clonidine 0.1mg qd and rest of meds were held as normotensive.    Plan:   -On discharge, d/c amlodipine and clonidine and start lisinopril 10    # Anxiety.   On home med escitalopram 5. Medication continued during hospitalization.     Plan:   -On discharge c/w escitalopram 5mg     83y Azerbaijani speaking Female with PMHx of HTN, HLD, gastritis, anxiety, who presents for headache, abdominal pain, chest pain, nausea for 7 months, acutely worsened overnight. Patient was found to be hyponatremic and was admitted for further workup of abdominal/chest pain.     # Hyponatremia  Plan:   -Resolved with IVF and food intake      # Chest pain.   Patient presents with chronic chest pain for 7 months since COVID infection in Jan 2023. The night prior to admission she had central chest pain that lasted throughout the night  for more than 6 hours. Chest pain described as burning, radiating to throat and lower abdomen, no known relieving factors. ECG in ED NS. Trop was negative.  Echo in 3/23 showed normal left and right ventricular size and systolic function. She follows up with cardiologist outpatient, has stress test scheduled for sept 2023, per patient.   Low suspicion for cardiac related disease as trop negative and EKG non-ischemic, suspect symptoms to be in setting of GERD. Pain is non-reproducible on exam. She was managed for pain with Tylenol 625 mg PRN and for GERD management with Maalox 30mg PO  PRN and Pantoprazole 40mg  PO q d. On 8/27 overnight patient reported chest pain, ECG was obtained that was non-ischemic . Patient reported similar pain in the morning, associated with dyspnea. VS wnl. Patient was asked to sit up, given Maalox and reported symptoms improved.   Plan:   -On discharge, f/u with PCP Dr. Calvillo    # Abdominal pain.   ·  Patient presents with epigastric pain, radiating to the throat, described as burning, worse when lying in bed at night since COVID infection in Jan 2023. She reports she has completed EGD 5 years ago, which showed erosive gastritis. She also reportedly completed colonoscopy 5 years ago, reported as normal. In the ED treated with  MAALOX 30ml 1x, Famotidine 2o IV 1x, Pantoprazole 40mg IV 1x. C/Tof abdomen pelvis and chest showed: No acute findings. No acute fracture.  On home meds: omeprazole-sodium bicarb 40 mg-1000mg qd. She was treated with Maalox 30mg PO  PRN and Pantoprazole 40mg  PO q d. GI was consulted and EGD was completed on 8/28/23, which showed mild gastritis and small white nodules throughout stomach, but no significant PUD or other inflammation. Random gastric biopsies taken. Normal esophagus, no esophagitis.       Plan:   -On discharge c/w PPI BID and OTC pepcid to use PRN  -f/u with GI outpatient gastroenterologist  Dr. Bolanos     # History of headache.   Patient reports chronic headaches since COVID January 2023. Home med: acetaminophen/butalbital/caffeine 300 mg-50 mg-40 mg oral capsule: 1 cap(s) orally every 8 hours PRN. On 8/28/23 Patient denies headache, vision changes at present, states she feels well.     On discharge:   -c/w with acetaminophen/butalbital/caffeine 300 mg-50 mg-40 mg oral capsule: 1 cap(s) orally every 8 hours as needed.       #Hypotonic hyponatremia.   Na 122 (repeat Na 125 s/p NS 1L blous), Serum osm 264. Previous was 135.  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 Upon chart review patient was recently prescribed medications that may cause SIADH including SSRI.   DDX: Hypovolemic hyponatremia vs SIADH. Patient appears clinically dry on exam today and reports the sensation of a dry mouth. Repeat urine studies consistent with hypovolemic hyponatremia. She was initially managed for SIADH with fluid restriction. ON 8/27, completed fluid challenge with 500cc of NS, Na improved from 128 to 131, suggestive of hypovolemic hyponatremia     Plan:  -On discharge, encourage oral hydration  -discontinue home acetazolamide     #HTN (hypertension).   Normotensive on admission. Home med: clonidine 0.1 mg qdaily, clopidogrel 75 mg oral tablet: 1 tab(s) orally once a day, amlodipine 5mg. She was treated with clonidine 0.1mg qd and rest of meds were held as normotensive.    Plan:   -On discharge, d/c amlodipine and clonidine and start lisinopril 10    # Anxiety.   On home med escitalopram 5. Medication continued during hospitalization.     Plan:   -On discharge c/w escitalopram 5mg

## 2023-08-28 NOTE — DISCHARGE NOTE PROVIDER - NSDCFUSCHEDAPPT_GEN_ALL_CORE_FT
Detail Level: Zone Continue Regimen: betamethasone dipropionate 0.05 % topical cream BID Apply to AA on elbows and knees BID 2 weeks on then 2 weeks off\\nclobetasol 0.05 % scalp solution BID Apply to AA on scalp Twice a day 2 weeks on 2 weeks off Action 2: Continue [facility] Physician [facility]  NEUROLOGY [address]  Scheduled Appointment: 09/07/2023

## 2023-08-28 NOTE — PROGRESS NOTE ADULT - PROBLEM SELECTOR PLAN 4
Hypotonic hyponatremia. Na 122 (repeat Na 125 s/p NS 1L blous), Serum osm 264. Previous was 135.  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 Upon chart review patient was recently prescribed medications that may cause SIADH including SSRI.   DDX: Hypovolemic hyponatremia vs SIADH. Patient appears clinically dry on exam today and reports the sensation of a dry mouth. Repeat urine studies consistent with hypovolemic hyponatremia. ON 8/27, completed fluid challenge with 500cc of NS, Na improved from 128 to 131, suggestive of hypovolemic hyponatremia   Plan:  -remove fluid restriction on diet and recheck BMP in afternoon  -med rec pending, unable to reach pharmacy

## 2023-08-28 NOTE — PRE-ANESTHESIA EVALUATION ADULT - NSANTHOBSERVEDRD_ENT_A_CORE
Pt's daughter called and said her mom was exposed to COVID-19 on 11/2/20. She is having difficulty swallowing and some chest pain.
Spoke to Shahla Weathers. States patient has cough, difficulty swallowing and pain in her \"esophogus down to chest\". No fever. Also states she has tremors on occasion. Advised to proceed to ER or IC for evaluation.
No

## 2023-08-28 NOTE — PROGRESS NOTE ADULT - ASSESSMENT
83y Grenadian speaking Female with PMHx of HTN, HLD, gastritis, anxiety, who presents for headache, abdominal pain, chest pain, nausea for 7 months, acutely worsened overnight. Patient was found to be hyponatremic and was admitted for further workup of abdominal/chest pain. 
Yes
83y Kuwaiti speaking Female with PMHx of HTN, HLD, gastritis, anxiety, who presents for headache, abdominal pain, chest pain, nausea for 7 months, acutely worsened overnight. Patient was found to be hyponatremic and was admitted for further workup of abdominal/chest pain.

## 2023-08-28 NOTE — CONSULT NOTE ADULT - ASSESSMENT
Assessment:   83 [place] citizen-speaking female with PMH of HTN, HLD, gastritis, anxiety, and recent admission for left-sided diminished sensation (MRI showed tiny infarcts and 2.6 mm lobular aneurysm), presenting for headache, abdominal pain, chest pain, and nausea x 7 months, admitted for hyponatremia. GI consulted for nausea, vomiting, and epigastric pain.     Patient states symptoms have persisted since having COVID in January, so epigastric pain is chronic in nature, but has not responded to PPI 40 mg PO daily, and patient endorses weight loss of 15 lbs since then.     Recommendations:   - keep patient NPO with the plan for EGD today   - pantoprazole, carafate, and Maalox for dyspepsia symptoms   - hold carafate in the setting of EGD today   - patient reports she currently is not taking any AC or anti-platelet medication     Case discussed with Dr. Bolanos. GI Team will continue to follow.     Tiffany Barros D.O.   Gastroenterology Fellow  Weekday 7am-5pm Pager: [phone]   Assessment:   83 Maltese-speaking female with PMH of HTN, HLD, gastritis, anxiety, and recent admission for left-sided diminished sensation (MRI showed tiny infarcts and 2.6 mm lobular aneurysm), presenting for headache, abdominal pain, chest pain, and nausea x 7 months, admitted for hyponatremia. GI consulted for nausea, vomiting, and epigastric pain.     CT abdomen/pelvis w/ IV contrast (08/26/23): revealed no acute abnormalities.     Patient states symptoms have persisted since having COVID in January, so epigastric pain is chronic in nature, but has not responded to PPI 40 mg PO daily, and patient endorses weight loss of 15 lbs since then.     Recommendations:   - keep patient NPO with the plan for EGD today   - pantoprazole, carafate, and Maalox for dyspepsia symptoms   - hold carafate in the setting of EGD today   - patient reports she currently is not taking any AC or anti-platelet medication     Case discussed with Dr. Bolanos. GI Team will continue to follow.     Tiffany Barros D.O.   Gastroenterology Fellow  Weekday 7am-5pm Pager: [phone]

## 2023-08-28 NOTE — PROVIDER CONTACT NOTE (OTHER) - SITUATION
MD Rainey made aware that patient is complaining of pain, but she refused Tylenol. Patient fell asleep before she could assess

## 2023-08-28 NOTE — PRE-ANESTHESIA EVALUATION ADULT - NSANTHOSAYNRD_GEN_A_CORE
No. BIRDIE screening performed.  STOP BANG Legend: 0-2 = LOW Risk; 3-4 = INTERMEDIATE Risk; 5-8 = HIGH Risk

## 2023-08-28 NOTE — PROGRESS NOTE ADULT - PROBLEM SELECTOR PLAN 6
On home med escitalopram 5    Plan:   c/w home med
On home med escitalopram 5    Plan:   c/w home med

## 2023-08-28 NOTE — DISCHARGE NOTE PROVIDER - CARE PROVIDER_API CALL
Indu Trevino  Neurology  [address] [address]  Phone: [phone]  Fax: [phone]  Follow Up Time: 1 week   Indu Trevino  Neurology  [address] [address]  Phone: [phone]  Fax: [phone]  Scheduled Appointment: 09/07/2023 09:00 AM   Indu Trevino  Neurology  [address] [address]  Phone: [phone]  Fax: [phone]  Scheduled Appointment: 09/07/2023 09:00 AM    Lasha Bolanos  Gastroenterology  [address]  [place] [address]  Phone: [phone]  Fax: [phone]  Follow Up Time: 2 weeks

## 2023-08-28 NOTE — DISCHARGE NOTE PROVIDER - CARE PROVIDERS DIRECT ADDRESSES
,[email or web address],DirectAddress_Unknown

## 2023-08-28 NOTE — DISCHARGE NOTE PROVIDER - NSDCCPCAREPLAN_GEN_ALL_CORE_FT
PRINCIPAL DISCHARGE DIAGNOSIS  Diagnosis: Abdominal pain  Assessment and Plan of Treatment: You were admitted to the hospital for abdominal pain and blood test at the time showed that you had low sodium in the blood. We treated you for the abdominal pain with heartburn medications that lower the stomach acid. We also did an endoscopy which showed that you have mild gastritis,      SECONDARY DISCHARGE DIAGNOSES  Diagnosis: Chest pain  Assessment and Plan of Treatment:      PRINCIPAL DISCHARGE DIAGNOSIS  Diagnosis: Abdominal pain  Assessment and Plan of Treatment: You were admitted to the hospital for abdominal pain and blood test at the time showed that you had low sodium in the blood. We treated you for the abdominal pain with heartburn medications that lower the stomach acid. We also did an endoscopy which showed that you have mild gastritis, white nodules in the stomach. However, there was no ulcer seen. Please follow up with Dr. Bolanos and your primary care doctor      SECONDARY DISCHARGE DIAGNOSES  Diagnosis: Chest pain  Assessment and Plan of Treatment:      PRINCIPAL DISCHARGE DIAGNOSIS  Diagnosis: Abdominal pain  Assessment and Plan of Treatment: You were admitted to the hospital for abdominal pain and blood test at the time showed that you had low sodium in the blood. We treated you for the abdominal pain with heartburn medications that lower the stomach acid. We also did an endoscopy which showed that you have mild gastritis, white nodules in the stomach. However, there was no ulcer seen. For the low sodium, we gave you fluids and the sodium improved. We recommend that you continue eating well.  Please follow up with Dr. Bolanos and your primary care doctor Dr. Khan. Please take all the medications as prescribed.     PRINCIPAL DISCHARGE DIAGNOSIS  Diagnosis: Abdominal pain  Assessment and Plan of Treatment: You were admitted to the hospital for abdominal pain and blood test at the time showed that you had low sodium in the blood. We treated you for the abdominal pain with heartburn medications that lower the stomach acid. We also did an endoscopy which showed that you have mild gastritis, white nodules in the stomach. However, there was no ulcer seen. For the low sodium, we gave you fluids and the sodium improved. We recommend that you continue eating well.  Please follow up with Dr. Bolanos and your primary care doctor Dr. Khan. Please take all the medications as prescribed.  On discharge, we recommend you use pantoprazole 40mg twice a day for two weeks and then transition to once a day after that. Use Pepcid (over the counter medication) as needed for symptoms.   Please follow up with your primary care doctor and GI doctor.      SECONDARY DISCHARGE DIAGNOSES  Diagnosis: Hypertension  Assessment and Plan of Treatment: While you were admitted, we adjusted your blood pressure medications.   we DISCONTINUED Clonidine and amlodipine.   you will START lisinopril 10mg once a day (the prescription was sent to your pharmacy)  In addition, please DISCONTINUE your diamox, as that likely was the cause of your low sodium levels  please follow with your primary care doctor.     PRINCIPAL DISCHARGE DIAGNOSIS  Diagnosis: Acute hyponatremia  Assessment and Plan of Treatment: Please continue to eat and drink. Have this level rechecked with your PCP      SECONDARY DISCHARGE DIAGNOSES  Diagnosis: Abdominal pain  Assessment and Plan of Treatment: You were admitted to the hospital for abdominal pain and blood test at the time showed that you had low sodium in the blood. We treated you for the abdominal pain with heartburn medications that lower the stomach acid. We also did an endoscopy which showed that you have mild gastritis, white nodules in the stomach. However, there was no ulcer seen. For the low sodium, we gave you fluids and the sodium improved. We recommend that you continue eating well.  Please follow up with Dr. Bolanos and your primary care doctor Dr. Khan. Please take all the medications as prescribed.  On discharge, we recommend you use pantoprazole 40mg twice a day for two weeks and then transition to once a day after that. Use Pepcid (over the counter medication) as needed for symptoms.   Please follow up with your primary care doctor and GI doctor.    Diagnosis: Hypertension  Assessment and Plan of Treatment: While you were admitted, we adjusted your blood pressure medications.   we DISCONTINUED Clonidine and amlodipine.   you will START lisinopril 10mg once a day (the prescription was sent to your pharmacy)  In addition, please DISCONTINUE your diamox, as that likely was the cause of your low sodium levels  please follow with your primary care doctor.

## 2023-08-28 NOTE — PROGRESS NOTE ADULT - PROBLEM SELECTOR PLAN 5
Normotensive on admission. Home med: clonidine 0.1 mg qdaily, nebivolol 2.5 mg qd, amlodipine 10mg,    Plan:   -c/w clonidine 0.1mg qd  -hold rest for now as normotensive.
Normotensive on admission. Home med: clonidine 0.1 mg qdaily, nebivolol 2.5 mg qd, amlodipine 10mg,    Plan:   -c/w clonidine 0.1mg qd  -hold rest for now as normotensive.

## 2023-08-28 NOTE — CONSULT NOTE ADULT - SUBJECTIVE AND OBJECTIVE BOX
Initial GI Consult Note:    HPI:   83 North Korean-speaking female with PMH of HTN, HLD, gastritis, anxiety, and recent admission for left-sided diminished sensation (MRI showed tiny infarcts and 2.6 mm lobular aneurysm), presenting for headache, abdominal pain, chest pain, and nausea x 7 months, admitted for hyponatremia. GI consulted for nausea, vomiting, and epigastric pain.     Patient seen ane examined at bedside using [place]  (ID number: [id number]). Patient's  present, and both describe that patient had COVID in January, and shortly after that had severe epigastric pain, accompanied with nausea and vomiting. Patient says she has had this intermittently for a few years, but it got much worse after her COVID infection. Underwent EGD/colonoscopy roughly 5 years ago with Dr. Iris Zepeda in [place]. Believes that the colonoscopy was normal and the EGD showed gastritis. Since January, states that she has been taking omeprazole 40 mg every night before bed, and had minimal to no symptom relief. Estimates that she has lost 15 lbs. Also complains of headache, but otherwise has no acute complaints at this time. Denies diarrhea, constipation, melena, hematemesis, or hematochezia.     Of note, was recently admitted for HA and left-sided decreased sensation. MRI brain was performed and negative for acute infarction, but showed smaller chronic infarcts. Patient had previously been prescribed Eliquis 2.5 mg PO BID, but was not exactly sure why, so on that prior admission was told to discontinue Eliquis and instead take Plavix per the stroke team. Per patient , patient filled the Plavix but never took it. Patient is currently not on any AC or anti-platelet at this time.     Upon admission, patient was hemodynamically stable but found to have Na of 122, which has now improved to 134.         VITAL SIGNS:  Vital Signs Last 24 Hrs  T(C): 36.6 (28 Aug 2023 05:32), Max: 36.6 (27 Aug 2023 13:00)  T(F): 97.9 (28 Aug 2023 05:32), Max: 97.9 (28 Aug 2023 05:32)  HR: 81 (28 Aug 2023 05:32) (57 - 81)  BP: 157/74 (28 Aug 2023 05:32) (119/68 - 157/74)  BP(mean): 94 (27 Aug 2023 13:00) (94 - 94)  RR: 17 (28 Aug 2023 05:32) (17 - 18)  SpO2: 99% (28 Aug 2023 05:32) (98% - 99%)    Parameters below as of 28 Aug 2023 05:32  Patient On (Oxygen Delivery Method): room air    08-27-23 @ 07:01  -  08-28-23 @ 07:00  --------------------------------------------------------  IN: 0 mL / OUT: 100 mL / NET: -100 mL      PHYSICAL EXAM:  General: No acute distress, elderly North Korean-speaking   Lungs: Normal respiratory effort and no intercostal retractions  Cardiovascular: RRR  Abdomen: Soft, epigastric tenderness, but non-distended abdomen, no rebound or guarding   Neurological: Alert and oriented x3  Skin: Warm and dry. No obvious rash      MEDICATIONS:  MEDICATIONS  (STANDING):  cloNIDine 0.1 milliGRAM(s) Oral daily  enoxaparin Injectable 40 milliGRAM(s) SubCutaneous every 24 hours  escitalopram 5 milliGRAM(s) Oral daily  pantoprazole    Tablet 40 milliGRAM(s) Oral every 12 hours  sodium chloride 0.9%. 500 milliLiter(s) (100 mL/Hr) IV Continuous <Continuous>  sucralfate suspension 1 Gram(s) Oral four times a day    MEDICATIONS  (PRN):  acetaminophen     Tablet .. 650 milliGRAM(s) Oral every 6 hours PRN Moderate Pain (4 - 6), Severe Pain (7 - 10)  aluminum hydroxide/magnesium hydroxide/simethicone Suspension 30 milliLiter(s) Oral every 6 hours PRN Dyspepsia      ALLERGIES:  Allergies    penicillin (Unknown)  tetracycline (Unknown)  aspirin (Angioedema)  sulfa drugs (Anaphylaxis)    Intolerances        LABS:                        12.6   8.10  )-----------( 299      ( 28 Aug 2023 07:06 )             36.2     08-28    134<L>  |  100  |  14  ----------------------------<  108<H>  4.6   |  27  |  0.97    Ca    9.2      28 Aug 2023 07:06  Phos  2.1     08-28  Mg     2.2     08-28    TPro  6.9  /  Alb  3.7  /  TBili  0.3  /  DBili  x   /  AST  18  /  ALT  11  /  AlkPhos  85  08-27      Urinalysis Basic - ( 28 Aug 2023 07:06 )    Color: x / Appearance: x / SG: x / pH: x  Gluc: 108 mg/dL / Ketone: x  / Bili: x / Urobili: x   Blood: x / Protein: x / Nitrite: x   Leuk Esterase: x / RBC: x / WBC x   Sq Epi: x / Non Sq Epi: x / Bacteria: x      CAPILLARY BLOOD GLUCOSE    RADIOLOGY & ADDITIONAL TESTS: Reviewed.

## 2023-08-28 NOTE — DISCHARGE NOTE PROVIDER - NSDCCPTREATMENT_GEN_ALL_CORE_FT
PRINCIPAL PROCEDURE  Procedure: EGD  Findings and Treatment: Findings:   - normal esophagus   - mild diffuse erythema in the stomach compatible with non-erosive gastritis   - flat elevated white patches or lesions were diffusely seen throughout the stomach body and biopsied for histology   - normal duodenum   - no obvious explanation for patient's symptoms found on EGD   Recommendations:   - Protonix 40 mg IV q 12 h  - Carafate solution q6 h   - advance diet as tolerated   - f/u pathology report for stomach biopsies  - no GI contraindication to discharge if tolerating PO and symptoms are improving  - consider outpatient initiation of low dose TCA or FDgard for visceral hypersensitivity  - arrange out-patient follow-up with patient's GI (Dr. Iris Dooley)

## 2023-08-28 NOTE — DISCHARGE NOTE PROVIDER - NSDCMRMEDTOKEN_GEN_ALL_CORE_FT
acetaminophen/butalbital/caffeine 300 mg-50 mg-40 mg oral capsule: 1 cap(s) orally every 8 hours As needed Headache  ALPRAZolam 0.5 mg oral tablet: 1 orally once a day  amitriptyline 10 mg oral tablet: 1 tab(s) orally once a day (at bedtime)  amLODIPine 5 mg oral tablet: 1 orally once a day  Benicar HCT 20 mg-12.5 mg oral tablet: 1 orally once a day  cetirizine 10 mg oral tablet: 1 tab(s) orally once a day  cloNIDine 0.1 mg oral tablet: 1 orally once a day  clopidogrel 75 mg oral tablet: 1 tab(s) orally once a day  Creon 36,000 units oral delayed release capsule: 1 orally 3 times a day  DilTIAZem (Eqv-Cardizem CD) 180 mg/24 hours oral capsule, extended release: 1 orally once a day  gabapentin 600 mg/24 hours oral tablet, extended release: 1 orally once a day  lamoTRIgine 25 mg oral tablet: 1 orally 2 times a day  nebivolol 2.5 mg oral tablet: 1 orally once a day  omeprazole-sodium bicarbonate 40 mg-1680 mg oral powder for reconstitution: 1 orally once a day  pitavastatin (as calcium) 4 mg oral tablet: 1 orally once a day  prazosin 1 mg oral capsule: 1 orally once a day (at bedtime)   acetaminophen/butalbital/caffeine 300 mg-50 mg-40 mg oral capsule: 1 cap(s) orally every 8 hours As needed Headache  amLODIPine 5 mg oral tablet: 1 orally once a day  cetirizine 10 mg oral tablet: 1 tab(s) orally once a day  cloNIDine 0.1 mg oral tablet: 1 orally once a day  clopidogrel 75 mg oral tablet: 1 tab(s) orally once a day  Diamox 125 mg oral tablet: 1 orally 2 times a day  Lexapro 5 mg oral tablet: 1 tab(s) orally once a day  omeprazole-sodium bicarbonate 40 mg-1680 mg oral powder for reconstitution: 1 orally once a day  pitavastatin (as calcium) 4 mg oral tablet: 1 orally once a day   acetaminophen/butalbital/caffeine 300 mg-50 mg-40 mg oral capsule: 1 cap(s) orally every 8 hours As needed Headache  clopidogrel 75 mg oral tablet: 1 tab(s) orally once a day  Lexapro 5 mg oral tablet: 1 tab(s) orally once a day  lisinopril 10 mg oral tablet: 1 tab(s) orally once a day  pantoprazole 40 mg oral delayed release tablet: 1 tab(s) orally every 12 hours  pitavastatin (as calcium) 4 mg oral tablet: 1 orally once a day

## 2023-08-28 NOTE — DISCHARGE NOTE NURSING/CASE MANAGEMENT/SOCIAL WORK - PATIENT PORTAL LINK FT
You can access the FollowMyHealth Patient Portal offered by [facility] by registering at the following website: [email or web address]. By joining [facility]’s FollowMyHealth portal, you will also be able to view your health information using other applications (apps) compatible with our system.

## 2023-08-29 ENCOUNTER — NON-APPOINTMENT (OUTPATIENT)
Age: 83
End: 2023-08-29

## 2023-08-29 RX ORDER — LISINOPRIL 2.5 MG/1
1 TABLET ORAL
Qty: 30 | Refills: 2
Start: 2023-08-29 | End: 2023-11-26

## 2023-08-30 LAB — SURGICAL PATHOLOGY STUDY: SIGNIFICANT CHANGE UP

## 2023-09-03 DIAGNOSIS — K29.70 GASTRITIS, UNSPECIFIED, WITHOUT BLEEDING: ICD-10-CM

## 2023-09-03 DIAGNOSIS — Z79.899 OTHER LONG TERM (CURRENT) DRUG THERAPY: ICD-10-CM

## 2023-09-03 DIAGNOSIS — Z88.6 ALLERGY STATUS TO ANALGESIC AGENT: ICD-10-CM

## 2023-09-03 DIAGNOSIS — Z88.2 ALLERGY STATUS TO SULFONAMIDES: ICD-10-CM

## 2023-09-03 DIAGNOSIS — Z79.02 LONG TERM (CURRENT) USE OF ANTITHROMBOTICS/ANTIPLATELETS: ICD-10-CM

## 2023-09-03 DIAGNOSIS — E78.5 HYPERLIPIDEMIA, UNSPECIFIED: ICD-10-CM

## 2023-09-03 DIAGNOSIS — K21.9 GASTRO-ESOPHAGEAL REFLUX DISEASE WITHOUT ESOPHAGITIS: ICD-10-CM

## 2023-09-03 DIAGNOSIS — E22.2 SYNDROME OF INAPPROPRIATE SECRETION OF ANTIDIURETIC HORMONE: ICD-10-CM

## 2023-09-03 DIAGNOSIS — I10 ESSENTIAL (PRIMARY) HYPERTENSION: ICD-10-CM

## 2023-09-03 DIAGNOSIS — F41.9 ANXIETY DISORDER, UNSPECIFIED: ICD-10-CM

## 2023-09-03 DIAGNOSIS — Z41.8 ENCOUNTER FOR OTHER PROCEDURES FOR PURPOSES OTHER THAN REMEDYING HEALTH STATE: ICD-10-CM

## 2023-09-03 DIAGNOSIS — Z88.0 ALLERGY STATUS TO PENICILLIN: ICD-10-CM

## 2023-09-03 DIAGNOSIS — E86.1 HYPOVOLEMIA: ICD-10-CM

## 2023-09-07 ENCOUNTER — APPOINTMENT (OUTPATIENT)
Dept: NEUROLOGY | Facility: CLINIC | Age: 83
End: 2023-09-07
Payer: MEDICARE

## 2023-09-07 VITALS
SYSTOLIC BLOOD PRESSURE: 158 MMHG | TEMPERATURE: 98 F | DIASTOLIC BLOOD PRESSURE: 78 MMHG | HEIGHT: 59.84 IN | WEIGHT: 133 LBS | BODY MASS INDEX: 26.11 KG/M2 | HEART RATE: 76 BPM | OXYGEN SATURATION: 98 %

## 2023-09-07 DIAGNOSIS — Z78.9 OTHER SPECIFIED HEALTH STATUS: ICD-10-CM

## 2023-09-07 DIAGNOSIS — R51.9 HEADACHE, UNSPECIFIED: ICD-10-CM

## 2023-09-07 DIAGNOSIS — Z86.79 PERSONAL HISTORY OF OTHER DISEASES OF THE CIRCULATORY SYSTEM: ICD-10-CM

## 2023-09-07 DIAGNOSIS — Z86.73 PERSONAL HISTORY OF TRANSIENT ISCHEMIC ATTACK (TIA), AND CEREBRAL INFARCTION W/OUT RESIDUAL DEFICITS: ICD-10-CM

## 2023-09-07 PROCEDURE — 99205 OFFICE O/P NEW HI 60 MIN: CPT

## 2023-09-07 RX ORDER — OMEPRAZOLE 40 MG/1
40 CAPSULE, DELAYED RELEASE ORAL DAILY
Refills: 0 | Status: ACTIVE | COMMUNITY

## 2023-09-07 RX ORDER — ALPRAZOLAM 0.5 MG/1
0.5 TABLET ORAL
Refills: 0 | Status: ACTIVE | COMMUNITY

## 2023-09-07 RX ORDER — LISINOPRIL 10 MG/1
10 TABLET ORAL DAILY
Refills: 0 | Status: ACTIVE | COMMUNITY

## 2023-09-07 RX ORDER — CETIRIZINE HYDROCHLORIDE 10 MG/1
10 TABLET, COATED ORAL DAILY
Refills: 0 | Status: ACTIVE | COMMUNITY

## 2023-09-07 RX ORDER — ESCITALOPRAM OXALATE 5 MG/1
5 TABLET, FILM COATED ORAL DAILY
Refills: 0 | Status: ACTIVE | COMMUNITY

## 2023-09-07 RX ORDER — CLONIDINE HYDROCHLORIDE 0.1 MG/1
0.1 TABLET ORAL DAILY
Refills: 0 | Status: ACTIVE | COMMUNITY

## 2023-09-07 RX ORDER — PANTOPRAZOLE 40 MG/1
40 TABLET, DELAYED RELEASE ORAL
Refills: 0 | Status: ACTIVE | COMMUNITY

## 2023-09-07 RX ORDER — TOPIRAMATE 25 MG/1
25 TABLET, FILM COATED ORAL
Qty: 90 | Refills: 1 | Status: ACTIVE | COMMUNITY
Start: 2023-09-07 | End: 1900-01-01

## 2023-09-07 RX ORDER — CLOPIDOGREL BISULFATE 75 MG/1
75 TABLET, FILM COATED ORAL DAILY
Refills: 0 | Status: ACTIVE | COMMUNITY

## 2023-09-07 RX ORDER — ACETAMINOPHEN 500 MG/1
500 TABLET ORAL 3 TIMES DAILY
Refills: 0 | Status: ACTIVE | COMMUNITY

## 2023-09-07 NOTE — HISTORY OF PRESENT ILLNESS
[FreeTextEntry1] : Patient is a 83 year old Eritrean speaking female with PMH of HTN, HLD, gastritis, anxiety, COVID infection in 1/2023 presenting as a post hospitalization follow up for headaches. Patient was recently admitted to the hospital on 7/6/23 for HA with left face, arm, and leg decreased sensation. Symptoms improved during admission, however, did not completely subside. Patient was reportedly on Eliquis 2.5mg BID (prescribed by Dr. Jiménez but patient is unsure why she is on it).   Hospital Labs: A1C 5.3, , TSH 2.310 Hospital Imaging: CT Head: No acute intracranial hemorrhage or transcortical infarction. MR Head Non Contrast: No acute infarction. Moderate microvascular disease. Tiny external capsule, basal ganglia and left thalamic chronic lacunar infarcts. Pontine chronic lacunar infarcts versus microvascular disease. Mild sinus inflammatory disease. CT Angio Head: No large vessel occlusion. 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 Focal area of moderate narrowing involving the right P2 segment. CT Angio Neck: Normal CTA of the neck. [x]echo (3/2023)-  1. Mild symmetric left ventricular hypertrophy.  2. Normal left and right ventricular size and systolic function.  3. Doppler findings are not conclusive but are suggestive of grade I diastolc dysfunction.  4. Normal atria.  5. No significant valvular disease.  6. No evidence of pulmonary hypertension.  7. No pericardial effusion.  8. No prior echo is available for comparison.   Patient was discharged on Plavix 75mg daily and Fioricet PRN for headaches. Patient was recommended to continue Pitavastatin 4mg daily.  Since discharge, patient denies any new stroke like symptoms. Patient hasn't been taking Plavix or Pitavastatin.  Her headaches start as a severe headache then BP goes 170 and higher, then chest pain, abdominal pain, body aches. Most recent admission was 8/28/23 where they did an endoscopy that showed mild gastritis and bloodwork showed low sodium. They discharged her with Pantoprazole 40mg once daily. And they recommended that she use Pepcid as needed for symptoms. She was having more severe headaches and difficulty breathing. The cardiology appointment is schedule for 9/14/23. She was prescribed Verapamil 40mg TID but it hasn't been helping with her blood pressure medications.  The headaches start at 2am-6am and then the blood pressure gets high. She takes caffeine and tylenol combo 2-3x a day. She will take the tylenol when she first feels the pain and then again 6-8hrs later because the pain wouldn't go away. She describes the pain as being on the apex of the head and then it goes down to the back of her head. When the headache starts, she feels some numbness in the right arm and both legs accompanied by burning chest pain and increased blood pressure to 170+. She denies flashing lights, photophobia, phonophobia, nausea, or vomiting. She first started feeling this type of headache in January 7, 2023. She checks her blood pressure 3+ times a day.  She had an MRI done in January 10, 2023 that showed: 1. Generalized age-appropriate parenchyma, atrophy. 2. Bilateral periventricular and subcortical white matter chronic ischemic changes. 3. Chronic ethmoid and maxillary sinusitis. 4. No evidence of acute intracranial pathology.  Repeat MRI in June 6, 2023: 1. Generalized age-appropriate parenchymal atrophy. 2. Bilateral periventricular and subcortical white matter severe chronic ischemic changes. 3. Chronic pansinusitis. 4. No evidence of acute intracranial pathology.

## 2023-09-07 NOTE — PHYSICAL EXAM
[FreeTextEntry1] : The patient is alert and oriented x3, follows commands, and is able to participate fully in the history taking. Speech is normal with no evidence of dysarthria. Memory is intact: Immediate recall 3 out of 3, short-term 3 out of 3, remote memory intact. Cranial nerves II through XII intact. Motor exam: Upper and lower extremities 5/5, normal tone. No abnormal movements noted. Sensory exam: Intact to light touch and pinprick. Romberg negative. Coordination and vestibular exam: Finger to nose intact, no evidence of ataxia or nystagmus. Gait: Normal stance and gait. Reflexes: One to 2+ in upper and lower extremities. No pathological reflexes. Downgoing toes.  right upper and lower extrmi

## 2023-09-07 NOTE — ASSESSMENT
[FreeTextEntry1] : Patient is a 83 year old Venezuelan speaking female with PMH of HTN, HLD, gastritis, anxiety, COVID infection in 1/2023 presenting as a post hospitalization follow up for headaches.  Plan - Continue daily medications - Add Topiramate 25mg once daily at bedtime. - Repeat labs - Headache diary - Daily BP log - Continue to f/u with PCP regarding regular health maintenance and prevention, including routine screening. - Counselled on signs of stroke BEFAST and to call 911 with any new or worsening neurological symptoms  - 2 week phone follow up to possibly adjust medications and then visit in person in 1 month

## 2023-10-04 NOTE — PROGRESS NOTE ADULT - PROBLEM SELECTOR PROBLEM 7
Application of Fluoride Varnish    Dental Fluoride Varnish and Post-Treatment Instructions: Reviewed with mother   used: No    Dental Fluoride applied to teeth by: Samantha Zacarias MA  Fluoride was well tolerated    LOT #: [id number]  EXPIRATION DATE:  11/28/24      Samantha Zacarias MA    
Prophylactic measure
Prophylactic measure

## 2023-11-09 ENCOUNTER — INPATIENT (INPATIENT)
Facility: HOSPITAL | Age: 83
LOS: 2 days | Discharge: HOME HEALTH | DRG: 322 | End: 2023-11-12
Attending: INTERNAL MEDICINE | Admitting: INTERNAL MEDICINE
Payer: MEDICARE

## 2023-11-09 ENCOUNTER — TRANSCRIPTION ENCOUNTER (OUTPATIENT)
Age: 83
End: 2023-11-09

## 2023-11-09 VITALS
DIASTOLIC BLOOD PRESSURE: 73 MMHG | HEART RATE: 71 BPM | RESPIRATION RATE: 18 BRPM | OXYGEN SATURATION: 100 % | SYSTOLIC BLOOD PRESSURE: 146 MMHG | TEMPERATURE: 98 F

## 2023-11-09 DIAGNOSIS — I20.0 UNSTABLE ANGINA: ICD-10-CM

## 2023-11-09 DIAGNOSIS — E78.5 HYPERLIPIDEMIA, UNSPECIFIED: ICD-10-CM

## 2023-11-09 DIAGNOSIS — Z98.890 OTHER SPECIFIED POSTPROCEDURAL STATES: Chronic | ICD-10-CM

## 2023-11-09 DIAGNOSIS — I63.81 OTHER CEREBRAL INFARCTION DUE TO OCCLUSION OR STENOSIS OF SMALL ARTERY: ICD-10-CM

## 2023-11-09 DIAGNOSIS — R51.9 HEADACHE, UNSPECIFIED: ICD-10-CM

## 2023-11-09 DIAGNOSIS — F41.9 ANXIETY DISORDER, UNSPECIFIED: ICD-10-CM

## 2023-11-09 DIAGNOSIS — Z90.49 ACQUIRED ABSENCE OF OTHER SPECIFIED PARTS OF DIGESTIVE TRACT: Chronic | ICD-10-CM

## 2023-11-09 DIAGNOSIS — I10 ESSENTIAL (PRIMARY) HYPERTENSION: ICD-10-CM

## 2023-11-09 LAB
A1C WITH ESTIMATED AVERAGE GLUCOSE RESULT: 5.6 % — SIGNIFICANT CHANGE UP (ref 4–5.6)
A1C WITH ESTIMATED AVERAGE GLUCOSE RESULT: 5.6 % — SIGNIFICANT CHANGE UP (ref 4–5.6)
ANION GAP SERPL CALC-SCNC: 11 MMOL/L — SIGNIFICANT CHANGE UP (ref 5–17)
ANION GAP SERPL CALC-SCNC: 11 MMOL/L — SIGNIFICANT CHANGE UP (ref 5–17)
APTT BLD: 34.7 SEC — SIGNIFICANT CHANGE UP (ref 24.5–35.6)
APTT BLD: 34.7 SEC — SIGNIFICANT CHANGE UP (ref 24.5–35.6)
BASOPHILS # BLD AUTO: 0.01 K/UL — SIGNIFICANT CHANGE UP (ref 0–0.2)
BASOPHILS # BLD AUTO: 0.01 K/UL — SIGNIFICANT CHANGE UP (ref 0–0.2)
BASOPHILS NFR BLD AUTO: 0.2 % — SIGNIFICANT CHANGE UP (ref 0–2)
BASOPHILS NFR BLD AUTO: 0.2 % — SIGNIFICANT CHANGE UP (ref 0–2)
BUN SERPL-MCNC: 12 MG/DL — SIGNIFICANT CHANGE UP (ref 7–23)
BUN SERPL-MCNC: 12 MG/DL — SIGNIFICANT CHANGE UP (ref 7–23)
CALCIUM SERPL-MCNC: 9.7 MG/DL — SIGNIFICANT CHANGE UP (ref 8.4–10.5)
CALCIUM SERPL-MCNC: 9.7 MG/DL — SIGNIFICANT CHANGE UP (ref 8.4–10.5)
CHLORIDE SERPL-SCNC: 104 MMOL/L — SIGNIFICANT CHANGE UP (ref 96–108)
CHLORIDE SERPL-SCNC: 104 MMOL/L — SIGNIFICANT CHANGE UP (ref 96–108)
CHOLEST SERPL-MCNC: 260 MG/DL — HIGH
CHOLEST SERPL-MCNC: 260 MG/DL — HIGH
CK MB CFR SERPL CALC: 1.1 NG/ML — SIGNIFICANT CHANGE UP (ref 0–6.7)
CK SERPL-CCNC: 34 U/L — SIGNIFICANT CHANGE UP (ref 25–170)
CK SERPL-CCNC: 34 U/L — SIGNIFICANT CHANGE UP (ref 25–170)
CK SERPL-CCNC: 41 U/L — SIGNIFICANT CHANGE UP (ref 25–170)
CK SERPL-CCNC: 41 U/L — SIGNIFICANT CHANGE UP (ref 25–170)
CO2 SERPL-SCNC: 25 MMOL/L — SIGNIFICANT CHANGE UP (ref 22–31)
CO2 SERPL-SCNC: 25 MMOL/L — SIGNIFICANT CHANGE UP (ref 22–31)
CREAT SERPL-MCNC: 0.9 MG/DL — SIGNIFICANT CHANGE UP (ref 0.5–1.3)
CREAT SERPL-MCNC: 0.9 MG/DL — SIGNIFICANT CHANGE UP (ref 0.5–1.3)
EGFR: 63 ML/MIN/1.73M2 — SIGNIFICANT CHANGE UP
EGFR: 63 ML/MIN/1.73M2 — SIGNIFICANT CHANGE UP
EOSINOPHIL # BLD AUTO: 0.16 K/UL — SIGNIFICANT CHANGE UP (ref 0–0.5)
EOSINOPHIL # BLD AUTO: 0.16 K/UL — SIGNIFICANT CHANGE UP (ref 0–0.5)
EOSINOPHIL NFR BLD AUTO: 2.5 % — SIGNIFICANT CHANGE UP (ref 0–6)
EOSINOPHIL NFR BLD AUTO: 2.5 % — SIGNIFICANT CHANGE UP (ref 0–6)
ESTIMATED AVERAGE GLUCOSE: 114 MG/DL — SIGNIFICANT CHANGE UP (ref 68–114)
ESTIMATED AVERAGE GLUCOSE: 114 MG/DL — SIGNIFICANT CHANGE UP (ref 68–114)
GLUCOSE SERPL-MCNC: 118 MG/DL — HIGH (ref 70–99)
GLUCOSE SERPL-MCNC: 118 MG/DL — HIGH (ref 70–99)
HCT VFR BLD CALC: 39.2 % — SIGNIFICANT CHANGE UP (ref 34.5–45)
HCT VFR BLD CALC: 39.2 % — SIGNIFICANT CHANGE UP (ref 34.5–45)
HDLC SERPL-MCNC: 61 MG/DL — SIGNIFICANT CHANGE UP
HDLC SERPL-MCNC: 61 MG/DL — SIGNIFICANT CHANGE UP
HGB BLD-MCNC: 13.3 G/DL — SIGNIFICANT CHANGE UP (ref 11.5–15.5)
HGB BLD-MCNC: 13.3 G/DL — SIGNIFICANT CHANGE UP (ref 11.5–15.5)
IMM GRANULOCYTES NFR BLD AUTO: 0.2 % — SIGNIFICANT CHANGE UP (ref 0–0.9)
IMM GRANULOCYTES NFR BLD AUTO: 0.2 % — SIGNIFICANT CHANGE UP (ref 0–0.9)
INR BLD: 0.98 — SIGNIFICANT CHANGE UP (ref 0.85–1.18)
INR BLD: 0.98 — SIGNIFICANT CHANGE UP (ref 0.85–1.18)
LIDOCAIN IGE QN: 37 U/L — SIGNIFICANT CHANGE UP (ref 7–60)
LIDOCAIN IGE QN: 37 U/L — SIGNIFICANT CHANGE UP (ref 7–60)
LIPID PNL WITH DIRECT LDL SERPL: 179 MG/DL — HIGH
LIPID PNL WITH DIRECT LDL SERPL: 179 MG/DL — HIGH
LYMPHOCYTES # BLD AUTO: 1.48 K/UL — SIGNIFICANT CHANGE UP (ref 1–3.3)
LYMPHOCYTES # BLD AUTO: 1.48 K/UL — SIGNIFICANT CHANGE UP (ref 1–3.3)
LYMPHOCYTES # BLD AUTO: 23.5 % — SIGNIFICANT CHANGE UP (ref 13–44)
LYMPHOCYTES # BLD AUTO: 23.5 % — SIGNIFICANT CHANGE UP (ref 13–44)
MCHC RBC-ENTMCNC: 29.3 PG — SIGNIFICANT CHANGE UP (ref 27–34)
MCHC RBC-ENTMCNC: 29.3 PG — SIGNIFICANT CHANGE UP (ref 27–34)
MCHC RBC-ENTMCNC: 33.9 GM/DL — SIGNIFICANT CHANGE UP (ref 32–36)
MCHC RBC-ENTMCNC: 33.9 GM/DL — SIGNIFICANT CHANGE UP (ref 32–36)
MCV RBC AUTO: 86.3 FL — SIGNIFICANT CHANGE UP (ref 80–100)
MCV RBC AUTO: 86.3 FL — SIGNIFICANT CHANGE UP (ref 80–100)
MONOCYTES # BLD AUTO: 0.3 K/UL — SIGNIFICANT CHANGE UP (ref 0–0.9)
MONOCYTES # BLD AUTO: 0.3 K/UL — SIGNIFICANT CHANGE UP (ref 0–0.9)
MONOCYTES NFR BLD AUTO: 4.8 % — SIGNIFICANT CHANGE UP (ref 2–14)
MONOCYTES NFR BLD AUTO: 4.8 % — SIGNIFICANT CHANGE UP (ref 2–14)
NEUTROPHILS # BLD AUTO: 4.33 K/UL — SIGNIFICANT CHANGE UP (ref 1.8–7.4)
NEUTROPHILS # BLD AUTO: 4.33 K/UL — SIGNIFICANT CHANGE UP (ref 1.8–7.4)
NEUTROPHILS NFR BLD AUTO: 68.8 % — SIGNIFICANT CHANGE UP (ref 43–77)
NEUTROPHILS NFR BLD AUTO: 68.8 % — SIGNIFICANT CHANGE UP (ref 43–77)
NON HDL CHOLESTEROL: 199 MG/DL — HIGH
NON HDL CHOLESTEROL: 199 MG/DL — HIGH
NRBC # BLD: 0 /100 WBCS — SIGNIFICANT CHANGE UP (ref 0–0)
NRBC # BLD: 0 /100 WBCS — SIGNIFICANT CHANGE UP (ref 0–0)
NT-PROBNP SERPL-SCNC: 256 PG/ML — SIGNIFICANT CHANGE UP (ref 0–300)
NT-PROBNP SERPL-SCNC: 256 PG/ML — SIGNIFICANT CHANGE UP (ref 0–300)
PLATELET # BLD AUTO: 288 K/UL — SIGNIFICANT CHANGE UP (ref 150–400)
PLATELET # BLD AUTO: 288 K/UL — SIGNIFICANT CHANGE UP (ref 150–400)
POTASSIUM SERPL-MCNC: 4.1 MMOL/L — SIGNIFICANT CHANGE UP (ref 3.5–5.3)
POTASSIUM SERPL-MCNC: 4.1 MMOL/L — SIGNIFICANT CHANGE UP (ref 3.5–5.3)
POTASSIUM SERPL-SCNC: 4.1 MMOL/L — SIGNIFICANT CHANGE UP (ref 3.5–5.3)
POTASSIUM SERPL-SCNC: 4.1 MMOL/L — SIGNIFICANT CHANGE UP (ref 3.5–5.3)
PROTHROM AB SERPL-ACNC: 11.2 SEC — SIGNIFICANT CHANGE UP (ref 9.5–13)
PROTHROM AB SERPL-ACNC: 11.2 SEC — SIGNIFICANT CHANGE UP (ref 9.5–13)
RBC # BLD: 4.54 M/UL — SIGNIFICANT CHANGE UP (ref 3.8–5.2)
RBC # BLD: 4.54 M/UL — SIGNIFICANT CHANGE UP (ref 3.8–5.2)
RBC # FLD: 12 % — SIGNIFICANT CHANGE UP (ref 10.3–14.5)
RBC # FLD: 12 % — SIGNIFICANT CHANGE UP (ref 10.3–14.5)
SODIUM SERPL-SCNC: 140 MMOL/L — SIGNIFICANT CHANGE UP (ref 135–145)
SODIUM SERPL-SCNC: 140 MMOL/L — SIGNIFICANT CHANGE UP (ref 135–145)
TRIGL SERPL-MCNC: 99 MG/DL — SIGNIFICANT CHANGE UP
TRIGL SERPL-MCNC: 99 MG/DL — SIGNIFICANT CHANGE UP
TROPONIN T, HIGH SENSITIVITY RESULT: 8 NG/L — SIGNIFICANT CHANGE UP (ref 0–51)
TROPONIN T, HIGH SENSITIVITY RESULT: 8 NG/L — SIGNIFICANT CHANGE UP (ref 0–51)
TROPONIN T, HIGH SENSITIVITY RESULT: 9 NG/L — SIGNIFICANT CHANGE UP (ref 0–51)
TROPONIN T, HIGH SENSITIVITY RESULT: 9 NG/L — SIGNIFICANT CHANGE UP (ref 0–51)
WBC # BLD: 6.29 K/UL — SIGNIFICANT CHANGE UP (ref 3.8–10.5)
WBC # BLD: 6.29 K/UL — SIGNIFICANT CHANGE UP (ref 3.8–10.5)
WBC # FLD AUTO: 6.29 K/UL — SIGNIFICANT CHANGE UP (ref 3.8–10.5)
WBC # FLD AUTO: 6.29 K/UL — SIGNIFICANT CHANGE UP (ref 3.8–10.5)

## 2023-11-09 PROCEDURE — 93010 ELECTROCARDIOGRAM REPORT: CPT

## 2023-11-09 PROCEDURE — 99285 EMERGENCY DEPT VISIT HI MDM: CPT

## 2023-11-09 PROCEDURE — 71045 X-RAY EXAM CHEST 1 VIEW: CPT | Mod: 26

## 2023-11-09 PROCEDURE — 75574 CT ANGIO HRT W/3D IMAGE: CPT | Mod: 26

## 2023-11-09 PROCEDURE — 93306 TTE W/DOPPLER COMPLETE: CPT | Mod: 26

## 2023-11-09 PROCEDURE — 99222 1ST HOSP IP/OBS MODERATE 55: CPT

## 2023-11-09 RX ORDER — PITAVASTATIN CALCIUM 1.04 MG/1
1 TABLET, FILM COATED ORAL
Refills: 0 | DISCHARGE

## 2023-11-09 RX ORDER — ISOSORBIDE MONONITRATE 60 MG/1
30 TABLET, EXTENDED RELEASE ORAL DAILY
Refills: 0 | Status: DISCONTINUED | OUTPATIENT
Start: 2023-11-09 | End: 2023-11-12

## 2023-11-09 RX ORDER — CLOPIDOGREL BISULFATE 75 MG/1
75 TABLET, FILM COATED ORAL DAILY
Refills: 0 | Status: DISCONTINUED | OUTPATIENT
Start: 2023-11-09 | End: 2023-11-10

## 2023-11-09 RX ORDER — LOSARTAN POTASSIUM 100 MG/1
100 TABLET, FILM COATED ORAL DAILY
Refills: 0 | Status: DISCONTINUED | OUTPATIENT
Start: 2023-11-09 | End: 2023-11-09

## 2023-11-09 RX ORDER — MONTELUKAST 4 MG/1
10 TABLET, CHEWABLE ORAL ONCE
Refills: 0 | Status: COMPLETED | OUTPATIENT
Start: 2023-11-09 | End: 2023-11-09

## 2023-11-09 RX ORDER — ESCITALOPRAM OXALATE 10 MG/1
5 TABLET, FILM COATED ORAL DAILY
Refills: 0 | Status: DISCONTINUED | OUTPATIENT
Start: 2023-11-09 | End: 2023-11-12

## 2023-11-09 RX ORDER — LORATADINE 10 MG/1
10 TABLET ORAL ONCE
Refills: 0 | Status: COMPLETED | OUTPATIENT
Start: 2023-11-09 | End: 2023-11-09

## 2023-11-09 RX ORDER — FAMOTIDINE 10 MG/ML
20 INJECTION INTRAVENOUS ONCE
Refills: 0 | Status: COMPLETED | OUTPATIENT
Start: 2023-11-10 | End: 2023-11-10

## 2023-11-09 RX ORDER — SODIUM CHLORIDE 9 MG/ML
500 INJECTION INTRAMUSCULAR; INTRAVENOUS; SUBCUTANEOUS
Refills: 0 | Status: DISCONTINUED | OUTPATIENT
Start: 2023-11-09 | End: 2023-11-10

## 2023-11-09 RX ORDER — ACETAMINOPHEN 500 MG
1000 TABLET ORAL ONCE
Refills: 0 | Status: COMPLETED | OUTPATIENT
Start: 2023-11-09 | End: 2023-11-09

## 2023-11-09 RX ORDER — PANTOPRAZOLE SODIUM 20 MG/1
40 TABLET, DELAYED RELEASE ORAL
Refills: 0 | Status: DISCONTINUED | OUTPATIENT
Start: 2023-11-09 | End: 2023-11-12

## 2023-11-09 RX ORDER — ATORVASTATIN CALCIUM 80 MG/1
40 TABLET, FILM COATED ORAL AT BEDTIME
Refills: 0 | Status: DISCONTINUED | OUTPATIENT
Start: 2023-11-09 | End: 2023-11-12

## 2023-11-09 RX ADMIN — MONTELUKAST 10 MILLIGRAM(S): 4 TABLET, CHEWABLE ORAL at 20:46

## 2023-11-09 RX ADMIN — ATORVASTATIN CALCIUM 40 MILLIGRAM(S): 80 TABLET, FILM COATED ORAL at 21:15

## 2023-11-09 RX ADMIN — CLOPIDOGREL BISULFATE 75 MILLIGRAM(S): 75 TABLET, FILM COATED ORAL at 09:51

## 2023-11-09 RX ADMIN — ISOSORBIDE MONONITRATE 30 MILLIGRAM(S): 60 TABLET, EXTENDED RELEASE ORAL at 13:47

## 2023-11-09 RX ADMIN — Medication 400 MILLIGRAM(S): at 06:10

## 2023-11-09 RX ADMIN — ESCITALOPRAM OXALATE 5 MILLIGRAM(S): 10 TABLET, FILM COATED ORAL at 13:47

## 2023-11-09 RX ADMIN — Medication 1000 MILLIGRAM(S): at 06:30

## 2023-11-09 RX ADMIN — Medication 1000 MILLIGRAM(S): at 06:37

## 2023-11-09 RX ADMIN — SODIUM CHLORIDE 75 MILLILITER(S): 9 INJECTION INTRAMUSCULAR; INTRAVENOUS; SUBCUTANEOUS at 23:49

## 2023-11-09 RX ADMIN — LORATADINE 10 MILLIGRAM(S): 10 TABLET ORAL at 21:15

## 2023-11-09 NOTE — H&P ADULT - PROBLEM SELECTOR PLAN 5
- Continue Fioricet Q8 PRN - Continue Lexapro 5mg QD    F: None  E: Replete if K<4 or Mag<2  N: NPO  GIppx: Pantoprazole  VTEppx: Lovenox  Dispo: Cardiac tele

## 2023-11-09 NOTE — H&P ADULT - PROBLEM SELECTOR PLAN 1
prsents w/ ongoing sharp LSCP, radiating to L arm/jaw, improved w/ SL Nitro, pt currently CP free and HD stable  - hsTrop T 9, CK/CKMB neg, f/u repeat CE @ 10a  - EKG: NSR, non ischemic  - TTE 3/2023: LVEF normal, mild LVH, G1DD, no significant valvular dz. f/u repeat TTE  - NPO for CCTA vs NST  - Continue Plavix 75mg QD and Lipitor 40mg HS presents w/ ongoing sharp LSCP, radiating to L arm/jaw, improved w/ SL Nitro, pt currently w/ mild 2/10 CP and HD stable  - hsTrop T 9, CK/CKMB neg, f/u repeat CE @ 10a  - EKG: NSR, non ischemic  - TTE 3/2023: LVEF normal, mild LVH, G1DD, no significant valvular dz. f/u repeat TTE  - NPO for CCTA  - Continue Plavix 75mg QD, Lipitor 40mg HS and Imdur 30mg QD presents w/ ongoing sharp LSCP, radiating to L arm/jaw, improved w/ SL Nitro, pt currently w/ mild 2/10 CP and HD stable  - hsTrop T 9, CK/CKMB neg, f/u repeat CE @ 10A  - EKG: NSR, non ischemic  - TTE 3/2023: LVEF normal, mild LVH, G1DD, no significant valvular dz. f/u repeat TTE  - NPO for CCTA  - Continue Plavix 75mg QD, Lipitor 40mg HS and Imdur 30mg QD

## 2023-11-09 NOTE — DISCHARGE NOTE PROVIDER - HOSPITAL COURSE
83F, Italian speaking, w/ PMHx of HTN, HLD, Gastritis (s/p EGD 8/2023), h/o chronic lacunar infarcts, chronic HAs and Anxiety, presented to [facility] ED c/o ongoing sharp LSCP, radiating to L arm/jaw, since 1AM, that improved w/ SL Nitro x 2. She states BP at home was 180/95 and took Clonidine 0.1mg x 1 (prescribed for PRN if SBP >160). Pt reports seeing her cardiologist @ [facility] 2 weeks ago for intermittent exertional CP for a few months w/ associated SOB, for which she was started on Imdur 30mg QD and rescheduled for a NST, not yet performed. In ED, VSS, labs significant for hsTrop T 9 > 8 and CK/CKMB neg x 2. EKG NSR non ischemic. Pt admitted to cardiac telemetry for management of unstable angina.    Pt now s/p CCTA: _________. TTE: _______________.     Pt seen and examined at bedside this AM without any complaints or events overnight, VSS, labs and telemetry reviewed and pt stable for discharge as discussed with  ___. Pt has received appropriate discharge instructions, including medication regimen, access site management and follow up with  __ in 1-2 weeks.    Discharge medications: Plavix 75mg QD, ______________. 83F, Tanzanian speaking, w/ PMHx of HTN, HLD, Gastritis (s/p EGD 8/2023), h/o chronic lacunar infarcts, chronic HAs and Anxiety, presented to [facility] ED c/o ongoing sharp LSCP, radiating to L arm/jaw, since 1AM, that improved w/ SL Nitro x 2. She states BP at home was 180/95 and took Clonidine 0.1mg x 1 (prescribed for PRN if SBP >160). Pt reports seeing her cardiologist @ [facility] 2 weeks ago for intermittent exertional CP for a few months w/ associated SOB, for which she was started on Imdur 30mg QD and rescheduled for a NST, not yet performed. In ED, VSS, labs significant for hsTrop T 9 > 8 and CK/CKMB neg x 2. EKG NSR non ischemic. Pt admitted to cardiac telemetry for management of unstable angina.    Pt now s/p CCTA: Mod-severe stenosis of mid LAD, mod stenosis of pLAD. TTE: Normal LV and RV size and function.     Pt seen and examined at bedside this AM without any complaints or events overnight, VSS, labs and telemetry reviewed and pt stable for discharge as discussed with  ___. Pt has received appropriate discharge instructions, including medication regimen, access site management and follow up with  __ in 1-2 weeks.    Discharge medications: Plavix 75mg QD, ______________. 83F, Burundian speaking, w/ PMHx of HTN, HLD, Gastritis (s/p EGD 8/2023), h/o chronic lacunar infarcts, chronic HAs and Anxiety, presented to [facility] ED c/o ongoing sharp LSCP, radiating to L arm/jaw, since 1AM, that improved w/ SL Nitro x 2. She states BP at home was 180/95 and took Clonidine 0.1mg x 1 (prescribed for PRN if SBP >160). Pt reports seeing her cardiologist @ [facility] 2 weeks ago for intermittent exertional CP for a few months w/ associated SOB, for which she was started on Imdur 30mg QD and rescheduled for a NST, not yet performed. In ED, VSS, labs significant for hsTrop T 9 > 8 and CK/CKMB neg x 2. EKG NSR non ischemic. Pt admitted to cardiac telemetry for management of unstable angina.    Pt now s/p CCTA: Mod-severe stenosis of mid LAD, mod stenosis of pLAD. TTE: Normal LV and RV size and function.    On 11/12/23 patient underwent LHC which revealed: GULSHAN x2 pLAD with mild RCA disease and an EDP of 5. Patient was momentarily upgraded to the CCU s/p LHC due to ASA desensitization. Patient was monitored overnight. On POD 1 patient remains HD stable with no complaints. Patient remains in SR with no arrythmias noted on tele. EKG performed showed no acute ST changes. Examination of right radial artery showed a C/DI site with no hematoma, erythema or bruit. Distal pulses are 2+ bilaterally. Renal function remains stable post cath. Patient will be discharged home on DAPT with ASA and Plavix.  Patient is being DC home in stable conditon.      Pt seen and examined at bedside this AM without any complaints or events overnight, VSS, labs and telemetry reviewed and pt stable for discharge as discussed with Dr. Martinez. Pt has received appropriate discharge instructions, including medication regimen, access site management and follow up with  __ in 1-2 weeks.    Discharge medications: Plavix 75mg QD, Aspirin 81 mg QD, Losartan 100 mg QD, imdur 30 mg QD and Atorvastatin 40 mg QD 83F, Chinese speaking, w/ PMHx of HTN, HLD, Gastritis (s/p EGD 8/2023), h/o chronic lacunar infarcts, chronic HAs and Anxiety, presented to [facility] ED c/o ongoing sharp LSCP, radiating to L arm/jaw, since 1AM, that improved w/ SL Nitro x 2. She states BP at home was 180/95 and took Clonidine 0.1mg x 1 (prescribed for PRN if SBP >160). Pt reports seeing her cardiologist @ [facility] 2 weeks ago for intermittent exertional CP for a few months w/ associated SOB, for which she was started on Imdur 30mg QD and rescheduled for a NST, not yet performed. In ED, VSS, labs significant for hsTrop T 9 > 8 and CK/CKMB neg x 2. EKG NSR non ischemic. Pt admitted to cardiac telemetry for management of unstable angina.    Pt now s/p CCTA: Mod-severe stenosis of mid LAD, mod stenosis of pLAD. TTE: Normal LV and RV size and function.    On 11/12/23 patient underwent LHC which revealed: GULSHAN x2 pLAD with mild RCA disease and an EDP of 5. Patient was momentarily upgraded to the CCU s/p LHC due to ASA desensitization. Patient was monitored overnight. On POD 1 patient remains HD stable with no complaints. Patient remains in SR with no arrythmias noted on tele. EKG performed showed no acute ST changes. Examination of right radial artery showed a C/DI site with no hematoma, erythema or bruit. Distal pulses are 2+ bilaterally. Renal function remains stable post cath. Patient will be discharged home on DAPT with ASA and Plavix.  Patient is being DC home in stable conditon.      Pt seen and examined at bedside this AM without any complaints or events overnight, VSS, labs and telemetry reviewed and pt stable for discharge as discussed with Dr. Martinez. Pt has received appropriate discharge instructions, including medication regimen, access site management and follow up with Dr. Melchor at [facility] in 1-2 weeks.    Discharge medications: Plavix 75mg QD, Aspirin 81 mg QD, Losartan 100 mg QD, imdur 30 mg QD and Atorvastatin 40 mg QD

## 2023-11-09 NOTE — H&P ADULT - NSICDXPASTMEDICALHX_GEN_ALL_CORE_FT
PAST MEDICAL HISTORY:  Chronic headaches     Gastritis     High cholesterol     HTN (hypertension)     Multiple lacunar infarcts

## 2023-11-09 NOTE — DISCHARGE NOTE PROVIDER - NSDCMRMEDTOKEN_GEN_ALL_CORE_FT
cetirizine 10 mg oral tablet: 1 tab(s) orally once a day  cloNIDine 0.1 mg oral tablet: 1 tab(s) orally as needed for  SBP &gt;160  clopidogrel 75 mg oral tablet: 1 tab(s) orally once a day  Imdur 30 mg oral tablet, extended release: 1 tab(s) orally once a day  Lexapro 5 mg oral tablet: 1 tab(s) orally once a day  Nitrostat 0.4 mg sublingual tablet: 1 tab(s) sublingually as needed for  chest pain  olmesartan 40 mg oral tablet: 1 tab(s) orally once a day  pantoprazole 40 mg oral delayed release tablet: 1 tab(s) orally once a day  pitavastatin (as calcium) 4 mg oral tablet: 1 tab(s) orally once a day   aspirin 81 mg oral delayed release tablet: 1 tab(s) orally once a day  cetirizine 10 mg oral tablet: 1 tab(s) orally once a day  cloNIDine 0.1 mg oral tablet: 1 tab(s) orally as needed for  SBP &gt;160  clopidogrel 75 mg oral tablet: 1 tab(s) orally once a day  Imdur 30 mg oral tablet, extended release: 1 tab(s) orally once a day  Lexapro 5 mg oral tablet: 1 tab(s) orally once a day  Nitrostat 0.4 mg sublingual tablet: 1 tab(s) sublingually as needed for  chest pain  olmesartan 40 mg oral tablet: 1 tab(s) orally once a day  pantoprazole 40 mg oral delayed release tablet: 1 tab(s) orally once a day  pitavastatin (as calcium) 4 mg oral tablet: 1 tab(s) orally once a day   aspirin 81 mg oral delayed release tablet: 1 tab(s) orally once a day  cetirizine 10 mg oral tablet: 1 tab(s) orally once a day  clopidogrel 75 mg oral tablet: 1 tab(s) orally once a day  Imdur 30 mg oral tablet, extended release: 1 tab(s) orally once a day  Lexapro 5 mg oral tablet: 1 tab(s) orally once a day  Nitrostat 0.4 mg sublingual tablet: 1 tab(s) sublingually as needed for  chest pain  olmesartan 40 mg oral tablet: 1 tab(s) orally once a day  pantoprazole 40 mg oral delayed release tablet: 1 tab(s) orally once a day  pitavastatin (as calcium) 4 mg oral tablet: 1 tab(s) orally once a day

## 2023-11-09 NOTE — H&P ADULT - NSHPOUTPATIENTPROVIDERS_GEN_ALL_CORE
Cardiologist - Dr. Jiménez  Neuro - Steph Escalera NP Cardiologist - Dr. Jill Muniz ([facility])  Neuro - Steph Escalera NP

## 2023-11-09 NOTE — DISCHARGE NOTE PROVIDER - NSDCCPTREATMENT_GEN_ALL_CORE_FT
PRINCIPAL PROCEDURE  Procedure: Coronary angiogram  Findings and Treatment: You have a diagnosis of coronary artery disease and received two stents to your left anterior descending coronary artery.  You have been started on Aspirin 81mg daily and Plavix (Clopidogrel) 75mg daily. You MUST continue taking the daily Aspirin and Plavix to ensure your stent does not close. DO NOT STOP THESE MEDICATIONS FOR ANY REASON UNLESS OTHERWISE INDICATED BY YOUR CARDIOLOGIST BECAUSE THIS WILL PUT YOU AT RISK FOR A HEART ATTACK. You should refrain from strenuous activity and heavy lifting for 1 week. Please make a follow up appointment with your cardiologist within 1-2 weeks of your discharge. All of your prescriptions have been sent electronically to your pharmacy.  You have been given a Stent Card to carry with you in your wallet.  Make Photocopies or take a picture of card so you have a backup copy.  This card has important information for any possible future Radiology/MRI studies.    You underwent a coronary/peripheral angiogram and should wait 3 days before returning to ordinary activities. Do not drive for 2 days. Do not lift more than 5 pounds with affected arm for 3 days or more than 10 pounds if groin access for 5 days.  The catheter from your wrist was removed and bleeding was stopped with manual pressure.  After 24hours you may take off the dressing and shower. Wash the site with soap and water.  There is no need to put on another bandage.  Avoid tub baths, hot tubs or swimming for 5 days to prevent infection.  If you notice Bleeding or hematoma formation (collection of blood under the skin), drainage or redness at the puncture site, acute pain, numbness, decrease in strength, coolness or pale coloration of skin of the leg or hand, please call [phone]. Consult your doctor before returning to vigorous activity.  •	If you are a diabetic and you take Metformin: DO NOT TAKE your Metformin for two days. This medication can interact with the contrast used during your procedure therefore we want to ensure the contrast has left your body prior to you

## 2023-11-09 NOTE — ED ADULT NURSE NOTE - OBJECTIVE STATEMENT
Received patient accompanied by  via stretcher BIBEMS with chief complaint of chest pain. Said complaint was noted to be for 1 week, intermittent, often radiating to the left arm and was accompanied by occasional shortness of breath. Patient took clopidogrel and nitroglycerin prior to arrival to ER which afforded partial relief. Denies signs and symptoms such as fever, vomiting, diarrhea or body weakness.  On PE, AOX4, speaking full sentences without difficulty. Patient not in active cardiac or respiratory distress, no noted neurologic deficits.  No obvious trauma/injury/deformity noted. Patient oriented to ED area. All needs attended. POC reviewed. Fall risk precautions maintained. Purposeful proactive hourly rounding in progress.

## 2023-11-09 NOTE — H&P ADULT - NSHPLABSRESULTS_GEN_ALL_CORE
13.3   6.29  )-----------( 288      ( 09 Nov 2023 05:26 )             39.2       11-09    140  |  104  |  12  ----------------------------<  118<H>  4.1   |  25  |  0.90    Ca    9.7      09 Nov 2023 05:26        PT/INR - ( 09 Nov 2023 05:26 )   PT: 11.2 sec;   INR: 0.98          PTT - ( 09 Nov 2023 05:26 )  PTT:34.7 sec    CARDIAC MARKERS ( 09 Nov 2023 05:26 )  x     / x     / 41 U/L / x     / 1.1 ng/mL        Urinalysis Basic - ( 09 Nov 2023 05:26 )    Color: x / Appearance: x / SG: x / pH: x  Gluc: 118 mg/dL / Ketone: x  / Bili: x / Urobili: x   Blood: x / Protein: x / Nitrite: x   Leuk Esterase: x / RBC: x / WBC x   Sq Epi: x / Non Sq Epi: x / Bacteria: x        EKG: NSR, non ischemic

## 2023-11-09 NOTE — H&P ADULT - HISTORY OF PRESENT ILLNESS
83F, Estonian speaking, w/ PMHx of HTN, HLD, ?pAF (prev on Eliquis), Gastritis (s/p EGD 8/2023), h/o chronic lacunar infarcts, chronic HAs and Anxiety, presented to [facility] ED c/o ongoing sharp LSCP, radiating to L arm/jaw, since the last night and improved w/ SL nitro. She denies any __ palpitations, diaphoresis, dizziness/lightheadedness, syncope, fatigue, HA, SOB, orthopnea, PND, Le edema, N/V, fever, chills or recent sick contact. Pt reports normal stress test many years ago, and was scheduled for a repeat in 9/2023 however was not able to go.    In ED, /73 > 124/72, HR 71bpm, T 97.6, RR 18, SpO2 100% RA.  Labs - hsTrop T 9, CK/CKMB neg, BNP wnl  EKG - NSR, non ischemic  CXR - clear   Intervention - IV Tylenol 1g x 1    Pt now admitted to cardiac telemetry for r/o ACS. 83F, Nauruan speaking, w/ PMHx of HTN, HLD, ?pAF (prev on Eliquis), Gastritis (s/p EGD 8/2023), h/o chronic lacunar infarcts, chronic HAs and Anxiety, presented to [facility] ED c/o ongoing sharp LSCP, radiating to L arm/jaw, since the last night and improved w/ SL nitro. She denies any __ palpitations, diaphoresis, dizziness/lightheadedness, syncope, fatigue, HA, SOB, orthopnea, PND, Le edema, N/V, fever, chills or recent sick contact. Pt reports normal stress test many years ago, and was scheduled for a repeat in 9/2023 however was not able to go.    In ED, /73 > 124/72, HR 71bpm, T 97.6, RR 18, SpO2 100% RA.  Labs - hsTrop T 9, CK/CKMB neg, BNP wnl  EKG - NSR, non ischemic  CXR - clear   Intervention - IV Tylenol 1g x 1    Pt now admitted to cardiac telemetry for r/o ACS. 83F, Anguillan speaking, w/ PMHx of HTN, HLD, ?pAF (prev on Eliquis), Gastritis (s/p EGD 8/2023), h/o chronic lacunar infarcts, chronic HAs and Anxiety, presented to [facility] ED c/o ongoing sharp LSCP, radiating to L arm/jaw, since the last night and improved w/ SL nitro. She denies any __ palpitations, diaphoresis, dizziness/lightheadedness, syncope, fatigue, HA, SOB, orthopnea, PND, Le edema, N/V, fever, chills or recent sick contact. Pt reports normal stress test many years ago, and was scheduled for a repeat in 9/2023 however was not able to go.    In ED, /73 > 124/72, HR 71bpm, T 97.6, RR 18, SpO2 100% RA.  Labs - hsTrop T 9, CK/CKMB neg, BNP wnl  EKG - NSR, non ischemic  CXR - clear   Intervention - IV Tylenol 1g x 1    Pt now admitted to cardiac telemetry for r/o ACS. 83F, Tristanian speaking, w/ PMHx of HTN, HLD, ?pAF (prev on Eliquis), Gastritis (s/p EGD 8/2023), h/o chronic lacunar infarcts, chronic HAs and Anxiety, presented to [facility] ED c/o ongoing sharp LSCP, radiating to L arm/jaw, since the last night and improved w/ SL nitro. She denies any __ palpitations, diaphoresis, dizziness/lightheadedness, syncope, fatigue, HA, SOB, orthopnea, PND, LE edema, N/V, fever, chills or recent sick contact. Pt reports normal stress test many years ago, and was scheduled for a repeat in 9/2023 however was not able to go.    In ED, /73 > 124/72, HR 71bpm, T 97.6, RR 18, SpO2 100% RA.  Labs - hsTrop T 9, CK/CKMB neg, BNP wnl  EKG - NSR, non ischemic  CXR - clear   Intervention - IV Tylenol 1g x 1    Pt now admitted to cardiac telemetry for management of unstable angina. 83F, Burundian speaking, w/ PMHx of HTN, HLD, ?pAF (prev on Eliquis), Gastritis (s/p EGD 8/2023), h/o chronic lacunar infarcts, chronic HAs and Anxiety, presented to [facility] ED c/o ongoing sharp LSCP, radiating to L arm/jaw, since the last night and improved w/ SL nitro. She denies any __ palpitations, diaphoresis, dizziness/lightheadedness, syncope, fatigue, HA, SOB, orthopnea, PND, LE edema, N/V, fever, chills or recent sick contact. Pt reports normal stress test many years ago, and was scheduled for a repeat in 9/2023 however was not able to go.    In ED, /73 > 124/72, HR 71bpm, T 97.6, RR 18, SpO2 100% RA.  Labs - hsTrop T 9, CK/CKMB neg, BNP wnl  EKG - NSR, non ischemic  CXR - clear   Intervention - IV Tylenol 1g x 1    Pt now admitted to cardiac telemetry for management of unstable angina. 83F, Angolan speaking, w/ PMHx of HTN, HLD, ?pAF (prev on Eliquis), Gastritis (s/p EGD 8/2023), h/o chronic lacunar infarcts, chronic HAs and Anxiety, presented to [facility] ED c/o ongoing sharp LSCP, radiating to L arm/jaw, since the last night and improved w/ SL nitro. She denies any __ palpitations, diaphoresis, dizziness/lightheadedness, syncope, fatigue, HA, SOB, orthopnea, PND, LE edema, N/V, fever, chills or recent sick contact. Pt reports normal stress test many years ago, and was scheduled for a repeat in 9/2023 however was not able to go.    In ED, /73 > 124/72, HR 71bpm, T 97.6, RR 18, SpO2 100% RA.  Labs - hsTrop T 9, CK/CKMB neg, BNP wnl  EKG - NSR, non ischemic  CXR - clear   Intervention - IV Tylenol 1g x 1    Pt now admitted to cardiac telemetry for management of unstable angina. 83F, Algerian speaking, w/ PMHx of HTN, HLD, Gastritis (s/p EGD 8/2023), h/o chronic lacunar infarcts, chronic HAs and Anxiety, presented to [facility] ED c/o ongoing sharp LSCP, radiating to L arm/jaw, since 1AM, that improved w/ SL Nitro x 2. She reports BP at home was 180/95 for which she took Clonidine 0.1mg x1 (prescribed for PRN if SBP >180). Pt endorses previously seeing her cardiologist @ [facility] 2 weeks ago for intermittent exertional CP for a few months w/ associated SOB for which she was started on Imdur 30mg QD and rescheduled for a NST, not yet performed. She denies any palpitations, diaphoresis, dizziness/lightheadedness, syncope, fatigue, HA, orthopnea, PND, LE edema, N/V, fever, chills or recent sick contact.     In ED, /73 > 124/72, HR 71bpm, T 97.6, RR 18, SpO2 100% RA.  Labs - hsTrop T 9, CK/CKMB neg, BNP wnl  EKG - NSR, non ischemic  CXR - clear   Intervention - IV Tylenol 1g x 1    Pt now admitted to cardiac telemetry for management of unstable angina. 83F, Slovenian speaking, w/ PMHx of HTN, HLD, Gastritis (s/p EGD 8/2023), h/o chronic lacunar infarcts, chronic HAs and Anxiety, presented to [facility] ED c/o ongoing sharp LSCP, radiating to L arm/jaw, since 1AM, that improved w/ SL Nitro x 2. She reports BP at home was 180/95 for which she took Clonidine 0.1mg x1 (prescribed for PRN if SBP >180). Pt endorses previously seeing her cardiologist @ [facility] 2 weeks ago for intermittent exertional CP for a few months w/ associated SOB for which she was started on Imdur 30mg QD and rescheduled for a NST, not yet performed. She denies any palpitations, diaphoresis, dizziness/lightheadedness, syncope, fatigue, HA, orthopnea, PND, LE edema, N/V, fever, chills or recent sick contact.     In ED, /73 > 124/72, HR 71bpm, T 97.6, RR 18, SpO2 100% RA.  Labs - hsTrop T 9, CK/CKMB neg, BNP wnl  EKG - NSR, non ischemic  CXR - clear   Intervention - IV Tylenol 1g x 1    Pt now admitted to cardiac telemetry for management of unstable angina. 83F, Honduran speaking, w/ PMHx of HTN, HLD, Gastritis (s/p EGD 8/2023), h/o chronic lacunar infarcts, chronic HAs and Anxiety, presented to [facility] ED c/o ongoing sharp LSCP, radiating to L arm/jaw, since 1AM, that improved w/ SL Nitro x 2. She reports BP at home was 180/95 for which she took Clonidine 0.1mg x1 (prescribed for PRN if SBP >180). Pt endorses previously seeing her cardiologist @ [facility] 2 weeks ago for intermittent exertional CP for a few months w/ associated SOB for which she was started on Imdur 30mg QD and rescheduled for a NST, not yet performed. She denies any palpitations, diaphoresis, dizziness/lightheadedness, syncope, fatigue, HA, orthopnea, PND, LE edema, N/V, fever, chills or recent sick contact.     In ED, /73 > 124/72, HR 71bpm, T 97.6, RR 18, SpO2 100% RA.  Labs - hsTrop T 9, CK/CKMB neg, BNP wnl  EKG - NSR, non ischemic  CXR - clear   Intervention - IV Tylenol 1g x 1    Pt now admitted to cardiac telemetry for management of unstable angina. 83F, Comoran speaking, w/ PMHx of HTN, HLD, Gastritis (s/p EGD 8/2023), h/o chronic lacunar infarcts, chronic HAs and Anxiety, presented to [facility] ED c/o ongoing sharp LSCP, radiating to L arm/jaw, since 1AM, that improved w/ SL Nitro x 2. She reports BP at home was 180/95 and took Clonidine 0.1mg x 1 (prescribed for PRN if SBP >180). Pt endorses seeing her cardiologist @ [facility] 2 weeks ago for intermittent exertional CP for a few months w/ associated SOB, for which she was started on Imdur 30mg QD and rescheduled for a NST, not yet performed. She denies any palpitations, diaphoresis, dizziness/lightheadedness, syncope, fatigue, HA, orthopnea, PND, LE edema, N/V, fever, chills or recent sick contact.     In ED, /73 > 124/72, HR 71bpm, T 97.6, RR 18, SpO2 100% RA.  Labs - hsTrop T 9, CK/CKMB neg, BNP wnl  EKG - NSR, non ischemic  CXR - clear   Intervention - IV Tylenol 1g x 1    Pt now admitted to cardiac telemetry for management of unstable angina. 83F, Honduran speaking, w/ PMHx of HTN, HLD, Gastritis (s/p EGD 8/2023), h/o chronic lacunar infarcts, chronic HAs and Anxiety, presented to [facility] ED c/o ongoing sharp LSCP, radiating to L arm/jaw, since 1AM, that improved w/ SL Nitro x 2. She reports BP at home was 180/95 and took Clonidine 0.1mg x 1 (prescribed for PRN if SBP >180). Pt endorses seeing her cardiologist @ [facility] 2 weeks ago for intermittent exertional CP for a few months w/ associated SOB, for which she was started on Imdur 30mg QD and rescheduled for a NST, not yet performed. She denies any palpitations, diaphoresis, dizziness/lightheadedness, syncope, fatigue, HA, orthopnea, PND, LE edema, N/V, fever, chills or recent sick contact.     In ED, /73 > 124/72, HR 71bpm, T 97.6, RR 18, SpO2 100% RA.  Labs - hsTrop T 9, CK/CKMB neg, BNP wnl  EKG - NSR, non ischemic  CXR - clear   Intervention - IV Tylenol 1g x 1    Pt now admitted to cardiac telemetry for management of unstable angina. 83F, Mauritanian speaking, w/ PMHx of HTN, HLD, Gastritis (s/p EGD 8/2023), h/o chronic lacunar infarcts, chronic HAs and Anxiety, presented to [facility] ED c/o ongoing sharp LSCP, radiating to L arm/jaw, since 1AM, that improved w/ SL Nitro x 2. She reports BP at home was 180/95 and took Clonidine 0.1mg x 1 (prescribed for PRN if SBP >180). Pt endorses seeing her cardiologist @ [facility] 2 weeks ago for intermittent exertional CP for a few months w/ associated SOB, for which she was started on Imdur 30mg QD and rescheduled for a NST, not yet performed. She denies any palpitations, diaphoresis, dizziness/lightheadedness, syncope, fatigue, HA, orthopnea, PND, LE edema, N/V, fever, chills or recent sick contact.     In ED, /73 > 124/72, HR 71bpm, T 97.6, RR 18, SpO2 100% RA.  Labs - hsTrop T 9, CK/CKMB neg, BNP wnl  EKG - NSR, non ischemic  CXR - clear   Intervention - IV Tylenol 1g x 1    Pt now admitted to cardiac telemetry for management of unstable angina. 83F, Singaporean speaking, w/ PMHx of HTN, HLD, Gastritis (s/p EGD 8/2023), h/o chronic lacunar infarcts, chronic HAs and Anxiety, presented to [facility] ED c/o ongoing sharp LSCP, radiating to L arm/jaw, since 1AM, that improved w/ SL Nitro x 2. She states BP at home was 180/95 and took Clonidine 0.1mg x 1 (prescribed for PRN if SBP >160). Pt reports seeing her cardiologist @ [facility] 2 weeks ago for intermittent exertional CP for a few months w/ associated SOB, for which she was started on Imdur 30mg QD and rescheduled for a NST, not yet performed. She denies any palpitations, diaphoresis, dizziness/lightheadedness, syncope, fatigue, HA, orthopnea, PND, LE edema, N/V, fever, chills or recent sick contact.     In ED, /73 > 124/72, HR 71bpm, T 97.6, RR 18, SpO2 100% RA.  Labs - hsTrop T 9, CK/CKMB neg, BNP wnl  EKG - NSR, non ischemic  CXR - clear   Intervention - IV Tylenol 1g x 1    Pt now admitted to cardiac telemetry for management of unstable angina. 83F, Palestinian speaking, w/ PMHx of HTN, HLD, Gastritis (s/p EGD 8/2023), h/o chronic lacunar infarcts, chronic HAs and Anxiety, presented to [facility] ED c/o ongoing sharp LSCP, radiating to L arm/jaw, since 1AM, that improved w/ SL Nitro x 2. She states BP at home was 180/95 and took Clonidine 0.1mg x 1 (prescribed for PRN if SBP >160). Pt reports seeing her cardiologist @ [facility] 2 weeks ago for intermittent exertional CP for a few months w/ associated SOB, for which she was started on Imdur 30mg QD and rescheduled for a NST, not yet performed. She denies any palpitations, diaphoresis, dizziness/lightheadedness, syncope, fatigue, HA, orthopnea, PND, LE edema, N/V, fever, chills or recent sick contact.     In ED, /73 > 124/72, HR 71bpm, T 97.6, RR 18, SpO2 100% RA.  Labs - hsTrop T 9, CK/CKMB neg, BNP wnl  EKG - NSR, non ischemic  CXR - clear   Intervention - IV Tylenol 1g x 1    Pt now admitted to cardiac telemetry for management of unstable angina. 83F, [place]ian speaking, w/ PMHx of HTN, HLD, Gastritis (s/p EGD 8/2023), h/o chronic lacunar infarcts, chronic HAs and Anxiety, presented to [facility] ED c/o ongoing sharp LSCP, radiating to L arm/jaw, since 1AM, that improved w/ SL Nitro x 2. She states BP at home was 180/95 and took Clonidine 0.1mg x 1 (prescribed for PRN if SBP >160). Pt reports seeing her cardiologist @ [facility] 2 weeks ago for intermittent exertional CP for a few months w/ associated SOB, for which she was started on Imdur 30mg QD and rescheduled for a NST, not yet performed. She denies any palpitations, diaphoresis, dizziness/lightheadedness, syncope, fatigue, HA, orthopnea, PND, LE edema, N/V, fever, chills or recent sick contact.     In ED, /73 > 124/72, HR 71bpm, T 97.6, RR 18, SpO2 100% RA.  Labs - hsTrop T 9, CK/CKMB neg, BNP wnl  EKG - NSR, non ischemic  CXR - clear   Intervention - IV Tylenol 1g x 1    Pt now admitted to cardiac telemetry for management of unstable angina.

## 2023-11-09 NOTE — PATIENT PROFILE ADULT - FALL HARM RISK - HARM RISK INTERVENTIONS

## 2023-11-09 NOTE — H&P ADULT - PROBLEM SELECTOR PLAN 4
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

## 2023-11-09 NOTE — H&P ADULT - ASSESSMENT
83F, Cuban speaking, w/ PMHx of HTN, HLD, ?pAF (prev on Eliquis), Gastritis (s/p EGD 8/2023), h/o chronic lacunar infarcts, chronic HAs and Anxiety, presents to [facility] w/ sharp LSCP radiating to L arm/jaw and now admitted to cardiac tele for further management of unstable angina. 83F, Bangladeshi speaking, w/ PMHx of HTN, HLD, ?pAF (prev on Eliquis), Gastritis (s/p EGD 8/2023), h/o chronic lacunar infarcts, chronic HAs and Anxiety, presents to [facility] w/ sharp LSCP radiating to L arm/jaw and now admitted to cardiac tele for further management of unstable angina. 83F, Chinese speaking, w/ PMHx of HTN, HLD, ?pAF (prev on Eliquis), Gastritis (s/p EGD 8/2023), h/o chronic lacunar infarcts, chronic HAs and Anxiety, presents to [facility] w/ sharp LSCP radiating to L arm/jaw and now admitted to cardiac tele for further management of unstable angina. 83F, Bangladeshi speaking, w/ PMHx of HTN, HLD, Gastritis (s/p EGD 8/2023), h/o chronic lacunar infarcts, chronic HAs and Anxiety, presents to [facility] w/ sharp LSCP radiating to L arm/jaw and now admitted to cardiac tele for further management of unstable angina. 83F, Micronesian speaking, w/ PMHx of HTN, HLD, Gastritis (s/p EGD 8/2023), h/o chronic lacunar infarcts, chronic HAs and Anxiety, presents to [facility] w/ sharp LSCP radiating to L arm/jaw and now admitted to cardiac tele for further management of unstable angina. 83F, French speaking, w/ PMHx of HTN, HLD, Gastritis (s/p EGD 8/2023), h/o chronic lacunar infarcts, chronic HAs and Anxiety, presents to [facility] w/ sharp LSCP radiating to L arm/jaw and now admitted to cardiac tele for further management of unstable angina.

## 2023-11-09 NOTE — H&P ADULT - PROBLEM SELECTOR PLAN 2
SBP stable  - Continue SBP stable  - Continue Olmesartan 40mg QD  - Of note, pt takes Clonidine 0.1mg PRN if SBP >160, pt took one this AM at home for SBP 180s prior to arrival SBP stable  - Continue Losartan 100mg QD  - Of note, pt takes Clonidine 0.1mg PRN if SBP >160, pt took one this AM at home for SBP 180s prior to arrival

## 2023-11-09 NOTE — ED PROVIDER NOTE - OBJECTIVE STATEMENT
83F hx htn, gastritis, high chol, c/o left sided chest pain. states ongoing since last night. some SOB with pain. no LE swelling, no cough. no vomiting, no abd pain. pt states pain radiates to left arm and left neck. states sharp in nature. no recent travel. states had a stress test years ago. pt states takes plavix, norvasc and olmesartan. states when pain started she took a sublingual nitro with some relief.

## 2023-11-09 NOTE — ED PROVIDER NOTE - CLINICAL SUMMARY MEDICAL DECISION MAKING FREE TEXT BOX
left sided chest pain, radiating to left arm and left neck, sob with pain, improved with SLN. concern for possible acs. doubt PE, doubt dissection. afebrile doubt PNA  -check labs  -ekg  -cxr  -tylenol

## 2023-11-09 NOTE — DISCHARGE NOTE PROVIDER - NSDCCPCAREPLAN_GEN_ALL_CORE_FT
PRINCIPAL DISCHARGE DIAGNOSIS  Diagnosis: Unstable angina  Assessment and Plan of Treatment: You came to the hospital for evaluation of your chest pain, which has since then resolved. You had cardiac enzymes which were negative x 2, revealing no damage to the heart muscle, or a heart attack. You had an Echocardiogram (ultrasound of the heart) which was normal. You also had a CT of your heart which revealed no blockages in the arteries of your heart.  PLEASE CONTINUE PLAVIX 75MG DAILY.  Please follow up with  __ in 1-2 weeks. If you experience any worsening chest pain, palpitations, dizziness, or shortness of breath, please go to the nearest emergency room.      SECONDARY DISCHARGE DIAGNOSES  Diagnosis: HTN (hypertension)  Assessment and Plan of Treatment: Please continue ____ as listed to keep your blood pressure controlled. For blood pressure that is too high or too low please see your doctor or go to the emergency room as necessary.    Diagnosis: HLD (hyperlipidemia)  Assessment and Plan of Treatment: Please continue ____ at bedtime to keep your cholesterol low. High cholesterol contributes to heart disease.     PRINCIPAL DISCHARGE DIAGNOSIS  Diagnosis: Unstable angina  Assessment and Plan of Treatment: You came to the hospital for evaluation of your chest pain, which has since then resolved. You had cardiac enzymes which were negative x 2, revealing no damage to the heart muscle, or a heart attack. You had an Echocardiogram (ultrasound of the heart) which was normal. You also had a CT of your heart which revealed no blockages in the arteries of your heart.  PLEASE CONTINUE PLAVIX 75MG DAILY.  Please follow up with  __ in 1-2 weeks. If you experience any worsening chest pain, palpitations, dizziness, or shortness of breath, please go to the nearest emergency room.      SECONDARY DISCHARGE DIAGNOSES  Diagnosis: HTN (hypertension)  Assessment and Plan of Treatment: Please continue losartan and imdur as listed to keep your blood pressure controlled. For blood pressure that is too high or too low please see your doctor or go to the emergency room as necessary.    Diagnosis: HLD (hyperlipidemia)  Assessment and Plan of Treatment: Please continue atorvastatin 40 mg QD at bedtime to keep your cholesterol low. High cholesterol contributes to heart disease.     PRINCIPAL DISCHARGE DIAGNOSIS  Diagnosis: Unstable angina  Assessment and Plan of Treatment: You came to the hospital for evaluation of your chest pain, which has since then resolved. You had cardiac enzymes which were negative x 2, revealing no damage to the heart muscle, or a heart attack. You had an Echocardiogram (ultrasound of the heart) which was normal. You also had a CT of your heart which revealed no blockages in the arteries of your heart.  PLEASE CONTINUE PLAVIX 75MG DAILY.  Please follow up with Dr. Melchor in 1-2 weeks. If you experience any worsening chest pain, palpitations, dizziness, or shortness of breath, please go to the nearest emergency room.      SECONDARY DISCHARGE DIAGNOSES  Diagnosis: HTN (hypertension)  Assessment and Plan of Treatment: Please continue olmesartan and imdur as listed to keep your blood pressure controlled. For blood pressure that is too high or too low please see your doctor or go to the emergency room as necessary.    Diagnosis: HLD (hyperlipidemia)  Assessment and Plan of Treatment: Please continue pivastatin 4 mg QD at bedtime to keep your cholesterol low. High cholesterol contributes to heart disease.

## 2023-11-10 LAB
A1C WITH ESTIMATED AVERAGE GLUCOSE RESULT: 5.5 % — SIGNIFICANT CHANGE UP (ref 4–5.6)
A1C WITH ESTIMATED AVERAGE GLUCOSE RESULT: 5.5 % — SIGNIFICANT CHANGE UP (ref 4–5.6)
ANION GAP SERPL CALC-SCNC: 11 MMOL/L — SIGNIFICANT CHANGE UP (ref 5–17)
ANION GAP SERPL CALC-SCNC: 11 MMOL/L — SIGNIFICANT CHANGE UP (ref 5–17)
APTT BLD: 32.2 SEC — SIGNIFICANT CHANGE UP (ref 24.5–35.6)
APTT BLD: 32.2 SEC — SIGNIFICANT CHANGE UP (ref 24.5–35.6)
BUN SERPL-MCNC: 17 MG/DL — SIGNIFICANT CHANGE UP (ref 7–23)
BUN SERPL-MCNC: 17 MG/DL — SIGNIFICANT CHANGE UP (ref 7–23)
CALCIUM SERPL-MCNC: 9.2 MG/DL — SIGNIFICANT CHANGE UP (ref 8.4–10.5)
CALCIUM SERPL-MCNC: 9.2 MG/DL — SIGNIFICANT CHANGE UP (ref 8.4–10.5)
CHLORIDE SERPL-SCNC: 106 MMOL/L — SIGNIFICANT CHANGE UP (ref 96–108)
CHLORIDE SERPL-SCNC: 106 MMOL/L — SIGNIFICANT CHANGE UP (ref 96–108)
CO2 SERPL-SCNC: 20 MMOL/L — LOW (ref 22–31)
CO2 SERPL-SCNC: 20 MMOL/L — LOW (ref 22–31)
CREAT SERPL-MCNC: 0.91 MG/DL — SIGNIFICANT CHANGE UP (ref 0.5–1.3)
CREAT SERPL-MCNC: 0.91 MG/DL — SIGNIFICANT CHANGE UP (ref 0.5–1.3)
EGFR: 62 ML/MIN/1.73M2 — SIGNIFICANT CHANGE UP
EGFR: 62 ML/MIN/1.73M2 — SIGNIFICANT CHANGE UP
ESTIMATED AVERAGE GLUCOSE: 111 MG/DL — SIGNIFICANT CHANGE UP (ref 68–114)
ESTIMATED AVERAGE GLUCOSE: 111 MG/DL — SIGNIFICANT CHANGE UP (ref 68–114)
GLUCOSE SERPL-MCNC: 100 MG/DL — HIGH (ref 70–99)
GLUCOSE SERPL-MCNC: 100 MG/DL — HIGH (ref 70–99)
HCT VFR BLD CALC: 36.6 % — SIGNIFICANT CHANGE UP (ref 34.5–45)
HCT VFR BLD CALC: 36.6 % — SIGNIFICANT CHANGE UP (ref 34.5–45)
HGB BLD-MCNC: 12.1 G/DL — SIGNIFICANT CHANGE UP (ref 11.5–15.5)
HGB BLD-MCNC: 12.1 G/DL — SIGNIFICANT CHANGE UP (ref 11.5–15.5)
INR BLD: 0.99 — SIGNIFICANT CHANGE UP (ref 0.85–1.18)
INR BLD: 0.99 — SIGNIFICANT CHANGE UP (ref 0.85–1.18)
MAGNESIUM SERPL-MCNC: 2.1 MG/DL — SIGNIFICANT CHANGE UP (ref 1.6–2.6)
MAGNESIUM SERPL-MCNC: 2.1 MG/DL — SIGNIFICANT CHANGE UP (ref 1.6–2.6)
MCHC RBC-ENTMCNC: 29.4 PG — SIGNIFICANT CHANGE UP (ref 27–34)
MCHC RBC-ENTMCNC: 29.4 PG — SIGNIFICANT CHANGE UP (ref 27–34)
MCHC RBC-ENTMCNC: 33.1 GM/DL — SIGNIFICANT CHANGE UP (ref 32–36)
MCHC RBC-ENTMCNC: 33.1 GM/DL — SIGNIFICANT CHANGE UP (ref 32–36)
MCV RBC AUTO: 88.8 FL — SIGNIFICANT CHANGE UP (ref 80–100)
MCV RBC AUTO: 88.8 FL — SIGNIFICANT CHANGE UP (ref 80–100)
NRBC # BLD: 0 /100 WBCS — SIGNIFICANT CHANGE UP (ref 0–0)
NRBC # BLD: 0 /100 WBCS — SIGNIFICANT CHANGE UP (ref 0–0)
PLATELET # BLD AUTO: 280 K/UL — SIGNIFICANT CHANGE UP (ref 150–400)
PLATELET # BLD AUTO: 280 K/UL — SIGNIFICANT CHANGE UP (ref 150–400)
POTASSIUM SERPL-MCNC: 4.2 MMOL/L — SIGNIFICANT CHANGE UP (ref 3.5–5.3)
POTASSIUM SERPL-MCNC: 4.2 MMOL/L — SIGNIFICANT CHANGE UP (ref 3.5–5.3)
POTASSIUM SERPL-SCNC: 4.2 MMOL/L — SIGNIFICANT CHANGE UP (ref 3.5–5.3)
POTASSIUM SERPL-SCNC: 4.2 MMOL/L — SIGNIFICANT CHANGE UP (ref 3.5–5.3)
PROTHROM AB SERPL-ACNC: 11.3 SEC — SIGNIFICANT CHANGE UP (ref 9.5–13)
PROTHROM AB SERPL-ACNC: 11.3 SEC — SIGNIFICANT CHANGE UP (ref 9.5–13)
RBC # BLD: 4.12 M/UL — SIGNIFICANT CHANGE UP (ref 3.8–5.2)
RBC # BLD: 4.12 M/UL — SIGNIFICANT CHANGE UP (ref 3.8–5.2)
RBC # FLD: 11.9 % — SIGNIFICANT CHANGE UP (ref 10.3–14.5)
RBC # FLD: 11.9 % — SIGNIFICANT CHANGE UP (ref 10.3–14.5)
SODIUM SERPL-SCNC: 137 MMOL/L — SIGNIFICANT CHANGE UP (ref 135–145)
SODIUM SERPL-SCNC: 137 MMOL/L — SIGNIFICANT CHANGE UP (ref 135–145)
WBC # BLD: 6.88 K/UL — SIGNIFICANT CHANGE UP (ref 3.8–10.5)
WBC # BLD: 6.88 K/UL — SIGNIFICANT CHANGE UP (ref 3.8–10.5)
WBC # FLD AUTO: 6.88 K/UL — SIGNIFICANT CHANGE UP (ref 3.8–10.5)
WBC # FLD AUTO: 6.88 K/UL — SIGNIFICANT CHANGE UP (ref 3.8–10.5)

## 2023-11-10 PROCEDURE — 99233 SBSQ HOSP IP/OBS HIGH 50: CPT

## 2023-11-10 PROCEDURE — 95180 RAPID DESENSITIZATION: CPT

## 2023-11-10 RX ORDER — ASPIRIN/CALCIUM CARB/MAGNESIUM 324 MG
81 TABLET ORAL DAILY
Refills: 0 | Status: DISCONTINUED | OUTPATIENT
Start: 2023-11-11 | End: 2023-11-12

## 2023-11-10 RX ORDER — ASPIRIN/CALCIUM CARB/MAGNESIUM 324 MG
325 TABLET ORAL ONCE
Refills: 0 | Status: COMPLETED | OUTPATIENT
Start: 2023-11-10 | End: 2023-11-10

## 2023-11-10 RX ORDER — CLOPIDOGREL BISULFATE 75 MG/1
300 TABLET, FILM COATED ORAL ONCE
Refills: 0 | Status: COMPLETED | OUTPATIENT
Start: 2023-11-10 | End: 2023-11-10

## 2023-11-10 RX ORDER — CLOPIDOGREL BISULFATE 75 MG/1
75 TABLET, FILM COATED ORAL DAILY
Refills: 0 | Status: DISCONTINUED | OUTPATIENT
Start: 2023-11-11 | End: 2023-11-12

## 2023-11-10 RX ORDER — ASPIRIN/CALCIUM CARB/MAGNESIUM 324 MG
162 TABLET ORAL ONCE
Refills: 0 | Status: COMPLETED | OUTPATIENT
Start: 2023-11-10 | End: 2023-11-10

## 2023-11-10 RX ORDER — ACETAMINOPHEN 500 MG
1000 TABLET ORAL EVERY 6 HOURS
Refills: 0 | Status: DISCONTINUED | OUTPATIENT
Start: 2023-11-10 | End: 2023-11-12

## 2023-11-10 RX ORDER — CLOPIDOGREL BISULFATE 75 MG/1
75 TABLET, FILM COATED ORAL ONCE
Refills: 0 | Status: DISCONTINUED | OUTPATIENT
Start: 2023-11-10 | End: 2023-11-10

## 2023-11-10 RX ORDER — EPINEPHRINE 0.3 MG/.3ML
0.3 INJECTION INTRAMUSCULAR; SUBCUTANEOUS ONCE
Refills: 0 | Status: DISCONTINUED | OUTPATIENT
Start: 2023-11-10 | End: 2023-11-12

## 2023-11-10 RX ORDER — NITROGLYCERIN 6.5 MG
0.4 CAPSULE, EXTENDED RELEASE ORAL ONCE
Refills: 0 | Status: COMPLETED | OUTPATIENT
Start: 2023-11-10 | End: 2023-11-10

## 2023-11-10 RX ORDER — DIPHENHYDRAMINE HCL 50 MG
50 CAPSULE ORAL ONCE
Refills: 0 | Status: DISCONTINUED | OUTPATIENT
Start: 2023-11-10 | End: 2023-11-12

## 2023-11-10 RX ORDER — LOSARTAN POTASSIUM 100 MG/1
100 TABLET, FILM COATED ORAL DAILY
Refills: 0 | Status: DISCONTINUED | OUTPATIENT
Start: 2023-11-10 | End: 2023-11-10

## 2023-11-10 RX ORDER — SODIUM CHLORIDE 9 MG/ML
1000 INJECTION INTRAMUSCULAR; INTRAVENOUS; SUBCUTANEOUS
Refills: 0 | Status: DISCONTINUED | OUTPATIENT
Start: 2023-11-11 | End: 2023-11-11

## 2023-11-10 RX ADMIN — PANTOPRAZOLE SODIUM 40 MILLIGRAM(S): 20 TABLET, DELAYED RELEASE ORAL at 06:39

## 2023-11-10 RX ADMIN — Medication 162 MILLIGRAM(S): at 12:52

## 2023-11-10 RX ADMIN — ATORVASTATIN CALCIUM 40 MILLIGRAM(S): 80 TABLET, FILM COATED ORAL at 22:49

## 2023-11-10 RX ADMIN — FAMOTIDINE 20 MILLIGRAM(S): 10 INJECTION INTRAVENOUS at 08:08

## 2023-11-10 RX ADMIN — Medication 325 MILLIGRAM(S): at 13:12

## 2023-11-10 RX ADMIN — Medication 0.4 MILLIGRAM(S): at 05:19

## 2023-11-10 RX ADMIN — CLOPIDOGREL BISULFATE 300 MILLIGRAM(S): 75 TABLET, FILM COATED ORAL at 14:34

## 2023-11-10 RX ADMIN — LOSARTAN POTASSIUM 100 MILLIGRAM(S): 100 TABLET, FILM COATED ORAL at 06:39

## 2023-11-10 RX ADMIN — ISOSORBIDE MONONITRATE 30 MILLIGRAM(S): 60 TABLET, EXTENDED RELEASE ORAL at 10:00

## 2023-11-10 RX ADMIN — Medication 1000 MILLIGRAM(S): at 22:50

## 2023-11-10 RX ADMIN — SODIUM CHLORIDE 75 MILLILITER(S): 9 INJECTION INTRAMUSCULAR; INTRAVENOUS; SUBCUTANEOUS at 06:42

## 2023-11-10 NOTE — CONSULT NOTE ADULT - SUBJECTIVE AND OBJECTIVE BOX
SURGERY CONSULT  ==============================================================================================================  HPI: 83y Female  HPI:  83F, Wallisian speaking, w/ PMHx of HTN, HLD, Gastritis (s/p EGD 8/2023), h/o chronic lacunar infarcts, chronic HAs and Anxiety, presented to [facility] ED c/o ongoing sharp LSCP, radiating to L arm/jaw, since 1AM, that improved w/ SL Nitro x 2. She states BP at home was 180/95 and took Clonidine 0.1mg x 1 (prescribed for PRN if SBP >160). Pt reports seeing her cardiologist @ [facility] 2 weeks ago for intermittent exertional CP for a few months w/ associated SOB, for which she was started on Imdur 30mg QD and rescheduled for a NST, not yet performed. She denies any palpitations, diaphoresis, dizziness/lightheadedness, syncope, fatigue, HA, orthopnea, PND, LE edema, N/V, fever, chills or recent sick contact.     In ED, /73 > 124/72, HR 71bpm, T 97.6, RR 18, SpO2 100% RA.  Labs - hsTrop T 9, CK/CKMB neg, BNP wnl  EKG - NSR, non ischemic  CXR - clear   Intervention - IV Tylenol 1g x 1    Pt now admitted to cardiac telemetry for management of unstable angina. (09 Nov 2023 07:57)      PAST MEDICAL & SURGICAL HISTORY:  HTN (hypertension)      Gastritis      High cholesterol      Multiple lacunar infarcts      Chronic headaches      S/P appendectomy      S/P lumpectomy, right breast        Home Meds: Home Medications:  cetirizine 10 mg oral tablet: 1 tab(s) orally once a day (09 Nov 2023 08:53)  clopidogrel 75 mg oral tablet: 1 tab(s) orally once a day (12 Nov 2023 09:00)  Imdur 30 mg oral tablet, extended release: 1 tab(s) orally once a day (09 Nov 2023 08:53)  Lexapro 5 mg oral tablet: 1 tab(s) orally once a day (09 Nov 2023 08:01)  Nitrostat 0.4 mg sublingual tablet: 1 tab(s) sublingually as needed for  chest pain (09 Nov 2023 08:53)  olmesartan 40 mg oral tablet: 1 tab(s) orally once a day (09 Nov 2023 08:53)  pantoprazole 40 mg oral delayed release tablet: 1 tab(s) orally once a day (09 Nov 2023 08:53)  pitavastatin (as calcium) 4 mg oral tablet: 1 tab(s) orally once a day (09 Nov 2023 08:53)    Allergies: Allergies    aspirin (Angioedema)  tetracycline (Unknown)  sulfa drugs (Anaphylaxis)    Intolerances    passed 325mg aspirin desensitization procedure 11/10/2023 (Unknown)    Soc:   Advanced Directives: Presumed Full Code     CURRENT MEDICATIONS:   --------------------------------------------------------------------------------------  Neurologic Medications    Respiratory Medications    Cardiovascular Medications    Gastrointestinal Medications    Genitourinary Medications    Hematologic/Oncologic Medications    Antimicrobial/Immunologic Medications    Endocrine/Metabolic Medications    Topical/Other Medications    --------------------------------------------------------------------------------------    VITAL SIGNS, INS/OUTS (last 24 hours):  --------------------------------------------------------------------------------------  ICU Vital Signs Last 24 Hrs  T(C): 36.7 (12 Nov 2023 09:10), Max: 36.7 (12 Nov 2023 09:10)  T(F): 98.1 (12 Nov 2023 09:10), Max: 98.1 (12 Nov 2023 09:10)  HR: 72 (12 Nov 2023 12:38) (72 - 72)  BP: 146/67 (12 Nov 2023 12:38) (146/67 - 163/73)  BP(mean): 97 (12 Nov 2023 12:38) (97 - 105)  ABP: --  ABP(mean): --  RR: 18 (12 Nov 2023 12:38) (18 - 18)  SpO2: 100% (12 Nov 2023 12:38) (100% - 100%)    O2 Parameters below as of 12 Nov 2023 12:38  Patient On (Oxygen Delivery Method): room air          I&O's Summary    12 Nov 2023 07:01  -  13 Nov 2023 07:00  --------------------------------------------------------  IN: 200 mL / OUT: 550 mL / NET: -350 mL      --------------------------------------------------------------------------------------    EXAM:  General/Neuro  GCS:   Exam: Normal, NAD, alert, oriented x 3, no focal deficits. PERRLA  ***    Respiratory  Exam: Lungs clear to auscultation, Normal expansion/effort.  ***    Cardiovascular  Exam: S1, S2.  Regular rate and rhythm.  Peripheral edema  ***  Cardiac Rhythm: Normal Sinus Rhythm    GI  Exam: Abdomen soft, Non-tender, Non-distended.    Wound:   ***    Extremities  Exam: Extremities warm, pink, well-perfused.      Derm:  Exam: Good skin turgor, no skin breakdown.      LABS  --------------------------------------------------------------------------------------  Labs:  CAPILLARY BLOOD GLUCOSE                              12.4   6.84  )-----------( 255      ( 12 Nov 2023 05:30 )             38.2         11-12    137  |  107  |  13  ----------------------------<  108<H>  4.3   |  21<L>  |  0.83          LFTs:         Coags:            Urinalysis Basic - ( 12 Nov 2023 05:30 )    Color: x / Appearance: x / SG: x / pH: x  Gluc: 108 mg/dL / Ketone: x  / Bili: x / Urobili: x   Blood: x / Protein: x / Nitrite: x   Leuk Esterase: x / RBC: x / WBC x   Sq Epi: x / Non Sq Epi: x / Bacteria: x          --------------------------------------------------------------------------------------    OTHER LABS    IMAGING RESULTS  ****************      ASSESSMENT/ PLAN:  83y Female ***      Attending aware and agrees with plan

## 2023-11-10 NOTE — PROVIDER CONTACT NOTE (CHANGE IN STATUS NOTIFICATION) - SITUATION
found patient moaning, touching her chest, /80 HR 70, Cardiac PA notified ,stat EKG done, reviewed by SANTIAGO Fernandez, cardiac PA at bedside assessed the patient

## 2023-11-10 NOTE — CHART NOTE - NSCHARTNOTEFT_GEN_A_CORE
Pt transferred back to cardiac tele 5 [facility] after completion of ASA desensitization in CCU, tolerated well w/o reactions. Cardiac cath rescheduled for tomorrow 11/11/23 due to cath lab scheduling conflict.
Procedure: Aspirin Desensitization Procedure  Indication: CAD, chest pain - moderate-severe stenosis in mid LAD on CCTA. Pt reports swelling, throat closing when taking aspirin >50y ago  Informed consent obtained and placed in chart. Indications, benefits, and risks of procedure were explained to patient and patient agreed to proceed. Time-out performed with CCU nurse at bedside.    History:   Allergy symptoms: throat closing, swelling  Last Occurrence: >50y ago  Tolerates excedrine or asim-seltzer: has not taken this    Exam  GEN: female in bed on RA in NAD  CV: RRR, no murmurs, no BLE Edema  PULM: no stridor, nml effort, CTAB wo rales, wheezing, rhonchi  ABD: Soft, non-tender  SKIN: No hives, rashes, lip swelling    Procedure performed w Dr. Hargrove and patient's daughter serving as Kosovan     Patient pre-medicated with montelukast 10mg PO and Loratidine 10mg PO evening prior to procedure.  The morning of the procedure pt received 20mg oral famotidine. No beta-blocker within 24h however pt did receive ARB AM of procedure. IV solumedrol, IV benadryl, and Epi-Pen was present at bedside. CCU Nurse and CCU Pharmacist was present to verify patient and medications. As desired maintenance dose by primary team was 81mg daily in addition to patient's reaction history suggesting possible risk of angioedema, we utilized the 325mg desensitization protocol starting at 1 mg ASA dissolved in solution. Dosages were given at 15 minute intervals at 1mg, 2mg, 5mg, 10mg, 20mg, 40mg, 81mg, 162mg, 325mg ASA and symptoms including pruritis, urticaria, dyspnea, wheezing, angioedema, hypotension, were monitored prior to each incremental dose. Repeat monitoring and examination was performed 1 hour post final dose of 325mg ASA. The patient tolerated the procedure well without side effects.    Results: Successful 325mg Aspirin densensitization procedure    The patient can now tolerate up to 325 mg PO aspirin daily.  Patient was counseled that if she were to miss two doses of aspirin, she would lose effects of desensitization and above procedure would need to be performed again. Patient expressed understanding and all questions were answered    Allergy [facility] updated  Discussed with Primary cardiology ICU team  Performed by Sung Maldonado MD, assisted by Dr. Nataliya Hargrove PharmD    I was personally present for 130 minutes at patient bedside, for consent, set-up, administration of oral challenge, monitoring, and examining the patient.
Seen and examined in ED alongside Dr. Nataliya Hargrove PharmD  Citizen of the [place]  ID# [id number]  83F w HTN, CAD, GERD, Depression p/w chest pain w exertion, found to have mod-severe stenosis of LAD, mod stenosis of proximal LAD - for LHC and likely stenting, medicine called to evaluate aspirin allergy and desensitization.    Reports reaction to aspirin >50 years ago  Reaction: swelling and throat closing up - c/w angioedema  Timing/dosage: pt does not recall  Denies taking asim-seltzer or pepto-bismol    Pt reporting chest pain w exertion. Found to have nml troponin. However CCTA showing mod-severe stenosis of midLAD and moderate stenosis of proximal LAD. Plan is for L heart cath w stenting  Exam: female in NAD on RA, MMM, No oropharyngeal swelling, no stridor, nml resp effort, CTAB, RRR, nml abd soft, non-tender, Alert, moving all ext.    Planned dose for desensitization: 325 mg ASA daily  Explained benefits and risks of procedure to patient,  at bedside. Pt counseled if she misses more than 2 daily doses, risk of allergy may return. All questions were answered. Consent signed    Plan  --Plan to admit to CICU for Aspirin desensitization to 325mg -- anticipated start 1100h 11/10/23  --Please pre-medicate w loratidine 10mg; monteklukast 10mg tonight. Give famotidine 20mg tomorrow AM  --Please hold beta-blockers and ACEi/ARB before procedure    Above discussed w Cardiology SANTIAGO Metcalf  Full consult note to follow

## 2023-11-10 NOTE — PROVIDER CONTACT NOTE (CHANGE IN STATUS NOTIFICATION) - ACTION/TREATMENT ORDERED:
stat EKG done, assessed by SANTIAGO Fernandez, patient refused Tylenol po order, Nitroglycerine 0.4 mg SL given as ordered, continue monitor

## 2023-11-10 NOTE — PROGRESS NOTE ADULT - PROBLEM SELECTOR PLAN 2
SBP stable  - Home med: Losartan 100mg QD, Imdur 30mg qd, Clonidine 0.1mg PRN if SBP >160  - HOLD ACE/ARB, BB pending ASA desensitization. Last dose ARB 11/9 PM  -c/w Imdur

## 2023-11-10 NOTE — PROGRESS NOTE ADULT - SUBJECTIVE AND OBJECTIVE BOX
CARDIOLOGY NP TRANSFER NOTE: CARDIAC TELE 5 URIS TO CCU    HOSPITAL COURSE  83F, Andorran speaking, w/ PMHx of HTN, HLD, Gastritis (s/p EGD 8/2023), h/o chronic lacunar infarcts, chronic HAs and Anxiety, presents to [facility] w/ sharp LSCP radiating to L arm/jaw and now admitted to cardiac tele for further management of unstable angina. Pt found to have abnormal CCTA w/ FFR (+) of p/mLAD. Pt now pending ASA desensitization in AM prior to cardiac cath 11/10.    Subjective:   Remainder ROS otherwise negative.    Overnight Events:     TELEMETRY:         VITAL SIGNS:  T(C): 36.2 (11-10-23 @ 04:46), Max: 36.8 (11-09-23 @ 13:02)  HR: 70 (11-10-23 @ 07:38) (65 - 76)  BP: 164/78 (11-10-23 @ 07:38) (110/66 - 168/80)  RR: 18 (11-10-23 @ 07:38) (18 - 18)  SpO2: 98% (11-10-23 @ 07:38) (96% - 99%)  Wt(kg): --    I&O's Summary    09 Nov 2023 07:01  -  10 Nov 2023 07:00  --------------------------------------------------------  IN: 840 mL / OUT: 0 mL / NET: 840 mL          PHYSICAL EXAM:    General: A/ox 3, No acute Distress  Neck: Supple, NO JVD  Cardiac: S1 S2, No M/R/G  Pulmonary: CTAB, Breathing unlabored, No Rhonchi/Rales/Wheezing  Abdomen: Soft, Non -tender, +BS x 4 quads  Extremities: No Rashes, No edema  Neuro: A/o x 3, No focal deficits          LABS:                          13.3   6.29  )-----------( 288      ( 09 Nov 2023 05:26 )             39.2                              11-09    140  |  104  |  12  ----------------------------<  118<H>  4.1   |  25  |  0.90    Ca    9.7      09 Nov 2023 05:26                              PT/INR - ( 10 Nov 2023 07:48 )   PT: 11.3 sec;   INR: 0.99          PTT - ( 10 Nov 2023 07:48 )  PTT:32.2 sec  CAPILLARY BLOOD GLUCOSE        CARDIAC MARKERS ( 09 Nov 2023 09:00 )  x     / x     / 34 U/L / x     / 1.1 ng/mL  CARDIAC MARKERS ( 09 Nov 2023 05:26 )  x     / x     / 41 U/L / x     / 1.1 ng/mL          Allergies:  aspirin (Angioedema)  tetracycline (Unknown)  sulfa drugs (Anaphylaxis)    MEDICATIONS  (STANDING):  atorvastatin 40 milliGRAM(s) Oral at bedtime  clopidogrel Tablet 75 milliGRAM(s) Oral daily  escitalopram 5 milliGRAM(s) Oral daily  famotidine    Tablet 20 milliGRAM(s) Oral once  isosorbide   mononitrate ER Tablet (IMDUR) 30 milliGRAM(s) Oral daily  pantoprazole    Tablet 40 milliGRAM(s) Oral before breakfast  sodium chloride 0.9%. 500 milliLiter(s) (75 mL/Hr) IV Continuous <Continuous>    MEDICATIONS  (PRN):  acetaminophen     Tablet .. 1000 milliGRAM(s) Oral every 6 hours PRN Moderate Pain (4 - 6), Severe Pain (7 - 10)        DIAGNOSTIC TESTS:        CARDIOLOGY NP TRANSFER NOTE: CARDIAC TELE 5 URIS TO CCU    HOSPITAL COURSE  83F, Guamanian speaking, w/ PMHx of HTN, HLD, Gastritis (s/p EGD 8/2023), h/o chronic lacunar infarcts, chronic HAs and Anxiety, presents to [facility] w/ sharp LSCP radiating to L arm/jaw x 1 day, improved w/ SL Nitro x 2. Reports intermittent exertional CP for a few months w/ associated SOB was scheduled for outpt NST but not performed yet. Trop neg x 2. EKG NSR w/ TWi III. Admitted to cardiac tele for further management of unstable angina. Pt found to have abnormal CCTA w/ FFR (+) of p/mLAD. Pt now pending ASA desensitization in CCU prior to cardiac cath 11/10.    Subjective: Pt seen and examined at bedside. Reports feeling epigastric pain this AM. Denies chest pain, sob, lightheadedness, dizziness, palpitations, fever, chills.  Remainder ROS otherwise negative.    Overnight Events: NPO s/p MN    TELEMETRY: SR 70s         VITAL SIGNS:  T(C): 36.2 (11-10-23 @ 04:46), Max: 36.8 (11-09-23 @ 13:02)  HR: 70 (11-10-23 @ 07:38) (65 - 76)  BP: 164/78 (11-10-23 @ 07:38) (110/66 - 168/80)  RR: 18 (11-10-23 @ 07:38) (18 - 18)  SpO2: 98% (11-10-23 @ 07:38) (96% - 99%)  Wt(kg): --    I&O's Summary    09 Nov 2023 07:01  -  10 Nov 2023 07:00  --------------------------------------------------------  IN: 840 mL / OUT: 0 mL / NET: 840 mL          PHYSICAL EXAM:    General: A/ox 3, No acute Distress  Neck: Supple, NO JVD  Cardiac: S1 S2, No M/R/G  Pulmonary: CTAB, Breathing unlabored, No Rhonchi/Rales/Wheezing  Abdomen: Soft, Non -tender, +BS x 4 quads  Extremities: No Rashes, No edema  Vascular: 2+ radial pulses severiano, no femoral bruits severiano, 2+ DP/PT pulses severiano  Neuro: A/o x 3, No focal deficits          LABS:                          13.3   6.29  )-----------( 288      ( 09 Nov 2023 05:26 )             39.2                              11-09    140  |  104  |  12  ----------------------------<  118<H>  4.1   |  25  |  0.90    Ca    9.7      09 Nov 2023 05:26      PT/INR - ( 10 Nov 2023 07:48 )   PT: 11.3 sec;   INR: 0.99          PTT - ( 10 Nov 2023 07:48 )  PTT:32.2 sec  CAPILLARY BLOOD GLUCOSE        CARDIAC MARKERS ( 09 Nov 2023 09:00 )  x     / x     / 34 U/L / x     / 1.1 ng/mL  CARDIAC MARKERS ( 09 Nov 2023 05:26 )  x     / x     / 41 U/L / x     / 1.1 ng/mL          Allergies:  aspirin (Angioedema)  tetracycline (Unknown)  sulfa drugs (Anaphylaxis)    MEDICATIONS  (STANDING):  atorvastatin 40 milliGRAM(s) Oral at bedtime  clopidogrel Tablet 75 milliGRAM(s) Oral daily  escitalopram 5 milliGRAM(s) Oral daily  famotidine    Tablet 20 milliGRAM(s) Oral once  isosorbide   mononitrate ER Tablet (IMDUR) 30 milliGRAM(s) Oral daily  pantoprazole    Tablet 40 milliGRAM(s) Oral before breakfast  sodium chloride 0.9%. 500 milliLiter(s) (75 mL/Hr) IV Continuous <Continuous>    MEDICATIONS  (PRN):  acetaminophen     Tablet .. 1000 milliGRAM(s) Oral every 6 hours PRN Moderate Pain (4 - 6), Severe Pain (7 - 10)        DIAGNOSTIC TESTS:     < from: TTE Echo Complete w/o Contrast w/ Doppler (11.09.23 @ 12:11) >  CONCLUSIONS:     1. Normal left ventricular size and systolic function.   2. Normal right ventricular size and systolic function.   3. Normal atria.   4. No significant valvular disease.   5. No evidence of pulmonary hypertension.   6. No pericardial effusion.   7. Compared to the previous TTE performed on 3/13/2023, there have been   no significant interval changes.    < end of copied text >    < from: CT Angio Cardiac w/ IV Cont (11.09.23 @ 11:00) >  IMPRESSION:    Cardiac:  1.  The calcium score is moderate at 384 Agatston units, which is at the   69 percentile, adjusted forage, gender and race.  2.  Severe stenosis in mid LAD  3.  Moderate stenosis in mid RCA and proximal LAD  4.  Remaining segments demonstrate non obstructive coronary artery   disease.    Based on the coronary findings, FFR-CT Coronary Analysis ([employer])   is recommended to define the physiologic significance of obstructive   coronary disease. After insurance approval, CCTA images will be sent for   analysis. FFR CT results are then generally available within 24 hours(if   technical qualityof the images is sufficient).      Non-cardiac:  1. Small type I hiatal hernia.  2. Bilateral mild mosaic perfusion pattern which may suggest air-trapping   from underlying small airways inflammation. As clinically indicated, HRCT   may be of value.    < end of copied text >    < from: CT Angio Cardiac w/ IV Cont (11.09.23 @ 11:00) >  The results of the CT-FFR analysis are:    LAD: 0.79 (3) for proximal LAD and  0.69 (3) for mid LAD  LCx: 0.83 (1)  RCA: 0.87 (1)    IMPRESSION:  1.CT-FFR is negative:The flow is > 0.80  2.CT-FFR is positive. However, overall does not suggest a discrete   physiologically significant lesion: The flow is < or = to 0.80 in one or   more segments of one or more coronary arteries (perhaps because it is a   distal segment (vessel size is less than 2 mm)  3.CT-FFR is positive and clinically significant:The flow is < or = to   0.80, and the finding is clinically significant.    < end of copied text >     CARDIOLOGY NP TRANSFER NOTE: CARDIAC TELE 5 URIS TO CCU    HOSPITAL COURSE  83F, Azerbaijani speaking, w/ PMHx of HTN, HLD, Gastritis (s/p EGD 8/2023), h/o chronic lacunar infarcts, chronic HAs and Anxiety, presents to [facility] w/ sharp LSCP radiating to L arm/jaw x 1 day, improved w/ SL Nitro x 2. Reports intermittent exertional CP for a few months w/ associated SOB was scheduled for outpt NST but not performed yet. Trop neg x 2. EKG NSR w/ TWi III. Admitted to cardiac tele for further management of unstable angina. Pt found to have abnormal CCTA w/ FFR (+) of p/mLAD. Pt now pending ASA desensitization in CCU prior to cardiac cath 11/10.    Subjective: Pt seen and examined at bedside w/ use of Azerbaijani  Jeffrey ID# [id number]. Reports feeling epigastric pain this AM. Denies chest pain, sob, lightheadedness, dizziness, palpitations, fever, chills.  Remainder ROS otherwise negative.    Overnight Events: NPO s/p MN    TELEMETRY: SR 70s         VITAL SIGNS:  T(C): 36.2 (11-10-23 @ 04:46), Max: 36.8 (11-09-23 @ 13:02)  HR: 70 (11-10-23 @ 07:38) (65 - 76)  BP: 164/78 (11-10-23 @ 07:38) (110/66 - 168/80)  RR: 18 (11-10-23 @ 07:38) (18 - 18)  SpO2: 98% (11-10-23 @ 07:38) (96% - 99%)  Wt(kg): --    I&O's Summary    09 Nov 2023 07:01  -  10 Nov 2023 07:00  --------------------------------------------------------  IN: 840 mL / OUT: 0 mL / NET: 840 mL          PHYSICAL EXAM:    General: A/ox 3, No acute Distress  Neck: Supple, NO JVD  Cardiac: S1 S2, No M/R/G  Pulmonary: CTAB, Breathing unlabored, No Rhonchi/Rales/Wheezing  Abdomen: Soft, Non -tender, +BS x 4 quads  Extremities: No Rashes, No edema  Vascular: 2+ radial pulses severiano, no femoral bruits severiano, 2+ DP/PT pulses severiano  Neuro: A/o x 3, No focal deficits          LABS:                          13.3   6.29  )-----------( 288      ( 09 Nov 2023 05:26 )             39.2                              11-09    140  |  104  |  12  ----------------------------<  118<H>  4.1   |  25  |  0.90    Ca    9.7      09 Nov 2023 05:26      PT/INR - ( 10 Nov 2023 07:48 )   PT: 11.3 sec;   INR: 0.99          PTT - ( 10 Nov 2023 07:48 )  PTT:32.2 sec  CAPILLARY BLOOD GLUCOSE        CARDIAC MARKERS ( 09 Nov 2023 09:00 )  x     / x     / 34 U/L / x     / 1.1 ng/mL  CARDIAC MARKERS ( 09 Nov 2023 05:26 )  x     / x     / 41 U/L / x     / 1.1 ng/mL          Allergies:  aspirin (Angioedema)  tetracycline (Unknown)  sulfa drugs (Anaphylaxis)    MEDICATIONS  (STANDING):  atorvastatin 40 milliGRAM(s) Oral at bedtime  clopidogrel Tablet 75 milliGRAM(s) Oral daily  escitalopram 5 milliGRAM(s) Oral daily  famotidine    Tablet 20 milliGRAM(s) Oral once  isosorbide   mononitrate ER Tablet (IMDUR) 30 milliGRAM(s) Oral daily  pantoprazole    Tablet 40 milliGRAM(s) Oral before breakfast  sodium chloride 0.9%. 500 milliLiter(s) (75 mL/Hr) IV Continuous <Continuous>    MEDICATIONS  (PRN):  acetaminophen     Tablet .. 1000 milliGRAM(s) Oral every 6 hours PRN Moderate Pain (4 - 6), Severe Pain (7 - 10)        DIAGNOSTIC TESTS:     < from: TTE Echo Complete w/o Contrast w/ Doppler (11.09.23 @ 12:11) >  CONCLUSIONS:     1. Normal left ventricular size and systolic function.   2. Normal right ventricular size and systolic function.   3. Normal atria.   4. No significant valvular disease.   5. No evidence of pulmonary hypertension.   6. No pericardial effusion.   7. Compared to the previous TTE performed on 3/13/2023, there have been   no significant interval changes.    < end of copied text >    < from: CT Angio Cardiac w/ IV Cont (11.09.23 @ 11:00) >  IMPRESSION:    Cardiac:  1.  The calcium score is moderate at 384 Agatston units, which is at the   69 percentile, adjusted forage, gender and race.  2.  Severe stenosis in mid LAD  3.  Moderate stenosis in mid RCA and proximal LAD  4.  Remaining segments demonstrate non obstructive coronary artery   disease.    Based on the coronary findings, FFR-CT Coronary Analysis ([employer])   is recommended to define the physiologic significance of obstructive   coronary disease. After insurance approval, CCTA images will be sent for   analysis. FFR CT results are then generally available within 24 hours(if   technical qualityof the images is sufficient).      Non-cardiac:  1. Small type I hiatal hernia.  2. Bilateral mild mosaic perfusion pattern which may suggest air-trapping   from underlying small airways inflammation. As clinically indicated, HRCT   may be of value.    < end of copied text >    < from: CT Angio Cardiac w/ IV Cont (11.09.23 @ 11:00) >  The results of the CT-FFR analysis are:    LAD: 0.79 (3) for proximal LAD and  0.69 (3) for mid LAD  LCx: 0.83 (1)  RCA: 0.87 (1)    IMPRESSION:  1.CT-FFR is negative:The flow is > 0.80  2.CT-FFR is positive. However, overall does not suggest a discrete   physiologically significant lesion: The flow is < or = to 0.80 in one or   more segments of one or more coronary arteries (perhaps because it is a   distal segment (vessel size is less than 2 mm)  3.CT-FFR is positive and clinically significant:The flow is < or = to   0.80, and the finding is clinically significant.    < end of copied text >

## 2023-11-10 NOTE — PROGRESS NOTE ADULT - PROBLEM SELECTOR PLAN 1
presents w/ ongoing sharp LSCP, radiating to L arm/jaw, improved w/ SL Nitro, pt currently w/ mild 2/10 CP and HD stable  - hsTrop T neg x2, CK/CKMB neg   - EKG: NSR w/ TWi lead III  - TTE 3/2023: LVEF normal, mild LVH, G1DD, no significant valvular dz   - Repeat TTE 11/9/23: EF 60-65%, normal LV/RF systolic function, no significant valvular disease  - CCTA 11/9/23: calcium score is moderate at 384 Agatston units, Severe stenosis in mid LAD, LAD FFR: 0.79 for proximal LAD and  0.69 for mid LAD. Moderate stenosis in mid RCA and proximal LAD.  - Continue Plavix 75mg QD, Lipitor 40mg HS and Imdur 30mg QD  - Will initiate ASA desensitization in CCU this AM given allergy of angioedema, Dr Doan following  - Plan for cardiac cath w/ Dr Gregory 11/10 PM after ASA desensitization. Consent in cath lab office.    Mallampati Class IV.   ASA Class II  Pt is a suitable candidate for moderate sedation.   Risks & benefits of procedure and alternative therapy have been explained to the patient including but not limited to: allergic reaction, bleeding w/possible need for blood transfusion, infection, renal and vascular compromise, limb damage, arrhythmia, stroke, vessel dissection/perforation, Myocardial infarction, emergent CABG. Informed consent obtained and in chart.

## 2023-11-10 NOTE — PROGRESS NOTE ADULT - PROBLEM SELECTOR PLAN 5
- Continue Lexapro 5mg QD    F: pre-cath IVF NS 75cc/hr x 12hrs  E: Replete if K<4 or Mag<2  N: NPO  GIppx: Pantoprazole  VTEppx: Lovenox  Dispo: transfer cardiac tele to CCU

## 2023-11-10 NOTE — PROGRESS NOTE ADULT - SUBJECTIVE AND OBJECTIVE BOX
Hospital course: 83F, Chadian speaking, w/ PMHx of HTN, HLD, Gastritis (s/p EGD 8/2023), h/o chronic lacunar infarcts, chronic HAs and Anxiety, presented to [facility] ED c/o ongoing sharp LSCP, radiating to L arm/jaw, since 1AM, that improved w/ SL Nitro x2, admitted for unstable angina. CCTA w/ mod-severe stenosis of mid LAD and mod stenosis of pLAD. Plan for L heart cath w stenting c/b possible aspirin allergy, admitted to CCU for aspirin desensitization.    Subjective:    ICU Vital Signs Last 24 Hrs  T(C): 36.2 (10 Nov 2023 04:46), Max: 36.8 (09 Nov 2023 13:02)  T(F): 97.1 (10 Nov 2023 04:46), Max: 98.3 (09 Nov 2023 13:02)  HR: 70 (10 Nov 2023 07:38) (65 - 76)  BP: 164/78 (10 Nov 2023 07:38) (110/66 - 168/80)  BP(mean): --  ABP: --  ABP(mean): --  RR: 18 (10 Nov 2023 07:38) (18 - 18)  SpO2: 98% (10 Nov 2023 07:38) (96% - 99%)    O2 Parameters below as of 10 Nov 2023 07:38  Patient On (Oxygen Delivery Method): room air          I&O's Summary    09 Nov 2023 07:01  -  10 Nov 2023 07:00  --------------------------------------------------------  IN: 840 mL / OUT: 0 mL / NET: 840 mL          EKG/Telemetry Events:    MEDICATIONS  (STANDING):  atorvastatin 40 milliGRAM(s) Oral at bedtime  clopidogrel Tablet 75 milliGRAM(s) Oral daily  escitalopram 5 milliGRAM(s) Oral daily  isosorbide   mononitrate ER Tablet (IMDUR) 30 milliGRAM(s) Oral daily  pantoprazole    Tablet 40 milliGRAM(s) Oral before breakfast  sodium chloride 0.9%. 500 milliLiter(s) (75 mL/Hr) IV Continuous <Continuous>    MEDICATIONS  (PRN):  acetaminophen     Tablet .. 1000 milliGRAM(s) Oral every 6 hours PRN Moderate Pain (4 - 6), Severe Pain (7 - 10)      PHYSICAL EXAM:  GENERAL: NAD, alert and interactive  HEAD: Atraumatic, normocephalic  EYES: EOMI, conjunctiva and sclera clear  NECK: Supple, no JVD  CHEST/LUNG: Clear to auscultation bilaterally; no rales, rhonchi, or wheezing; normal WOB on RA  HEART: Regular rate and rhythm; S1 S2 normal, no S3; no murmurs, rubs, or gallops  ABDOMEN: Soft, nontender, nondistended; bowel sounds present  EXTREMITIES: No clubbing, cyanosis, or edema  NEURO: Grossly nonfocal  SKIN: No rashes or lesions    LABS:                        13.3   6.29  )-----------( 288      ( 09 Nov 2023 05:26 )             39.2     11-09    140  |  104  |  12  ----------------------------<  118<H>  4.1   |  25  |  0.90    Ca    9.7      09 Nov 2023 05:26        PT/INR - ( 10 Nov 2023 07:48 )   PT: 11.3 sec;   INR: 0.99          PTT - ( 10 Nov 2023 07:48 )  PTT:32.2 sec  CAPILLARY BLOOD GLUCOSE          Creatine Kinase, Serum: 34 U/L (11-09 @ 09:00)  CKMB Units: 1.1 ng/mL (11-09 @ 09:00)    CARDIAC MARKERS ( 09 Nov 2023 09:00 )  x     / x     / 34 U/L / x     / 1.1 ng/mL  CARDIAC MARKERS ( 09 Nov 2023 05:26 )  x     / x     / 41 U/L / x     / 1.1 ng/mL      Urinalysis Basic - ( 09 Nov 2023 05:26 )    Color: x / Appearance: x / SG: x / pH: x  Gluc: 118 mg/dL / Ketone: x  / Bili: x / Urobili: x   Blood: x / Protein: x / Nitrite: x   Leuk Esterase: x / RBC: x / WBC x   Sq Epi: x / Non Sq Epi: x / Bacteria: x        RADIOLOGY & ADDITIONAL TESTS:  CXR:    Care Discussed with Consultants/Other Providers [ x] YES  [ ] NO *****STEPDOWN FROM CCU TO CARDIAC TELE*****    Hospital course: 83F, Chinese speaking, w/ PMHx of HTN, HLD, Gastritis (s/p EGD 8/2023), h/o chronic lacunar infarcts, chronic HAs and Anxiety, presented to [facility] ED c/o ongoing sharp LSCP, radiating to L arm/jaw, since 1AM, that improved w/ SL Nitro x2, admitted for unstable angina. CCTA w/ mod-severe stenosis of mid LAD and mod stenosis of pLAD. Plan for L heart cath w stenting c/b possible aspirin allergy, admitted to CCU for aspirin desensitization. S/p desensitization testing w/o complications. Plavix loaded. Pending [facility] 11/11. Patient stepped down to cardiac tele.     Subjective: Pt seen at beside, found laying in bed comfortably. She is nervous about her upcoming cath but has no acute complaints. Denies CP/SOB. ROS negative.    ICU Vital Signs Last 24 Hrs  T(C): 36.2 (10 Nov 2023 04:46), Max: 36.8 (09 Nov 2023 13:02)  T(F): 97.1 (10 Nov 2023 04:46), Max: 98.3 (09 Nov 2023 13:02)  HR: 70 (10 Nov 2023 07:38) (65 - 76)  BP: 164/78 (10 Nov 2023 07:38) (110/66 - 168/80)  BP(mean): --  ABP: --  ABP(mean): --  RR: 18 (10 Nov 2023 07:38) (18 - 18)  SpO2: 98% (10 Nov 2023 07:38) (96% - 99%)    O2 Parameters below as of 10 Nov 2023 07:38  Patient On (Oxygen Delivery Method): room air          I&O's Summary    09 Nov 2023 07:01  -  10 Nov 2023 07:00  --------------------------------------------------------  IN: 840 mL / OUT: 0 mL / NET: 840 mL          EKG/Telemetry Events:    MEDICATIONS  (STANDING):  atorvastatin 40 milliGRAM(s) Oral at bedtime  clopidogrel Tablet 75 milliGRAM(s) Oral daily  escitalopram 5 milliGRAM(s) Oral daily  isosorbide   mononitrate ER Tablet (IMDUR) 30 milliGRAM(s) Oral daily  pantoprazole    Tablet 40 milliGRAM(s) Oral before breakfast  sodium chloride 0.9%. 500 milliLiter(s) (75 mL/Hr) IV Continuous <Continuous>    MEDICATIONS  (PRN):  acetaminophen     Tablet .. 1000 milliGRAM(s) Oral every 6 hours PRN Moderate Pain (4 - 6), Severe Pain (7 - 10)      PHYSICAL EXAM:  GENERAL: NAD, alert and interactive  HEAD: Atraumatic, normocephalic  EYES: EOMI, conjunctiva and sclera clear  NECK: Supple, no JVD  CHEST/LUNG: Clear to auscultation bilaterally; no rales, rhonchi, or wheezing; normal WOB on RA  HEART: Regular rate and rhythm; S1 S2 normal, no S3; no murmurs, rubs, or gallops  ABDOMEN: Soft, nontender, nondistended; bowel sounds present  EXTREMITIES: No clubbing, cyanosis, or edema  NEURO: Grossly nonfocal  SKIN: No rashes or lesions    LABS:                        13.3   6.29  )-----------( 288      ( 09 Nov 2023 05:26 )             39.2     11-09    140  |  104  |  12  ----------------------------<  118<H>  4.1   |  25  |  0.90    Ca    9.7      09 Nov 2023 05:26        PT/INR - ( 10 Nov 2023 07:48 )   PT: 11.3 sec;   INR: 0.99          PTT - ( 10 Nov 2023 07:48 )  PTT:32.2 sec  CAPILLARY BLOOD GLUCOSE          Creatine Kinase, Serum: 34 U/L (11-09 @ 09:00)  CKMB Units: 1.1 ng/mL (11-09 @ 09:00)    CARDIAC MARKERS ( 09 Nov 2023 09:00 )  x     / x     / 34 U/L / x     / 1.1 ng/mL  CARDIAC MARKERS ( 09 Nov 2023 05:26 )  x     / x     / 41 U/L / x     / 1.1 ng/mL      Urinalysis Basic - ( 09 Nov 2023 05:26 )    Color: x / Appearance: x / SG: x / pH: x  Gluc: 118 mg/dL / Ketone: x  / Bili: x / Urobili: x   Blood: x / Protein: x / Nitrite: x   Leuk Esterase: x / RBC: x / WBC x   Sq Epi: x / Non Sq Epi: x / Bacteria: x        RADIOLOGY & ADDITIONAL TESTS:  CXR:    Care Discussed with Consultants/Other Providers [ x] YES  [ ] NO

## 2023-11-10 NOTE — PROGRESS NOTE ADULT - ASSESSMENT
83F, Nigerian speaking, w/ PMHx of HTN, HLD, Gastritis (s/p EGD 8/2023), h/o chronic lacunar infarcts, chronic HAs and Anxiety, presents to [facility] w/ sharp LSCP radiating to L arm/jaw admitted to CCU for aspirin desensitization with pending L heart cath w/ possible PCI.    NEURO  # Multiple lacunar infarcts   Pt has hx of external capsule, basal ganglia and left thalamic chronic lacunar infarcts on MRI 7/2023. On Plavix 75mg QD and Lipitor 40mg HS.  - C/w plavix and lipitor     PSYCH  # Anxiety  Hx of anxiety on home lexapro 5mg QD  - C/w lexapro    PULM  YANDEL    CARDIO  # Unstable angina   Pt presented w/ ongoing sharp LSCP radiating to L arm/jaw and improved with SL nitro. mod-severe stenosis of mid LAD and mod stenosis of pLAD. Pt has hx of aspirin allergy, admitted to CCU for aspirin desensitization.  - Aspirin desensitization with 325mg ASA QD, starting at 1100h, premedicated with loratidine 10mg, montelukast 10mg, famotidine 20mg   - Cath scheduled for 3-4pm   - c/w Plavix 75mg    # HLD  Patient taking Lipitor 40mg QHS  - C/w lipitor     GI  YANDEL    RENAL  #HTN  Takes home Losartan 100mg   - Holding losartan    ENDO  YANDEL    ID  YANDEL    PROPHYLAXIS  F: none  E: K>4, mg>2  N: NPO  DVT: None  GI ppx: Protonix   Dispo: TBD 83F, Bahraini speaking, w/ PMHx of HTN, HLD, Gastritis (s/p EGD 8/2023), h/o chronic lacunar infarcts, chronic HAs and Anxiety, presents to [facility] w/ sharp LSCP radiating to L arm/jaw admitted to CCU for aspirin desensitization with pending L heart cath w/ possible PCI.    NEURO  # Multiple lacunar infarcts   Pt has hx of external capsule, basal ganglia and left thalamic chronic lacunar infarcts on MRI 7/2023. On Plavix 75mg QD and Lipitor 40mg HS.  - C/w plavix and lipitor     PSYCH  # Anxiety  Hx of anxiety on home lexapro 5mg QD  - C/w lexapro    PULM  YANDEL    CARDIO  # Unstable angina   Pt presented w/ ongoing sharp LSCP radiating to L arm/jaw and improved with SL nitro. mod-severe stenosis of mid LAD and mod stenosis of pLAD. Pt has hx of aspirin allergy, admitted to CCU for aspirin desensitization.  - S/p aspirin desensitization  - S/p plavix 300mg load  - Pending [facility] with possible PCI 11/11    # HLD  Patient taking Lipitor 40mg QHS  - C/w lipitor     GI  YANDEL    RENAL  #HTN  Takes home Losartan 100mg   - Holding losartan    ENDO  YANDEL    ID  YANDEL    PROPHYLAXIS  F: none  E: K>4, mg>2  N: NPO  DVT: None  GI ppx: Protonix   Dispo: TBD

## 2023-11-11 LAB
ALBUMIN SERPL ELPH-MCNC: 3.8 G/DL — SIGNIFICANT CHANGE UP (ref 3.3–5)
ALBUMIN SERPL ELPH-MCNC: 3.8 G/DL — SIGNIFICANT CHANGE UP (ref 3.3–5)
ALP SERPL-CCNC: 110 U/L — SIGNIFICANT CHANGE UP (ref 40–120)
ALP SERPL-CCNC: 110 U/L — SIGNIFICANT CHANGE UP (ref 40–120)
ALT FLD-CCNC: 10 U/L — SIGNIFICANT CHANGE UP (ref 10–45)
ALT FLD-CCNC: 10 U/L — SIGNIFICANT CHANGE UP (ref 10–45)
ANION GAP SERPL CALC-SCNC: 11 MMOL/L — SIGNIFICANT CHANGE UP (ref 5–17)
ANION GAP SERPL CALC-SCNC: 11 MMOL/L — SIGNIFICANT CHANGE UP (ref 5–17)
AST SERPL-CCNC: 17 U/L — SIGNIFICANT CHANGE UP (ref 10–40)
AST SERPL-CCNC: 17 U/L — SIGNIFICANT CHANGE UP (ref 10–40)
BASOPHILS # BLD AUTO: 0.03 K/UL — SIGNIFICANT CHANGE UP (ref 0–0.2)
BASOPHILS # BLD AUTO: 0.03 K/UL — SIGNIFICANT CHANGE UP (ref 0–0.2)
BASOPHILS NFR BLD AUTO: 0.4 % — SIGNIFICANT CHANGE UP (ref 0–2)
BASOPHILS NFR BLD AUTO: 0.4 % — SIGNIFICANT CHANGE UP (ref 0–2)
BILIRUB SERPL-MCNC: 0.5 MG/DL — SIGNIFICANT CHANGE UP (ref 0.2–1.2)
BILIRUB SERPL-MCNC: 0.5 MG/DL — SIGNIFICANT CHANGE UP (ref 0.2–1.2)
BUN SERPL-MCNC: 16 MG/DL — SIGNIFICANT CHANGE UP (ref 7–23)
BUN SERPL-MCNC: 16 MG/DL — SIGNIFICANT CHANGE UP (ref 7–23)
CALCIUM SERPL-MCNC: 9.3 MG/DL — SIGNIFICANT CHANGE UP (ref 8.4–10.5)
CALCIUM SERPL-MCNC: 9.3 MG/DL — SIGNIFICANT CHANGE UP (ref 8.4–10.5)
CHLORIDE SERPL-SCNC: 105 MMOL/L — SIGNIFICANT CHANGE UP (ref 96–108)
CHLORIDE SERPL-SCNC: 105 MMOL/L — SIGNIFICANT CHANGE UP (ref 96–108)
CO2 SERPL-SCNC: 21 MMOL/L — LOW (ref 22–31)
CO2 SERPL-SCNC: 21 MMOL/L — LOW (ref 22–31)
CREAT SERPL-MCNC: 0.96 MG/DL — SIGNIFICANT CHANGE UP (ref 0.5–1.3)
CREAT SERPL-MCNC: 0.96 MG/DL — SIGNIFICANT CHANGE UP (ref 0.5–1.3)
EGFR: 58 ML/MIN/1.73M2 — LOW
EGFR: 58 ML/MIN/1.73M2 — LOW
EOSINOPHIL # BLD AUTO: 0.42 K/UL — SIGNIFICANT CHANGE UP (ref 0–0.5)
EOSINOPHIL # BLD AUTO: 0.42 K/UL — SIGNIFICANT CHANGE UP (ref 0–0.5)
EOSINOPHIL NFR BLD AUTO: 5.7 % — SIGNIFICANT CHANGE UP (ref 0–6)
EOSINOPHIL NFR BLD AUTO: 5.7 % — SIGNIFICANT CHANGE UP (ref 0–6)
GLUCOSE SERPL-MCNC: 93 MG/DL — SIGNIFICANT CHANGE UP (ref 70–99)
GLUCOSE SERPL-MCNC: 93 MG/DL — SIGNIFICANT CHANGE UP (ref 70–99)
HCT VFR BLD CALC: 38.6 % — SIGNIFICANT CHANGE UP (ref 34.5–45)
HCT VFR BLD CALC: 38.6 % — SIGNIFICANT CHANGE UP (ref 34.5–45)
HGB BLD-MCNC: 12.9 G/DL — SIGNIFICANT CHANGE UP (ref 11.5–15.5)
HGB BLD-MCNC: 12.9 G/DL — SIGNIFICANT CHANGE UP (ref 11.5–15.5)
IMM GRANULOCYTES NFR BLD AUTO: 0.3 % — SIGNIFICANT CHANGE UP (ref 0–0.9)
IMM GRANULOCYTES NFR BLD AUTO: 0.3 % — SIGNIFICANT CHANGE UP (ref 0–0.9)
ISTAT ACTK (ACTIVATED CLOTTING TIME KAOLIN): 320 SEC — HIGH (ref 74–137)
ISTAT ACTK (ACTIVATED CLOTTING TIME KAOLIN): 320 SEC — HIGH (ref 74–137)
LYMPHOCYTES # BLD AUTO: 1.69 K/UL — SIGNIFICANT CHANGE UP (ref 1–3.3)
LYMPHOCYTES # BLD AUTO: 1.69 K/UL — SIGNIFICANT CHANGE UP (ref 1–3.3)
LYMPHOCYTES # BLD AUTO: 22.9 % — SIGNIFICANT CHANGE UP (ref 13–44)
LYMPHOCYTES # BLD AUTO: 22.9 % — SIGNIFICANT CHANGE UP (ref 13–44)
MAGNESIUM SERPL-MCNC: 2.1 MG/DL — SIGNIFICANT CHANGE UP (ref 1.6–2.6)
MAGNESIUM SERPL-MCNC: 2.1 MG/DL — SIGNIFICANT CHANGE UP (ref 1.6–2.6)
MCHC RBC-ENTMCNC: 29.5 PG — SIGNIFICANT CHANGE UP (ref 27–34)
MCHC RBC-ENTMCNC: 29.5 PG — SIGNIFICANT CHANGE UP (ref 27–34)
MCHC RBC-ENTMCNC: 33.4 GM/DL — SIGNIFICANT CHANGE UP (ref 32–36)
MCHC RBC-ENTMCNC: 33.4 GM/DL — SIGNIFICANT CHANGE UP (ref 32–36)
MCV RBC AUTO: 88.1 FL — SIGNIFICANT CHANGE UP (ref 80–100)
MCV RBC AUTO: 88.1 FL — SIGNIFICANT CHANGE UP (ref 80–100)
MONOCYTES # BLD AUTO: 0.54 K/UL — SIGNIFICANT CHANGE UP (ref 0–0.9)
MONOCYTES # BLD AUTO: 0.54 K/UL — SIGNIFICANT CHANGE UP (ref 0–0.9)
MONOCYTES NFR BLD AUTO: 7.3 % — SIGNIFICANT CHANGE UP (ref 2–14)
MONOCYTES NFR BLD AUTO: 7.3 % — SIGNIFICANT CHANGE UP (ref 2–14)
NEUTROPHILS # BLD AUTO: 4.68 K/UL — SIGNIFICANT CHANGE UP (ref 1.8–7.4)
NEUTROPHILS # BLD AUTO: 4.68 K/UL — SIGNIFICANT CHANGE UP (ref 1.8–7.4)
NEUTROPHILS NFR BLD AUTO: 63.4 % — SIGNIFICANT CHANGE UP (ref 43–77)
NEUTROPHILS NFR BLD AUTO: 63.4 % — SIGNIFICANT CHANGE UP (ref 43–77)
NRBC # BLD: 0 /100 WBCS — SIGNIFICANT CHANGE UP (ref 0–0)
NRBC # BLD: 0 /100 WBCS — SIGNIFICANT CHANGE UP (ref 0–0)
PHOSPHATE SERPL-MCNC: 3.2 MG/DL — SIGNIFICANT CHANGE UP (ref 2.5–4.5)
PHOSPHATE SERPL-MCNC: 3.2 MG/DL — SIGNIFICANT CHANGE UP (ref 2.5–4.5)
PLATELET # BLD AUTO: 258 K/UL — SIGNIFICANT CHANGE UP (ref 150–400)
PLATELET # BLD AUTO: 258 K/UL — SIGNIFICANT CHANGE UP (ref 150–400)
POTASSIUM SERPL-MCNC: 4.3 MMOL/L — SIGNIFICANT CHANGE UP (ref 3.5–5.3)
POTASSIUM SERPL-MCNC: 4.3 MMOL/L — SIGNIFICANT CHANGE UP (ref 3.5–5.3)
POTASSIUM SERPL-SCNC: 4.3 MMOL/L — SIGNIFICANT CHANGE UP (ref 3.5–5.3)
POTASSIUM SERPL-SCNC: 4.3 MMOL/L — SIGNIFICANT CHANGE UP (ref 3.5–5.3)
PROT SERPL-MCNC: 7.6 G/DL — SIGNIFICANT CHANGE UP (ref 6–8.3)
PROT SERPL-MCNC: 7.6 G/DL — SIGNIFICANT CHANGE UP (ref 6–8.3)
RBC # BLD: 4.38 M/UL — SIGNIFICANT CHANGE UP (ref 3.8–5.2)
RBC # BLD: 4.38 M/UL — SIGNIFICANT CHANGE UP (ref 3.8–5.2)
RBC # FLD: 12 % — SIGNIFICANT CHANGE UP (ref 10.3–14.5)
RBC # FLD: 12 % — SIGNIFICANT CHANGE UP (ref 10.3–14.5)
SODIUM SERPL-SCNC: 137 MMOL/L — SIGNIFICANT CHANGE UP (ref 135–145)
SODIUM SERPL-SCNC: 137 MMOL/L — SIGNIFICANT CHANGE UP (ref 135–145)
WBC # BLD: 7.38 K/UL — SIGNIFICANT CHANGE UP (ref 3.8–10.5)
WBC # BLD: 7.38 K/UL — SIGNIFICANT CHANGE UP (ref 3.8–10.5)
WBC # FLD AUTO: 7.38 K/UL — SIGNIFICANT CHANGE UP (ref 3.8–10.5)
WBC # FLD AUTO: 7.38 K/UL — SIGNIFICANT CHANGE UP (ref 3.8–10.5)

## 2023-11-11 PROCEDURE — 93010 ELECTROCARDIOGRAM REPORT: CPT

## 2023-11-11 PROCEDURE — 99152 MOD SED SAME PHYS/QHP 5/>YRS: CPT

## 2023-11-11 PROCEDURE — 92978 ENDOLUMINL IVUS OCT C 1ST: CPT | Mod: 26,LD

## 2023-11-11 PROCEDURE — 93458 L HRT ARTERY/VENTRICLE ANGIO: CPT | Mod: 26,59

## 2023-11-11 PROCEDURE — 92928 PRQ TCAT PLMT NTRAC ST 1 LES: CPT | Mod: LD

## 2023-11-11 PROCEDURE — 99233 SBSQ HOSP IP/OBS HIGH 50: CPT

## 2023-11-11 RX ORDER — SODIUM CHLORIDE 9 MG/ML
1000 INJECTION INTRAMUSCULAR; INTRAVENOUS; SUBCUTANEOUS
Refills: 0 | Status: DISCONTINUED | OUTPATIENT
Start: 2023-11-11 | End: 2023-11-12

## 2023-11-11 RX ORDER — LOSARTAN POTASSIUM 100 MG/1
100 TABLET, FILM COATED ORAL DAILY
Refills: 0 | Status: DISCONTINUED | OUTPATIENT
Start: 2023-11-11 | End: 2023-11-12

## 2023-11-11 RX ADMIN — CLOPIDOGREL BISULFATE 75 MILLIGRAM(S): 75 TABLET, FILM COATED ORAL at 09:50

## 2023-11-11 RX ADMIN — ATORVASTATIN CALCIUM 40 MILLIGRAM(S): 80 TABLET, FILM COATED ORAL at 21:53

## 2023-11-11 RX ADMIN — ESCITALOPRAM OXALATE 5 MILLIGRAM(S): 10 TABLET, FILM COATED ORAL at 12:50

## 2023-11-11 RX ADMIN — LOSARTAN POTASSIUM 100 MILLIGRAM(S): 100 TABLET, FILM COATED ORAL at 17:12

## 2023-11-11 RX ADMIN — Medication 1000 MILLIGRAM(S): at 05:49

## 2023-11-11 RX ADMIN — ISOSORBIDE MONONITRATE 30 MILLIGRAM(S): 60 TABLET, EXTENDED RELEASE ORAL at 12:50

## 2023-11-11 RX ADMIN — SODIUM CHLORIDE 75 MILLILITER(S): 9 INJECTION INTRAMUSCULAR; INTRAVENOUS; SUBCUTANEOUS at 00:19

## 2023-11-11 RX ADMIN — Medication 81 MILLIGRAM(S): at 09:50

## 2023-11-11 RX ADMIN — Medication 1000 MILLIGRAM(S): at 00:00

## 2023-11-11 RX ADMIN — PANTOPRAZOLE SODIUM 40 MILLIGRAM(S): 20 TABLET, DELAYED RELEASE ORAL at 06:59

## 2023-11-11 RX ADMIN — Medication 1000 MILLIGRAM(S): at 18:18

## 2023-11-11 RX ADMIN — Medication 1000 MILLIGRAM(S): at 07:00

## 2023-11-11 RX ADMIN — Medication 1000 MILLIGRAM(S): at 17:11

## 2023-11-11 RX ADMIN — SODIUM CHLORIDE 177 MILLILITER(S): 9 INJECTION INTRAMUSCULAR; INTRAVENOUS; SUBCUTANEOUS at 13:35

## 2023-11-11 NOTE — PROGRESS NOTE ADULT - ASSESSMENT
83F, Tongan speaking, w/ PMHx of HTN, HLD, Gastritis (s/p EGD 8/2023), h/o chronic lacunar infarcts, chronic HAs and Anxiety, presents to [facility] w/ sharp LSCP radiating to L arm/jaw. Admitted to cardiac tele for further management of unstable angina. Found to have abnormal CCTA w/ positive FFR p/mLAD. Will transfer to CCU for ASA desensitization prior to cardiac cath 11/10/23.  83F, [place] citizen speaking, w/ PMHx of HTN, HLD, Gastritis (s/p EGD 8/2023), h/o chronic lacunar infarcts, chronic HAs and Anxiety, presents to [facility] w/ sharp L substernal chest pain radiating to L arm/jaw. Admitted to cardiac tele for unstable angina. Found to have abnormal CCTA w/ positive FFR p/mLAD. S/p ASA desensitization in CCU 11/10/23, now s/p cardiac cath 11/10/23 w/ GULSHAN x 2 o/m LAD.  83F, Icelandic speaking, w/ PMHx of HTN, HLD, Gastritis (s/p EGD 8/2023), h/o chronic lacunar infarcts, chronic HAs and Anxiety, presents to [facility] w/ sharp L substernal chest pain radiating to L arm/jaw. Admitted to cardiac tele for unstable angina. Found to have abnormal CCTA w/ positive FFR p/mLAD. S/p ASA desensitization in CCU 11/10/23, now s/p cardiac cath 11/10/23 w/ GULSHAN x 2 pLAD.

## 2023-11-11 NOTE — PROGRESS NOTE ADULT - SUBJECTIVE AND OBJECTIVE BOX
CARDIOLOGY NP PROGRESS NOTE    Subjective: Pt seen and examined at bedside in presence of  and Ugandan  Audi ID # [id number]. Reports had L sided chest pain and HA last night, self resolved. Denies chest pain, sob, lightheadedness, dizziness, palpitations, fever, chills.  Remainder ROS otherwise negative.    Overnight Events: NPO s/p MN for cath    TELEMETRY: SR 60-70s        VITAL SIGNS:  T(C): 36.7 (11-11-23 @ 08:46), Max: 36.7 (11-11-23 @ 08:46)  HR: 68 (11-11-23 @ 08:46) (58 - 75)  BP: 168/78 (11-11-23 @ 08:46) (107/62 - 168/78)  RR: 20 (11-11-23 @ 08:46) (18 - 20)  SpO2: 100% (11-11-23 @ 08:46) (98% - 100%)  Wt(kg): --    I&O's Summary    10 Nov 2023 07:01  -  11 Nov 2023 07:00  --------------------------------------------------------  IN: 960 mL / OUT: 500 mL / NET: 460 mL    11 Nov 2023 07:01  -  11 Nov 2023 12:03  --------------------------------------------------------  IN: 0 mL / OUT: 0 mL / NET: 0 mL          PHYSICAL EXAM:    General: A/ox 3, No acute Distress  Neck: Supple, NO JVD  Cardiac: S1 S2, No M/R/G  Pulmonary: CTAB, Breathing unlabored, No Rhonchi/Rales/Wheezing  Abdomen: Soft, Non -tender, +BS x 4 quads  Extremities: No Rashes, No edema  Neuro: A/o x 3, No focal deficits          LABS:                          12.9   7.38  )-----------( 258      ( 11 Nov 2023 05:30 )             38.6                              11-11    137  |  105  |  16  ----------------------------<  93  4.3   |  21<L>  |  0.96    Ca    9.3      11 Nov 2023 05:30  Phos  3.2     11-11  Mg     2.1     11-11    TPro  7.6  /  Alb  3.8  /  TBili  0.5  /  DBili  x   /  AST  17  /  ALT  10  /  AlkPhos  110  11-11    LIVER FUNCTIONS - ( 11 Nov 2023 05:30 )  Alb: 3.8 g/dL / Pro: 7.6 g/dL / ALK PHOS: 110 U/L / ALT: 10 U/L / AST: 17 U/L / GGT: x                                 PT/INR - ( 10 Nov 2023 07:48 )   PT: 11.3 sec;   INR: 0.99          PTT - ( 10 Nov 2023 07:48 )  PTT:32.2 sec  CAPILLARY BLOOD GLUCOSE                Allergies:  aspirin (Angioedema)  tetracycline (Unknown)  sulfa drugs (Anaphylaxis)  passed 325mg aspirin desensitization procedure 11/10/2023 (Unknown)    MEDICATIONS  (STANDING):  aspirin enteric coated 81 milliGRAM(s) Oral daily  atorvastatin 40 milliGRAM(s) Oral at bedtime  clopidogrel Tablet 75 milliGRAM(s) Oral daily  escitalopram 5 milliGRAM(s) Oral daily  isosorbide   mononitrate ER Tablet (IMDUR) 30 milliGRAM(s) Oral daily  losartan 100 milliGRAM(s) Oral daily  pantoprazole    Tablet 40 milliGRAM(s) Oral before breakfast  sodium chloride 0.9%. 1000 milliLiter(s) (177 mL/Hr) IV Continuous <Continuous>    MEDICATIONS  (PRN):  acetaminophen     Tablet .. 1000 milliGRAM(s) Oral every 6 hours PRN Moderate Pain (4 - 6), Severe Pain (7 - 10)  diphenhydrAMINE Injectable 50 milliGRAM(s) IV Push once PRN reaction due to Aspirin desensitization  EPINEPHrine     1 mG/mL Injectable 0.3 milliGRAM(s) IntraMuscular once PRN Anaphylaxis due to Aspirin desensitization  methylPREDNISolone sodium succinate Injectable 125 milliGRAM(s) IV Push once PRN reaction due to Aspirin desensitization        DIAGNOSTIC TESTS:        CARDIOLOGY NP PROGRESS NOTE    Subjective: Pt seen and examined at bedside in presence of  and Serbian  Audi ID # [id number]. Reports had L sided chest pain and HA last night, self resolved. Denies chest pain, sob, lightheadedness, dizziness, palpitations, fever, chills.  Remainder ROS otherwise negative.    Overnight Events: NPO s/p MN for cath    TELEMETRY: SR 60-70s        VITAL SIGNS:  T(C): 36.7 (11-11-23 @ 08:46), Max: 36.7 (11-11-23 @ 08:46)  HR: 68 (11-11-23 @ 08:46) (58 - 75)  BP: 168/78 (11-11-23 @ 08:46) (107/62 - 168/78)  RR: 20 (11-11-23 @ 08:46) (18 - 20)  SpO2: 100% (11-11-23 @ 08:46) (98% - 100%)  Wt(kg): --    I&O's Summary    10 Nov 2023 07:01  -  11 Nov 2023 07:00  --------------------------------------------------------  IN: 960 mL / OUT: 500 mL / NET: 460 mL    11 Nov 2023 07:01  -  11 Nov 2023 12:03  --------------------------------------------------------  IN: 0 mL / OUT: 0 mL / NET: 0 mL          PHYSICAL EXAM:    General: A/ox 3, No acute Distress  Neck: Supple, NO JVD  Cardiac: S1 S2, No M/R/G  Pulmonary: CTAB, Breathing unlabored on RA, No Rhonchi/Rales/Wheezing  Abdomen: Soft, Non -tender, +BS x 4 quads  Extremities: No Rashes, No edema  Vascular: 2+ severiano radial, no femoral bruits severiano, 2+ DP/PT pulses severiano  Neuro: A/o x 3, No focal deficits          LABS:                          12.9   7.38  )-----------( 258      ( 11 Nov 2023 05:30 )             38.6                              11-11    137  |  105  |  16  ----------------------------<  93  4.3   |  21<L>  |  0.96    Ca    9.3      11 Nov 2023 05:30  Phos  3.2     11-11  Mg     2.1     11-11    TPro  7.6  /  Alb  3.8  /  TBili  0.5  /  DBili  x   /  AST  17  /  ALT  10  /  AlkPhos  110  11-11    LIVER FUNCTIONS - ( 11 Nov 2023 05:30 )  Alb: 3.8 g/dL / Pro: 7.6 g/dL / ALK PHOS: 110 U/L / ALT: 10 U/L / AST: 17 U/L / GGT: x         PT/INR - ( 10 Nov 2023 07:48 )   PT: 11.3 sec;   INR: 0.99          PTT - ( 10 Nov 2023 07:48 )  PTT:32.2 sec  CAPILLARY BLOOD GLUCOSE             Allergies:  aspirin (Angioedema)  tetracycline (Unknown)  sulfa drugs (Anaphylaxis)  passed 325mg aspirin desensitization procedure 11/10/2023 (Unknown)    MEDICATIONS  (STANDING):  aspirin enteric coated 81 milliGRAM(s) Oral daily  atorvastatin 40 milliGRAM(s) Oral at bedtime  clopidogrel Tablet 75 milliGRAM(s) Oral daily  escitalopram 5 milliGRAM(s) Oral daily  isosorbide   mononitrate ER Tablet (IMDUR) 30 milliGRAM(s) Oral daily  losartan 100 milliGRAM(s) Oral daily  pantoprazole    Tablet 40 milliGRAM(s) Oral before breakfast  sodium chloride 0.9%. 1000 milliLiter(s) (177 mL/Hr) IV Continuous <Continuous>    MEDICATIONS  (PRN):  acetaminophen     Tablet .. 1000 milliGRAM(s) Oral every 6 hours PRN Moderate Pain (4 - 6), Severe Pain (7 - 10)  diphenhydrAMINE Injectable 50 milliGRAM(s) IV Push once PRN reaction due to Aspirin desensitization  EPINEPHrine     1 mG/mL Injectable 0.3 milliGRAM(s) IntraMuscular once PRN Anaphylaxis due to Aspirin desensitization  methylPREDNISolone sodium succinate Injectable 125 milliGRAM(s) IV Push once PRN reaction due to Aspirin desensitization        DIAGNOSTIC TESTS:        CARDIOLOGY NP PROGRESS NOTE    Subjective: Pt seen and examined at bedside in presence of  and Bolivian  Audi ID # [id number]. Reports had L sided chest pain and HA last night, self resolved. Denies chest pain, sob, lightheadedness, dizziness, palpitations, fever, chills.  Remainder ROS otherwise negative.    Overnight Events: NPO s/p MN for cath    TELEMETRY: SR 60-70s        VITAL SIGNS:  T(C): 36.7 (11-11-23 @ 08:46), Max: 36.7 (11-11-23 @ 08:46)  HR: 68 (11-11-23 @ 08:46) (58 - 75)  BP: 168/78 (11-11-23 @ 08:46) (107/62 - 168/78)  RR: 20 (11-11-23 @ 08:46) (18 - 20)  SpO2: 100% (11-11-23 @ 08:46) (98% - 100%)  Wt(kg): --    I&O's Summary    10 Nov 2023 07:01  -  11 Nov 2023 07:00  --------------------------------------------------------  IN: 960 mL / OUT: 500 mL / NET: 460 mL    11 Nov 2023 07:01  -  11 Nov 2023 12:03  --------------------------------------------------------  IN: 0 mL / OUT: 0 mL / NET: 0 mL          PHYSICAL EXAM:    General: A/ox 3, No acute Distress  Neck: Supple, NO JVD  Cardiac: S1 S2, No M/R/G  Pulmonary: CTAB, Breathing unlabored on RA, No Rhonchi/Rales/Wheezing  Abdomen: Soft, Non -tender, +BS x 4 quads  Extremities: No Rashes, No edema  Vascular: 2+ severiano radial, no femoral bruits severiano, 2+ DP/PT pulses severiano. R radial access site   Neuro: A/o x 3, No focal deficits          LABS:                          12.9   7.38  )-----------( 258      ( 11 Nov 2023 05:30 )             38.6                              11-11    137  |  105  |  16  ----------------------------<  93  4.3   |  21<L>  |  0.96    Ca    9.3      11 Nov 2023 05:30  Phos  3.2     11-11  Mg     2.1     11-11    TPro  7.6  /  Alb  3.8  /  TBili  0.5  /  DBili  x   /  AST  17  /  ALT  10  /  AlkPhos  110  11-11    LIVER FUNCTIONS - ( 11 Nov 2023 05:30 )  Alb: 3.8 g/dL / Pro: 7.6 g/dL / ALK PHOS: 110 U/L / ALT: 10 U/L / AST: 17 U/L / GGT: x         PT/INR - ( 10 Nov 2023 07:48 )   PT: 11.3 sec;   INR: 0.99          PTT - ( 10 Nov 2023 07:48 )  PTT:32.2 sec  CAPILLARY BLOOD GLUCOSE             Allergies:  aspirin (Angioedema)  tetracycline (Unknown)  sulfa drugs (Anaphylaxis)  passed 325mg aspirin desensitization procedure 11/10/2023 (Unknown)    MEDICATIONS  (STANDING):  aspirin enteric coated 81 milliGRAM(s) Oral daily  atorvastatin 40 milliGRAM(s) Oral at bedtime  clopidogrel Tablet 75 milliGRAM(s) Oral daily  escitalopram 5 milliGRAM(s) Oral daily  isosorbide   mononitrate ER Tablet (IMDUR) 30 milliGRAM(s) Oral daily  losartan 100 milliGRAM(s) Oral daily  pantoprazole    Tablet 40 milliGRAM(s) Oral before breakfast  sodium chloride 0.9%. 1000 milliLiter(s) (177 mL/Hr) IV Continuous <Continuous>    MEDICATIONS  (PRN):  acetaminophen     Tablet .. 1000 milliGRAM(s) Oral every 6 hours PRN Moderate Pain (4 - 6), Severe Pain (7 - 10)  diphenhydrAMINE Injectable 50 milliGRAM(s) IV Push once PRN reaction due to Aspirin desensitization  EPINEPHrine     1 mG/mL Injectable 0.3 milliGRAM(s) IntraMuscular once PRN Anaphylaxis due to Aspirin desensitization  methylPREDNISolone sodium succinate Injectable 125 milliGRAM(s) IV Push once PRN reaction due to Aspirin desensitization        DIAGNOSTIC TESTS:

## 2023-11-11 NOTE — PROGRESS NOTE ADULT - PROBLEM SELECTOR PLAN 2
SBP stable  - Home med: Losartan 100mg QD, Imdur 30mg qd, Clonidine 0.1mg PRN if SBP >160  - HOLD ACE/ARB, BB pending ASA desensitization. Last dose ARB 11/9 PM  -c/w Imdur SBP stable  - Home med: Olmesartan 40mg QD, Imdur 30mg qd, Clonidine 0.1mg PRN if SBP >160  - Held ACE/ARB, BB prior to ASA desensitization.  - Resumed Losartan 100mg QD (TIC ARB), Imdur 30mg qd

## 2023-11-11 NOTE — PROGRESS NOTE ADULT - PROBLEM SELECTOR PLAN 5
- Continue Lexapro 5mg QD    F: pre-cath IVF NS 75cc/hr x 12hrs  E: Replete if K<4 or Mag<2  N: NPO  GIppx: Pantoprazole  VTEppx: Lovenox  Dispo: transfer cardiac tele to CCU - Continue Lexapro 5mg QD    F: post-cath IVF NS 177cc/hr x 6hrs per protocol  E: Replete if K<4 or Mag<2  N: DASH/TLC  GIppx: Pantoprazole  VTEppx: Lovenox  Dispo: cardiac tele

## 2023-11-11 NOTE — PROGRESS NOTE ADULT - PROBLEM SELECTOR PLAN 4
August 16, 2017        Florentin Corona  [address]  [address]  [place] [address]  Phone: [phone]  Fax: [phone]             [facility]  [address]  [facility] [address]  Phone: [phone]   Patient: Martin Hendrix Jr.   MR Number: [id number]   YOB: 1963   Date of Visit: 8/16/2017       Dear Dr. Florentin Corona    Thank you for referring Martin Hendrix to me for evaluation. Attached you will find relevant portions of my assessment and plan of care.    If you have questions, please do not hesitate to call me. I look forward to following Martin Hendrix along with you.    Sincerely,    Darrick Wolfe MD    Enclosure    If you would like to receive this communication electronically, please contact [email or web address] or [phone] to request [employer] access.    [employer] is a tool which provides read-only access to select patient information with whom you have a relationship. Its easy to use and provides real time access to review your patients record including encounter summaries, notes, results, and demographic information.    If you feel you have received this communication in error or would no longer like to receive these types of communications, please e-mail [email or web address]       
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

## 2023-11-11 NOTE — PROGRESS NOTE ADULT - PROBLEM SELECTOR PLAN 3
- Continue Lipitor 40mg HS T Chol 260, . Takes Pitavastatin 4mg qd at home  - Started Lipitor 40mg HS

## 2023-11-11 NOTE — PROGRESS NOTE ADULT - PROBLEM SELECTOR PLAN 1
presents w/ ongoing sharp LSCP, radiating to L arm/jaw, improved w/ SL Nitro, pt currently w/ mild 2/10 CP and HD stable  - hsTrop T neg x2, CK/CKMB neg   - EKG: NSR w/ TWi lead III  - TTE 3/2023: LVEF normal, mild LVH, G1DD, no significant valvular dz   - Repeat TTE 11/9/23: EF 60-65%, normal LV/RF systolic function, no significant valvular disease  - CCTA 11/9/23: calcium score is moderate at 384 Agatston units, Severe stenosis in mid LAD, LAD FFR: 0.79 for proximal LAD and  0.69 for mid LAD. Moderate stenosis in mid RCA and proximal LAD.  - Continue Plavix 75mg QD, Lipitor 40mg HS and Imdur 30mg QD  - Will initiate ASA desensitization in CCU this AM given allergy of angioedema, Dr Doan following  - Plan for cardiac cath w/ Dr Gregory 11/10 PM after ASA desensitization. Consent in cath lab office.    Mallampati Class IV.   ASA Class II  Pt is a suitable candidate for moderate sedation.   Risks & benefits of procedure and alternative therapy have been explained to the patient including but not limited to: allergic reaction, bleeding w/possible need for blood transfusion, infection, renal and vascular compromise, limb damage, arrhythmia, stroke, vessel dissection/perforation, Myocardial infarction, emergent CABG. Informed consent obtained and in chart. presents w/ ongoing sharp LSCP, radiating to L arm/jaw, improved w/ SL Nitro, pt currently w/ mild 2/10 CP and HD stable  - hsTrop T neg x2, CK/CKMB neg   - EKG: NSR w/ TWi lead III  - TTE 3/2023: LVEF normal, mild LVH, G1DD, no significant valvular dz   - Repeat TTE 11/9/23: EF 60-65%, normal LV/RF systolic function, no significant valvular disease  - CCTA 11/9/23: calcium score moderate at 384 Agatston units. Severe stenosis in mid LAD, LAD FFR: 0.79 for proximal LAD and  0.69 for mid LAD. Moderate stenosis in mid RCA and proximal LAD.  - Continue Plavix 75mg QD, Lipitor 40mg HS and Imdur 30mg QD  - S/p ASA desensitization in CCU 11/10/23 given allergy of angioedema, tolerated well  - S/p Cardiac cath 11/11/23 w/ Dr Gregory: GULSHAN x 2 o/m LAD, RCA mild dz, EDP 5. R radial access presents w/ ongoing sharp LSCP, radiating to L arm/jaw, improved w/ SL Nitro, pt currently w/ mild 2/10 CP and HD stable  - hsTrop T neg x2, CK/CKMB neg   - EKG: NSR w/ TWi lead III  - TTE 3/2023: LVEF normal, mild LVH, G1DD, no significant valvular dz   - Repeat TTE 11/9/23: EF 60-65%, normal LV/RF systolic function, no significant valvular disease  - CCTA 11/9/23: calcium score moderate at 384 Agatston units. Severe stenosis in mid LAD, LAD FFR: 0.79 for proximal LAD and  0.69 for mid LAD. Moderate stenosis in mid RCA and proximal LAD.  - Continue Plavix 75mg QD, Lipitor 40mg HS and Imdur 30mg QD  - S/p ASA desensitization in CCU 11/10/23 given allergy of angioedema, tolerated well  - S/p Cardiac cath 11/11/23 w/ Dr Gregory: GULSHAN x 2 pLAD, RCA mild dz, EDP 5. R radial access

## 2023-11-12 ENCOUNTER — TRANSCRIPTION ENCOUNTER (OUTPATIENT)
Age: 83
End: 2023-11-12

## 2023-11-12 VITALS
HEART RATE: 72 BPM | OXYGEN SATURATION: 100 % | SYSTOLIC BLOOD PRESSURE: 146 MMHG | RESPIRATION RATE: 18 BRPM | DIASTOLIC BLOOD PRESSURE: 67 MMHG

## 2023-11-12 LAB
ANION GAP SERPL CALC-SCNC: 9 MMOL/L — SIGNIFICANT CHANGE UP (ref 5–17)
ANION GAP SERPL CALC-SCNC: 9 MMOL/L — SIGNIFICANT CHANGE UP (ref 5–17)
BUN SERPL-MCNC: 13 MG/DL — SIGNIFICANT CHANGE UP (ref 7–23)
BUN SERPL-MCNC: 13 MG/DL — SIGNIFICANT CHANGE UP (ref 7–23)
CALCIUM SERPL-MCNC: 9.2 MG/DL — SIGNIFICANT CHANGE UP (ref 8.4–10.5)
CALCIUM SERPL-MCNC: 9.2 MG/DL — SIGNIFICANT CHANGE UP (ref 8.4–10.5)
CHLORIDE SERPL-SCNC: 107 MMOL/L — SIGNIFICANT CHANGE UP (ref 96–108)
CHLORIDE SERPL-SCNC: 107 MMOL/L — SIGNIFICANT CHANGE UP (ref 96–108)
CO2 SERPL-SCNC: 21 MMOL/L — LOW (ref 22–31)
CO2 SERPL-SCNC: 21 MMOL/L — LOW (ref 22–31)
CREAT SERPL-MCNC: 0.83 MG/DL — SIGNIFICANT CHANGE UP (ref 0.5–1.3)
CREAT SERPL-MCNC: 0.83 MG/DL — SIGNIFICANT CHANGE UP (ref 0.5–1.3)
EGFR: 69 ML/MIN/1.73M2 — SIGNIFICANT CHANGE UP
EGFR: 69 ML/MIN/1.73M2 — SIGNIFICANT CHANGE UP
GLUCOSE SERPL-MCNC: 108 MG/DL — HIGH (ref 70–99)
GLUCOSE SERPL-MCNC: 108 MG/DL — HIGH (ref 70–99)
HCT VFR BLD CALC: 38.2 % — SIGNIFICANT CHANGE UP (ref 34.5–45)
HCT VFR BLD CALC: 38.2 % — SIGNIFICANT CHANGE UP (ref 34.5–45)
HGB BLD-MCNC: 12.4 G/DL — SIGNIFICANT CHANGE UP (ref 11.5–15.5)
HGB BLD-MCNC: 12.4 G/DL — SIGNIFICANT CHANGE UP (ref 11.5–15.5)
MAGNESIUM SERPL-MCNC: 1.9 MG/DL — SIGNIFICANT CHANGE UP (ref 1.6–2.6)
MAGNESIUM SERPL-MCNC: 1.9 MG/DL — SIGNIFICANT CHANGE UP (ref 1.6–2.6)
MCHC RBC-ENTMCNC: 29.3 PG — SIGNIFICANT CHANGE UP (ref 27–34)
MCHC RBC-ENTMCNC: 29.3 PG — SIGNIFICANT CHANGE UP (ref 27–34)
MCHC RBC-ENTMCNC: 32.5 GM/DL — SIGNIFICANT CHANGE UP (ref 32–36)
MCHC RBC-ENTMCNC: 32.5 GM/DL — SIGNIFICANT CHANGE UP (ref 32–36)
MCV RBC AUTO: 90.3 FL — SIGNIFICANT CHANGE UP (ref 80–100)
MCV RBC AUTO: 90.3 FL — SIGNIFICANT CHANGE UP (ref 80–100)
NRBC # BLD: 0 /100 WBCS — SIGNIFICANT CHANGE UP (ref 0–0)
NRBC # BLD: 0 /100 WBCS — SIGNIFICANT CHANGE UP (ref 0–0)
PLATELET # BLD AUTO: 255 K/UL — SIGNIFICANT CHANGE UP (ref 150–400)
PLATELET # BLD AUTO: 255 K/UL — SIGNIFICANT CHANGE UP (ref 150–400)
POTASSIUM SERPL-MCNC: 4.3 MMOL/L — SIGNIFICANT CHANGE UP (ref 3.5–5.3)
POTASSIUM SERPL-MCNC: 4.3 MMOL/L — SIGNIFICANT CHANGE UP (ref 3.5–5.3)
POTASSIUM SERPL-SCNC: 4.3 MMOL/L — SIGNIFICANT CHANGE UP (ref 3.5–5.3)
POTASSIUM SERPL-SCNC: 4.3 MMOL/L — SIGNIFICANT CHANGE UP (ref 3.5–5.3)
RBC # BLD: 4.23 M/UL — SIGNIFICANT CHANGE UP (ref 3.8–5.2)
RBC # BLD: 4.23 M/UL — SIGNIFICANT CHANGE UP (ref 3.8–5.2)
RBC # FLD: 12.1 % — SIGNIFICANT CHANGE UP (ref 10.3–14.5)
RBC # FLD: 12.1 % — SIGNIFICANT CHANGE UP (ref 10.3–14.5)
SODIUM SERPL-SCNC: 137 MMOL/L — SIGNIFICANT CHANGE UP (ref 135–145)
SODIUM SERPL-SCNC: 137 MMOL/L — SIGNIFICANT CHANGE UP (ref 135–145)
WBC # BLD: 6.84 K/UL — SIGNIFICANT CHANGE UP (ref 3.8–10.5)
WBC # BLD: 6.84 K/UL — SIGNIFICANT CHANGE UP (ref 3.8–10.5)
WBC # FLD AUTO: 6.84 K/UL — SIGNIFICANT CHANGE UP (ref 3.8–10.5)
WBC # FLD AUTO: 6.84 K/UL — SIGNIFICANT CHANGE UP (ref 3.8–10.5)

## 2023-11-12 PROCEDURE — 83880 ASSAY OF NATRIURETIC PEPTIDE: CPT

## 2023-11-12 PROCEDURE — 85025 COMPLETE CBC W/AUTO DIFF WBC: CPT

## 2023-11-12 PROCEDURE — 85027 COMPLETE CBC AUTOMATED: CPT

## 2023-11-12 PROCEDURE — 83735 ASSAY OF MAGNESIUM: CPT

## 2023-11-12 PROCEDURE — 83036 HEMOGLOBIN GLYCOSYLATED A1C: CPT

## 2023-11-12 PROCEDURE — 80053 COMPREHEN METABOLIC PANEL: CPT

## 2023-11-12 PROCEDURE — 80061 LIPID PANEL: CPT

## 2023-11-12 PROCEDURE — 82553 CREATINE MB FRACTION: CPT

## 2023-11-12 PROCEDURE — 84484 ASSAY OF TROPONIN QUANT: CPT

## 2023-11-12 PROCEDURE — C1887: CPT

## 2023-11-12 PROCEDURE — C1894: CPT

## 2023-11-12 PROCEDURE — 99285 EMERGENCY DEPT VISIT HI MDM: CPT

## 2023-11-12 PROCEDURE — 93005 ELECTROCARDIOGRAM TRACING: CPT

## 2023-11-12 PROCEDURE — 75574 CT ANGIO HRT W/3D IMAGE: CPT

## 2023-11-12 PROCEDURE — C1874: CPT

## 2023-11-12 PROCEDURE — 36415 COLL VENOUS BLD VENIPUNCTURE: CPT

## 2023-11-12 PROCEDURE — C1725: CPT

## 2023-11-12 PROCEDURE — 93306 TTE W/DOPPLER COMPLETE: CPT

## 2023-11-12 PROCEDURE — 99239 HOSP IP/OBS DSCHRG MGMT >30: CPT

## 2023-11-12 PROCEDURE — 84100 ASSAY OF PHOSPHORUS: CPT

## 2023-11-12 PROCEDURE — 82550 ASSAY OF CK (CPK): CPT

## 2023-11-12 PROCEDURE — C1769: CPT

## 2023-11-12 PROCEDURE — C1753: CPT

## 2023-11-12 PROCEDURE — 71045 X-RAY EXAM CHEST 1 VIEW: CPT

## 2023-11-12 PROCEDURE — 85730 THROMBOPLASTIN TIME PARTIAL: CPT

## 2023-11-12 PROCEDURE — 85347 COAGULATION TIME ACTIVATED: CPT

## 2023-11-12 PROCEDURE — 83690 ASSAY OF LIPASE: CPT

## 2023-11-12 PROCEDURE — 96365 THER/PROPH/DIAG IV INF INIT: CPT

## 2023-11-12 PROCEDURE — 85610 PROTHROMBIN TIME: CPT

## 2023-11-12 PROCEDURE — 80048 BASIC METABOLIC PNL TOTAL CA: CPT

## 2023-11-12 RX ORDER — ASPIRIN/CALCIUM CARB/MAGNESIUM 324 MG
1 TABLET ORAL
Qty: 30 | Refills: 10
Start: 2023-11-12 | End: 2024-10-06

## 2023-11-12 RX ORDER — CLOPIDOGREL BISULFATE 75 MG/1
1 TABLET, FILM COATED ORAL
Qty: 0 | Refills: 0 | DISCHARGE
Start: 2023-11-12

## 2023-11-12 RX ORDER — ASPIRIN/CALCIUM CARB/MAGNESIUM 324 MG
1 TABLET ORAL
Qty: 0 | Refills: 0 | DISCHARGE
Start: 2023-11-12

## 2023-11-12 RX ADMIN — Medication 1000 MILLIGRAM(S): at 12:56

## 2023-11-12 RX ADMIN — LOSARTAN POTASSIUM 100 MILLIGRAM(S): 100 TABLET, FILM COATED ORAL at 06:19

## 2023-11-12 RX ADMIN — CLOPIDOGREL BISULFATE 75 MILLIGRAM(S): 75 TABLET, FILM COATED ORAL at 11:37

## 2023-11-12 RX ADMIN — ISOSORBIDE MONONITRATE 30 MILLIGRAM(S): 60 TABLET, EXTENDED RELEASE ORAL at 09:55

## 2023-11-12 RX ADMIN — PANTOPRAZOLE SODIUM 40 MILLIGRAM(S): 20 TABLET, DELAYED RELEASE ORAL at 06:19

## 2023-11-12 RX ADMIN — Medication 1000 MILLIGRAM(S): at 02:00

## 2023-11-12 RX ADMIN — Medication 81 MILLIGRAM(S): at 11:43

## 2023-11-12 RX ADMIN — Medication 1000 MILLIGRAM(S): at 03:00

## 2023-11-12 RX ADMIN — ESCITALOPRAM OXALATE 5 MILLIGRAM(S): 10 TABLET, FILM COATED ORAL at 11:37

## 2023-11-12 RX ADMIN — Medication 1000 MILLIGRAM(S): at 11:52

## 2023-11-12 NOTE — DISCHARGE NOTE NURSING/CASE MANAGEMENT/SOCIAL WORK - PATIENT PORTAL LINK FT
You can access the FollowMyHealth Patient Portal offered by [facility] by registering at the following website: [email or web address]. By joining [facility]’s FollowMyHealth portal, you will also be able to view your health information using other applications (apps) compatible with our system.

## 2023-11-12 NOTE — DISCHARGE NOTE NURSING/CASE MANAGEMENT/SOCIAL WORK - NSDCPEFALRISK_GEN_ALL_CORE
For information on Fall & Injury Prevention, visit: [email or web address] OR  [email or web address] OR  https://www.cdc.gov/steadi/patient.html

## 2023-11-15 DIAGNOSIS — Z86.73 PERSONAL HISTORY OF TRANSIENT ISCHEMIC ATTACK (TIA), AND CEREBRAL INFARCTION WITHOUT RESIDUAL DEFICITS: ICD-10-CM

## 2023-11-15 DIAGNOSIS — I10 ESSENTIAL (PRIMARY) HYPERTENSION: ICD-10-CM

## 2023-11-15 DIAGNOSIS — Z88.1 ALLERGY STATUS TO OTHER ANTIBIOTIC AGENTS STATUS: ICD-10-CM

## 2023-11-15 DIAGNOSIS — F41.9 ANXIETY DISORDER, UNSPECIFIED: ICD-10-CM

## 2023-11-15 DIAGNOSIS — I25.110 ATHEROSCLEROTIC HEART DISEASE OF NATIVE CORONARY ARTERY WITH UNSTABLE ANGINA PECTORIS: ICD-10-CM

## 2023-11-15 DIAGNOSIS — K29.70 GASTRITIS, UNSPECIFIED, WITHOUT BLEEDING: ICD-10-CM

## 2023-11-15 DIAGNOSIS — K21.9 GASTRO-ESOPHAGEAL REFLUX DISEASE WITHOUT ESOPHAGITIS: ICD-10-CM

## 2023-11-15 DIAGNOSIS — Z79.02 LONG TERM (CURRENT) USE OF ANTITHROMBOTICS/ANTIPLATELETS: ICD-10-CM

## 2023-11-15 DIAGNOSIS — F32.A DEPRESSION, UNSPECIFIED: ICD-10-CM

## 2023-11-15 DIAGNOSIS — Z88.2 ALLERGY STATUS TO SULFONAMIDES: ICD-10-CM

## 2023-11-15 DIAGNOSIS — R51.9 HEADACHE, UNSPECIFIED: ICD-10-CM

## 2023-11-15 DIAGNOSIS — I25.84 CORONARY ATHEROSCLEROSIS DUE TO CALCIFIED CORONARY LESION: ICD-10-CM

## 2023-11-15 DIAGNOSIS — E78.00 PURE HYPERCHOLESTEROLEMIA, UNSPECIFIED: ICD-10-CM

## 2023-11-15 DIAGNOSIS — Z88.0 ALLERGY STATUS TO PENICILLIN: ICD-10-CM

## 2023-11-20 ENCOUNTER — INPATIENT (INPATIENT)
Facility: HOSPITAL | Age: 83
LOS: 1 days | Discharge: HOME | DRG: 313 | End: 2023-11-22
Attending: INTERNAL MEDICINE | Admitting: INTERNAL MEDICINE
Payer: MEDICARE

## 2023-11-20 VITALS
TEMPERATURE: 98 F | SYSTOLIC BLOOD PRESSURE: 170 MMHG | HEIGHT: 57 IN | HEART RATE: 73 BPM | RESPIRATION RATE: 18 BRPM | DIASTOLIC BLOOD PRESSURE: 84 MMHG | OXYGEN SATURATION: 96 %

## 2023-11-20 DIAGNOSIS — Z29.9 ENCOUNTER FOR PROPHYLACTIC MEASURES, UNSPECIFIED: ICD-10-CM

## 2023-11-20 DIAGNOSIS — F41.9 ANXIETY DISORDER, UNSPECIFIED: ICD-10-CM

## 2023-11-20 DIAGNOSIS — Z98.890 OTHER SPECIFIED POSTPROCEDURAL STATES: Chronic | ICD-10-CM

## 2023-11-20 DIAGNOSIS — I10 ESSENTIAL (PRIMARY) HYPERTENSION: ICD-10-CM

## 2023-11-20 DIAGNOSIS — E78.5 HYPERLIPIDEMIA, UNSPECIFIED: ICD-10-CM

## 2023-11-20 DIAGNOSIS — Z90.49 ACQUIRED ABSENCE OF OTHER SPECIFIED PARTS OF DIGESTIVE TRACT: Chronic | ICD-10-CM

## 2023-11-20 DIAGNOSIS — Z86.79 PERSONAL HISTORY OF OTHER DISEASES OF THE CIRCULATORY SYSTEM: ICD-10-CM

## 2023-11-20 DIAGNOSIS — I63.81 OTHER CEREBRAL INFARCTION DUE TO OCCLUSION OR STENOSIS OF SMALL ARTERY: ICD-10-CM

## 2023-11-20 PROBLEM — E78.00 PURE HYPERCHOLESTEROLEMIA, UNSPECIFIED: Chronic | Status: ACTIVE | Noted: 2023-11-09

## 2023-11-20 PROBLEM — R51.9 HEADACHE, UNSPECIFIED: Chronic | Status: ACTIVE | Noted: 2023-11-09

## 2023-11-20 LAB
A1C WITH ESTIMATED AVERAGE GLUCOSE RESULT: 5.5 % — SIGNIFICANT CHANGE UP (ref 4–5.6)
A1C WITH ESTIMATED AVERAGE GLUCOSE RESULT: 5.5 % — SIGNIFICANT CHANGE UP (ref 4–5.6)
ALBUMIN SERPL ELPH-MCNC: 4.2 G/DL — SIGNIFICANT CHANGE UP (ref 3.3–5)
ALBUMIN SERPL ELPH-MCNC: 4.2 G/DL — SIGNIFICANT CHANGE UP (ref 3.3–5)
ALP SERPL-CCNC: 103 U/L — SIGNIFICANT CHANGE UP (ref 40–120)
ALP SERPL-CCNC: 103 U/L — SIGNIFICANT CHANGE UP (ref 40–120)
ALT FLD-CCNC: 13 U/L — SIGNIFICANT CHANGE UP (ref 10–45)
ALT FLD-CCNC: 13 U/L — SIGNIFICANT CHANGE UP (ref 10–45)
ANION GAP SERPL CALC-SCNC: 11 MMOL/L — SIGNIFICANT CHANGE UP (ref 5–17)
ANION GAP SERPL CALC-SCNC: 11 MMOL/L — SIGNIFICANT CHANGE UP (ref 5–17)
AST SERPL-CCNC: 17 U/L — SIGNIFICANT CHANGE UP (ref 10–40)
AST SERPL-CCNC: 17 U/L — SIGNIFICANT CHANGE UP (ref 10–40)
BASOPHILS # BLD AUTO: 0.03 K/UL — SIGNIFICANT CHANGE UP (ref 0–0.2)
BASOPHILS # BLD AUTO: 0.03 K/UL — SIGNIFICANT CHANGE UP (ref 0–0.2)
BASOPHILS NFR BLD AUTO: 0.5 % — SIGNIFICANT CHANGE UP (ref 0–2)
BASOPHILS NFR BLD AUTO: 0.5 % — SIGNIFICANT CHANGE UP (ref 0–2)
BILIRUB SERPL-MCNC: 0.5 MG/DL — SIGNIFICANT CHANGE UP (ref 0.2–1.2)
BILIRUB SERPL-MCNC: 0.5 MG/DL — SIGNIFICANT CHANGE UP (ref 0.2–1.2)
BUN SERPL-MCNC: 13 MG/DL — SIGNIFICANT CHANGE UP (ref 7–23)
BUN SERPL-MCNC: 13 MG/DL — SIGNIFICANT CHANGE UP (ref 7–23)
CALCIUM SERPL-MCNC: 9.8 MG/DL — SIGNIFICANT CHANGE UP (ref 8.4–10.5)
CALCIUM SERPL-MCNC: 9.8 MG/DL — SIGNIFICANT CHANGE UP (ref 8.4–10.5)
CHLORIDE SERPL-SCNC: 100 MMOL/L — SIGNIFICANT CHANGE UP (ref 96–108)
CHLORIDE SERPL-SCNC: 100 MMOL/L — SIGNIFICANT CHANGE UP (ref 96–108)
CHOLEST SERPL-MCNC: 179 MG/DL — SIGNIFICANT CHANGE UP
CHOLEST SERPL-MCNC: 179 MG/DL — SIGNIFICANT CHANGE UP
CK MB CFR SERPL CALC: 1.3 NG/ML — SIGNIFICANT CHANGE UP (ref 0–6.7)
CK MB CFR SERPL CALC: 1.3 NG/ML — SIGNIFICANT CHANGE UP (ref 0–6.7)
CK SERPL-CCNC: 36 U/L — SIGNIFICANT CHANGE UP (ref 25–170)
CK SERPL-CCNC: 36 U/L — SIGNIFICANT CHANGE UP (ref 25–170)
CO2 SERPL-SCNC: 25 MMOL/L — SIGNIFICANT CHANGE UP (ref 22–31)
CO2 SERPL-SCNC: 25 MMOL/L — SIGNIFICANT CHANGE UP (ref 22–31)
CREAT SERPL-MCNC: 0.99 MG/DL — SIGNIFICANT CHANGE UP (ref 0.5–1.3)
CREAT SERPL-MCNC: 0.99 MG/DL — SIGNIFICANT CHANGE UP (ref 0.5–1.3)
CRP SERPL-MCNC: <3 MG/L — SIGNIFICANT CHANGE UP (ref 0–4)
CRP SERPL-MCNC: <3 MG/L — SIGNIFICANT CHANGE UP (ref 0–4)
EGFR: 56 ML/MIN/1.73M2 — LOW
EGFR: 56 ML/MIN/1.73M2 — LOW
EOSINOPHIL # BLD AUTO: 0.2 K/UL — SIGNIFICANT CHANGE UP (ref 0–0.5)
EOSINOPHIL # BLD AUTO: 0.2 K/UL — SIGNIFICANT CHANGE UP (ref 0–0.5)
EOSINOPHIL NFR BLD AUTO: 3.1 % — SIGNIFICANT CHANGE UP (ref 0–6)
EOSINOPHIL NFR BLD AUTO: 3.1 % — SIGNIFICANT CHANGE UP (ref 0–6)
ERYTHROCYTE [SEDIMENTATION RATE] IN BLOOD: 26 MM/HR — HIGH
ERYTHROCYTE [SEDIMENTATION RATE] IN BLOOD: 26 MM/HR — HIGH
ESTIMATED AVERAGE GLUCOSE: 111 MG/DL — SIGNIFICANT CHANGE UP (ref 68–114)
ESTIMATED AVERAGE GLUCOSE: 111 MG/DL — SIGNIFICANT CHANGE UP (ref 68–114)
GLUCOSE SERPL-MCNC: 103 MG/DL — HIGH (ref 70–99)
GLUCOSE SERPL-MCNC: 103 MG/DL — HIGH (ref 70–99)
HCT VFR BLD CALC: 38.1 % — SIGNIFICANT CHANGE UP (ref 34.5–45)
HCT VFR BLD CALC: 38.1 % — SIGNIFICANT CHANGE UP (ref 34.5–45)
HDLC SERPL-MCNC: 59 MG/DL — SIGNIFICANT CHANGE UP
HDLC SERPL-MCNC: 59 MG/DL — SIGNIFICANT CHANGE UP
HGB BLD-MCNC: 13.1 G/DL — SIGNIFICANT CHANGE UP (ref 11.5–15.5)
HGB BLD-MCNC: 13.1 G/DL — SIGNIFICANT CHANGE UP (ref 11.5–15.5)
IMM GRANULOCYTES NFR BLD AUTO: 0.3 % — SIGNIFICANT CHANGE UP (ref 0–0.9)
IMM GRANULOCYTES NFR BLD AUTO: 0.3 % — SIGNIFICANT CHANGE UP (ref 0–0.9)
LIPID PNL WITH DIRECT LDL SERPL: 107 MG/DL — HIGH
LIPID PNL WITH DIRECT LDL SERPL: 107 MG/DL — HIGH
LYMPHOCYTES # BLD AUTO: 2.46 K/UL — SIGNIFICANT CHANGE UP (ref 1–3.3)
LYMPHOCYTES # BLD AUTO: 2.46 K/UL — SIGNIFICANT CHANGE UP (ref 1–3.3)
LYMPHOCYTES # BLD AUTO: 37.8 % — SIGNIFICANT CHANGE UP (ref 13–44)
LYMPHOCYTES # BLD AUTO: 37.8 % — SIGNIFICANT CHANGE UP (ref 13–44)
MAGNESIUM SERPL-MCNC: 2.2 MG/DL — SIGNIFICANT CHANGE UP (ref 1.6–2.6)
MAGNESIUM SERPL-MCNC: 2.2 MG/DL — SIGNIFICANT CHANGE UP (ref 1.6–2.6)
MCHC RBC-ENTMCNC: 29.4 PG — SIGNIFICANT CHANGE UP (ref 27–34)
MCHC RBC-ENTMCNC: 29.4 PG — SIGNIFICANT CHANGE UP (ref 27–34)
MCHC RBC-ENTMCNC: 34.4 GM/DL — SIGNIFICANT CHANGE UP (ref 32–36)
MCHC RBC-ENTMCNC: 34.4 GM/DL — SIGNIFICANT CHANGE UP (ref 32–36)
MCV RBC AUTO: 85.4 FL — SIGNIFICANT CHANGE UP (ref 80–100)
MCV RBC AUTO: 85.4 FL — SIGNIFICANT CHANGE UP (ref 80–100)
MONOCYTES # BLD AUTO: 0.4 K/UL — SIGNIFICANT CHANGE UP (ref 0–0.9)
MONOCYTES # BLD AUTO: 0.4 K/UL — SIGNIFICANT CHANGE UP (ref 0–0.9)
MONOCYTES NFR BLD AUTO: 6.1 % — SIGNIFICANT CHANGE UP (ref 2–14)
MONOCYTES NFR BLD AUTO: 6.1 % — SIGNIFICANT CHANGE UP (ref 2–14)
NEUTROPHILS # BLD AUTO: 3.4 K/UL — SIGNIFICANT CHANGE UP (ref 1.8–7.4)
NEUTROPHILS # BLD AUTO: 3.4 K/UL — SIGNIFICANT CHANGE UP (ref 1.8–7.4)
NEUTROPHILS NFR BLD AUTO: 52.2 % — SIGNIFICANT CHANGE UP (ref 43–77)
NEUTROPHILS NFR BLD AUTO: 52.2 % — SIGNIFICANT CHANGE UP (ref 43–77)
NON HDL CHOLESTEROL: 120 MG/DL — SIGNIFICANT CHANGE UP
NON HDL CHOLESTEROL: 120 MG/DL — SIGNIFICANT CHANGE UP
NRBC # BLD: 0 /100 WBCS — SIGNIFICANT CHANGE UP (ref 0–0)
NRBC # BLD: 0 /100 WBCS — SIGNIFICANT CHANGE UP (ref 0–0)
NT-PROBNP SERPL-SCNC: 310 PG/ML — HIGH (ref 0–300)
NT-PROBNP SERPL-SCNC: 310 PG/ML — HIGH (ref 0–300)
PLATELET # BLD AUTO: 309 K/UL — SIGNIFICANT CHANGE UP (ref 150–400)
PLATELET # BLD AUTO: 309 K/UL — SIGNIFICANT CHANGE UP (ref 150–400)
POTASSIUM SERPL-MCNC: 4.1 MMOL/L — SIGNIFICANT CHANGE UP (ref 3.5–5.3)
POTASSIUM SERPL-MCNC: 4.1 MMOL/L — SIGNIFICANT CHANGE UP (ref 3.5–5.3)
POTASSIUM SERPL-SCNC: 4.1 MMOL/L — SIGNIFICANT CHANGE UP (ref 3.5–5.3)
POTASSIUM SERPL-SCNC: 4.1 MMOL/L — SIGNIFICANT CHANGE UP (ref 3.5–5.3)
PROT SERPL-MCNC: 7.8 G/DL — SIGNIFICANT CHANGE UP (ref 6–8.3)
PROT SERPL-MCNC: 7.8 G/DL — SIGNIFICANT CHANGE UP (ref 6–8.3)
RBC # BLD: 4.46 M/UL — SIGNIFICANT CHANGE UP (ref 3.8–5.2)
RBC # BLD: 4.46 M/UL — SIGNIFICANT CHANGE UP (ref 3.8–5.2)
RBC # FLD: 11.9 % — SIGNIFICANT CHANGE UP (ref 10.3–14.5)
RBC # FLD: 11.9 % — SIGNIFICANT CHANGE UP (ref 10.3–14.5)
SODIUM SERPL-SCNC: 136 MMOL/L — SIGNIFICANT CHANGE UP (ref 135–145)
SODIUM SERPL-SCNC: 136 MMOL/L — SIGNIFICANT CHANGE UP (ref 135–145)
TRIGL SERPL-MCNC: 67 MG/DL — SIGNIFICANT CHANGE UP
TRIGL SERPL-MCNC: 67 MG/DL — SIGNIFICANT CHANGE UP
TROPONIN T, HIGH SENSITIVITY RESULT: 18 NG/L — SIGNIFICANT CHANGE UP (ref 0–51)
TROPONIN T, HIGH SENSITIVITY RESULT: 18 NG/L — SIGNIFICANT CHANGE UP (ref 0–51)
TROPONIN T, HIGH SENSITIVITY RESULT: 19 NG/L — SIGNIFICANT CHANGE UP (ref 0–51)
TROPONIN T, HIGH SENSITIVITY RESULT: 19 NG/L — SIGNIFICANT CHANGE UP (ref 0–51)
TSH SERPL-MCNC: 2.84 UIU/ML — SIGNIFICANT CHANGE UP (ref 0.27–4.2)
TSH SERPL-MCNC: 2.84 UIU/ML — SIGNIFICANT CHANGE UP (ref 0.27–4.2)
WBC # BLD: 6.51 K/UL — SIGNIFICANT CHANGE UP (ref 3.8–10.5)
WBC # BLD: 6.51 K/UL — SIGNIFICANT CHANGE UP (ref 3.8–10.5)
WBC # FLD AUTO: 6.51 K/UL — SIGNIFICANT CHANGE UP (ref 3.8–10.5)
WBC # FLD AUTO: 6.51 K/UL — SIGNIFICANT CHANGE UP (ref 3.8–10.5)

## 2023-11-20 PROCEDURE — 99222 1ST HOSP IP/OBS MODERATE 55: CPT

## 2023-11-20 PROCEDURE — 99285 EMERGENCY DEPT VISIT HI MDM: CPT

## 2023-11-20 PROCEDURE — 71045 X-RAY EXAM CHEST 1 VIEW: CPT | Mod: 26

## 2023-11-20 PROCEDURE — 93010 ELECTROCARDIOGRAM REPORT: CPT

## 2023-11-20 PROCEDURE — 93306 TTE W/DOPPLER COMPLETE: CPT | Mod: 26

## 2023-11-20 RX ORDER — ENOXAPARIN SODIUM 100 MG/ML
30 INJECTION SUBCUTANEOUS EVERY 24 HOURS
Refills: 0 | Status: DISCONTINUED | OUTPATIENT
Start: 2023-11-20 | End: 2023-11-22

## 2023-11-20 RX ORDER — ACETAMINOPHEN 500 MG
1000 TABLET ORAL ONCE
Refills: 0 | Status: COMPLETED | OUTPATIENT
Start: 2023-11-20 | End: 2023-11-20

## 2023-11-20 RX ORDER — ACETAMINOPHEN 500 MG
650 TABLET ORAL EVERY 6 HOURS
Refills: 0 | Status: DISCONTINUED | OUTPATIENT
Start: 2023-11-20 | End: 2023-11-22

## 2023-11-20 RX ORDER — LORATADINE 10 MG/1
10 TABLET ORAL DAILY
Refills: 0 | Status: DISCONTINUED | OUTPATIENT
Start: 2023-11-20 | End: 2023-11-22

## 2023-11-20 RX ORDER — CLOPIDOGREL BISULFATE 75 MG/1
75 TABLET, FILM COATED ORAL DAILY
Refills: 0 | Status: DISCONTINUED | OUTPATIENT
Start: 2023-11-20 | End: 2023-11-22

## 2023-11-20 RX ORDER — LOSARTAN POTASSIUM 100 MG/1
100 TABLET, FILM COATED ORAL DAILY
Refills: 0 | Status: DISCONTINUED | OUTPATIENT
Start: 2023-11-20 | End: 2023-11-20

## 2023-11-20 RX ORDER — ESCITALOPRAM OXALATE 10 MG/1
5 TABLET, FILM COATED ORAL DAILY
Refills: 0 | Status: DISCONTINUED | OUTPATIENT
Start: 2023-11-20 | End: 2023-11-22

## 2023-11-20 RX ORDER — LOSARTAN POTASSIUM 100 MG/1
25 TABLET, FILM COATED ORAL DAILY
Refills: 0 | Status: DISCONTINUED | OUTPATIENT
Start: 2023-11-20 | End: 2023-11-20

## 2023-11-20 RX ORDER — SODIUM CHLORIDE 9 MG/ML
500 INJECTION INTRAMUSCULAR; INTRAVENOUS; SUBCUTANEOUS ONCE
Refills: 0 | Status: COMPLETED | OUTPATIENT
Start: 2023-11-20 | End: 2023-11-20

## 2023-11-20 RX ORDER — ISOSORBIDE MONONITRATE 60 MG/1
30 TABLET, EXTENDED RELEASE ORAL DAILY
Refills: 0 | Status: DISCONTINUED | OUTPATIENT
Start: 2023-11-20 | End: 2023-11-22

## 2023-11-20 RX ORDER — ASPIRIN/CALCIUM CARB/MAGNESIUM 324 MG
81 TABLET ORAL DAILY
Refills: 0 | Status: DISCONTINUED | OUTPATIENT
Start: 2023-11-20 | End: 2023-11-20

## 2023-11-20 RX ORDER — PANTOPRAZOLE SODIUM 20 MG/1
40 TABLET, DELAYED RELEASE ORAL
Refills: 0 | Status: DISCONTINUED | OUTPATIENT
Start: 2023-11-20 | End: 2023-11-22

## 2023-11-20 RX ORDER — ATORVASTATIN CALCIUM 80 MG/1
20 TABLET, FILM COATED ORAL AT BEDTIME
Refills: 0 | Status: DISCONTINUED | OUTPATIENT
Start: 2023-11-20 | End: 2023-11-22

## 2023-11-20 RX ORDER — PANTOPRAZOLE SODIUM 20 MG/1
40 TABLET, DELAYED RELEASE ORAL ONCE
Refills: 0 | Status: COMPLETED | OUTPATIENT
Start: 2023-11-20 | End: 2023-11-20

## 2023-11-20 RX ORDER — NITROGLYCERIN 6.5 MG
1 CAPSULE, EXTENDED RELEASE ORAL
Refills: 0 | DISCHARGE

## 2023-11-20 RX ORDER — AMLODIPINE BESYLATE 2.5 MG/1
5 TABLET ORAL AT BEDTIME
Refills: 0 | Status: DISCONTINUED | OUTPATIENT
Start: 2023-11-20 | End: 2023-11-22

## 2023-11-20 RX ORDER — AMLODIPINE BESYLATE 2.5 MG/1
5 TABLET ORAL AT BEDTIME
Refills: 0 | Status: DISCONTINUED | OUTPATIENT
Start: 2023-11-20 | End: 2023-11-20

## 2023-11-20 RX ORDER — ASPIRIN/CALCIUM CARB/MAGNESIUM 324 MG
325 TABLET ORAL EVERY 8 HOURS
Refills: 0 | Status: DISCONTINUED | OUTPATIENT
Start: 2023-11-20 | End: 2023-11-21

## 2023-11-20 RX ORDER — ISOSORBIDE MONONITRATE 60 MG/1
30 TABLET, EXTENDED RELEASE ORAL DAILY
Refills: 0 | Status: DISCONTINUED | OUTPATIENT
Start: 2023-11-20 | End: 2023-11-20

## 2023-11-20 RX ORDER — COLCHICINE 0.6 MG
0.6 TABLET ORAL
Refills: 0 | Status: DISCONTINUED | OUTPATIENT
Start: 2023-11-20 | End: 2023-11-22

## 2023-11-20 RX ORDER — SUCRALFATE 1 G
1 TABLET ORAL ONCE
Refills: 0 | Status: COMPLETED | OUTPATIENT
Start: 2023-11-20 | End: 2023-11-20

## 2023-11-20 RX ORDER — RANOLAZINE 500 MG/1
1000 TABLET, FILM COATED, EXTENDED RELEASE ORAL
Refills: 0 | Status: DISCONTINUED | OUTPATIENT
Start: 2023-11-20 | End: 2023-11-22

## 2023-11-20 RX ADMIN — ISOSORBIDE MONONITRATE 30 MILLIGRAM(S): 60 TABLET, EXTENDED RELEASE ORAL at 12:56

## 2023-11-20 RX ADMIN — SODIUM CHLORIDE 500 MILLILITER(S): 9 INJECTION INTRAMUSCULAR; INTRAVENOUS; SUBCUTANEOUS at 21:42

## 2023-11-20 RX ADMIN — Medication 325 MILLIGRAM(S): at 21:00

## 2023-11-20 RX ADMIN — Medication 650 MILLIGRAM(S): at 09:39

## 2023-11-20 RX ADMIN — PANTOPRAZOLE SODIUM 40 MILLIGRAM(S): 20 TABLET, DELAYED RELEASE ORAL at 04:29

## 2023-11-20 RX ADMIN — LOSARTAN POTASSIUM 100 MILLIGRAM(S): 100 TABLET, FILM COATED ORAL at 12:55

## 2023-11-20 RX ADMIN — Medication 0.6 MILLIGRAM(S): at 12:58

## 2023-11-20 RX ADMIN — ATORVASTATIN CALCIUM 20 MILLIGRAM(S): 80 TABLET, FILM COATED ORAL at 21:00

## 2023-11-20 RX ADMIN — LOSARTAN POTASSIUM 25 MILLIGRAM(S): 100 TABLET, FILM COATED ORAL at 06:20

## 2023-11-20 RX ADMIN — LORATADINE 10 MILLIGRAM(S): 10 TABLET ORAL at 12:58

## 2023-11-20 RX ADMIN — Medication 1000 MILLIGRAM(S): at 08:26

## 2023-11-20 RX ADMIN — Medication 1 GRAM(S): at 04:29

## 2023-11-20 RX ADMIN — Medication 400 MILLIGRAM(S): at 06:20

## 2023-11-20 RX ADMIN — Medication 0.6 MILLIGRAM(S): at 17:16

## 2023-11-20 RX ADMIN — RANOLAZINE 1000 MILLIGRAM(S): 500 TABLET, FILM COATED, EXTENDED RELEASE ORAL at 17:16

## 2023-11-20 RX ADMIN — LOSARTAN POTASSIUM 100 MILLIGRAM(S): 100 TABLET, FILM COATED ORAL at 10:16

## 2023-11-20 RX ADMIN — Medication 650 MILLIGRAM(S): at 10:06

## 2023-11-20 RX ADMIN — CLOPIDOGREL BISULFATE 75 MILLIGRAM(S): 75 TABLET, FILM COATED ORAL at 07:26

## 2023-11-20 RX ADMIN — Medication 325 MILLIGRAM(S): at 14:10

## 2023-11-20 RX ADMIN — ESCITALOPRAM OXALATE 5 MILLIGRAM(S): 10 TABLET, FILM COATED ORAL at 12:55

## 2023-11-20 RX ADMIN — ENOXAPARIN SODIUM 30 MILLIGRAM(S): 100 INJECTION SUBCUTANEOUS at 10:32

## 2023-11-20 NOTE — H&P ADULT - PROBLEM SELECTOR PLAN 1
Presents w/ ongoing idull, LSCP, radiating to L arm/jaw, improved w/tylenol  - Currently w/ 8/10 CP, reproducible,   - hsTrop T neg x 1,  F/U Patel @ 10AM  - EKG: NSR, isolated TWI V1, otherwise non-ischemic   - CXR with clear lungs  - TTE 11/9/23:  LVEF 60-65%, normal LV/RF systolic function, no significant valvular disease  - CCTA 11/9/23: calcium score moderate at 384 Agatston units. Severe stenosis in mid LAD, LAD FFR: 0.79 for proximal LAD and  0.69 for mid LAD. Moderate stenosis in mid RCA and proximal LAD.  - S/p ASA 325mg desensitization in CCU 11/10/23  iso ASA allergy (Rx angioedema)  - S/p Cardiac Cath 11/11/23 w/ Dr Gregory: GULSHAN x 2 pLAD, RCA mild dz, EDP 5. R radial access w/o complications  - c/w ASA 81mg QD, Plavix 75mg QD, Lipitor 20mg Q HS,  Imdur 30mg QD, Amlodipine 5mg QD  - Tele monitor, VS, pulse ox cont. Presents with chronic anginal symptoms, improved w/tylenol  - Currently w/ 8/10 CP, reproducible,   - hsTrop T neg x 1,  F/U Patel @ 10AM  - EKG: NSR, isolated TWI V1, otherwise non-ischemic   - CXR with clear lungs  - TTE 11/9/23:  LVEF 60-65%, normal LV/RF systolic function, no significant valvular disease  - CCTA 11/9/23: calcium score moderate at 384 Agatston units. Severe stenosis in mid LAD, LAD FFR: 0.79 for proximal LAD and  0.69 for mid LAD. Moderate stenosis in mid RCA and proximal LAD.  - S/p ASA 325mg desensitization in CCU 11/10/23  iso ASA allergy (Rx angioedema)  - S/p Cardiac Cath 11/11/23 w/ Dr Gregory: GULSHAN x 2 pLAD, RCA mild dz, EDP 5. R radial access w/o complications  Plan:   - c/w Plavix 75mg QD, Lipitor 20mg Q HS,  Imdur 30mg QD, Amlodipine 5mg QD  - R/O Pericarditis: TTE, increased to ASA 325mg q8h, Colchicine 0.6mg BID  - Add Ranexa 1000mg  BID  - Tele monitor, VS, pulse ox cont. Presents with chronic anginal symptoms, improved w/tylenol  - Currently w/ 8/10 CP, reproducible,   - hsTrop T neg x 1,  F/U Patel @ 10AM, ESR/CRP  - EKG: NSR, isolated TWI V1, otherwise non-ischemic   - CXR with clear lungs  - TTE 11/9/23:  LVEF 60-65%, normal LV/RF systolic function, no significant valvular disease  - CCTA 11/9/23: calcium score moderate at 384 Agatston units. Severe stenosis in mid LAD, LAD FFR: 0.79 for proximal LAD and  0.69 for mid LAD. Moderate stenosis in mid RCA and proximal LAD.  - S/p ASA 325mg desensitization in CCU 11/10/23  iso ASA allergy (Rx angioedema)  - S/p Cardiac Cath 11/11/23 w/ Dr Gregory: GULSHAN x 2 pLAD, RCA mild dz, EDP 5. R radial access w/o complications  Plan:   - c/w Plavix 75mg QD, Lipitor 20mg Q HS,  Imdur 30mg QD, Amlodipine 5mg QD  - R/O Pericarditis: TTE, increased to ASA 325mg q8h, Colchicine 0.6mg BID  - Add Ranexa 1000mg  BID  - Tele monitor, VS, pulse ox cont. Presents with chronic anginal symptoms, improved w/tylenol  - Currently w/ 8/10 CP, reproducible,   - hsTrop T neg x 1,  F/U Patel @ 10AM, ESR/CRP  - EKG: NSR, isolated TWI V1, otherwise non-ischemic   - CXR with clear lungs  - TTE 11/9/23:  LVEF 60-65%, normal LV/RF systolic function, no significant valvular disease  - CCTA 11/9/23: calcium score moderate at 384 Agatston units. Severe stenosis in mid LAD, LAD FFR: 0.79 for proximal LAD and  0.69 for mid LAD. Moderate stenosis in mid RCA and proximal LAD.  - S/p ASA 325mg desensitization in CCU 11/10/23  iso ASA allergy (Rx angioedema)  - S/p Cardiac Cath 11/11/23 w/ Dr Gregory: GULSHAN x 2 pLAD, RCA mild dz, EDP 5. R radial access w/o complications  Plan:   - c/w Plavix 75mg QD, Lipitor 20mg Q HS,  Imdur 30mg QD, Amlodipine 5mg QD  - R/O Pericarditis: TTE, increased to ASA 325mg q8h (can increase to 650mg q8h per clinical   pharmacist)  - Started on Colchicine 0.6mg BID, Ranexa 1000mg  BID

## 2023-11-20 NOTE — PATIENT PROFILE ADULT - FALL HARM RISK - HARM RISK INTERVENTIONS

## 2023-11-20 NOTE — PATIENT PROFILE ADULT - DO YOU LACK THE NECESSARY SUPPORT TO HELP YOU COPE WITH LIFE CHALLENGES?
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 Gibson at [facility]. Patient reports remarkable improvement of  gait apraxia, he has been walking without any assistance, however for the past 2-3 years he reports feeling unsteady on his feet, Patient has attempted to reach Dr. Gibson, is unable to so, a the neurosurgeon has relocated to another state. Overall, patient has not had his shunt adjusted in about 9 years. PT denies urinary incontinence, He does admit to difficulty concentrating and occasional short-term memory issues,overall he is fully functional.\par \par Upon obtaining further history, patient states that for the past 6 months he has been having episodes of spinning sensation upon waking up in the morning, it lasts for a few seconds to minutes and resolves, there is no associated double vision, nausea, vomiting, visual blurring or tinnitus.\par \par Patient also complains of aching sensation in the right palm/hand,it is worse upon waking up in the morning.
no

## 2023-11-20 NOTE — ED PROVIDER NOTE - OBJECTIVE STATEMENT
83F, Montserratian speaking, w/ PMHx of HTN, HLD, Gastritis (s/p EGD 8/2023), h/o chronic lacunar infarcts, chronic HAs and Anxiety, presents to [facility] w/ sharp L substernal chest pain/pressure radiating to L arm/jaw which has been ongoing since her cath 11/10. Admitted to cardiac tele for unstable angina. Found to have abnormal CCTA w/ positive FFR p/mLAD. S/p ASA desensitization in CCU 11/10/23, now s/p cardiac cath 11/10/23 w/ GULSHAN x 2 pLAD. Today patient states pain has gotten worse and is typical of her prior chest pressure.  Denies associated vomiting admits to nausea.  Also admits that she has been in bed all week and has somewhat of a desensitization to both of her legs below the knees described as paresthesias denies back pain or urinary retention admits to urge incontinence.

## 2023-11-20 NOTE — PATIENT PROFILE ADULT - NSPROPTRIGHTREPPHONE_GEN_A_NUR
Progress Note


Date of Service:  May 9, 2017


Subjective


Pt evaluation today including:  conversation w/ patient, conversation w/ family 

(wife), physical exam, chart review, lab review, review of studies, review of 

inpatient medication list


CC f/U GI bleeding


HPI Had loose black stools last pm after laxative. Last stool about 6 hours ago 

still black. NO abd pain.





Review of Systems


Respiratory:  No shortness of breath


Cardiac:  + chest pain (off and on)





Medications





 Current Inpatient Medications








 Medications


  (Trade)  Dose


 Ordered  Sig/Mariposa


 Route  Start Time


 Stop Time Status Last Admin


Dose Admin


 


 Ondansetron HCl


  (Zofran Inj)  4 mg  Q6H  PRN


 IV  5/7/17 16:00


 6/6/17 15:59   


 


 


 Cyanocobalamin


  (Vitamin B-12


 Tab)  1,000 mcg  DAILY


 PO  5/8/17 09:00


 6/7/17 08:59  5/9/17 08:09


1,000 MCG


 


 Nitroglycerin


  (Nitrostat Tab)  0.4 mg  UD  PRN


 SL  5/7/17 16:00


 6/6/17 15:59   


 


 


 Insulin Glargine


  (Lantus Solostar


 Pen)  14 unit  QAM


 SC  5/8/17 09:00


 6/7/17 08:59  5/9/17 08:13


14 UNIT


 


 Glucose


  (Glucose 40% Gel)  15-30


 GRAMS 15


 GRAMS...  UD  PRN


 PO  5/7/17 16:15


 6/6/17 16:14   


 


 


 Glucose


  (Glucose Chew


 Tab)  4-8


 Tablets 4


 Tabl...  UD  PRN


 PO  5/7/17 16:15


 6/6/17 16:14   


 


 


 Dextrose


  (Dextrose 50%


 50ML Syringe)  25-50ML OF


 50% DW IV


 FOR...  UD  PRN


 IV  5/7/17 16:15


 6/6/17 16:14   


 


 


 Glucagon


  (Glucagon Inj)  1 mg  UD  PRN


 SQ  5/7/17 16:15


 6/6/17 16:14   


 


 


 Pantoprazole


 Sodium


  (Protonix Tab)  40 mg  BID


 PO  5/8/17 21:00


 6/7/17 20:59  5/9/17 08:09


40 MG


 


 Insulin Aspart


  (novoLOG ASPART)  SLIDING


 SCALE  ACHS


 SC  5/8/17 16:15


    5/9/17 11:00


9 UNITS











Objective


Vital Signs











  Date Time  Temp Pulse Resp B/P Pulse Ox O2 Delivery O2 Flow Rate FiO2


 


5/9/17 12:02      Room Air  


 


5/9/17 11:56 36.9 66 18 110/60 96   


 


5/9/17 08:06      Room Air  


 


5/9/17 07:54 36.8 58 18 107/59 96   


 


5/9/17 05:59      Room Air  


 


5/9/17 03:45 36.4 71 20 144/71 92 Room Air  


 


5/8/17 23:49 36.6 66 19 125/68 92 Room Air  


 


5/8/17 23:18      Room Air  


 


5/8/17 20:00      Room Air  


 


5/8/17 19:11 36.6 64 20 148/71 96 Room Air  


 


5/8/17 16:05 36.6 61 18 158/78 97 Room Air  


 


5/8/17 16:00      Room Air  


 


5/8/17 13:44  55 20 132/53 96 Room Air 0 


 


5/8/17 13:29  62 20 134/53 97 Room Air 0 


 


5/8/17 13:13  60 20 118/46 96 Nasal Cannula 10 











Physical Exam


General Appearance:  WD/WN, no apparent distress


Cardiovascular:  regular rate, rhythm


Abdomen:  normal bowel sounds, non tender, soft


Neurologic/Psych:  normal mood/affect, oriented x 3





Laboratory Results





Last 24 Hours








Test


  5/8/17


16:04 5/8/17


20:07 5/9/17


06:00 5/9/17


06:52


 


Bedside Glucose 224 mg/dl  255 mg/dl   156 mg/dl 


 


White Blood Count   8.87 K/uL  


 


Red Blood Count   2.78 M/uL  


 


Hemoglobin   7.8 g/dL  


 


Hematocrit   24.0 %  


 


Mean Corpuscular Volume   86.3 fL  


 


Mean Corpuscular Hemoglobin   28.1 pg  


 


Mean Corpuscular Hemoglobin


Concent 


  


  32.5 g/dl 


  


 


 


Platelet Count   47 K/uL  


 


Neutrophils (%) (Auto)   43.7 %  


 


Lymphocytes (%) (Auto)   17.4 %  


 


Monocytes (%) (Auto)   37.2 %  


 


Eosinophils (%) (Auto)   0.7 %  


 


Basophils (%) (Auto)   0.1 %  


 


Neutrophils # (Auto)   3.88 K/uL  


 


Lymphocytes # (Auto)   1.54 K/uL  


 


Monocytes # (Auto)   3.30 K/uL  


 


Eosinophils # (Auto)   0.06 K/uL  


 


Basophils # (Auto)   0.01 K/uL  


 


RDW Standard Deviation   60.1 fL  


 


RDW Coefficient of Variation   19.4 %  


 


Immature Granulocyte % (Auto)   0.9 %  


 


Immature Granulocyte # (Auto)   0.08 K/uL  


 


Nucleated RBC Absolute Count


(auto) 


  


  0.07 K/uL 


  


 


 


Nucleated Red Blood Cells %   0.7 %  


 


Platelet Estimate   DECREASED  


 


Anisocytosis   PRESENT  


 


Echinocytes   1+  


 


Prothrombin Time   12.3 SECONDS  


 


Prothromb Time International


Ratio 


  


  1.1 


  


 


 


Activated Partial


Thromboplast Time 


  


  23.5 SECONDS 


  


 


 


Partial Thromboplastin Ratio   0.9  


 


Sodium Level   142 mmol/L  


 


Potassium Level   4.4 mmol/L  


 


Chloride Level   113 mmol/L  


 


Carbon Dioxide Level   20 mmol/L  


 


Anion Gap   9.0 mmol/L  


 


Blood Urea Nitrogen   45 mg/dl  


 


Creatinine   2.20 mg/dl  


 


Est Creatinine Clear Calc


Drug Dose 


  


  26.7 ml/min 


  


 


 


Estimated GFR (


American) 


  


  31.2 


  


 


 


Estimated GFR (Non-


American 


  


  26.9 


  


 


 


BUN/Creatinine Ratio   20.5  


 


Random Glucose   146 mg/dl  


 


Estimated Average Glucose   174 mg/dl  


 


Hemoglobin A1c   7.7 %  


 


Calcium Level   7.7 mg/dl  


 


Total Bilirubin   0.4 mg/dl  


 


Aspartate Amino Transf


(AST/SGOT) 


  


  9 U/L 


  


 


 


Alanine Aminotransferase


(ALT/SGPT) 


  


  13 U/L 


  


 


 


Alkaline Phosphatase   49 U/L  


 


Total Protein   5.8 gm/dl  


 


Albumin   2.5 gm/dl  


 


Globulin   3.3 gm/dl  


 


Albumin/Globulin Ratio   0.8  














Test


  5/9/17


11:25 5/9/17


13:00 


  


 


 


Bedside Glucose 214 mg/dl    











Assessment and Plan








GI bleeding---5/2016 admit with EGD /colo then outpt capsule negative--dark 

stool suggests UGI source. EGD duod erosion and AVM possible source but no 

fresh blood and no stigmata of bleeding so not sure.  Recommend patient remain 

inpatient until stools brown and then can repeat outpt capsule endoscopy. On 

the other hand, if stools remain black then do nuc med bleeding scan. Give 

another dose of lactulose to purge gut. 








acute blood loss anemia---transfuse prn--Hgb slightly lower this am  





thrombocytopenia--low but should be adequate to prevent spontaneous GI bleeding [id number]

## 2023-11-20 NOTE — ED PROVIDER NOTE - CLINICAL SUMMARY MEDICAL DECISION MAKING FREE TEXT BOX
83-year-old female with GERD and CAD status post PCI here with worsening chest pressure and pain also has a history of anxiety we will treat for GERD consult cardiology plan for cardiac enzymes EKG chest x-ray evaluate for ACS     EKG normal sinus rhythm 67 normal axis and intervals no STEMI no acute ischemia

## 2023-11-20 NOTE — H&P ADULT - HISTORY OF PRESENT ILLNESS
Pharmacy:  [facility]  Meds:  HCP/Escort      83F, Algerian speaking, (ASA Allergy, s/p desensitization 11/10/23: ASA 325mg), PMHx of HTN, HLD, Gastritis (s/p EGD 8/2023), h/o chronic lacunar infarcts, chronic HAs and Anxiety, presented to [facility] ED c/o ongoing sharp LSCP, radiating to L arm/jaw, since her last procedure.       s/p CCTA: Mod-severe stenosis of mid LAD, mod stenosis of pLAD. TTE: Normal LV and RV size and function.    s/p LHC 11/12/23:  GULSHAN x2 pLAD with mild RCA disease and an EDP of 5. Patient was momentarily upgraded to the CCU s/p [facility] due to ASA desensitization.    Pt denies any palpitations, diaphoresis, dizziness/lightheadedness, syncope, fatigue, HA, orthopnea, PND, LE edema, N/V, fever, chills or recent sick contact.     ED course:  Vitals: BP: 170/84, HR 73, RR 18, Temp 97.8, O2:  5L/NC  EKG: NSR 67 bpm, TWI V1, otherwise non-ischemic  Labs:  HsTrops: 18,  BNP:  310,  H/H/Plts: stable, BUN/Cr:  Neg, Glu normal  Imaging:  CXR clear  Interventions: IV Tylenol 1g, Inj Pantoprazole 40mg and Carafate 1gm    Pt now admitted to cardiac telemetry for management of unstable angina.         Pharmacy:  [facility]  Meds:  HCP/Escort      83F, British speaking, (ASA Allergy, s/p desensitization 11/10/23: ASA 325mg), PMHx of HTN, HLD, Gastritis (s/p EGD 8/2023), h/o chronic lacunar infarcts, chronic HAs and Anxiety, presented to [facility] ED c/o ongoing sharp LSCP, radiating to L arm/jaw, since her last procedure.  Previously admitted to cardiac tele for unstable angina, found with subsequent abnormal CCTA w/ positive FFR p/mLAD. s/p ASA desensitization in CCU 11/10/23 and underwent cardiac cath 11/10/23 w/ GULSHAN x 2 pLAD,        s/p CCTA: Mod-severe stenosis of mid LAD, mod stenosis.  TTE: Normal LV and RV size and function.    s/p LHC 11/12/23:  GULSHAN x2 pLAD with mild RCA disease and an EDP of 5. Patient was momentarily upgraded to the CCU s/p C due to ASA desensitization.    Pt denies any palpitations, diaphoresis, dizziness/lightheadedness, syncope, fatigue, HA, orthopnea, PND, LE edema, N/V, fever, chills or recent sick contact.     ED course:  Vitals: BP: 170/84, HR 73, RR 18, Temp 97.8, O2:  5L/NC  EKG: NSR 67 bpm, TWI V1, otherwise non-ischemic  Labs:  HsTrops: 18,  BNP:  310,  H/H/Plts: stable, BUN/Cr:  Neg, Glu normal  Imaging:  CXR clear  Interventions: IV Tylenol 1g, Inj Pantoprazole 40mg and Carafate 1gm    Pt now admitted to cardiac telemetry for management of unstable angina.         83F, Ecuadorean speaking, (ASA Allergy, desensitized to ASA 325mg 11/10/23), PMHx of HTN, HLD,  CAD s/p s/p C 11/12/23:  GULSHAN x2 pLAD, RCA MLI, Gastritis (s/p EGD 8/2023),  h/o chronic lacunar infarcts, chronic HAs and Anxiety, presented to [facility] ED today with complaints of similar since last cath procedure.   Pt endorsing intermittent, dull, pressing, LSCP, radiating to L arm/jaw,  a/w HA, symptoms constant since her last procedure.  Pt stated that she followed up with her outpatient Cardiologist, Dr Jill Muniz on 11/14/23 for chest pain evaluation, EKG done non-ischemic.  Of note:  Pt previously admitted to cardiac tele for unstable angina, found with subsequent abnormal CCTA w/ positive FFR p/mLAD  s/p ASA desensitization in CCU 11/10/23 with subsequent C 11/10/23 with PCI LAD.  Pt denies any palpitations, diaphoresis, dizziness/lightheadedness, syncope, fatigue, orthopnea, PND, LE edema, N/V, fever, chills or recent sick contact.    ED course:  Vitals: BP: 170/84, HR 73, RR 18, Temp 97.8, O2:  5L/NC  EKG: NSR 67 bpm, TWI V1, otherwise non-ischemic  Labs:  HsTrops: 18,  BNP:  310,  H/H/Plts: stable, BUN/Cr:  Neg, Glu normal  Imaging:  CXR clear  Interventions: IV Tylenol 1g, Inj Pantoprazole 40mg and Carafate 1gm  Plan:  Pt now admitted to cardiac telemetry for management of unstable angina.         83F, Nicaraguan speaking, (ASA Allergy, desensitized to ASA 325mg 11/10/23), PMHx of HTN, HLD,  CAD s/p s/p C 11/12/23:  GULSHAN x2 pLAD, RCA MLI, Gastritis (s/p EGD 8/2023),  h/o chronic lacunar infarcts, chronic HAs and Anxiety, presented to [facility] ED today with complaints of similar CP since last cath procedure.   Pt endorsing intermittent, dull, pressing, LSCP, radiating to L arm/jaw, a/w ANAYA, symptoms constant since her last procedure.  Pt stated that she followed up with her outpatient Cardiologist, Dr Jill Muniz on 11/14/23 for chest pain evaluation, EKG done non-ischemic.  Of note:  Pt previously admitted to cardiac tele for unstable angina, found with subsequent abnormal CCTA w/ positive FFR p/mLAD  s/p ASA desensitization in CCU 11/10/23 with subsequent C 11/10/23 with PCI LAD.  Pt denies any palpitations, diaphoresis, dizziness/lightheadedness, syncope, fatigue, orthopnea, PND, LE edema, N/V, fever, chills or recent sick contact.    ED course:  Vitals: BP: 170/84->168/87, HR 73, RR 18, Temp 97.8, O2:  5L/NC  EKG: NSR 67 bpm, TWI V1, otherwise non-ischemic  Labs:  HsTrops: 18,  BNP:  310,  H/H/Plts: stable, BUN/Cr:  Neg, Glu normal  Imaging:  CXR clear  Interventions: IV Tylenol 1g, Inj Pantoprazole 40mg and Carafate 1gm  Plan:  Pt now admitted to cardiac telemetry for management of unstable angina.

## 2023-11-20 NOTE — H&P ADULT - ASSESSMENT
83F, Barbadian speaking, (ASA Allergy, desensitized to ASA 325mg 11/10/23), PMHx of HTN, HLD,  CAD s/p s/p LHC 11/12/23:  GULSHAN x2 pLAD, RCA MLI, Gastritis (s/p EGD 8/2023),  h/o chronic lacunar infarcts, chronic HAs and Anxiety admitted to cardiac telemetry  to R/O ACS in the setting of unstable angina.  83F, German speaking, (ASA Allergy, desensitized to ASA 325mg 11/10/23), PMHx of HTN, HLD,  CAD s/p s/p LHC 11/12/23:  GULSHAN x2 pLAD, RCA MLI, Gastritis (s/p EGD 8/2023),  h/o chronic lacunar infarcts, chronic HAs and Anxiety admitted to cardiac telemetry  to R/O ACS/Pericarditis in the setting of unstable angina.

## 2023-11-20 NOTE — H&P ADULT - NS ATTEND AMEND GEN_ALL_CORE FT
83F, Prydeinig speaking, (ASA Allergy, desensitized to ASA 325mg 11/10/23), PMHx of HTN, HLD,  CAD s/p s/p C 11/12/23:  GULSHAN x2 pLAD, RCA MLI, Gastritis (s/p EGD 8/2023), h/o chronic lacunar infarcts, chronic HAs and Anxiety, presented to [facility] ED today with complaints of similar CP since last cath procedure.   Pt endorsing intermittent, dull, pressing, LSCP, radiating to L arm/jaw, a/w ANAYA, symptoms constant since her last procedure.  Pt stated that she followed up with her outpatient Cardiologist, Dr Jill Muniz on 11/14/23 for chest pain evaluation, EKG done non-ischemic.      1. Atypical chest pain  Recent [facility] with GULSHAN placement, presents with ongoing CP, nonischemic EKG, negative hs troponins, unclear etiology, low suspicion for ACS, plan to obtain an echocardiogram, treat empirically for acute pericarditis, and if no WMA, no LHC.  ESR elevated.  colchicine, high dose asa, protonix, imdur and ranexa.

## 2023-11-20 NOTE — PATIENT PROFILE ADULT - FUNCTIONAL ASSESSMENT - BASIC MOBILITY 6.
3-calculated by average/Not able to assess (calculate score using [facility] averaging method)

## 2023-11-20 NOTE — H&P ADULT - PROBLEM SELECTOR PLAN 5
DVT:  LSQ    F: NS/None  E: K<4, Mg<2  N: DASH/TLC    C: FULL CODE    Dispo: Pending clinical progression    Case discussed with Dr Briones

## 2023-11-20 NOTE — ED PROVIDER NOTE - PHYSICAL EXAMINATION
VITAL SIGNS: I have reviewed nursing notes and confirm.  CONSTITUTIONAL: Well appearing, in no acute distress.   SKIN:  warm and dry, no acute rash.   HEAD:  normocephalic, atraumatic.  EYES: EOM intact; conjunctiva and sclera clear.  ENT: No nasal discharge; airway clear.   NECK: Supple.  CARD: S1, S2, Regular rate and rhythm.   RESP:  Clear to auscultation b/l, no wheezes, rales or rhonchi.  ABD: Normal bowel sounds; soft; non-distended; non-tender; no guarding/ rebound.  EXT: Normal ROM. No peripheral edema. Pulses intact and equal b/l. Right upper extremity bruising without hematomas or palpable expanding hematomas  NEURO: Alert, oriented, grossly unremarkable  PSYCH: Cooperative, mood and affect appropriate.

## 2023-11-20 NOTE — PATIENT PROFILE ADULT - LIVING ENVIRONMENT
Progress Note    Admit Date: 10/18/2021  Day: 3  Diet: Adult Oral Nutrition Supplement; Wound Healing Oral Supplement  ADULT DIET; Regular; 3 carb choices (45 gm/meal); Low Sodium (2 gm)    CC: AMS    Interval history:  Patient AOX4 this a.m. Stating she feels better overall, however feels as if she is mildly withdrawing from not receiving her methadone. She is on 3L O2 during my exam.   Patient denies cough, cold symptoms, SOB. Patient denies fever/chills, nausea/vomiting, diarrhea/constipation, abdominal pain. HPI: Laura Morales is a 58-year-old female with PMH CKD 4, DVT (2004; now on Eliquis), T2DM, narcotic-dependent chronic pain (now on Methadone), systolic CHF, suspected RASHMI, bilateral lower foot wound s/p partial amputation who presented with altered mental status. Patient was d/c 10/16 (2 days prior to re-admission) during which she was diagnosed with osteomyelitis of feet and given wound care. Wound cultures had grown Pseudomonas, E coli and Enterococcus and patient was treated with Vanc, Merrem and Fluconazole. She also had an FAHEEM on CKD. She recevied multiple I&D with surgery and was hypotensive, difficult to arouse after these procedures. VBG showed acute on chronic respiratory acidoseis and she was placed on BiPAP with improvement. 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 She was discharged to skilled nursing facility with continued antibiotics for osteomyelitis. Per EMS and the facility she was found to be minimally responsive shortly after receiving her typical 140 mg of methadone in the morning and therefore EMS was called. On arrival of the EMS she was noted to have glucose in the 30s and was given glucagon with improvement. She also received Narcan 2 mg with improvement in her mental status.   On arrival to the ED, she was also noted to have hypoxia and was placed on the nonrebreather oxygen and was noted to be very agitated, shouting and being violent with the staff, given Haldol and Versed. She was later noted to have glucose levels in the 30s and was given D50 followed by started on D5 LR at 150 mL/h. Blood cultures were sent and her antibiotic for osteomyelitis were continued. EKG showed sinus bradycardia and chest x-ray showed bilateral airspace disease which was new compared to previous study. The decision was made to admit her to the floor for further evaluation of her altered mental status.     Medications:     Scheduled Meds:   methadone  100 mg Oral Daily    insulin lispro  0-6 Units SubCUTAneous TID WC    insulin lispro  0-3 Units SubCUTAneous Nightly    amitriptyline  100 mg Oral Nightly    aspirin EC  81 mg Oral Daily    atorvastatin  20 mg Oral Nightly    ammonium lactate   Topical Daily    apixaban  5 mg Oral BID    escitalopram  10 mg Oral Daily    gabapentin  400 mg Oral BID    hydrALAZINE  50 mg Oral 3 times per day    pantoprazole  40 mg Oral QAM AC    sodium chloride flush  5-40 mL IntraVENous 2 times per day    vancomycin  750 mg IntraVENous Q24H    meropenem  1,000 mg IntraVENous Q12H     Continuous Infusions:   sodium chloride      dextrose       PRN Meds:hydrOXYzine, sodium chloride flush, sodium chloride, ondansetron **OR** ondansetron, polyethylene glycol, acetaminophen **OR** acetaminophen, glucose, dextrose, glucagon (rDNA), dextrose, naloxone    Objective:   Vitals:   T-max:  Patient Vitals for the past 8 hrs:   BP Temp Temp src Pulse Resp SpO2 Weight   10/20/21 1513 129/82 98.3 °F (36.8 °C) Oral 79 10 91 %    10/20/21 1111 125/77 98.1 °F (36.7 °C) Oral 81 16 94 %    10/20/21 0935      93 %    10/20/21 0810 (!) 144/72 97.6 °F (36.4 °C) Oral 80 16 91 %    10/20/21 0807       223 lb 1.7 oz (101.2 kg)       Intake/Output Summary (Last 24 hours) at 10/20/2021 1550  Last data filed at 10/20/2021 1107  Gross per 24 hour   Intake 710 ml   Output 600 ml   Net 110 ml       Review of Systems  Per Interval Hx. Physical Exam  Constitutional:       Appearance: She is obese. HENT:      Head: Normocephalic and atraumatic. Nose: Nose normal.      Mouth/Throat:      Mouth: Mucous membranes are moist.      Pharynx: Oropharynx is clear. Eyes:      Extraocular Movements: Extraocular movements intact. Conjunctiva/sclera: Conjunctivae normal.   Cardiovascular:      Rate and Rhythm: Normal rate and regular rhythm. Pulses: Normal pulses. Heart sounds: Normal heart sounds. Pulmonary:      Effort: Pulmonary effort is normal.      Breath sounds: Normal breath sounds. Comments: 3L O2 via nasal cannula. Diminished breath sounds throughout. Abdominal:      General: Bowel sounds are normal.      Palpations: Abdomen is soft. Comments: Obese abdomen. Genitourinary:     Comments: Deferred. Musculoskeletal:      Cervical back: Normal range of motion and neck supple. Skin:     General: Skin is warm and dry. Capillary Refill: Capillary refill takes less than 2 seconds. Comments: IV line noted at R chest.   Neurological:      General: No focal deficit present. Mental Status: She is alert and oriented to person, place, and time. Mental status is at baseline. LABS:    CBC:   Recent Labs     10/18/21  1156 10/18/21  1156 10/19/21  0630 10/20/21  0759 10/20/21  1050   WBC 12.9*  --  9.2 7.7  --    HGB 9.3*   < > 8.1* 7.7* 7.7*   HCT 28.2*   < > 24.6* 23.1* 23.3*     --  327 300  --    MCV 85.9  --  86.9 86.3  --     < > = values in this interval not displayed.      Renal:    Recent Labs     10/18/21  1156 10/18/21  1156 10/18/21  2010 10/19/21  0630 10/20/21  0759     --   --  140 138   K 4.6   < >  --  4.5 4.7  4.7     --   --  103 101   CO2 29  --   --  30 29   BUN 28*  --   --  30* 44*   CREATININE 1.4*  --   --  1.6* 1.7*   GLUCOSE 78  --   --  95 212*   CALCIUM 9.3  --   --  8.8 8.5   MG  --   --  1.80  --   --    PHOS  --   --   -- --  3.4   ANIONGAP 8  --   --  7 8    < > = values in this interval not displayed. Hepatic:   Recent Labs     10/18/21  1156 10/19/21  0630 10/20/21  0759   AST 20 21 18   ALT 16 18 18   BILITOT <0.2 <0.2 <0.2   PROT 6.7 5.9* 5.8*   LABALBU 2.7* 2.3* 2.3*   ALKPHOS 149* 151* 184*     Troponin:   Recent Labs     10/18/21  1156 10/18/21  2010 10/19/21  0152   TROPONINI 0.03* 0.02* 0.03*     BNP: No results for input(s): BNP in the last 72 hours. Lipids: No results for input(s): CHOL, HDL in the last 72 hours. Invalid input(s): LDLCALCU, TRIGLYCERIDE  ABGs:    Recent Labs     10/18/21  1756   PHART 7.356   IHM0VOA 57.4*   PO2ART 53.9*   HKV4TIB 32*   BEART 5.7*   T6JGOZRK 87*   NMA8FHK 34       INR:   Recent Labs     10/18/21  1156   INR 1.35*     Lactate: No results for input(s): LACTATE in the last 72 hours. Cultures:  -----------------------------------------------------------------  RAD:   XR CHEST PORTABLE   Final Result      1. Moderately severe diffuse bilateral airspace disease, new/increased from prior study. 2. Cardiomegaly. CT Head WO Contrast   Final Result      1. No acute intracranial hemorrhage or mass effect. Assessment/Plan:   Romie Navarro is a 62-year-old female with PMH CKD 4, DVT (2004; now on Eliquis), T2DM, narcotic-dependent chronic pain (now on Methadone), systolic CHF, suspected RASHMI, bilateral lower foot wound s/p partial amputation who presented with altered mental status. She was found to have BG low to 30's on arrival, treated with Dextrose drip. Acute Metabolic Encephalopathy, Metabolic - Improving   Likely related to hypoglycemia combined with possible Methadone overuse. BG low to 30's on arrival. In the ED patient received D50 along with D5 LR @100 mL/h. Patient on 50U Lantus and 8U with meals, during prior hospitalization she was on 12U Lantus and HDSS.    Glucose above 200's this a.m.   - continue to monitor POCT glucose, hypoglycemia protocol   - holding home insulin at this time     Ulcerations of b/l Feet   Osteomyelitis on Recent Admission  .1,  on admission. S/p I&D with partial cuboid resection of R foot, I&D with [facility] of L foot (10/13). - Podiatry c/s - appreciate reccs  - per Podiatry, wound vac application 25/25; no surg intervention at this itme  - continue Vancomycin and Merrem through 11/26      Acute Hypoxic Respiratory Failure   New oxygen requirement of 5L NC in ED. Was briefly off and now back on 3L NC.   CXR on admission with evidence of basilar opacities, slightly worse from previous CXR. Afebile with elevated leukocyte count on admission however leukocytosis now resolved. - attempt to wean off oxygen   - COVID negative  - f/u blood cultures NGTD      Acute on Chronic Diastolic Heart Failure  Last echo 2018 with EF 50 to 55%. Evidence of PAH which may be contributing. Chest x-ray shows mild cardiomegaly with bilateral diffuse opacities, elevated pro BNP.  - Lasix 40 mg IV x1 10/19     T2DM  BG low to 30's on arrival. S/p Dextrose gtt. - hold Lantus on d/c  - continue Lispro 8U TID on d/c   - continue to monitor POCT glucose, hypoglycemia protocol      CKD  Baseline Cr 2.   - continue to monitor   - avoid nephrotoxic drugs      MDD  - continue home meds     Hypertension  - continue home Hydralazine      Hx Opioid Pain Med Use, Now on Methadone   Patient seen at outpatient methadone clinic and is prescribed methadone 140 mg. Patient reports she has been going to methadone clinic for about 20 years. - started Methadone 100 mg/day on 10/20   - monitor mental status   - Narcan ordered PRN     Code Status: Full Code  FEN: Adult Oral Nutrition Supplement; Wound Healing Oral Supplement  ADULT DIET; Regular; 3 carb choices (45 gm/meal); Low Sodium (2 gm)  PPX: Eliquis  DISPO: SHEMAR Rondon MD, PGY-1  10/20/21  3:50 PM    This patient has been staffed and discussed with Dr. Cody Phillip. no

## 2023-11-20 NOTE — H&P ADULT - CARDIOVASCULAR
regular rate and rhythm/S1 S2 present/no gallops/no rub/no murmur regular rate and rhythm/S1 S2 present/no gallops/no rub/no murmur/vascular

## 2023-11-20 NOTE — H&P ADULT - NSHPLABSRESULTS_GEN_ALL_CORE
13.1   6.51  )-----------( 309      ( 20 Nov 2023 04:04 )             38.1       11-20    136  |  100  |  13  ----------------------------<  103<H>  4.1   |  25  |  0.99    Ca    9.8      20 Nov 2023 04:04  Mg     2.2     11-20    TPro  7.8  /  Alb  4.2  /  TBili  0.5  /  DBili  x   /  AST  17  /  ALT  13  /  AlkPhos  103  11-20      Urinalysis Basic - ( 20 Nov 2023 04:04 )    Color: x / Appearance: x / SG: x / pH: x  Gluc: 103 mg/dL / Ketone: x  / Bili: x / Urobili: x   Blood: x / Protein: x / Nitrite: x   Leuk Esterase: x / RBC: x / WBC x   Sq Epi: x / Non Sq Epi: x / Bacteria: x

## 2023-11-20 NOTE — H&P ADULT - PROBLEM/PLAN-5
DISPLAY PLAN FREE TEXT
Detail Level: Simple
Additional Notes: Patient consent was obtained to proceed with the visit and recommended plan of care after discussion of all risks and benefits, including the risks of COVID-19 exposure.
Additional Notes: OTC Aveeno cracked skin relief balm recommended

## 2023-11-20 NOTE — ED ADULT TRIAGE NOTE - ARRIVAL INFO ADDITIONAL COMMENTS
pt c/o chest pain since having a cardiac cath 2 weeks ago.   tonight states pain worsened. pt c/o chest pain since having a cardiac cath 11/10.   tonight states pain worsened.

## 2023-11-20 NOTE — H&P ADULT - PROBLEM SELECTOR PLAN 2
SBP 160s  - Home med: Olmesartan 40mg QD, Amlodipine 5mg QD Imdur 30mg QD, Clonidine 0.2mg QD PRN if SBP >160  - Omesartan TIC to Losartan 100mg QD, c/w  Imdur 30mg qd, Amlodipine 5mg QD and Clonidine PRN SBP 160s  - Home med: Olmesartan 40mg QD, Amlodipine 5mg QD Imdur 30mg QD, Clonidine 0.2mg weekly PRN if SBP >160  - Omesartan TIC to Losartan 100mg QD, c/w  Imdur 30mg qd, Amlodipine 5mg QD and Clonidine PRN

## 2023-11-20 NOTE — ED PROVIDER NOTE - PROGRESS NOTE DETAILS
Troponin is negative patient remained stable feeling somewhat improved and recent catheterization and continuous chest pain will admit Case discussed with cardiology fellow as well as PA

## 2023-11-21 DIAGNOSIS — R07.89 OTHER CHEST PAIN: ICD-10-CM

## 2023-11-21 LAB
ALBUMIN SERPL ELPH-MCNC: 3.9 G/DL — SIGNIFICANT CHANGE UP (ref 3.3–5)
ALBUMIN SERPL ELPH-MCNC: 3.9 G/DL — SIGNIFICANT CHANGE UP (ref 3.3–5)
ALBUMIN SERPL ELPH-MCNC: 4 G/DL — SIGNIFICANT CHANGE UP (ref 3.3–5)
ALBUMIN SERPL ELPH-MCNC: 4 G/DL — SIGNIFICANT CHANGE UP (ref 3.3–5)
ALP SERPL-CCNC: 108 U/L — SIGNIFICANT CHANGE UP (ref 40–120)
ALP SERPL-CCNC: 108 U/L — SIGNIFICANT CHANGE UP (ref 40–120)
ALP SERPL-CCNC: 116 U/L — SIGNIFICANT CHANGE UP (ref 40–120)
ALP SERPL-CCNC: 116 U/L — SIGNIFICANT CHANGE UP (ref 40–120)
ALT FLD-CCNC: 13 U/L — SIGNIFICANT CHANGE UP (ref 10–45)
ALT FLD-CCNC: 13 U/L — SIGNIFICANT CHANGE UP (ref 10–45)
ALT FLD-CCNC: 29 U/L — SIGNIFICANT CHANGE UP (ref 10–45)
ALT FLD-CCNC: 29 U/L — SIGNIFICANT CHANGE UP (ref 10–45)
AMYLASE P1 CFR SERPL: 85 U/L — SIGNIFICANT CHANGE UP (ref 25–125)
AMYLASE P1 CFR SERPL: 85 U/L — SIGNIFICANT CHANGE UP (ref 25–125)
ANION GAP SERPL CALC-SCNC: 13 MMOL/L — SIGNIFICANT CHANGE UP (ref 5–17)
ANION GAP SERPL CALC-SCNC: 13 MMOL/L — SIGNIFICANT CHANGE UP (ref 5–17)
ANION GAP SERPL CALC-SCNC: 9 MMOL/L — SIGNIFICANT CHANGE UP (ref 5–17)
ANION GAP SERPL CALC-SCNC: 9 MMOL/L — SIGNIFICANT CHANGE UP (ref 5–17)
AST SERPL-CCNC: 20 U/L — SIGNIFICANT CHANGE UP (ref 10–40)
AST SERPL-CCNC: 20 U/L — SIGNIFICANT CHANGE UP (ref 10–40)
AST SERPL-CCNC: 53 U/L — HIGH (ref 10–40)
AST SERPL-CCNC: 53 U/L — HIGH (ref 10–40)
BASOPHILS # BLD AUTO: 0.02 K/UL — SIGNIFICANT CHANGE UP (ref 0–0.2)
BASOPHILS NFR BLD AUTO: 0.2 % — SIGNIFICANT CHANGE UP (ref 0–2)
BASOPHILS NFR BLD AUTO: 0.2 % — SIGNIFICANT CHANGE UP (ref 0–2)
BASOPHILS NFR BLD AUTO: 0.3 % — SIGNIFICANT CHANGE UP (ref 0–2)
BASOPHILS NFR BLD AUTO: 0.3 % — SIGNIFICANT CHANGE UP (ref 0–2)
BILIRUB SERPL-MCNC: 0.4 MG/DL — SIGNIFICANT CHANGE UP (ref 0.2–1.2)
BILIRUB SERPL-MCNC: 0.4 MG/DL — SIGNIFICANT CHANGE UP (ref 0.2–1.2)
BILIRUB SERPL-MCNC: 0.6 MG/DL — SIGNIFICANT CHANGE UP (ref 0.2–1.2)
BILIRUB SERPL-MCNC: 0.6 MG/DL — SIGNIFICANT CHANGE UP (ref 0.2–1.2)
BUN SERPL-MCNC: 14 MG/DL — SIGNIFICANT CHANGE UP (ref 7–23)
BUN SERPL-MCNC: 14 MG/DL — SIGNIFICANT CHANGE UP (ref 7–23)
BUN SERPL-MCNC: 17 MG/DL — SIGNIFICANT CHANGE UP (ref 7–23)
BUN SERPL-MCNC: 17 MG/DL — SIGNIFICANT CHANGE UP (ref 7–23)
CALCIUM SERPL-MCNC: 9.2 MG/DL — SIGNIFICANT CHANGE UP (ref 8.4–10.5)
CALCIUM SERPL-MCNC: 9.2 MG/DL — SIGNIFICANT CHANGE UP (ref 8.4–10.5)
CALCIUM SERPL-MCNC: 9.4 MG/DL — SIGNIFICANT CHANGE UP (ref 8.4–10.5)
CALCIUM SERPL-MCNC: 9.4 MG/DL — SIGNIFICANT CHANGE UP (ref 8.4–10.5)
CHLORIDE SERPL-SCNC: 103 MMOL/L — SIGNIFICANT CHANGE UP (ref 96–108)
CHLORIDE SERPL-SCNC: 103 MMOL/L — SIGNIFICANT CHANGE UP (ref 96–108)
CHLORIDE SERPL-SCNC: 97 MMOL/L — SIGNIFICANT CHANGE UP (ref 96–108)
CHLORIDE SERPL-SCNC: 97 MMOL/L — SIGNIFICANT CHANGE UP (ref 96–108)
CO2 SERPL-SCNC: 16 MMOL/L — LOW (ref 22–31)
CO2 SERPL-SCNC: 16 MMOL/L — LOW (ref 22–31)
CO2 SERPL-SCNC: 25 MMOL/L — SIGNIFICANT CHANGE UP (ref 22–31)
CO2 SERPL-SCNC: 25 MMOL/L — SIGNIFICANT CHANGE UP (ref 22–31)
CREAT SERPL-MCNC: 0.84 MG/DL — SIGNIFICANT CHANGE UP (ref 0.5–1.3)
CREAT SERPL-MCNC: 0.84 MG/DL — SIGNIFICANT CHANGE UP (ref 0.5–1.3)
CREAT SERPL-MCNC: 0.96 MG/DL — SIGNIFICANT CHANGE UP (ref 0.5–1.3)
CREAT SERPL-MCNC: 0.96 MG/DL — SIGNIFICANT CHANGE UP (ref 0.5–1.3)
EGFR: 58 ML/MIN/1.73M2 — LOW
EGFR: 58 ML/MIN/1.73M2 — LOW
EGFR: 68 ML/MIN/1.73M2 — SIGNIFICANT CHANGE UP
EGFR: 68 ML/MIN/1.73M2 — SIGNIFICANT CHANGE UP
EOSINOPHIL # BLD AUTO: 0.1 K/UL — SIGNIFICANT CHANGE UP (ref 0–0.5)
EOSINOPHIL # BLD AUTO: 0.1 K/UL — SIGNIFICANT CHANGE UP (ref 0–0.5)
EOSINOPHIL # BLD AUTO: 0.24 K/UL — SIGNIFICANT CHANGE UP (ref 0–0.5)
EOSINOPHIL # BLD AUTO: 0.24 K/UL — SIGNIFICANT CHANGE UP (ref 0–0.5)
EOSINOPHIL NFR BLD AUTO: 0.9 % — SIGNIFICANT CHANGE UP (ref 0–6)
EOSINOPHIL NFR BLD AUTO: 0.9 % — SIGNIFICANT CHANGE UP (ref 0–6)
EOSINOPHIL NFR BLD AUTO: 3.8 % — SIGNIFICANT CHANGE UP (ref 0–6)
EOSINOPHIL NFR BLD AUTO: 3.8 % — SIGNIFICANT CHANGE UP (ref 0–6)
GLUCOSE SERPL-MCNC: 121 MG/DL — HIGH (ref 70–99)
GLUCOSE SERPL-MCNC: 121 MG/DL — HIGH (ref 70–99)
GLUCOSE SERPL-MCNC: 136 MG/DL — HIGH (ref 70–99)
GLUCOSE SERPL-MCNC: 136 MG/DL — HIGH (ref 70–99)
HCT VFR BLD CALC: 38.1 % — SIGNIFICANT CHANGE UP (ref 34.5–45)
HCT VFR BLD CALC: 38.1 % — SIGNIFICANT CHANGE UP (ref 34.5–45)
HCT VFR BLD CALC: 39.9 % — SIGNIFICANT CHANGE UP (ref 34.5–45)
HCT VFR BLD CALC: 39.9 % — SIGNIFICANT CHANGE UP (ref 34.5–45)
HGB BLD-MCNC: 12.8 G/DL — SIGNIFICANT CHANGE UP (ref 11.5–15.5)
HGB BLD-MCNC: 12.8 G/DL — SIGNIFICANT CHANGE UP (ref 11.5–15.5)
HGB BLD-MCNC: 13.4 G/DL — SIGNIFICANT CHANGE UP (ref 11.5–15.5)
HGB BLD-MCNC: 13.4 G/DL — SIGNIFICANT CHANGE UP (ref 11.5–15.5)
IMM GRANULOCYTES NFR BLD AUTO: 0.3 % — SIGNIFICANT CHANGE UP (ref 0–0.9)
IMM GRANULOCYTES NFR BLD AUTO: 0.3 % — SIGNIFICANT CHANGE UP (ref 0–0.9)
IMM GRANULOCYTES NFR BLD AUTO: 0.4 % — SIGNIFICANT CHANGE UP (ref 0–0.9)
IMM GRANULOCYTES NFR BLD AUTO: 0.4 % — SIGNIFICANT CHANGE UP (ref 0–0.9)
LIDOCAIN IGE QN: 45 U/L — SIGNIFICANT CHANGE UP (ref 7–60)
LIDOCAIN IGE QN: 45 U/L — SIGNIFICANT CHANGE UP (ref 7–60)
LIDOCAIN IGE QN: 62 U/L — HIGH (ref 7–60)
LIDOCAIN IGE QN: 62 U/L — HIGH (ref 7–60)
LYMPHOCYTES # BLD AUTO: 1.54 K/UL — SIGNIFICANT CHANGE UP (ref 1–3.3)
LYMPHOCYTES # BLD AUTO: 1.54 K/UL — SIGNIFICANT CHANGE UP (ref 1–3.3)
LYMPHOCYTES # BLD AUTO: 1.76 K/UL — SIGNIFICANT CHANGE UP (ref 1–3.3)
LYMPHOCYTES # BLD AUTO: 1.76 K/UL — SIGNIFICANT CHANGE UP (ref 1–3.3)
LYMPHOCYTES # BLD AUTO: 13.5 % — SIGNIFICANT CHANGE UP (ref 13–44)
LYMPHOCYTES # BLD AUTO: 13.5 % — SIGNIFICANT CHANGE UP (ref 13–44)
LYMPHOCYTES # BLD AUTO: 28.1 % — SIGNIFICANT CHANGE UP (ref 13–44)
LYMPHOCYTES # BLD AUTO: 28.1 % — SIGNIFICANT CHANGE UP (ref 13–44)
MAGNESIUM SERPL-MCNC: 2.2 MG/DL — SIGNIFICANT CHANGE UP (ref 1.6–2.6)
MAGNESIUM SERPL-MCNC: 2.2 MG/DL — SIGNIFICANT CHANGE UP (ref 1.6–2.6)
MCHC RBC-ENTMCNC: 29 PG — SIGNIFICANT CHANGE UP (ref 27–34)
MCHC RBC-ENTMCNC: 29 PG — SIGNIFICANT CHANGE UP (ref 27–34)
MCHC RBC-ENTMCNC: 29.3 PG — SIGNIFICANT CHANGE UP (ref 27–34)
MCHC RBC-ENTMCNC: 29.3 PG — SIGNIFICANT CHANGE UP (ref 27–34)
MCHC RBC-ENTMCNC: 33.6 GM/DL — SIGNIFICANT CHANGE UP (ref 32–36)
MCV RBC AUTO: 86.4 FL — SIGNIFICANT CHANGE UP (ref 80–100)
MCV RBC AUTO: 86.4 FL — SIGNIFICANT CHANGE UP (ref 80–100)
MCV RBC AUTO: 87.2 FL — SIGNIFICANT CHANGE UP (ref 80–100)
MCV RBC AUTO: 87.2 FL — SIGNIFICANT CHANGE UP (ref 80–100)
MONOCYTES # BLD AUTO: 0.56 K/UL — SIGNIFICANT CHANGE UP (ref 0–0.9)
MONOCYTES # BLD AUTO: 0.56 K/UL — SIGNIFICANT CHANGE UP (ref 0–0.9)
MONOCYTES # BLD AUTO: 0.82 K/UL — SIGNIFICANT CHANGE UP (ref 0–0.9)
MONOCYTES # BLD AUTO: 0.82 K/UL — SIGNIFICANT CHANGE UP (ref 0–0.9)
MONOCYTES NFR BLD AUTO: 7.2 % — SIGNIFICANT CHANGE UP (ref 2–14)
MONOCYTES NFR BLD AUTO: 7.2 % — SIGNIFICANT CHANGE UP (ref 2–14)
MONOCYTES NFR BLD AUTO: 8.9 % — SIGNIFICANT CHANGE UP (ref 2–14)
MONOCYTES NFR BLD AUTO: 8.9 % — SIGNIFICANT CHANGE UP (ref 2–14)
NEUTROPHILS # BLD AUTO: 3.66 K/UL — SIGNIFICANT CHANGE UP (ref 1.8–7.4)
NEUTROPHILS # BLD AUTO: 3.66 K/UL — SIGNIFICANT CHANGE UP (ref 1.8–7.4)
NEUTROPHILS # BLD AUTO: 8.9 K/UL — HIGH (ref 1.8–7.4)
NEUTROPHILS # BLD AUTO: 8.9 K/UL — HIGH (ref 1.8–7.4)
NEUTROPHILS NFR BLD AUTO: 58.6 % — SIGNIFICANT CHANGE UP (ref 43–77)
NEUTROPHILS NFR BLD AUTO: 58.6 % — SIGNIFICANT CHANGE UP (ref 43–77)
NEUTROPHILS NFR BLD AUTO: 77.8 % — HIGH (ref 43–77)
NEUTROPHILS NFR BLD AUTO: 77.8 % — HIGH (ref 43–77)
NRBC # BLD: 0 /100 WBCS — SIGNIFICANT CHANGE UP (ref 0–0)
PHOSPHATE SERPL-MCNC: 2.7 MG/DL — SIGNIFICANT CHANGE UP (ref 2.5–4.5)
PHOSPHATE SERPL-MCNC: 2.7 MG/DL — SIGNIFICANT CHANGE UP (ref 2.5–4.5)
PLATELET # BLD AUTO: 241 K/UL — SIGNIFICANT CHANGE UP (ref 150–400)
PLATELET # BLD AUTO: 241 K/UL — SIGNIFICANT CHANGE UP (ref 150–400)
PLATELET # BLD AUTO: 286 K/UL — SIGNIFICANT CHANGE UP (ref 150–400)
PLATELET # BLD AUTO: 286 K/UL — SIGNIFICANT CHANGE UP (ref 150–400)
POTASSIUM SERPL-MCNC: 4.4 MMOL/L — SIGNIFICANT CHANGE UP (ref 3.5–5.3)
POTASSIUM SERPL-MCNC: 4.4 MMOL/L — SIGNIFICANT CHANGE UP (ref 3.5–5.3)
POTASSIUM SERPL-MCNC: 4.8 MMOL/L — SIGNIFICANT CHANGE UP (ref 3.5–5.3)
POTASSIUM SERPL-MCNC: 4.8 MMOL/L — SIGNIFICANT CHANGE UP (ref 3.5–5.3)
POTASSIUM SERPL-SCNC: 4.4 MMOL/L — SIGNIFICANT CHANGE UP (ref 3.5–5.3)
POTASSIUM SERPL-SCNC: 4.4 MMOL/L — SIGNIFICANT CHANGE UP (ref 3.5–5.3)
POTASSIUM SERPL-SCNC: 4.8 MMOL/L — SIGNIFICANT CHANGE UP (ref 3.5–5.3)
POTASSIUM SERPL-SCNC: 4.8 MMOL/L — SIGNIFICANT CHANGE UP (ref 3.5–5.3)
PROT SERPL-MCNC: 7.7 G/DL — SIGNIFICANT CHANGE UP (ref 6–8.3)
RAPID RVP RESULT: SIGNIFICANT CHANGE UP
RAPID RVP RESULT: SIGNIFICANT CHANGE UP
RBC # BLD: 4.37 M/UL — SIGNIFICANT CHANGE UP (ref 3.8–5.2)
RBC # BLD: 4.37 M/UL — SIGNIFICANT CHANGE UP (ref 3.8–5.2)
RBC # BLD: 4.62 M/UL — SIGNIFICANT CHANGE UP (ref 3.8–5.2)
RBC # BLD: 4.62 M/UL — SIGNIFICANT CHANGE UP (ref 3.8–5.2)
RBC # FLD: 12 % — SIGNIFICANT CHANGE UP (ref 10.3–14.5)
RBC # FLD: 12 % — SIGNIFICANT CHANGE UP (ref 10.3–14.5)
RBC # FLD: 12.1 % — SIGNIFICANT CHANGE UP (ref 10.3–14.5)
RBC # FLD: 12.1 % — SIGNIFICANT CHANGE UP (ref 10.3–14.5)
SARS-COV-2 RNA SPEC QL NAA+PROBE: SIGNIFICANT CHANGE UP
SARS-COV-2 RNA SPEC QL NAA+PROBE: SIGNIFICANT CHANGE UP
SODIUM SERPL-SCNC: 126 MMOL/L — LOW (ref 135–145)
SODIUM SERPL-SCNC: 126 MMOL/L — LOW (ref 135–145)
SODIUM SERPL-SCNC: 137 MMOL/L — SIGNIFICANT CHANGE UP (ref 135–145)
SODIUM SERPL-SCNC: 137 MMOL/L — SIGNIFICANT CHANGE UP (ref 135–145)
WBC # BLD: 11.42 K/UL — HIGH (ref 3.8–10.5)
WBC # BLD: 11.42 K/UL — HIGH (ref 3.8–10.5)
WBC # BLD: 6.26 K/UL — SIGNIFICANT CHANGE UP (ref 3.8–10.5)
WBC # BLD: 6.26 K/UL — SIGNIFICANT CHANGE UP (ref 3.8–10.5)
WBC # FLD AUTO: 11.42 K/UL — HIGH (ref 3.8–10.5)
WBC # FLD AUTO: 11.42 K/UL — HIGH (ref 3.8–10.5)
WBC # FLD AUTO: 6.26 K/UL — SIGNIFICANT CHANGE UP (ref 3.8–10.5)
WBC # FLD AUTO: 6.26 K/UL — SIGNIFICANT CHANGE UP (ref 3.8–10.5)

## 2023-11-21 PROCEDURE — 99233 SBSQ HOSP IP/OBS HIGH 50: CPT | Mod: GC

## 2023-11-21 PROCEDURE — 76705 ECHO EXAM OF ABDOMEN: CPT | Mod: 26

## 2023-11-21 RX ORDER — LANOLIN ALCOHOL/MO/W.PET/CERES
5 CREAM (GRAM) TOPICAL AT BEDTIME
Refills: 0 | Status: DISCONTINUED | OUTPATIENT
Start: 2023-11-21 | End: 2023-11-22

## 2023-11-21 RX ORDER — NITROGLYCERIN 6.5 MG
1 CAPSULE, EXTENDED RELEASE ORAL
Refills: 0 | DISCHARGE

## 2023-11-21 RX ORDER — ACETAMINOPHEN 500 MG
1000 TABLET ORAL ONCE
Refills: 0 | Status: COMPLETED | OUTPATIENT
Start: 2023-11-21 | End: 2023-11-21

## 2023-11-21 RX ORDER — CALCIUM CARBONATE 500(1250)
1 TABLET ORAL DAILY
Refills: 0 | Status: DISCONTINUED | OUTPATIENT
Start: 2023-11-21 | End: 2023-11-22

## 2023-11-21 RX ORDER — COLCHICINE 0.6 MG
1 TABLET ORAL
Qty: 120 | Refills: 0
Start: 2023-11-21 | End: 2024-01-19

## 2023-11-21 RX ORDER — ONDANSETRON 8 MG/1
4 TABLET, FILM COATED ORAL ONCE
Refills: 0 | Status: COMPLETED | OUTPATIENT
Start: 2023-11-21 | End: 2023-11-21

## 2023-11-21 RX ORDER — ASPIRIN/CALCIUM CARB/MAGNESIUM 324 MG
81 TABLET ORAL DAILY
Refills: 0 | Status: DISCONTINUED | OUTPATIENT
Start: 2023-11-21 | End: 2023-11-22

## 2023-11-21 RX ORDER — ASPIRIN/CALCIUM CARB/MAGNESIUM 324 MG
81 TABLET ORAL EVERY 8 HOURS
Refills: 0 | Status: DISCONTINUED | OUTPATIENT
Start: 2023-11-21 | End: 2023-11-21

## 2023-11-21 RX ORDER — SODIUM CHLORIDE 9 MG/ML
500 INJECTION INTRAMUSCULAR; INTRAVENOUS; SUBCUTANEOUS
Refills: 0 | Status: DISCONTINUED | OUTPATIENT
Start: 2023-11-21 | End: 2023-11-22

## 2023-11-21 RX ADMIN — Medication 1000 MILLIGRAM(S): at 16:34

## 2023-11-21 RX ADMIN — Medication 325 MILLIGRAM(S): at 06:13

## 2023-11-21 RX ADMIN — ONDANSETRON 4 MILLIGRAM(S): 8 TABLET, FILM COATED ORAL at 16:46

## 2023-11-21 RX ADMIN — PANTOPRAZOLE SODIUM 40 MILLIGRAM(S): 20 TABLET, DELAYED RELEASE ORAL at 04:30

## 2023-11-21 RX ADMIN — RANOLAZINE 1000 MILLIGRAM(S): 500 TABLET, FILM COATED, EXTENDED RELEASE ORAL at 18:29

## 2023-11-21 RX ADMIN — LORATADINE 10 MILLIGRAM(S): 10 TABLET ORAL at 11:00

## 2023-11-21 RX ADMIN — CLOPIDOGREL BISULFATE 75 MILLIGRAM(S): 75 TABLET, FILM COATED ORAL at 11:00

## 2023-11-21 RX ADMIN — SODIUM CHLORIDE 75 MILLILITER(S): 9 INJECTION INTRAMUSCULAR; INTRAVENOUS; SUBCUTANEOUS at 23:29

## 2023-11-21 RX ADMIN — ISOSORBIDE MONONITRATE 30 MILLIGRAM(S): 60 TABLET, EXTENDED RELEASE ORAL at 11:01

## 2023-11-21 RX ADMIN — ENOXAPARIN SODIUM 30 MILLIGRAM(S): 100 INJECTION SUBCUTANEOUS at 10:51

## 2023-11-21 RX ADMIN — ONDANSETRON 4 MILLIGRAM(S): 8 TABLET, FILM COATED ORAL at 21:35

## 2023-11-21 RX ADMIN — Medication 1 TABLET(S): at 14:44

## 2023-11-21 RX ADMIN — AMLODIPINE BESYLATE 5 MILLIGRAM(S): 2.5 TABLET ORAL at 21:35

## 2023-11-21 RX ADMIN — Medication 0.6 MILLIGRAM(S): at 18:27

## 2023-11-21 RX ADMIN — Medication 0.6 MILLIGRAM(S): at 04:30

## 2023-11-21 RX ADMIN — RANOLAZINE 1000 MILLIGRAM(S): 500 TABLET, FILM COATED, EXTENDED RELEASE ORAL at 04:30

## 2023-11-21 RX ADMIN — Medication 650 MILLIGRAM(S): at 01:29

## 2023-11-21 RX ADMIN — Medication 650 MILLIGRAM(S): at 00:29

## 2023-11-21 RX ADMIN — ATORVASTATIN CALCIUM 20 MILLIGRAM(S): 80 TABLET, FILM COATED ORAL at 21:36

## 2023-11-21 RX ADMIN — Medication 400 MILLIGRAM(S): at 16:12

## 2023-11-21 RX ADMIN — ESCITALOPRAM OXALATE 5 MILLIGRAM(S): 10 TABLET, FILM COATED ORAL at 11:00

## 2023-11-21 NOTE — PROGRESS NOTE ADULT - PROBLEM/PLAN-5
Left message for patient to call and reschedule surgery.
Left message for patient to call back to reschedule surgery from 1/10/2023 due to Dr. Katty Nicholson being out of the office.
DISPLAY PLAN FREE TEXT

## 2023-11-21 NOTE — PROGRESS NOTE ADULT - SUBJECTIVE AND OBJECTIVE BOX
Patient is a 83y old  Female who presents with a chief complaint of Unstable Angina (20 Nov 2023 07:11)      INTERVAL HPI/OVERNIGHT EVENTS:          Vital Signs Last 24 Hrs  T(C): 36.6 (21 Nov 2023 10:39), Max: 36.9 (20 Nov 2023 14:00)  T(F): 97.9 (21 Nov 2023 10:39), Max: 98.5 (20 Nov 2023 17:29)  HR: 70 (21 Nov 2023 09:40) (60 - 80)  BP: 135/64 (21 Nov 2023 09:40) (99/57 - 160/79)  BP(mean): 92 (21 Nov 2023 09:40) (75 - 113)  RR: 18 (21 Nov 2023 09:40) (15 - 30)  SpO2: 100% (21 Nov 2023 09:40) (99% - 100%)    Parameters below as of 21 Nov 2023 09:40  Patient On (Oxygen Delivery Method): room air        11-20-23 @ 07:01  -  11-21-23 @ 07:00  --------------------------------------------------------  IN: 236 mL / OUT: 250 mL / NET: -14 mL    11-21-23 @ 07:01  -  11-21-23 @ 12:38  --------------------------------------------------------  IN: 240 mL / OUT: 0 mL / NET: 240 mL        PHYSICAL EXAM:  GENERAL: NAD, well-groomed, well-developed  HEAD:  Atraumatic, Normocephalic  EYES: EOMI, PERRLA, conjunctiva and sclera clear  ENMT: No tonsillar erythema, exudates, or enlargement; Moist mucous membranes, Good dentition, No lesions  NECK: Supple, No JVD, Normal thyroid  NERVOUS SYSTEM:  Alert & Oriented X3, Good concentration; Motor Strength 5/5 B/L upper and lower extremities; DTRs 2+ intact and symmetric  CHEST/LUNG: Clear to percussion bilaterally; No rales, rhonchi, wheezing, or rubs  HEART: Regular rate and rhythm; No murmurs, rubs, or gallops  ABDOMEN: Soft, Nontender, Nondistended; Bowel sounds present  EXTREMITIES:  2+ Peripheral Pulses, No clubbing, cyanosis, or edema  LYMPH: No lymphadenopathy noted  SKIN: No rashes or lesions    Consultant(s) Notes Reviewed:  [x ] YES  [ ] NO  Care Discussed with Consultants/Other Providers [ x] YES  [ ] NO    LABS:        RADIOLOGY & ADDITIONAL TESTS:    Imaging Personally Reviewed:  [ ] YES  [ ] NO  acetaminophen     Tablet .. 650 milliGRAM(s) Oral every 6 hours PRN  amLODIPine   Tablet 5 milliGRAM(s) Oral at bedtime  aspirin enteric coated 81 milliGRAM(s) Oral daily  atorvastatin 20 milliGRAM(s) Oral at bedtime  cloNIDine Patch 0.2 mG/24Hr(s) 1 patch Transdermal <User Schedule>  clopidogrel Tablet 75 milliGRAM(s) Oral daily  colchicine 0.6 milliGRAM(s) Oral two times a day  enoxaparin Injectable 30 milliGRAM(s) SubCutaneous every 24 hours  escitalopram 5 milliGRAM(s) Oral daily  isosorbide   mononitrate ER Tablet (IMDUR) 30 milliGRAM(s) Oral daily  loratadine 10 milliGRAM(s) Oral daily  melatonin 5 milliGRAM(s) Oral at bedtime  pantoprazole    Tablet 40 milliGRAM(s) Oral before breakfast  ranolazine 1000 milliGRAM(s) Oral two times a day      HEALTH ISSUES - PROBLEM Dx:  H/O unstable angina    HTN (hypertension)    HLD (hyperlipidemia)    Lacunar infarction    Prophylactic measure    Anxiety           Patient is a 83y old  Female who presents with a chief complaint of Unstable Angina (20 Nov 2023 07:11)      INTERVAL HPI/OVERNIGHT EVENTS: Patient expressed chest pain that was more severe overnight. She also had numbness/tingling in her feet bilaterally, which were exacerbated compared to at baseline prior to admission.    SUBJECTIVE: Patient seen and examined at bedside. Daughter is at bedside, and translates in Israeli. Patient expresses that chest pain persists, which she rates 7/10 and radiates to her right side. She notes that the chest pain waxes and wanes, and generally is worse overnight. She also has a headache.        Vital Signs Last 24 Hrs  T(C): 36.6 (21 Nov 2023 10:39), Max: 36.9 (20 Nov 2023 14:00)  T(F): 97.9 (21 Nov 2023 10:39), Max: 98.5 (20 Nov 2023 17:29)  HR: 70 (21 Nov 2023 09:40) (60 - 80)  BP: 135/64 (21 Nov 2023 09:40) (99/57 - 160/79)  BP(mean): 92 (21 Nov 2023 09:40) (75 - 113)  RR: 18 (21 Nov 2023 09:40) (15 - 30)  SpO2: 100% (21 Nov 2023 09:40) (99% - 100%)    Parameters below as of 21 Nov 2023 09:40  Patient On (Oxygen Delivery Method): room air        11-20-23 @ 07:01  -  11-21-23 @ 07:00  --------------------------------------------------------  IN: 236 mL / OUT: 250 mL / NET: -14 mL    11-21-23 @ 07:01  -  11-21-23 @ 12:38  --------------------------------------------------------  IN: 240 mL / OUT: 0 mL / NET: 240 mL        PHYSICAL EXAM:  GENERAL: NAD, well-groomed, well-developed  HEAD:  Atraumatic, Normocephalic  ENMT: Moist mucous membranes.  NERVOUS SYSTEM:  Alert & Oriented X3, No focal deficits.  CHEST/LUNG: Clear to percussion bilaterally; No rales, rhonchi, wheezing, or rubs  HEART: Regular rate and rhythm; No murmurs, rubs, or gallops  ABDOMEN: Soft, Nontender, Nondistended; Bowel sounds present  EXTREMITIES:  2+ Peripheral Pulses, No clubbing, cyanosis, or edema  SKIN: No rashes or lesions    Consultant(s) Notes Reviewed:  [x ] YES  [ ] NO  Care Discussed with Consultants/Other Providers [ x] YES  [ ] NO    LABS:                          12.8   6.26  )-----------( 286      ( 21 Nov 2023 12:35 )             38.1     11-21    137  |  103  |  14  ----------------------------<  121<H>  4.4   |  25  |  0.96    Ca    9.4      21 Nov 2023 12:35  Phos  2.7     11-21  Mg     2.2     11-21    TPro  7.7  /  Alb  4.0  /  TBili  0.4  /  DBili  x   /  AST  20  /  ALT  13  /  AlkPhos  108  11-21      RADIOLOGY & ADDITIONAL TESTS:    Imaging Personally Reviewed:  [ ] YES  [ ] NO  acetaminophen     Tablet .. 650 milliGRAM(s) Oral every 6 hours PRN  amLODIPine   Tablet 5 milliGRAM(s) Oral at bedtime  aspirin enteric coated 81 milliGRAM(s) Oral daily  atorvastatin 20 milliGRAM(s) Oral at bedtime  cloNIDine Patch 0.2 mG/24Hr(s) 1 patch Transdermal <User Schedule>  clopidogrel Tablet 75 milliGRAM(s) Oral daily  colchicine 0.6 milliGRAM(s) Oral two times a day  enoxaparin Injectable 30 milliGRAM(s) SubCutaneous every 24 hours  escitalopram 5 milliGRAM(s) Oral daily  isosorbide   mononitrate ER Tablet (IMDUR) 30 milliGRAM(s) Oral daily  loratadine 10 milliGRAM(s) Oral daily  melatonin 5 milliGRAM(s) Oral at bedtime  pantoprazole    Tablet 40 milliGRAM(s) Oral before breakfast  ranolazine 1000 milliGRAM(s) Oral two times a day      HEALTH ISSUES - PROBLEM Dx:  H/O unstable angina    HTN (hypertension)    HLD (hyperlipidemia)    Lacunar infarction    Prophylactic measure    Anxiety           Patient is a 83y old  Female who presents with a chief complaint of Unstable Angina (20 Nov 2023 07:11)      INTERVAL HPI/OVERNIGHT EVENTS: Patient expressed chest pain that was more severe overnight. She also had numbness/tingling in her feet bilaterally, which were exacerbated compared to at baseline prior to admission.    SUBJECTIVE: Patient seen and examined at bedside. Daughter is at bedside, and translates in Ugandan. Patient expresses that chest pain persists, which she rates 7/10 and radiates to her right side. She notes that the chest pain waxes and wanes, and generally is worse overnight. She also has a headache.        Vital Signs Last 24 Hrs  T(C): 36.6 (21 Nov 2023 10:39), Max: 36.9 (20 Nov 2023 14:00)  T(F): 97.9 (21 Nov 2023 10:39), Max: 98.5 (20 Nov 2023 17:29)  HR: 70 (21 Nov 2023 09:40) (60 - 80)  BP: 135/64 (21 Nov 2023 09:40) (99/57 - 160/79)  BP(mean): 92 (21 Nov 2023 09:40) (75 - 113)  RR: 18 (21 Nov 2023 09:40) (15 - 30)  SpO2: 100% (21 Nov 2023 09:40) (99% - 100%)    Parameters below as of 21 Nov 2023 09:40  Patient On (Oxygen Delivery Method): room air        11-20-23 @ 07:01  -  11-21-23 @ 07:00  --------------------------------------------------------  IN: 236 mL / OUT: 250 mL / NET: -14 mL    11-21-23 @ 07:01  -  11-21-23 @ 12:38  --------------------------------------------------------  IN: 240 mL / OUT: 0 mL / NET: 240 mL        PHYSICAL EXAM:  GENERAL: NAD, well-groomed, well-developed  HEAD:  Atraumatic, Normocephalic  ENMT: Moist mucous membranes.  NERVOUS SYSTEM:  Alert & Oriented X3, No focal deficits.  CHEST/LUNG: Clear to percussion bilaterally; No rales, rhonchi, wheezing, or rubs  HEART: Regular rate and rhythm; No murmurs, rubs, or gallops  ABDOMEN: Soft, Nondistended. +Tenderness to Deep palpation in epigastrium; Bowel sounds present  EXTREMITIES:  2+ Peripheral Pulses, No clubbing, cyanosis, or edema  SKIN: No rashes or lesions    Consultant(s) Notes Reviewed:  [x ] YES  [ ] NO  Care Discussed with Consultants/Other Providers [ x] YES  [ ] NO    LABS:                          12.8   6.26  )-----------( 286      ( 21 Nov 2023 12:35 )             38.1     11-21    137  |  103  |  14  ----------------------------<  121<H>  4.4   |  25  |  0.96    Ca    9.4      21 Nov 2023 12:35  Phos  2.7     11-21  Mg     2.2     11-21    TPro  7.7  /  Alb  4.0  /  TBili  0.4  /  DBili  x   /  AST  20  /  ALT  13  /  AlkPhos  108  11-21      RADIOLOGY & ADDITIONAL TESTS:    Imaging Personally Reviewed:  [ ] YES  [ ] NO  acetaminophen     Tablet .. 650 milliGRAM(s) Oral every 6 hours PRN  amLODIPine   Tablet 5 milliGRAM(s) Oral at bedtime  aspirin enteric coated 81 milliGRAM(s) Oral daily  atorvastatin 20 milliGRAM(s) Oral at bedtime  cloNIDine Patch 0.2 mG/24Hr(s) 1 patch Transdermal <User Schedule>  clopidogrel Tablet 75 milliGRAM(s) Oral daily  colchicine 0.6 milliGRAM(s) Oral two times a day  enoxaparin Injectable 30 milliGRAM(s) SubCutaneous every 24 hours  escitalopram 5 milliGRAM(s) Oral daily  isosorbide   mononitrate ER Tablet (IMDUR) 30 milliGRAM(s) Oral daily  loratadine 10 milliGRAM(s) Oral daily  melatonin 5 milliGRAM(s) Oral at bedtime  pantoprazole    Tablet 40 milliGRAM(s) Oral before breakfast  ranolazine 1000 milliGRAM(s) Oral two times a day      HEALTH ISSUES - PROBLEM Dx:  H/O unstable angina    HTN (hypertension)    HLD (hyperlipidemia)    Lacunar infarction    Prophylactic measure    Anxiety           Patient is a 83y old  Female who presents with a chief complaint of Unstable Angina (20 Nov 2023 07:11)      INTERVAL HPI/OVERNIGHT EVENTS: Patient expressed chest pain that was more severe overnight. She also had numbness/tingling in her feet bilaterally, which were exacerbated compared to at baseline prior to admission.    SUBJECTIVE: Patient seen and examined at bedside. Daughter is at bedside, and translates in Indonesian. Patient expresses that chest pain persists, which she rates 7/10 and radiates to her right side. She notes that the chest pain waxes and wanes, and generally is worse overnight. She also has a headache.        Vital Signs Last 24 Hrs  T(C): 36.6 (21 Nov 2023 10:39), Max: 36.9 (20 Nov 2023 14:00)  T(F): 97.9 (21 Nov 2023 10:39), Max: 98.5 (20 Nov 2023 17:29)  HR: 70 (21 Nov 2023 09:40) (60 - 80)  BP: 135/64 (21 Nov 2023 09:40) (99/57 - 160/79)  BP(mean): 92 (21 Nov 2023 09:40) (75 - 113)  RR: 18 (21 Nov 2023 09:40) (15 - 30)  SpO2: 100% (21 Nov 2023 09:40) (99% - 100%)    Parameters below as of 21 Nov 2023 09:40  Patient On (Oxygen Delivery Method): room air        11-20-23 @ 07:01  -  11-21-23 @ 07:00  --------------------------------------------------------  IN: 236 mL / OUT: 250 mL / NET: -14 mL    11-21-23 @ 07:01  -  11-21-23 @ 12:38  --------------------------------------------------------  IN: 240 mL / OUT: 0 mL / NET: 240 mL        PHYSICAL EXAM:  GENERAL: Appears visibly uncomfortable from CP/abdominal pain, well-groomed, well-developed  HEAD:  Atraumatic, Normocephalic  ENMT: Moist mucous membranes.  NERVOUS SYSTEM:  Alert & Oriented X3, No focal deficits.  CHEST/LUNG: Clear to percussion bilaterally; No rales, rhonchi, wheezing, or rubs  HEART: Regular rate and rhythm; No murmurs, rubs, or gallops  ABDOMEN: Soft, Nondistended. +Tenderness to Deep palpation in epigastrium; Bowel sounds present  EXTREMITIES:  2+ Peripheral Pulses, No clubbing, cyanosis, or edema  SKIN: No rashes or lesions    Consultant(s) Notes Reviewed:  [x ] YES  [ ] NO  Care Discussed with Consultants/Other Providers [ x] YES  [ ] NO    LABS:                          12.8   6.26  )-----------( 286      ( 21 Nov 2023 12:35 )             38.1     11-21    137  |  103  |  14  ----------------------------<  121<H>  4.4   |  25  |  0.96    Ca    9.4      21 Nov 2023 12:35  Phos  2.7     11-21  Mg     2.2     11-21    TPro  7.7  /  Alb  4.0  /  TBili  0.4  /  DBili  x   /  AST  20  /  ALT  13  /  AlkPhos  108  11-21      RADIOLOGY & ADDITIONAL TESTS:    Imaging Personally Reviewed:  [ ] YES  [ ] NO  acetaminophen     Tablet .. 650 milliGRAM(s) Oral every 6 hours PRN  amLODIPine   Tablet 5 milliGRAM(s) Oral at bedtime  aspirin enteric coated 81 milliGRAM(s) Oral daily  atorvastatin 20 milliGRAM(s) Oral at bedtime  cloNIDine Patch 0.2 mG/24Hr(s) 1 patch Transdermal <User Schedule>  clopidogrel Tablet 75 milliGRAM(s) Oral daily  colchicine 0.6 milliGRAM(s) Oral two times a day  enoxaparin Injectable 30 milliGRAM(s) SubCutaneous every 24 hours  escitalopram 5 milliGRAM(s) Oral daily  isosorbide   mononitrate ER Tablet (IMDUR) 30 milliGRAM(s) Oral daily  loratadine 10 milliGRAM(s) Oral daily  melatonin 5 milliGRAM(s) Oral at bedtime  pantoprazole    Tablet 40 milliGRAM(s) Oral before breakfast  ranolazine 1000 milliGRAM(s) Oral two times a day      HEALTH ISSUES - PROBLEM Dx:  H/O unstable angina    HTN (hypertension)    HLD (hyperlipidemia)    Lacunar infarction    Prophylactic measure    Anxiety

## 2023-11-21 NOTE — PROGRESS NOTE ADULT - ASSESSMENT
83F, Iranian speaking, (ASA Allergy, desensitized to ASA 325mg 11/10/23), PMHx of HTN, HLD,  CAD s/p s/p LHC 11/12/23:  GULSHAN x2 pLAD, RCA MLI, Gastritis (s/p EGD 8/2023),  h/o chronic lacunar infarcts, chronic HAs and Anxiety admitted to cardiac telemetry  to R/O ACS/Pericarditis in the setting of unstable angina.  83F, [place]araguan speaking, (ASA Allergy, desensitized to ASA 325mg 11/10/23), PMHx of HTN, HLD,  CAD s/p s/p LHC 11/12/23:  GULSHAN x2 pLAD, RCA MLI, Gastritis (s/p EGD 8/2023),  h/o chronic lacunar infarcts, chronic HAs and Anxiety admitted to cardiac telemetry  to R/O ACS/Pericarditis in the setting of unstable angina.  83F, Venezuelan speaking, (ASA Allergy, desensitized to ASA 325mg 11/10/23), PMHx of HTN, HLD,  CAD s/p s/p LHC 11/12/23:  GULSHAN x2 pLAD, RCA MLI, Gastritis (s/p EGD 8/2023),  h/o chronic lacunar infarcts, chronic HAs and Anxiety admitted to cardiac telemetry to R/O ACS/Pericarditis in the setting of unstable angina.

## 2023-11-21 NOTE — PROGRESS NOTE ADULT - PROBLEM SELECTOR PLAN 3
-Home med:  Pitavastatin 4mg qd at home  -TIC Lipitor 20mg QHS. -Home med:  Pitavastatin 4mg qd at home    -c/w Lipitor 20mg QHS.

## 2023-11-21 NOTE — PROGRESS NOTE ADULT - PROBLEM SELECTOR PLAN 2
SBP 160s  - Home med: Olmesartan 40mg QD, Amlodipine 5mg QD Imdur 30mg QD, Clonidine 0.2mg weekly PRN if SBP >160  - Omesartan TIC to Losartan 100mg QD, c/w  Imdur 30mg qd, Amlodipine 5mg QD and Clonidine PRN -140s/60-70s  - Home meds: Olmesartan 40mg QD, Amlodipine 5mg QD Imdur 30mg QD, Clonidine 0.2mg weekly PRN if SBP >160  Losartan     - Losartan 100mg QD, c/w  Imdur 30mg qd, Amlodipine 5mg QD and Clonidine PRN -140s/60-70s  - Home meds: Olmesartan 40mg QD, Amlodipine 5mg QD Imdur 30mg QD, Clonidine 0.2mg weekly PRN if SBP >160  Losartan 225 mg given 11/20. Cr wnl.     - Hold Losartan 100mg QD dose given above.  - c/w Imdur 30mg qd  - c/w Amlodipine 5mg QD   - c/w Clonidine  0.2 mg transdermal patch PRN

## 2023-11-21 NOTE — PROGRESS NOTE ADULT - PROBLEM SELECTOR PLAN 1
Presents with chronic anginal symptoms, improved w/tylenol  - Currently w/ 8/10 CP, reproducible,   - hsTrop T neg x 1,  F/U Patel @ 10AM, ESR/CRP  - EKG: NSR, isolated TWI V1, otherwise non-ischemic   - CXR with clear lungs  - TTE 11/9/23:  LVEF 60-65%, normal LV/RF systolic function, no significant valvular disease  - CCTA 11/9/23: calcium score moderate at 384 Agatston units. Severe stenosis in mid LAD, LAD FFR: 0.79 for proximal LAD and  0.69 for mid LAD. Moderate stenosis in mid RCA and proximal LAD.  - S/p ASA 325mg desensitization in CCU 11/10/23  iso ASA allergy (Rx angioedema)  - S/p Cardiac Cath 11/11/23 w/ Dr Gregory: GULSHAN x 2 pLAD, RCA mild dz, EDP 5. R radial access w/o complications  Plan:   - c/w Plavix 75mg QD, Lipitor 20mg Q HS,  Imdur 30mg QD, Amlodipine 5mg QD  - R/O Pericarditis: TTE, increased to ASA 325mg q8h (can increase to 650mg q8h per clinical   pharmacist)  - Started on Colchicine 0.6mg BID, Ranexa 1000mg  BID Low suspicion for post-catheterization carditis, given minimal improvement with Colcichine and TTE unremarkable.  Etiology includes CAD (cardiac causes) and cholecystitis, costochondritis, GERD (non-cardiac causes) given CP radiates to RUQ of abdomen.  - hsTrop T neg x 1,  F/U Patel @ 10AM, ESR/CRP  - EKG: NSR, isolated TWI V1, otherwise non-ischemic   - CXR with clear lungs  - S/p Cardiac Cath 11/11/23 w/ Dr Gregory: GULSHAN x 2 pLAD, RCA mild dz, EDP 5. R radial access w/o complications  - TTE (11/20/23): Normal left and right ventricular size and systolic function. Mild symmetric left ventricular hypertrophy. No significant valvular disease.    PLAN:  - c/w Plavix 75mg QD  - c/w Lipitor 20mg Q HS  - c/w Imdur 30mg QD  - c/w Amlodipine 5mg QD  - Decrease Aspirin to 81mg QD   - c/w Colchicine 0.6mg BID  - c/w Ranexa 1000mg  BID  - Nuclear stress test 11/22, NPO after MN  - Pain control: Tylenol 650 mg Q6 PRN  - f/u US Abdomen

## 2023-11-21 NOTE — PROGRESS NOTE ADULT - PROBLEM SELECTOR PLAN 5
DVT:  LSQ    F: NS/None  E: K<4, Mg<2  N: DASH/TLC    C: FULL CODE    Dispo: Pending clinical progression    Case discussed with Dr Briones DVT:  LSQ  F: NS/None  E: K<4, Mg<2  N: DASH/TLC, NPO after MN for impending cardiac stress test  C: FULL CODE  Dispo: Pending clinical progression

## 2023-11-21 NOTE — PROGRESS NOTE ADULT - ATTENDING COMMENTS
83F Sri Lankan speaking, (ASA Allergy, desensitized to ASA 325mg 11/10/23), PMHx of HTN, HLD,  CAD s/p s/p C 11/12/23:  GULSHAN x2 pLAD, RCA MLI, Gastritis (s/p EGD 8/2023), h/o chronic lacunar infarcts, chronic HAs and Anxiety, presented to [facility] ED today with complaints of similar CP since last cath procedure.         1. Atypical chest pain  Echo without pericardial effusion, normal LV wall motion. No improvement on colchicine and high dose aspirin. Reduce aspirin to 81 mg as she is complaining of abd discomfort.  Nuclear stress test in AM to rule out cad.  US abd to rule out cholecystitis.    2. HTN  Unfortunately patient received 225 mg of losartan inadvertently - no major side effects, Cr unchanged. Patient,  and daughter informed of wrong dose given, appreciated transparency.

## 2023-11-22 ENCOUNTER — TRANSCRIPTION ENCOUNTER (OUTPATIENT)
Age: 83
End: 2023-11-22

## 2023-11-22 VITALS
RESPIRATION RATE: 29 BRPM | SYSTOLIC BLOOD PRESSURE: 154 MMHG | OXYGEN SATURATION: 92 % | DIASTOLIC BLOOD PRESSURE: 70 MMHG | HEART RATE: 67 BPM

## 2023-11-22 LAB
ALBUMIN SERPL ELPH-MCNC: 3.7 G/DL — SIGNIFICANT CHANGE UP (ref 3.3–5)
ALBUMIN SERPL ELPH-MCNC: 3.7 G/DL — SIGNIFICANT CHANGE UP (ref 3.3–5)
ALP SERPL-CCNC: 113 U/L — SIGNIFICANT CHANGE UP (ref 40–120)
ALP SERPL-CCNC: 113 U/L — SIGNIFICANT CHANGE UP (ref 40–120)
ALT FLD-CCNC: 38 U/L — SIGNIFICANT CHANGE UP (ref 10–45)
ALT FLD-CCNC: 38 U/L — SIGNIFICANT CHANGE UP (ref 10–45)
ANION GAP SERPL CALC-SCNC: 9 MMOL/L — SIGNIFICANT CHANGE UP (ref 5–17)
ANION GAP SERPL CALC-SCNC: 9 MMOL/L — SIGNIFICANT CHANGE UP (ref 5–17)
AST SERPL-CCNC: 55 U/L — HIGH (ref 10–40)
AST SERPL-CCNC: 55 U/L — HIGH (ref 10–40)
BILIRUB SERPL-MCNC: 0.6 MG/DL — SIGNIFICANT CHANGE UP (ref 0.2–1.2)
BILIRUB SERPL-MCNC: 0.6 MG/DL — SIGNIFICANT CHANGE UP (ref 0.2–1.2)
BUN SERPL-MCNC: 15 MG/DL — SIGNIFICANT CHANGE UP (ref 7–23)
BUN SERPL-MCNC: 15 MG/DL — SIGNIFICANT CHANGE UP (ref 7–23)
CALCIUM SERPL-MCNC: 9.2 MG/DL — SIGNIFICANT CHANGE UP (ref 8.4–10.5)
CALCIUM SERPL-MCNC: 9.2 MG/DL — SIGNIFICANT CHANGE UP (ref 8.4–10.5)
CHLORIDE SERPL-SCNC: 99 MMOL/L — SIGNIFICANT CHANGE UP (ref 96–108)
CHLORIDE SERPL-SCNC: 99 MMOL/L — SIGNIFICANT CHANGE UP (ref 96–108)
CO2 SERPL-SCNC: 22 MMOL/L — SIGNIFICANT CHANGE UP (ref 22–31)
CO2 SERPL-SCNC: 22 MMOL/L — SIGNIFICANT CHANGE UP (ref 22–31)
CREAT SERPL-MCNC: 0.93 MG/DL — SIGNIFICANT CHANGE UP (ref 0.5–1.3)
CREAT SERPL-MCNC: 0.93 MG/DL — SIGNIFICANT CHANGE UP (ref 0.5–1.3)
EGFR: 61 ML/MIN/1.73M2 — SIGNIFICANT CHANGE UP
EGFR: 61 ML/MIN/1.73M2 — SIGNIFICANT CHANGE UP
GI PCR PANEL: SIGNIFICANT CHANGE UP
GI PCR PANEL: SIGNIFICANT CHANGE UP
GLUCOSE SERPL-MCNC: 108 MG/DL — HIGH (ref 70–99)
GLUCOSE SERPL-MCNC: 108 MG/DL — HIGH (ref 70–99)
HCT VFR BLD CALC: 40.1 % — SIGNIFICANT CHANGE UP (ref 34.5–45)
HCT VFR BLD CALC: 40.1 % — SIGNIFICANT CHANGE UP (ref 34.5–45)
HGB BLD-MCNC: 13.3 G/DL — SIGNIFICANT CHANGE UP (ref 11.5–15.5)
HGB BLD-MCNC: 13.3 G/DL — SIGNIFICANT CHANGE UP (ref 11.5–15.5)
MAGNESIUM SERPL-MCNC: 2 MG/DL — SIGNIFICANT CHANGE UP (ref 1.6–2.6)
MAGNESIUM SERPL-MCNC: 2 MG/DL — SIGNIFICANT CHANGE UP (ref 1.6–2.6)
MCHC RBC-ENTMCNC: 29.2 PG — SIGNIFICANT CHANGE UP (ref 27–34)
MCHC RBC-ENTMCNC: 29.2 PG — SIGNIFICANT CHANGE UP (ref 27–34)
MCHC RBC-ENTMCNC: 33.2 GM/DL — SIGNIFICANT CHANGE UP (ref 32–36)
MCHC RBC-ENTMCNC: 33.2 GM/DL — SIGNIFICANT CHANGE UP (ref 32–36)
MCV RBC AUTO: 88.1 FL — SIGNIFICANT CHANGE UP (ref 80–100)
MCV RBC AUTO: 88.1 FL — SIGNIFICANT CHANGE UP (ref 80–100)
NRBC # BLD: 0 /100 WBCS — SIGNIFICANT CHANGE UP (ref 0–0)
NRBC # BLD: 0 /100 WBCS — SIGNIFICANT CHANGE UP (ref 0–0)
PHOSPHATE SERPL-MCNC: 3.2 MG/DL — SIGNIFICANT CHANGE UP (ref 2.5–4.5)
PHOSPHATE SERPL-MCNC: 3.2 MG/DL — SIGNIFICANT CHANGE UP (ref 2.5–4.5)
PLATELET # BLD AUTO: 268 K/UL — SIGNIFICANT CHANGE UP (ref 150–400)
PLATELET # BLD AUTO: 268 K/UL — SIGNIFICANT CHANGE UP (ref 150–400)
POTASSIUM SERPL-MCNC: 4.5 MMOL/L — SIGNIFICANT CHANGE UP (ref 3.5–5.3)
POTASSIUM SERPL-MCNC: 4.5 MMOL/L — SIGNIFICANT CHANGE UP (ref 3.5–5.3)
POTASSIUM SERPL-SCNC: 4.5 MMOL/L — SIGNIFICANT CHANGE UP (ref 3.5–5.3)
POTASSIUM SERPL-SCNC: 4.5 MMOL/L — SIGNIFICANT CHANGE UP (ref 3.5–5.3)
PROT SERPL-MCNC: 7.1 G/DL — SIGNIFICANT CHANGE UP (ref 6–8.3)
PROT SERPL-MCNC: 7.1 G/DL — SIGNIFICANT CHANGE UP (ref 6–8.3)
RBC # BLD: 4.55 M/UL — SIGNIFICANT CHANGE UP (ref 3.8–5.2)
RBC # BLD: 4.55 M/UL — SIGNIFICANT CHANGE UP (ref 3.8–5.2)
RBC # FLD: 11.9 % — SIGNIFICANT CHANGE UP (ref 10.3–14.5)
RBC # FLD: 11.9 % — SIGNIFICANT CHANGE UP (ref 10.3–14.5)
SODIUM SERPL-SCNC: 130 MMOL/L — LOW (ref 135–145)
SODIUM SERPL-SCNC: 130 MMOL/L — LOW (ref 135–145)
WBC # BLD: 9.71 K/UL — SIGNIFICANT CHANGE UP (ref 3.8–10.5)
WBC # BLD: 9.71 K/UL — SIGNIFICANT CHANGE UP (ref 3.8–10.5)
WBC # FLD AUTO: 9.71 K/UL — SIGNIFICANT CHANGE UP (ref 3.8–10.5)
WBC # FLD AUTO: 9.71 K/UL — SIGNIFICANT CHANGE UP (ref 3.8–10.5)

## 2023-11-22 PROCEDURE — 71045 X-RAY EXAM CHEST 1 VIEW: CPT

## 2023-11-22 PROCEDURE — 84100 ASSAY OF PHOSPHORUS: CPT

## 2023-11-22 PROCEDURE — 83036 HEMOGLOBIN GLYCOSYLATED A1C: CPT

## 2023-11-22 PROCEDURE — 83690 ASSAY OF LIPASE: CPT

## 2023-11-22 PROCEDURE — 85025 COMPLETE CBC W/AUTO DIFF WBC: CPT

## 2023-11-22 PROCEDURE — 74176 CT ABD & PELVIS W/O CONTRAST: CPT

## 2023-11-22 PROCEDURE — 78452 HT MUSCLE IMAGE SPECT MULT: CPT

## 2023-11-22 PROCEDURE — 85652 RBC SED RATE AUTOMATED: CPT

## 2023-11-22 PROCEDURE — 87507 IADNA-DNA/RNA PROBE TQ 12-25: CPT

## 2023-11-22 PROCEDURE — 36415 COLL VENOUS BLD VENIPUNCTURE: CPT

## 2023-11-22 PROCEDURE — 93306 TTE W/DOPPLER COMPLETE: CPT

## 2023-11-22 PROCEDURE — 99239 HOSP IP/OBS DSCHRG MGMT >30: CPT

## 2023-11-22 PROCEDURE — 82550 ASSAY OF CK (CPK): CPT

## 2023-11-22 PROCEDURE — 83735 ASSAY OF MAGNESIUM: CPT

## 2023-11-22 PROCEDURE — 86140 C-REACTIVE PROTEIN: CPT

## 2023-11-22 PROCEDURE — 78452 HT MUSCLE IMAGE SPECT MULT: CPT | Mod: 26

## 2023-11-22 PROCEDURE — 82150 ASSAY OF AMYLASE: CPT

## 2023-11-22 PROCEDURE — 84443 ASSAY THYROID STIM HORMONE: CPT

## 2023-11-22 PROCEDURE — A9500: CPT

## 2023-11-22 PROCEDURE — 93017 CV STRESS TEST TRACING ONLY: CPT

## 2023-11-22 PROCEDURE — 96374 THER/PROPH/DIAG INJ IV PUSH: CPT

## 2023-11-22 PROCEDURE — 74176 CT ABD & PELVIS W/O CONTRAST: CPT | Mod: 26

## 2023-11-22 PROCEDURE — 85027 COMPLETE CBC AUTOMATED: CPT

## 2023-11-22 PROCEDURE — 80053 COMPREHEN METABOLIC PANEL: CPT

## 2023-11-22 PROCEDURE — 80061 LIPID PANEL: CPT

## 2023-11-22 PROCEDURE — 83880 ASSAY OF NATRIURETIC PEPTIDE: CPT

## 2023-11-22 PROCEDURE — 93005 ELECTROCARDIOGRAM TRACING: CPT

## 2023-11-22 PROCEDURE — 76705 ECHO EXAM OF ABDOMEN: CPT

## 2023-11-22 PROCEDURE — 82553 CREATINE MB FRACTION: CPT

## 2023-11-22 PROCEDURE — 99285 EMERGENCY DEPT VISIT HI MDM: CPT | Mod: 25

## 2023-11-22 PROCEDURE — 84484 ASSAY OF TROPONIN QUANT: CPT

## 2023-11-22 PROCEDURE — 0225U NFCT DS DNA&RNA 21 SARSCOV2: CPT

## 2023-11-22 RX ORDER — MORPHINE SULFATE 50 MG/1
1 CAPSULE, EXTENDED RELEASE ORAL ONCE
Refills: 0 | Status: DISCONTINUED | OUTPATIENT
Start: 2023-11-22 | End: 2023-11-22

## 2023-11-22 RX ORDER — ACETAMINOPHEN 500 MG
1000 TABLET ORAL ONCE
Refills: 0 | Status: COMPLETED | OUTPATIENT
Start: 2023-11-22 | End: 2023-11-22

## 2023-11-22 RX ADMIN — CLOPIDOGREL BISULFATE 75 MILLIGRAM(S): 75 TABLET, FILM COATED ORAL at 14:47

## 2023-11-22 RX ADMIN — MORPHINE SULFATE 1 MILLIGRAM(S): 50 CAPSULE, EXTENDED RELEASE ORAL at 01:03

## 2023-11-22 RX ADMIN — Medication 81 MILLIGRAM(S): at 14:46

## 2023-11-22 RX ADMIN — ESCITALOPRAM OXALATE 5 MILLIGRAM(S): 10 TABLET, FILM COATED ORAL at 14:46

## 2023-11-22 RX ADMIN — LORATADINE 10 MILLIGRAM(S): 10 TABLET ORAL at 14:47

## 2023-11-22 RX ADMIN — ISOSORBIDE MONONITRATE 30 MILLIGRAM(S): 60 TABLET, EXTENDED RELEASE ORAL at 14:46

## 2023-11-22 RX ADMIN — MORPHINE SULFATE 1 MILLIGRAM(S): 50 CAPSULE, EXTENDED RELEASE ORAL at 00:48

## 2023-11-22 RX ADMIN — Medication 1 TABLET(S): at 14:47

## 2023-11-22 RX ADMIN — PANTOPRAZOLE SODIUM 40 MILLIGRAM(S): 20 TABLET, DELAYED RELEASE ORAL at 06:04

## 2023-11-22 RX ADMIN — RANOLAZINE 1000 MILLIGRAM(S): 500 TABLET, FILM COATED, EXTENDED RELEASE ORAL at 06:04

## 2023-11-22 NOTE — DISCHARGE NOTE PROVIDER - HOSPITAL COURSE
#Discharge: do not delete    83F, Guamanian speaking, (ASA Allergy, desensitized to ASA 325mg 11/10/23), PMHx of HTN, HLD,  CAD s/p s/p C 11/12/23:  GULSHAN x2 pLAD, RCA MLI, Gastritis (s/p EGD 8/2023),  h/o chronic lacunar infarcts, chronic HAs and Anxiety admitted to cardiac telemetry to R/O ACS/Pericarditis in the setting of unstable angina.     Hospital course (by problem):     #Atypical chest pain.   Low suspicion for post-catheterization carditis, given minimal improvement with Colcichine and TTE unremarkable.  CP radiates to Right side of abdomen. Nuclear stress test ordered, per results *****.   - hsTrop T neg x 1,  F/U Patel @ 10AM, ESR/CRP  - EKG: NSR, isolated TWI V1, otherwise non-ischemic   - CXR with clear lungs  - S/p Cardiac Cath 11/11/23 w/ Dr Gregory: GULSHAN x 2 pLAD, RCA mild dz, EDP 5. R radial access w/o complications  - TTE (11/20/23): Normal left and right ventricular size and systolic function. Mild symmetric left ventricular hypertrophy. No significant valvular disease.  PLAN:  - c/w Plavix 75mg QD  - c/w Lipitor 20mg Q HS  - c/w Imdur 30mg QD  - c/w Amlodipine 5mg QD  - Decrease Aspirin to 81mg QD   - Discontinued Colchicine 0.6mg BID  - c/w Ranexa 1000mg  BID  - Pain control: Tylenol 650 mg Q6 PRN  - f/u US Abdomen.    #Abdominal pain  Unclear etiology, may be in the setting of Colcichine use. RVP negative, GI PCR negative. US Abd and CT A/P negative. Lipase mildly elevated at 62. Discontinued Colcichine, which was initiated to treat possible post-catheter carditis.   - Pain control: Tylenol 650 mg Q6 PRN  - Gentle hydration 2L NS     #HTN (hypertension).   -140s/60-70s  - Home meds: Olmesartan 40mg QD, Amlodipine 5mg QD Imdur 30mg QD, Clonidine 0.2mg weekly PRN if SBP >160  Losartan 225 mg inadvertently given 11/20. Cr wnl.   PLAN:  - Hold Losartan 100mg QD dose given above.  - c/w Imdur 30mg qd  - c/w Amlodipine 5mg QD   - c/w Clonidine  0.2 mg transdermal patch PRN.    #HLD (hyperlipidemia).    -Home med:  Pitavastatin 4mg qd at home  PLAN:  -c/w Lipitor 20mg QHS.    #Anxiety.   - c/w Home med Lexapro 5mg QD.      Patient was discharged to: Home    New medications: None  Changes to old medications: None  Medications that were stopped: Losartan    Items to follow up as outpatient: Please follow up with Cardiologist outpatient.  Labs to be followed outpatient: None  Exam to be followed outpatient: None    Physical exam at the time of discharge:  GENERAL: No acute distress, well-groomed, well-developed, resting comfortably at bedside.  HEAD:  Atraumatic, Normocephalic  ENMT: Moist mucous membranes.  NERVOUS SYSTEM:  Alert & Oriented X3, No focal deficits.  CHEST/LUNG: Clear to percussion bilaterally; No rales, rhonchi, wheezing, or rubs  HEART: Regular rate and rhythm; No murmurs, rubs, or gallops  ABDOMEN: Soft, Nondistended. +Diffuse TTP. Negative Graham's sign; Bowel sounds present  EXTREMITIES: No clubbing, cyanosis, or edema  SKIN: No rashes or lesions #Discharge: do not delete    83F, Sierra Leonean speaking, (ASA Allergy, desensitized to ASA 325mg 11/10/23), PMHx of HTN, HLD,  CAD s/p s/p C 11/12/23:  GULSHAN x2 pLAD, RCA MLI, Gastritis (s/p EGD 8/2023),  h/o chronic lacunar infarcts, chronic HAs and Anxiety admitted to cardiac telemetry to R/O ACS/Pericarditis in the setting of unstable angina.     Hospital course (by problem):     #Atypical chest pain.   Low suspicion for post-catheterization carditis, given minimal improvement with Colcichine and TTE unremarkable.  CP radiates to Right side of abdomen. Nuclear stress test ordered, per results *****.   - hsTrop T neg x 1,  F/U Patel @ 10AM, ESR/CRP  - EKG: NSR, isolated TWI V1, otherwise non-ischemic   - CXR with clear lungs  - S/p Cardiac Cath 11/11/23 w/ Dr Gregory: GULSHAN x 2 pLAD, RCA mild dz, EDP 5. R radial access w/o complications  - TTE (11/20/23): Normal left and right ventricular size and systolic function. Mild symmetric left ventricular hypertrophy. No significant valvular disease.  PLAN:  - c/w Plavix 75mg QD  - c/w Lipitor 20mg Q HS  - c/w Imdur 30mg QD  - c/w Amlodipine 5mg QD  - Decrease Aspirin to 81mg QD   - Discontinued Colchicine 0.6mg BID  - c/w Ranexa 1000mg  BID  - Pain control: Tylenol 650 mg Q6 PRN  - f/u US Abdomen.    #Abdominal pain  Unclear etiology, may be in the setting of Colcichine use. RVP negative, GI PCR negative. US Abd ordered, and CT A/P negative. Lipase mildly elevated at 62. Discontinued Colcichine, which was initiated to treat possible post-catheter carditis.   - Pain control: Tylenol 650 mg Q6 PRN  - Gentle hydration 2L NS     #HTN (hypertension).   -140s/60-70s  - Home meds: Olmesartan 40mg QD, Amlodipine 5mg QD Imdur 30mg QD, Clonidine 0.2mg weekly PRN if SBP >160  Losartan 225 mg inadvertently given 11/20. Cr wnl.   PLAN:  - Hold Losartan 100mg QD dose given above.  - c/w Imdur 30mg qd  - c/w Amlodipine 5mg QD   - c/w Clonidine  0.2 mg transdermal patch PRN.    #HLD (hyperlipidemia).    -Home med:  Pitavastatin 4mg qd at home  PLAN:  -c/w Lipitor 20mg QHS.    #Anxiety.   - c/w Home med Lexapro 5mg QD.      Patient was discharged to: Home    New medications: None  Changes to old medications: None  Medications that were stopped: Losartan    Items to follow up as outpatient: Please follow up with Cardiologist outpatient.  Labs to be followed outpatient: None  Exam to be followed outpatient: None    Physical exam at the time of discharge:  GENERAL: No acute distress, well-groomed, well-developed, resting comfortably at bedside.  HEAD:  Atraumatic, Normocephalic  ENMT: Moist mucous membranes.  NERVOUS SYSTEM:  Alert & Oriented X3, No focal deficits.  CHEST/LUNG: Clear to percussion bilaterally; No rales, rhonchi, wheezing, or rubs  HEART: Regular rate and rhythm; No murmurs, rubs, or gallops  ABDOMEN: Soft, Nondistended. +Diffuse TTP. Negative Graham's sign; Bowel sounds present  EXTREMITIES: No clubbing, cyanosis, or edema  SKIN: No rashes or lesions #Discharge: do not delete    83F, Filipino speaking, (ASA Allergy, desensitized to ASA 325mg 11/10/23), PMHx of HTN, HLD,  CAD s/p s/p C 11/12/23:  GULSHAN x2 pLAD, RCA MLI, Gastritis (s/p EGD 8/2023),  h/o chronic lacunar infarcts, chronic HAs and Anxiety admitted to cardiac telemetry to R/O ACS/Pericarditis in the setting of unstable angina.     Hospital course (by problem):     #Atypical chest pain.   Low suspicion for post-catheterization carditis, given minimal improvement with Colcichine and TTE unremarkable.  CP radiates to Right side of abdomen. Nuclear stress test ordered, which was negative.   - hsTrop T neg x 1,  F/U Patel @ 10AM, ESR/CRP  - EKG: NSR, isolated TWI V1, otherwise non-ischemic   - CXR with clear lungs  - S/p Cardiac Cath 11/11/23 w/ Dr Gregory: GULSHAN x 2 pLAD, RCA mild dz, EDP 5. R radial access w/o complications  - TTE (11/20/23): Normal left and right ventricular size and systolic function. Mild symmetric left ventricular hypertrophy. No significant valvular disease.  PLAN:  - c/w Plavix 75mg QD  - c/w Lipitor 20mg Q HS  - c/w Imdur 30mg QD  - c/w Amlodipine 5mg QD  - Decrease Aspirin to 81mg QD   - Discontinued Colchicine 0.6mg BID  - c/w Ranexa 1000mg  BID  - Pain control: Tylenol 650 mg Q6 PRN  - f/u US Abdomen.    #Abdominal pain  Unclear etiology, may be in the setting of Colcichine use. RVP negative, GI PCR negative. US Abd ordered, and CT A/P negative. Lipase mildly elevated at 62. Discontinued Colcichine, which was initiated to treat possible post-catheter carditis.   - Pain control: Tylenol 650 mg Q6 PRN  - Gentle hydration 2L NS     #HTN (hypertension).   -140s/60-70s  - Home meds: Olmesartan 40mg QD, Amlodipine 5mg QD Imdur 30mg QD, Clonidine 0.2mg weekly PRN if SBP >160  Losartan 225 mg inadvertently given 11/20. Cr wnl.   PLAN:  - Hold Losartan 100mg QD dose given above.  - c/w Imdur 30mg qd  - c/w Amlodipine 5mg QD   - c/w Clonidine  0.2 mg transdermal patch PRN.    #HLD (hyperlipidemia).    -Home med:  Pitavastatin 4mg qd at home  PLAN:  -c/w Lipitor 20mg QHS.    #Anxiety.   - c/w Home med Lexapro 5mg QD.      Patient was discharged to: Home    New medications: None  Changes to old medications: None  Medications that were stopped: Losartan    Items to follow up as outpatient: Please follow up with Cardiologist outpatient.  Labs to be followed outpatient: None  Exam to be followed outpatient: None    Physical exam at the time of discharge:  GENERAL: No acute distress, well-groomed, well-developed, resting comfortably at bedside.  HEAD:  Atraumatic, Normocephalic  ENMT: Moist mucous membranes.  NERVOUS SYSTEM:  Alert & Oriented X3, No focal deficits.  CHEST/LUNG: Clear to percussion bilaterally; No rales, rhonchi, wheezing, or rubs  HEART: Regular rate and rhythm; No murmurs, rubs, or gallops  ABDOMEN: Soft, Nondistended. +Diffuse TTP. Negative Graham's sign; Bowel sounds present  EXTREMITIES: No clubbing, cyanosis, or edema  SKIN: No rashes or lesions

## 2023-11-22 NOTE — DISCHARGE NOTE PROVIDER - NSDCCPCAREPLAN_GEN_ALL_CORE_FT
PRINCIPAL DISCHARGE DIAGNOSIS  Diagnosis: Chest pain  Assessment and Plan of Treatment:       SECONDARY DISCHARGE DIAGNOSES  Diagnosis: Abdominal pain  Assessment and Plan of Treatment: You had abdominal pain, and we ran several tests to find a possible cause. We looked for a possible infection in your body, and those results came back negative. We also got scans of your abdomen done (an ultrasound and CT scan) both of which were negative. We think the abdominal pain and diarrhea may have been due to a side effect of a medication you were started on for your heart Colcichine. We discontinued this medication, and gave you some hydration through IV fluids and pain medication. Please follow up with your outpatient PCP and cardiologist.     PRINCIPAL DISCHARGE DIAGNOSIS  Diagnosis: Chest pain  Assessment and Plan of Treatment:       SECONDARY DISCHARGE DIAGNOSES  Diagnosis: Abdominal pain  Assessment and Plan of Treatment: You had abdominal pain, and we ran several tests to find a possible cause. We looked for a possible infection in your body, and those results came back negative. We also got scans of your abdomen done (a CT scan) which was negative. We think the abdominal pain and diarrhea may have been due to a side effect of a medication you were started on for your heart Colcichine. We discontinued this medication, and gave you some hydration through IV fluids and pain medication. Please follow up with your outpatient PCP and cardiologist.     PRINCIPAL DISCHARGE DIAGNOSIS  Diagnosis: Chest pain  Assessment and Plan of Treatment: You had atypical chest pain, which is chest pain that doesn’t fit the description of typical or classic chest pain. It is unclear exactly what the cause of this atypical chest pain was. We did many tests in order to see what it could be related to. We gave you medications to try to improve the chest pain to see if it was related to your recent cardiac catheterization; however this did not help.  We also did an Echo which was generally normal. We reduced your Aspirin dose due to your abdominal pain. We also did a Nuclear stress test which was negative. Additionally, we did some tests to explore your abdominal pain, which are  described below and were negative.  Please follow up with your cardiologist at the scheduled appointment.   ***If you have worsening chest pain, shortness of breath, nausea, vomiting, sweating, fatigue, please call your doctor immediately or go to the Emergency Room***      SECONDARY DISCHARGE DIAGNOSES  Diagnosis: Abdominal pain  Assessment and Plan of Treatment: You had abdominal pain, and we ran several tests to find a possible cause. We looked for a possible infection in your body, and those results came back negative. We also got scans of your abdomen done (a CT scan) which was negative. We think the abdominal pain and diarrhea may have been due to a side effect of a medication you were started on for your heart Colcichine. We discontinued this medication, and gave you some hydration through IV fluids and pain medication. Please follow up with your outpatient PCP and cardiologist.

## 2023-11-22 NOTE — DISCHARGE NOTE NURSING/CASE MANAGEMENT/SOCIAL WORK - PATIENT PORTAL LINK FT
You can access the FollowMyHealth Patient Portal offered by [facility] by registering at the following website: [email or web address]. By joining [employer]’s FollowMyHealth portal, you will also be able to view your health information using other applications (apps) compatible with our system.

## 2023-11-22 NOTE — DISCHARGE NOTE PROVIDER - ATTENDING DISCHARGE PHYSICAL EXAMINATION:
83F Fijian speaking, (ASA Allergy, desensitized to ASA 325mg 11/10/23), PMHx of HTN, HLD,  CAD s/p s/p C 11/12/23:  GULSHAN x2 pLAD, RCA MLI, Gastritis (s/p EGD 8/2023), h/o chronic lacunar infarcts, chronic HAs and Anxiety, presented to [facility] ED today with complaints of similar CP since last cath procedure.         1. Atypical chest pain  Echo without pericardial effusion, normal LV wall motion. No improvement on colchicine (also developed nausea and diarrhea) and high dose aspirin. Reduced aspirin to 81 mg.  CT ABD negative for pancreatitis. Nuclear stress test negative for ischemia.  Unclear etiology of pain, resolved.

## 2023-11-22 NOTE — DISCHARGE NOTE PROVIDER - NSDCMRMEDTOKEN_GEN_ALL_CORE_FT
aspirin 81 mg oral delayed release tablet: 1 tab(s) orally once a day  cetirizine 10 mg oral tablet: 1 tab(s) orally once a day  cloNIDine 0.2 mg/24 hr transdermal film, extended release: 1 patch transdermally once a week  clopidogrel 75 mg oral tablet: 1 tab(s) orally once a day  Imdur 30 mg oral tablet, extended release: 1 tab(s) orally once a day  Lexapro 5 mg oral tablet: 1 tab(s) orally once a day  Norvasc 5 mg oral tablet: 1 tab(s) orally once a day (at bedtime)  olmesartan 40 mg oral tablet: 1 tab(s) orally once a day  pantoprazole 40 mg oral delayed release tablet: 1 tab(s) orally once a day  pitavastatin (as calcium) 4 mg oral tablet: 1 tab(s) orally once a day

## 2023-11-22 NOTE — DISCHARGE NOTE PROVIDER - NSDCCPTREATMENT_GEN_ALL_CORE_FT
PRINCIPAL PROCEDURE  Procedure: CT abdomen pelvis  Findings and Treatment: FINDINGS:  LOWER CHEST: Within normal limits.  LIVER: Within normal limits.  BILE DUCTS: Normal caliber.  GALLBLADDER: Within normal limits.  SPLEEN: Within normal limits.  PANCREAS: Limited evaluation without intravenous contrast. However, no   peripancreatic inflammatory changes. No pancreatic ductal dilatation. No   peripancreatic fluid.  ADRENALS: Within normal limits.  KIDNEYS/URETERS: No renal collecting system obstruction bilaterally.  BLADDER: Within normal limits.  REPRODUCTIVE ORGANS: Within normallimits  BOWEL: No bowel obstruction. Appendix is not visualized. No evidence of   inflammation in the pericecal region. The terminal ileum is normal. No   diverticulitis.  PERITONEUM: No ascites.  VESSELS: Atherosclerotic calcification of the abdominal aorta  RETROPERITONEUM/LYMPH NODES: No lymphadenopathy.  ABDOMINAL WALL: Within normal limits.  BONES: Degenerative changes.  IMPRESSION:  No acute findings. Pancreas grossly unremarkable.       PRINCIPAL PROCEDURE  Procedure: CT abdomen pelvis  Findings and Treatment: FINDINGS:  LOWER CHEST: Within normal limits.  LIVER: Within normal limits.  BILE DUCTS: Normal caliber.  GALLBLADDER: Within normal limits.  SPLEEN: Within normal limits.  PANCREAS: Limited evaluation without intravenous contrast. However, no   peripancreatic inflammatory changes. No pancreatic ductal dilatation. No   peripancreatic fluid.  ADRENALS: Within normal limits.  KIDNEYS/URETERS: No renal collecting system obstruction bilaterally.  BLADDER: Within normal limits.  REPRODUCTIVE ORGANS: Within normallimits  BOWEL: No bowel obstruction. Appendix is not visualized. No evidence of   inflammation in the pericecal region. The terminal ileum is normal. No   diverticulitis.  PERITONEUM: No ascites.  VESSELS: Atherosclerotic calcification of the abdominal aorta  RETROPERITONEUM/LYMPH NODES: No lymphadenopathy.  ABDOMINAL WALL: Within normal limits.  BONES: Degenerative changes.  IMPRESSION:  No acute findings. Pancreas grossly unremarkable.        SECONDARY PROCEDURE  Procedure: Transthoracic echocardiography (TTE)  Findings and Treatment: CONCLUSIONS:   1. Normal left and right ventricular size and systolic function.   2. Mild symmetric left ventricular hypertrophy.   3. No significant valvular disease.   4. No evidence of pulmonary hypertension.   5. No pericardial effusion.

## 2023-11-27 DIAGNOSIS — K21.9 GASTRO-ESOPHAGEAL REFLUX DISEASE WITHOUT ESOPHAGITIS: ICD-10-CM

## 2023-11-27 DIAGNOSIS — Z79.899 OTHER LONG TERM (CURRENT) DRUG THERAPY: ICD-10-CM

## 2023-11-27 DIAGNOSIS — Z88.2 ALLERGY STATUS TO SULFONAMIDES: ICD-10-CM

## 2023-11-27 DIAGNOSIS — R07.89 OTHER CHEST PAIN: ICD-10-CM

## 2023-11-27 DIAGNOSIS — Z95.5 PRESENCE OF CORONARY ANGIOPLASTY IMPLANT AND GRAFT: ICD-10-CM

## 2023-11-27 DIAGNOSIS — Z88.1 ALLERGY STATUS TO OTHER ANTIBIOTIC AGENTS STATUS: ICD-10-CM

## 2023-11-27 DIAGNOSIS — F41.9 ANXIETY DISORDER, UNSPECIFIED: ICD-10-CM

## 2023-11-27 DIAGNOSIS — Z88.6 ALLERGY STATUS TO ANALGESIC AGENT: ICD-10-CM

## 2023-11-27 DIAGNOSIS — I25.110 ATHEROSCLEROTIC HEART DISEASE OF NATIVE CORONARY ARTERY WITH UNSTABLE ANGINA PECTORIS: ICD-10-CM

## 2023-11-27 DIAGNOSIS — I10 ESSENTIAL (PRIMARY) HYPERTENSION: ICD-10-CM

## 2023-11-27 DIAGNOSIS — Z86.73 PERSONAL HISTORY OF TRANSIENT ISCHEMIC ATTACK (TIA), AND CEREBRAL INFARCTION WITHOUT RESIDUAL DEFICITS: ICD-10-CM

## 2023-11-27 DIAGNOSIS — E78.5 HYPERLIPIDEMIA, UNSPECIFIED: ICD-10-CM

## 2024-01-21 VITALS
DIASTOLIC BLOOD PRESSURE: 82 MMHG | OXYGEN SATURATION: 96 % | RESPIRATION RATE: 18 BRPM | SYSTOLIC BLOOD PRESSURE: 161 MMHG | WEIGHT: 134.92 LBS | HEART RATE: 64 BPM | TEMPERATURE: 98 F

## 2024-01-21 PROCEDURE — 99285 EMERGENCY DEPT VISIT HI MDM: CPT

## 2024-01-21 NOTE — ED ADULT TRIAGE NOTE - LOCATION:
Left arm; Airway patent, TM normal bilaterally, normal appearing mouth, nose, throat, neck supple with full range of motion, no cervical adenopathy.

## 2024-01-22 ENCOUNTER — INPATIENT (INPATIENT)
Facility: HOSPITAL | Age: 84
LOS: 1 days | Discharge: HOME | DRG: 313 | End: 2024-01-24
Attending: STUDENT IN AN ORGANIZED HEALTH CARE EDUCATION/TRAINING PROGRAM | Admitting: INTERNAL MEDICINE
Payer: MEDICARE

## 2024-01-22 DIAGNOSIS — R07.9 CHEST PAIN, UNSPECIFIED: ICD-10-CM

## 2024-01-22 DIAGNOSIS — K29.70 GASTRITIS, UNSPECIFIED, WITHOUT BLEEDING: ICD-10-CM

## 2024-01-22 DIAGNOSIS — Z98.890 OTHER SPECIFIED POSTPROCEDURAL STATES: Chronic | ICD-10-CM

## 2024-01-22 DIAGNOSIS — Z90.49 ACQUIRED ABSENCE OF OTHER SPECIFIED PARTS OF DIGESTIVE TRACT: Chronic | ICD-10-CM

## 2024-01-22 DIAGNOSIS — E78.00 PURE HYPERCHOLESTEROLEMIA, UNSPECIFIED: ICD-10-CM

## 2024-01-22 DIAGNOSIS — I10 ESSENTIAL (PRIMARY) HYPERTENSION: ICD-10-CM

## 2024-01-22 LAB
ALBUMIN SERPL ELPH-MCNC: 4.2 G/DL — SIGNIFICANT CHANGE UP (ref 3.3–5)
ALP SERPL-CCNC: 100 U/L — SIGNIFICANT CHANGE UP (ref 40–120)
ALT FLD-CCNC: 10 U/L — SIGNIFICANT CHANGE UP (ref 10–45)
ANION GAP SERPL CALC-SCNC: 10 MMOL/L — SIGNIFICANT CHANGE UP (ref 5–17)
ANION GAP SERPL CALC-SCNC: 8 MMOL/L — SIGNIFICANT CHANGE UP (ref 5–17)
AST SERPL-CCNC: 15 U/L — SIGNIFICANT CHANGE UP (ref 10–40)
BASOPHILS # BLD AUTO: 0.02 K/UL — SIGNIFICANT CHANGE UP (ref 0–0.2)
BASOPHILS NFR BLD AUTO: 0.3 % — SIGNIFICANT CHANGE UP (ref 0–2)
BILIRUB SERPL-MCNC: 0.4 MG/DL — SIGNIFICANT CHANGE UP (ref 0.2–1.2)
BUN SERPL-MCNC: 17 MG/DL — SIGNIFICANT CHANGE UP (ref 7–23)
BUN SERPL-MCNC: 21 MG/DL — SIGNIFICANT CHANGE UP (ref 7–23)
CALCIUM SERPL-MCNC: 9.4 MG/DL — SIGNIFICANT CHANGE UP (ref 8.4–10.5)
CALCIUM SERPL-MCNC: 9.4 MG/DL — SIGNIFICANT CHANGE UP (ref 8.4–10.5)
CHLORIDE SERPL-SCNC: 103 MMOL/L — SIGNIFICANT CHANGE UP (ref 96–108)
CHLORIDE SERPL-SCNC: 105 MMOL/L — SIGNIFICANT CHANGE UP (ref 96–108)
CK MB CFR SERPL CALC: 1.5 NG/ML — SIGNIFICANT CHANGE UP (ref 0–6.7)
CK SERPL-CCNC: 47 U/L — SIGNIFICANT CHANGE UP (ref 25–170)
CO2 SERPL-SCNC: 23 MMOL/L — SIGNIFICANT CHANGE UP (ref 22–31)
CO2 SERPL-SCNC: 28 MMOL/L — SIGNIFICANT CHANGE UP (ref 22–31)
CREAT SERPL-MCNC: 1.1 MG/DL — SIGNIFICANT CHANGE UP (ref 0.5–1.3)
CREAT SERPL-MCNC: 1.16 MG/DL — SIGNIFICANT CHANGE UP (ref 0.5–1.3)
CRP SERPL-MCNC: <3 MG/L — SIGNIFICANT CHANGE UP (ref 0–4)
EGFR: 46 ML/MIN/1.73M2 — LOW
EGFR: 50 ML/MIN/1.73M2 — LOW
EOSINOPHIL # BLD AUTO: 0.29 K/UL — SIGNIFICANT CHANGE UP (ref 0–0.5)
EOSINOPHIL NFR BLD AUTO: 4.5 % — SIGNIFICANT CHANGE UP (ref 0–6)
ERYTHROCYTE [SEDIMENTATION RATE] IN BLOOD: 27 MM/HR — HIGH
GLUCOSE SERPL-MCNC: 108 MG/DL — HIGH (ref 70–99)
GLUCOSE SERPL-MCNC: 99 MG/DL — SIGNIFICANT CHANGE UP (ref 70–99)
HCT VFR BLD CALC: 35.6 % — SIGNIFICANT CHANGE UP (ref 34.5–45)
HCT VFR BLD CALC: 36.7 % — SIGNIFICANT CHANGE UP (ref 34.5–45)
HGB BLD-MCNC: 11.7 G/DL — SIGNIFICANT CHANGE UP (ref 11.5–15.5)
HGB BLD-MCNC: 12.2 G/DL — SIGNIFICANT CHANGE UP (ref 11.5–15.5)
IMM GRANULOCYTES NFR BLD AUTO: 0.2 % — SIGNIFICANT CHANGE UP (ref 0–0.9)
LYMPHOCYTES # BLD AUTO: 2.13 K/UL — SIGNIFICANT CHANGE UP (ref 1–3.3)
LYMPHOCYTES # BLD AUTO: 33.1 % — SIGNIFICANT CHANGE UP (ref 13–44)
MAGNESIUM SERPL-MCNC: 2.2 MG/DL — SIGNIFICANT CHANGE UP (ref 1.6–2.6)
MCHC RBC-ENTMCNC: 28.9 PG — SIGNIFICANT CHANGE UP (ref 27–34)
MCHC RBC-ENTMCNC: 29 PG — SIGNIFICANT CHANGE UP (ref 27–34)
MCHC RBC-ENTMCNC: 32.9 GM/DL — SIGNIFICANT CHANGE UP (ref 32–36)
MCHC RBC-ENTMCNC: 33.2 GM/DL — SIGNIFICANT CHANGE UP (ref 32–36)
MCV RBC AUTO: 87.4 FL — SIGNIFICANT CHANGE UP (ref 80–100)
MCV RBC AUTO: 87.9 FL — SIGNIFICANT CHANGE UP (ref 80–100)
MONOCYTES # BLD AUTO: 0.52 K/UL — SIGNIFICANT CHANGE UP (ref 0–0.9)
MONOCYTES NFR BLD AUTO: 8.1 % — SIGNIFICANT CHANGE UP (ref 2–14)
NEUTROPHILS # BLD AUTO: 3.46 K/UL — SIGNIFICANT CHANGE UP (ref 1.8–7.4)
NEUTROPHILS NFR BLD AUTO: 53.8 % — SIGNIFICANT CHANGE UP (ref 43–77)
NRBC # BLD: 0 /100 WBCS — SIGNIFICANT CHANGE UP (ref 0–0)
NRBC # BLD: 0 /100 WBCS — SIGNIFICANT CHANGE UP (ref 0–0)
NT-PROBNP SERPL-SCNC: 883 PG/ML — HIGH (ref 0–300)
PLATELET # BLD AUTO: 223 K/UL — SIGNIFICANT CHANGE UP (ref 150–400)
PLATELET # BLD AUTO: 235 K/UL — SIGNIFICANT CHANGE UP (ref 150–400)
POTASSIUM SERPL-MCNC: 4.3 MMOL/L — SIGNIFICANT CHANGE UP (ref 3.5–5.3)
POTASSIUM SERPL-MCNC: 5 MMOL/L — SIGNIFICANT CHANGE UP (ref 3.5–5.3)
POTASSIUM SERPL-SCNC: 4.3 MMOL/L — SIGNIFICANT CHANGE UP (ref 3.5–5.3)
POTASSIUM SERPL-SCNC: 5 MMOL/L — SIGNIFICANT CHANGE UP (ref 3.5–5.3)
PROT SERPL-MCNC: 7.9 G/DL — SIGNIFICANT CHANGE UP (ref 6–8.3)
RBC # BLD: 4.05 M/UL — SIGNIFICANT CHANGE UP (ref 3.8–5.2)
RBC # BLD: 4.2 M/UL — SIGNIFICANT CHANGE UP (ref 3.8–5.2)
RBC # FLD: 12.4 % — SIGNIFICANT CHANGE UP (ref 10.3–14.5)
RBC # FLD: 12.6 % — SIGNIFICANT CHANGE UP (ref 10.3–14.5)
SODIUM SERPL-SCNC: 136 MMOL/L — SIGNIFICANT CHANGE UP (ref 135–145)
SODIUM SERPL-SCNC: 141 MMOL/L — SIGNIFICANT CHANGE UP (ref 135–145)
TROPONIN T, HIGH SENSITIVITY RESULT: 11 NG/L — SIGNIFICANT CHANGE UP (ref 0–51)
TROPONIN T, HIGH SENSITIVITY RESULT: 9 NG/L — SIGNIFICANT CHANGE UP (ref 0–51)
WBC # BLD: 5.77 K/UL — SIGNIFICANT CHANGE UP (ref 3.8–10.5)
WBC # BLD: 6.43 K/UL — SIGNIFICANT CHANGE UP (ref 3.8–10.5)
WBC # FLD AUTO: 5.77 K/UL — SIGNIFICANT CHANGE UP (ref 3.8–10.5)
WBC # FLD AUTO: 6.43 K/UL — SIGNIFICANT CHANGE UP (ref 3.8–10.5)

## 2024-01-22 PROCEDURE — 93010 ELECTROCARDIOGRAM REPORT: CPT

## 2024-01-22 PROCEDURE — 93306 TTE W/DOPPLER COMPLETE: CPT | Mod: 26

## 2024-01-22 PROCEDURE — 71045 X-RAY EXAM CHEST 1 VIEW: CPT | Mod: 26

## 2024-01-22 PROCEDURE — 93010 ELECTROCARDIOGRAM REPORT: CPT | Mod: 77

## 2024-01-22 PROCEDURE — 99223 1ST HOSP IP/OBS HIGH 75: CPT

## 2024-01-22 RX ORDER — ISOSORBIDE MONONITRATE 60 MG/1
60 TABLET, EXTENDED RELEASE ORAL DAILY
Refills: 0 | Status: DISCONTINUED | OUTPATIENT
Start: 2024-01-22 | End: 2024-01-24

## 2024-01-22 RX ORDER — PITAVASTATIN CALCIUM 1.04 MG/1
1 TABLET, FILM COATED ORAL
Refills: 0 | DISCHARGE

## 2024-01-22 RX ORDER — LOSARTAN POTASSIUM 100 MG/1
100 TABLET, FILM COATED ORAL DAILY
Refills: 0 | Status: DISCONTINUED | OUTPATIENT
Start: 2024-01-22 | End: 2024-01-24

## 2024-01-22 RX ORDER — INFLUENZA VIRUS VACCINE 15; 15; 15; 15 UG/.5ML; UG/.5ML; UG/.5ML; UG/.5ML
0.7 SUSPENSION INTRAMUSCULAR ONCE
Refills: 0 | Status: DISCONTINUED | OUTPATIENT
Start: 2024-01-22 | End: 2024-01-24

## 2024-01-22 RX ORDER — CETIRIZINE HYDROCHLORIDE 10 MG/1
1 TABLET ORAL
Refills: 0 | DISCHARGE

## 2024-01-22 RX ORDER — CLOPIDOGREL BISULFATE 75 MG/1
75 TABLET, FILM COATED ORAL DAILY
Refills: 0 | Status: DISCONTINUED | OUTPATIENT
Start: 2024-01-22 | End: 2024-01-24

## 2024-01-22 RX ORDER — OLMESARTAN MEDOXOMIL 5 MG/1
1 TABLET, FILM COATED ORAL
Refills: 0 | DISCHARGE

## 2024-01-22 RX ORDER — NITROGLYCERIN 6.5 MG
1 CAPSULE, EXTENDED RELEASE ORAL ONCE
Refills: 0 | Status: COMPLETED | OUTPATIENT
Start: 2024-01-22 | End: 2024-01-22

## 2024-01-22 RX ORDER — ASPIRIN/CALCIUM CARB/MAGNESIUM 324 MG
81 TABLET ORAL DAILY
Refills: 0 | Status: DISCONTINUED | OUTPATIENT
Start: 2024-01-22 | End: 2024-01-24

## 2024-01-22 RX ORDER — ATORVASTATIN CALCIUM 80 MG/1
20 TABLET, FILM COATED ORAL AT BEDTIME
Refills: 0 | Status: DISCONTINUED | OUTPATIENT
Start: 2024-01-22 | End: 2024-01-22

## 2024-01-22 RX ORDER — DIAZEPAM 5 MG
2 TABLET ORAL ONCE
Refills: 0 | Status: DISCONTINUED | OUTPATIENT
Start: 2024-01-22 | End: 2024-01-22

## 2024-01-22 RX ORDER — PANTOPRAZOLE SODIUM 20 MG/1
40 TABLET, DELAYED RELEASE ORAL
Refills: 0 | Status: DISCONTINUED | OUTPATIENT
Start: 2024-01-22 | End: 2024-01-24

## 2024-01-22 RX ORDER — ESCITALOPRAM OXALATE 10 MG/1
5 TABLET, FILM COATED ORAL DAILY
Refills: 0 | Status: DISCONTINUED | OUTPATIENT
Start: 2024-01-22 | End: 2024-01-24

## 2024-01-22 RX ORDER — AMLODIPINE BESYLATE 2.5 MG/1
5 TABLET ORAL DAILY
Refills: 0 | Status: DISCONTINUED | OUTPATIENT
Start: 2024-01-22 | End: 2024-01-24

## 2024-01-22 RX ORDER — AMLODIPINE BESYLATE 2.5 MG/1
1 TABLET ORAL
Refills: 0 | DISCHARGE

## 2024-01-22 RX ORDER — FAMOTIDINE 10 MG/ML
20 INJECTION INTRAVENOUS ONCE
Refills: 0 | Status: COMPLETED | OUTPATIENT
Start: 2024-01-22 | End: 2024-01-22

## 2024-01-22 RX ORDER — ESCITALOPRAM OXALATE 10 MG/1
1 TABLET, FILM COATED ORAL
Refills: 0 | DISCHARGE

## 2024-01-22 RX ORDER — ATORVASTATIN CALCIUM 80 MG/1
40 TABLET, FILM COATED ORAL AT BEDTIME
Refills: 0 | Status: DISCONTINUED | OUTPATIENT
Start: 2024-01-22 | End: 2024-01-24

## 2024-01-22 RX ORDER — NITROGLYCERIN 6.5 MG
0.4 CAPSULE, EXTENDED RELEASE ORAL ONCE
Refills: 0 | Status: COMPLETED | OUTPATIENT
Start: 2024-01-22 | End: 2024-01-22

## 2024-01-22 RX ORDER — ISOSORBIDE MONONITRATE 60 MG/1
30 TABLET, EXTENDED RELEASE ORAL DAILY
Refills: 0 | Status: DISCONTINUED | OUTPATIENT
Start: 2024-01-22 | End: 2024-01-22

## 2024-01-22 RX ORDER — PANTOPRAZOLE SODIUM 20 MG/1
1 TABLET, DELAYED RELEASE ORAL
Refills: 0 | DISCHARGE

## 2024-01-22 RX ORDER — CLOPIDOGREL BISULFATE 75 MG/1
75 TABLET, FILM COATED ORAL ONCE
Refills: 0 | Status: COMPLETED | OUTPATIENT
Start: 2024-01-22 | End: 2024-01-22

## 2024-01-22 RX ADMIN — AMLODIPINE BESYLATE 5 MILLIGRAM(S): 2.5 TABLET ORAL at 05:11

## 2024-01-22 RX ADMIN — Medication 2 MILLIGRAM(S): at 01:18

## 2024-01-22 RX ADMIN — LOSARTAN POTASSIUM 100 MILLIGRAM(S): 100 TABLET, FILM COATED ORAL at 05:11

## 2024-01-22 RX ADMIN — ESCITALOPRAM OXALATE 5 MILLIGRAM(S): 10 TABLET, FILM COATED ORAL at 15:25

## 2024-01-22 RX ADMIN — ISOSORBIDE MONONITRATE 60 MILLIGRAM(S): 60 TABLET, EXTENDED RELEASE ORAL at 21:46

## 2024-01-22 RX ADMIN — PANTOPRAZOLE SODIUM 40 MILLIGRAM(S): 20 TABLET, DELAYED RELEASE ORAL at 05:11

## 2024-01-22 RX ADMIN — Medication 81 MILLIGRAM(S): at 15:25

## 2024-01-22 RX ADMIN — FAMOTIDINE 20 MILLIGRAM(S): 10 INJECTION INTRAVENOUS at 01:18

## 2024-01-22 RX ADMIN — CLOPIDOGREL BISULFATE 75 MILLIGRAM(S): 75 TABLET, FILM COATED ORAL at 15:25

## 2024-01-22 RX ADMIN — Medication 1 INCH(S): at 22:36

## 2024-01-22 RX ADMIN — ATORVASTATIN CALCIUM 40 MILLIGRAM(S): 80 TABLET, FILM COATED ORAL at 21:46

## 2024-01-22 RX ADMIN — Medication 0.4 MILLIGRAM(S): at 18:39

## 2024-01-22 NOTE — PATIENT PROFILE ADULT - NSPROPTRIGHTNOTIFY_GEN_A_NUR
Detail Level: Detailed Quality 111:Pneumonia Vaccination Status For Older Adults: Pneumococcal Vaccination Previously Received Quality 130: Documentation Of Current Medications In The Medical Record: Current Medications Documented Quality 110: Preventive Care And Screening: Influenza Immunization: Influenza Immunization Administered during Influenza season Quality 226: Preventive Care And Screening: Tobacco Use: Screening And Cessation Intervention: Patient screened for tobacco use and is an ex/non-smoker declines

## 2024-01-22 NOTE — ED PROVIDER NOTE - PROGRESS NOTE DETAILS
Patient felt some shortness of breath after receiving Valium and Pepcid lungs are clear oxygen is 99% on room air patient has no rash blood pressure and heart rate are both improved informed patient of findings patient admits that she is anxious about receiving new medications placed on supplemental O2 for comfort, given persistent chest pain recent stent in November will admit to cardiology for monitoring overnight patient hemodynamically stable chest x-ray negative stable for admission

## 2024-01-22 NOTE — H&P ADULT - HISTORY OF PRESENT ILLNESS
This is a 83 female, Micronesian speaking, with PMHx of CAD treated with PCI LAD in 11/10/23 (On Aspirin and Plavix), HTN, HLD, Gastritis (s/p EGD 8/2023), h/o chronic lacunar infarcts, chronic HAs and Anxiety, presented to [facility] ED c/o 5 days of intermittent left sided sharp chest pain at rest, radiating to L arm/jaw associated with SOB. On day of admission, pain was like a burning sensation. Pain is sharp in nature, lasting a few minutes and relieved on its own. She also c/o upper gastric pain. Denies N/V, diarrhea, constipations, fever, dizziness    In the ER, /82, HR 64, R 18, T 97.7, O2 sat 96% RA. Labs with trop 9, EKG non ischemic, CXR pending. She received valium and Pepcid for anxiety and gastric symptoms and c/o immediately of SOB. Evaluated by ER attending, lungs were clear, O2 99% RA. she was deemed to be anxious and admitted to telemetry for further management of chest pain

## 2024-01-22 NOTE — H&P ADULT - PROBLEM SELECTOR PLAN 1
Recent PCI LAD in 11/10/23, returned with chest pain a few days later with negative w/o including stress test  Now with same chest pain  No relieved with GI cocktail  Telemetry  trend troponin  add BNP to labs  Continue Imdur, Aspirin and Plavix (Missed Plavix dose on 1/21 at 12 noon)

## 2024-01-22 NOTE — H&P ADULT - NSHPLABSRESULTS_GEN_ALL_CORE
12.2   6.43  )-----------( 223      ( 22 Jan 2024 00:13 )             36.7       01-22    136  |  103  |  21  ----------------------------<  99  4.3   |  23  |  1.16    Ca    9.4      22 Jan 2024 00:13    TPro  7.9  /  Alb  4.2  /  TBili  0.4  /  DBili  x   /  AST  15  /  ALT  10  /  AlkPhos  100  01-22        Urinalysis Basic - ( 22 Jan 2024 00:13 )    Color: x / Appearance: x / SG: x / pH: x  Gluc: 99 mg/dL / Ketone: x  / Bili: x / Urobili: x   Blood: x / Protein: x / Nitrite: x   Leuk Esterase: x / RBC: x / WBC x   Sq Epi: x / Non Sq Epi: x / Bacteria: x Complex Repair And Skin Substitute Graft Text: The defect edges were debeveled with a #15 scalpel blade.  The primary defect was closed partially with a complex linear closure.  Given the location of the remaining defect, shape of the defect and the proximity to free margins a skin substitute graft was deemed most appropriate to repair the remaining defect.  The graft was trimmed to fit the size of the remaining defect.  The graft was then placed in the primary defect, oriented appropriately, and sutured into place.

## 2024-01-22 NOTE — H&P ADULT - NSICDXPASTMEDICALHX_GEN_ALL_CORE_FT
PAST MEDICAL HISTORY:  CAD (coronary artery disease)     Chronic headaches     Gastritis     High cholesterol     HTN (hypertension)     Multiple lacunar infarcts

## 2024-01-22 NOTE — ED ADULT NURSE NOTE - OBJECTIVE STATEMENT
Pt complaints of Lt side sharp chest pain w/ burning sensation, radiating to Lt arm/jaw.  Denies N/V/D, HA, dizziness, SOB or any other discomfort at this time. PMHX of HTN, HLD, Gastritis.  States home blood pressure was greater than 200/100 earlier in the day took home meds including olmesartan and clonidine has clonidine patch now.  Pt is alert and oriented, A&O4. Family member is on bedside.

## 2024-01-22 NOTE — ED ADULT NURSE NOTE - NSICDXPASTMEDICALHX_GEN_ALL_CORE_FT
2000 PAST MEDICAL HISTORY:  Chronic headaches     Gastritis     High cholesterol     HTN (hypertension)     Multiple lacunar infarcts

## 2024-01-22 NOTE — ED PROVIDER NOTE - PHYSICAL EXAMINATION
VITAL SIGNS: I have reviewed nursing notes and confirm.  CONSTITUTIONAL: Well appearing, in no acute distress.   SKIN:  warm and dry, no acute rash.   HEAD:  normocephalic, atraumatic.  EYES: EOM intact; conjunctiva and sclera clear.  ENT: No nasal discharge; airway clear.   NECK: Supple.  CARD: S1, S2, Regular rate and rhythm. Hypertensive but not diaphoretic 178/88, equal distal pulses in no acute distress  RESP:  Clear to auscultation b/l, no wheezes, rales or rhonchi.  ABD: Normal bowel sounds; soft; non-distended; non-tender; no guarding/ rebound.  EXT: Normal ROM. No peripheral edema. Pulses intact and equal b/l.  NEURO: Alert, oriented, grossly unremarkable  PSYCH: Cooperative, mood and affect appropriate.

## 2024-01-22 NOTE — ED PROVIDER NOTE - OBJECTIVE STATEMENT
83F, Swedish speaking, w/ PMHx of HTN, HLD, Gastritis (s/p EGD 8/2023), h/o chronic lacunar infarcts, chronic HAs and Anxiety, presents to [facility] w/ sharp L substernal chest pain/pressure radiating to L arm/jaw which has been ongoing since her cath 11/10. Admitted to cardiac tele for unstable angina. Found to have abnormal CCTA w/ positive FFR p/mLAD. S/p ASA desensitization in CCU 11/10/23, now s/p cardiac cath 11/10/23 w/ GULSHNA x 2 pLAD. Today patient states pain has gotten worse and is typical of her prior chest pressure but now also having burning senstation.  Denies associated vomiting admits to nausea. No sob. Denies fever chills cough.   at the bedside as well.  States home blood pressure was greater than 200/100 earlier in the day took home meds including olmesartan and clonidine has clonidine patch now.

## 2024-01-22 NOTE — H&P ADULT - NSHPPHYSICALEXAM_GEN_ALL_CORE
T(C): 36.5 (01-21-24 @ 23:47), Max: 36.5 (01-21-24 @ 23:47)  HR: 58 (01-22-24 @ 01:53) (58 - 64)  BP: 156/70 (01-22-24 @ 01:53) (156/70 - 161/82)  RR: 18 (01-22-24 @ 01:53) (18 - 18)  SpO2: 97% (01-22-24 @ 01:53) (96% - 97%)  Wt(kg): --    Appearance: Normal	  HEENT:   Normal oral mucosa, PERRL, EOMI	  Neck: Supple,  - JVD; No Carotid Bruit and 2+ pulses B/L  Cardiovascular: Normal S1 S2, No JVD, No murmurs  Respiratory: Fine crackles throughout. No Rales, Rhonchi, Wheezing	  Gastrointestinal:  Soft, Non-tender, + BS	  Skin: No rashes, No ecchymoses, No cyanosis  Extremities: Normal range of motion, No clubbing, cyanosis or edema  Vascular: Femoral pulses 2+ b/l without bruit, DP 1+ b/l, PT 1+ b/l  Neurologic: Non-focal  Psychiatry: A & O x 3, Mood & affect appropriate

## 2024-01-22 NOTE — H&P ADULT - ASSESSMENT
84 y/o female Tunisian speaking, with PMHx of CAD treated with PCI LAD in 11/10/23 (On Aspirin and Plavix), HTN, HLD, Gastritis (s/p EGD 8/2023), h/o chronic lacunar infarcts, chronic HAs and Anxiety, presented to [facility] ED c/o 5 days of intermittent left sided sharp chest pain at rest, radiating to L arm/jaw associated with SOB. 1st set trop and EKG negative

## 2024-01-22 NOTE — PATIENT PROFILE ADULT - FALL HARM RISK - HARM RISK INTERVENTIONS

## 2024-01-22 NOTE — H&P ADULT - NS ATTEND AMEND GEN_ALL_CORE FT
84 yo man PMHx of CAD s/p GULSHAN to [facility] Nov/2023 and HTN who presented with 5d hx of possibly cardiac chest pain.    Assessment  1. CAD s/p GULSHAN to mLAD Nov/2023  2. Possibly cardiac chest pain x 5 days  ACS ruled out  ESR/CRP low and no pleuritic chest pain or frictional rub so no concern for pericarditis  3. HTN    Plan  1. DAPT ASA 81mg QD and Plavix 75mg QD  2. High intensity statin  3. Nuclear stress test and TTE  4. If nuclear stress test negative for ischemia, will add metoprolol succinate 25mg PO QD and increase Imdur to 60mg PO QD for anti-anginal therapy  5. Amlodipine 5mg PO QD and losartan 100mg PO QD for HTN    During non face-to-face time, I reviewed relevant portions of the patient’s medical record. During face-to-face time, I took a relevant history and examined the patient. I also explained differential diagnoses, relevant cardiac diagnoses, workup, and management plan, which required a high level of medical decision making. I answered all questions related to the patient's medical conditions.     Kamlesh Arora M.D.  CARDIOLOGY ATTENDING

## 2024-01-22 NOTE — ED PROVIDER NOTE - CLINICAL SUMMARY MEDICAL DECISION MAKING FREE TEXT BOX
83-year-old female with coronary artery disease  hypertension hyperlipidemia GERD anxiety here today with recurrent chest pain described as pressure and burning in the left side of her chest similar to prior events has had catheterization plan for evaluation for ACS anxiety GERD   plan for CBC CMP Pepcid Maalox Valium EKG troponin likely will discuss with cardiology will require admission for monitoring overnight   EKG normal sinus rhythm 56 normal axis with prolonged QT with normal QTc no STEMI nonischemic EKG   chest x-ray

## 2024-01-22 NOTE — ED ADULT NURSE NOTE - NS ED NURSE LEVEL OF CONSCIOUSNESS ORIENTATION
Written by Maria M Mcgovern, as dictated by Sharon De La Vega Dust, [address]. Name: Hugh Haynes       Age: 79 y.o. Date: 12/20/2017    Chief Complaint:   Chief Complaint   Patient presents with    Skin Exam       Subjective:    HPI:  Ms.. Hugh Haynes is a 79 y.o. female who presents for the evaluation of a lesion on the left anterior distal shin. She states that the lesion appeared one week ago. The patient has not had prior treatment for this lesion. Associated symptoms include pain. She reports falling a few days before this presentation and causing a skin tear in the area. She presented for an office visit on 10//16/2017 to address a skin tear on her right shin. ROS: Consitutional: Negative  Dermatological : positive for - skin lesion changes      Social History     Social History    Marital status:      Spouse name: N/A    Number of children: N/A    Years of education: N/A     Occupational History    Not on file.      Social History Main Topics    Smoking status: Former Smoker     Packs/day: 1.00     Years: 20.00     Types: Cigarettes     Quit date: 12/27/1971    Smokeless tobacco: Never Used    Alcohol use 7.0 oz/week     10 Glasses of wine, 4 Standard drinks or equivalent per week    Drug use: No    Sexual activity: Not on file     Other Topics Concern    Not on file     Social History Narrative    ** Merged History Encounter **            Family History   Problem Relation Age of Onset    Stroke Mother     Heart Disease Father      heart attack in 66's       Past Medical History:   Diagnosis Date    Calculus of kidney     Cancer ([facility] [address]     bladder    Environmental allergies     History of actinic keratoses     Sun-damaged skin     Sunburn, blistering        Past Surgical History:   Procedure Laterality Date    HX APPENDECTOMY      HX BLADDER REPAIR      HX GYN      tahbso    HX HEENT      tonsillectomy    HX HYSTERECTOMY      HX ORTHOPAEDIC partial right hip replacement        Current Outpatient Prescriptions   Medication Sig Dispense Refill    latanoprost (XALATAN) 0.005 % ophthalmic solution       conjugated estrogens (PREMARIN) 0.625 mg tablet TAKE (1) TABLET DAILY. 30 Tab 3    fluorouracil (EFUDEX) 5 % chemo cream Apply a thin layer to the pink areas on each upper arm twice daily for 4 weeks. 40 g 0    levothyroxine (SYNTHROID) 25 mcg tablet TAKE 1 TABLET BY MOUTH ONCE DAILY. 30 Tab PRN    triamcinolone acetonide (KENALOG) 0.1 % ointment Apply  to affected area two (2) times a day. use thin layer 30 g 0    Biotin 2,500 mcg cap Take  by mouth.  cholecalciferol, vitamin D3, (VITAMIN D3) 2,000 unit tab Take 1,000 Units by mouth.  potassium chloride (K-DUR, KLOR-CON) 10 mEq tablet Take 1 Tab by mouth daily. 90 Tab 3    loratadine-pseudoephedrine (CLARITIN-D 12 HOUR) 5-120 mg per tablet Take 1 Tab by mouth two (2) times daily as needed.  FOLIC ACID/MULTIVITS-MIN/LUT (CENTRUM SILVER PO) Take  by mouth.  diazepam (VALIUM) 2 mg tablet Take  by mouth every six (6) hours as needed for Anxiety. Allergies   Allergen Reactions    Lemon Unknown (comments)     Headache and close throat      Novocain [Procaine] Anaphylaxis     Caused her to faint         Objective:    Visit Vitals    /78 (BP 1 Location: Left arm, BP Patient Position: Sitting)    Pulse 84    Temp 97.6 °F (36.4 °C) (Oral)    Ht 5' 5\" (1.651 m)    Wt 121 lb (54.9 kg)    SpO2 96%    BMI 20.14 kg/m2       Priscilla Meza is a 79 y.o. female who appears well and in no distress. She is awake, alert, and oriented. There is no preauricular, submandibular, or cervical lymphadenopathy. A limited skin examination was completed including her right shin and left shin. She has a well healed skin tear in her right shin with crusting in the mid portion.  She has a healing skin tear on her left anterior distal shin and a 14 x 12 mm keratotic papule inferior to that concerning for squamous cell carcinoma. Right shin        Left shin    Assessment/Plan:    1. Skin tear, right shin. The site is well healed. Recheck next visit. 2. Skin tear, left anterior distal shin. The site is healing. The wound was bandaged with a pressure dressing utilizing Vaseline, Telfa, gauze, and Coban. 3. Neoplasm of uncertain behavior, left anterior distal shin, favor SCC. A shave biopsy was advised to sample this lesion. The procedure was reviewed and verbal and written consent were obtained. The risks of pain, bleeding, infection, and scar were discussed. The patient is aware that this is a sample and is intended for diagnosis and not therapy of the skin lesion. I performed the procedure. The site was cleansed and anesthetized with 1% Lidocaine with Epinephrine 1:100,000. A shave biopsy was performed to sample the lesion. Drysol was used for hemostasis. The wound was bandaged and care reviewed. The specimen was sent to pathology. I will contact the patient with the results and any further treatment that may be necessary. This plan was reviewed with the patient and patient agrees. All questions were answered. This scribe documentation was reviewed by me and accurately reflects the examination and decisions made by me. [facility]   OFFICE PROCEDURE PROGRESS NOTE   Chart reviewed for the following:   IKofi, have reviewed the History, Physical and updated the Allergic reactions for Maxcine Ivory. TIME OUT performed immediately prior to start of procedure:   ITomeka, have performed the following reviews on Maxcine Ivory   prior to the start of the procedure:     * Patient was identified by name and date of birth   * Agreement on procedure being performed was verified   * Risks and Benefits explained to the patient   * Procedure site verified and marked as necessary   * Patient was positioned for comfort   * Consent was signed and verified     Time: 2:44 PM  Date of procedure: 12/20/2017  Procedure performed by: Jordan Pete, [address]  Provider assisted by: Jhony Sampson MA   Patient assisted by: self   How tolerated by patient: tolerated the procedure well with no complications   Comments: none Oriented - self; Oriented - place; Oriented - time

## 2024-01-22 NOTE — ED ADULT NURSE NOTE - NSFALLUNIVINTERV_ED_ALL_ED
Bed/Stretcher in lowest position, wheels locked, appropriate side rails in place/Call bell, personal items and telephone in reach/Instruct patient to call for assistance before getting out of bed/chair/stretcher/Non-slip footwear applied when patient is off stretcher/[place] to call system/Physically safe environment - no spills, clutter or unnecessary equipment/Purposeful proactive rounding/Room/bathroom lighting operational, light cord in reach

## 2024-01-22 NOTE — H&P ADULT - NSHPREVIEWOFSYSTEMS_GEN_ALL_CORE
GENERAL, CONSTITUTIONAL : denies recent weight loss, fever, chills  EYES, VISION: denies changes in vision   EARS, NOSE, THROAT: denies hearing loss  HEART, CARDIOVASCULAR: +  chest pain,  SOB. Denies arrhythmia, palpitations,  LE edema, claudication  RESPIRATORY: + SOB. Denies cough,  wheezing, PND, orthopnea  GASTROINTESTINAL: + abdominal pain, heartburn, Denies bloody stool, dark tarry stool  GENITOURINARY: Denies frequent urination, urgency  MUSCULOSKELETAL: bruises on arms. Denies joint pain or swelling, restricted motion, musculoskeletal pain.   SKIN & INTEGUMENTARY Denies rashes, sores, blisters, blisters, growths.  NEUROLOGICAL: Denies numbness or tingling sensations, sensation loss, burning.   PSYCHIATRIC: Denies nervousness, anxiety, depression  ENDOCRINE Denies heat or cold intolerance, excessive thirst  HEMATOLOGIC/LYMPHATIC: Denies abnormal bleeding, bleeding of any kind

## 2024-01-23 ENCOUNTER — TRANSCRIPTION ENCOUNTER (OUTPATIENT)
Age: 84
End: 2024-01-23

## 2024-01-23 DIAGNOSIS — R07.9 CHEST PAIN, UNSPECIFIED: ICD-10-CM

## 2024-01-23 DIAGNOSIS — I25.10 ATHEROSCLEROTIC HEART DISEASE OF NATIVE CORONARY ARTERY WITHOUT ANGINA PECTORIS: ICD-10-CM

## 2024-01-23 DIAGNOSIS — N63.0 UNSPECIFIED LUMP IN UNSPECIFIED BREAST: ICD-10-CM

## 2024-01-23 LAB
ANION GAP SERPL CALC-SCNC: 11 MMOL/L — SIGNIFICANT CHANGE UP (ref 5–17)
APTT BLD: 33.7 SEC — SIGNIFICANT CHANGE UP (ref 24.5–35.6)
BUN SERPL-MCNC: 24 MG/DL — HIGH (ref 7–23)
CALCIUM SERPL-MCNC: 9 MG/DL — SIGNIFICANT CHANGE UP (ref 8.4–10.5)
CHLORIDE SERPL-SCNC: 98 MMOL/L — SIGNIFICANT CHANGE UP (ref 96–108)
CO2 SERPL-SCNC: 25 MMOL/L — SIGNIFICANT CHANGE UP (ref 22–31)
CREAT SERPL-MCNC: 1.29 MG/DL — SIGNIFICANT CHANGE UP (ref 0.5–1.3)
EGFR: 41 ML/MIN/1.73M2 — LOW
GLUCOSE SERPL-MCNC: 112 MG/DL — HIGH (ref 70–99)
HCT VFR BLD CALC: 35.3 % — SIGNIFICANT CHANGE UP (ref 34.5–45)
HGB BLD-MCNC: 11.8 G/DL — SIGNIFICANT CHANGE UP (ref 11.5–15.5)
INR BLD: 0.99 — SIGNIFICANT CHANGE UP (ref 0.85–1.18)
MAGNESIUM SERPL-MCNC: 2.4 MG/DL — SIGNIFICANT CHANGE UP (ref 1.6–2.6)
MCHC RBC-ENTMCNC: 28.7 PG — SIGNIFICANT CHANGE UP (ref 27–34)
MCHC RBC-ENTMCNC: 33.4 GM/DL — SIGNIFICANT CHANGE UP (ref 32–36)
MCV RBC AUTO: 85.9 FL — SIGNIFICANT CHANGE UP (ref 80–100)
NRBC # BLD: 0 /100 WBCS — SIGNIFICANT CHANGE UP (ref 0–0)
PHOSPHATE SERPL-MCNC: 4.3 MG/DL — SIGNIFICANT CHANGE UP (ref 2.5–4.5)
PLATELET # BLD AUTO: 241 K/UL — SIGNIFICANT CHANGE UP (ref 150–400)
POTASSIUM SERPL-MCNC: 4.3 MMOL/L — SIGNIFICANT CHANGE UP (ref 3.5–5.3)
POTASSIUM SERPL-SCNC: 4.3 MMOL/L — SIGNIFICANT CHANGE UP (ref 3.5–5.3)
PROTHROM AB SERPL-ACNC: 11.3 SEC — SIGNIFICANT CHANGE UP (ref 9.5–13)
RBC # BLD: 4.11 M/UL — SIGNIFICANT CHANGE UP (ref 3.8–5.2)
RBC # FLD: 12.5 % — SIGNIFICANT CHANGE UP (ref 10.3–14.5)
SODIUM SERPL-SCNC: 134 MMOL/L — LOW (ref 135–145)
TROPONIN T, HIGH SENSITIVITY RESULT: 10 NG/L — SIGNIFICANT CHANGE UP (ref 0–51)
WBC # BLD: 6.79 K/UL — SIGNIFICANT CHANGE UP (ref 3.8–10.5)
WBC # FLD AUTO: 6.79 K/UL — SIGNIFICANT CHANGE UP (ref 3.8–10.5)

## 2024-01-23 PROCEDURE — 93016 CV STRESS TEST SUPVJ ONLY: CPT

## 2024-01-23 PROCEDURE — 99233 SBSQ HOSP IP/OBS HIGH 50: CPT

## 2024-01-23 PROCEDURE — 78452 HT MUSCLE IMAGE SPECT MULT: CPT | Mod: 26

## 2024-01-23 PROCEDURE — 93018 CV STRESS TEST I&R ONLY: CPT

## 2024-01-23 RX ORDER — NITROGLYCERIN 6.5 MG
0.4 CAPSULE, EXTENDED RELEASE ORAL
Refills: 0 | Status: COMPLETED | OUTPATIENT
Start: 2024-01-23 | End: 2024-01-23

## 2024-01-23 RX ORDER — NITROGLYCERIN 6.5 MG
0.4 CAPSULE, EXTENDED RELEASE ORAL ONCE
Refills: 0 | Status: COMPLETED | OUTPATIENT
Start: 2024-01-23 | End: 2024-01-23

## 2024-01-23 RX ORDER — METOPROLOL TARTRATE 50 MG
25 TABLET ORAL DAILY
Refills: 0 | Status: DISCONTINUED | OUTPATIENT
Start: 2024-01-23 | End: 2024-01-24

## 2024-01-23 RX ADMIN — Medication 1 INCH(S): at 10:16

## 2024-01-23 RX ADMIN — Medication 25 MILLIGRAM(S): at 16:55

## 2024-01-23 RX ADMIN — Medication 0.4 MILLIGRAM(S): at 04:31

## 2024-01-23 RX ADMIN — AMLODIPINE BESYLATE 5 MILLIGRAM(S): 2.5 TABLET ORAL at 06:15

## 2024-01-23 RX ADMIN — CLOPIDOGREL BISULFATE 75 MILLIGRAM(S): 75 TABLET, FILM COATED ORAL at 16:40

## 2024-01-23 RX ADMIN — Medication 81 MILLIGRAM(S): at 16:40

## 2024-01-23 RX ADMIN — Medication 0.4 MILLIGRAM(S): at 10:18

## 2024-01-23 RX ADMIN — LOSARTAN POTASSIUM 100 MILLIGRAM(S): 100 TABLET, FILM COATED ORAL at 06:14

## 2024-01-23 RX ADMIN — PANTOPRAZOLE SODIUM 40 MILLIGRAM(S): 20 TABLET, DELAYED RELEASE ORAL at 06:16

## 2024-01-23 RX ADMIN — Medication 0.4 MILLIGRAM(S): at 03:56

## 2024-01-23 RX ADMIN — ISOSORBIDE MONONITRATE 60 MILLIGRAM(S): 60 TABLET, EXTENDED RELEASE ORAL at 16:39

## 2024-01-23 RX ADMIN — ATORVASTATIN CALCIUM 40 MILLIGRAM(S): 80 TABLET, FILM COATED ORAL at 21:52

## 2024-01-23 RX ADMIN — Medication 0.4 MILLIGRAM(S): at 05:07

## 2024-01-23 RX ADMIN — ESCITALOPRAM OXALATE 5 MILLIGRAM(S): 10 TABLET, FILM COATED ORAL at 16:40

## 2024-01-23 NOTE — PROGRESS NOTE ADULT - PROBLEM SELECTOR PLAN 1
Etiology unknown. Currently having reproducible left sided CP which radiates to her abdomen.  - Recent PCI LAD in 11/10/23, returned with chest pain a few days later with negative w/o including stress test  - EKG non-ischemic  - Troponin 9-> 11->10, ESR/CRP normal  - NST 1/23- Normal  - TTE 1/22: EF 66%, normal  - CXR: Left basilar focal atelectasis. Azygos lobe developmental variant. Cardiomegaly/cardiac magnification, thoracic aortic calcification. Unremarkable mediastinum.   - C/w Imdur 60mg qd, amlodipine 5mg qd and losartan 100mg qd.  - Started on metoprolol 25mg qd Principal Discharge DX:	Traveler's diarrhea

## 2024-01-23 NOTE — DISCHARGE NOTE PROVIDER - NSDCCPCAREPLAN_GEN_ALL_CORE_FT
PRINCIPAL DISCHARGE DIAGNOSIS  Diagnosis: Chest pain  Assessment and Plan of Treatment: You came to the hospital for evaluation of your chest pain, which has since then resolved. You had cardiac enzymes which were negative x 3, revealing no damage to the heart muscle, or a heart attack. You had an Echocardiogram (ultrasound of the heart) which was normal. You also had a stress test of your heart which revealed no blockages in the arteries of your heart.  PLEASE CONTINUE TO FOLLOW UP WITH ASPIRIN 81MG DAILY.  Please follow up with Dr. frye in 1-2 weeks. If you experience any worsening chest pain, palpitations, dizziness, or shortness of breath, please go to the nearest emergency room.     PRINCIPAL DISCHARGE DIAGNOSIS  Diagnosis: Chest pain  Assessment and Plan of Treatment: You came to the hospital for evaluation of your chest pain, which has since then resolved. You had cardiac enzymes which were negative x 3, revealing no damage to the heart muscle, or a heart attack. You had an Echocardiogram (ultrasound of the heart) which was normal. You also had a stress test of your heart which revealed no blockages in the arteries of your heart.  PLEASE CONTINUE TO FOLLOW UP WITH ASPIRIN 81MG DAILY.  Please follow up with Dr. frye in 1-2 weeks. If you experience any worsening chest pain, palpitations, dizziness, or shortness of breath, please go to the nearest emergency room.      SECONDARY DISCHARGE DIAGNOSES  Diagnosis: Breast mass  Assessment and Plan of Treatment: You have a known breast mass for which you are scheduled for an outpatient biopsy. PLEASE FOLLOW UP WITH YOUR CARDIOLOGIST PRIOR TO THIS PROCEDURE TO DISCUSS HOLDING YOUR PLAVIX.    Diagnosis: CAD (coronary artery disease)  Assessment and Plan of Treatment: You were found to have blockages in the arteries of your heart, also known as Coronary Artery Disease.   PLEASE CONTINUE ASPIRIN 81MG DAILY AND PLAVIX 75MG DAILY. DO NOT STOP THESE MEDICATIONS FOR ANY REASON AS THEY ARE KEEPING YOUR STENT OPEN AND PREVENTING A HEART ATTACK.   Please follow up with your cardiologist Dr. Jill Muniz in 1-2 weeks. For recurrent chest pain, please call your doctor or go to the emergency room.       PRINCIPAL DISCHARGE DIAGNOSIS  Diagnosis: Chest pain  Assessment and Plan of Treatment: You came to the hospital for evaluation of your chest pain, which has since then resolved. You had cardiac enzymes which were negative x 3, revealing no damage to the heart muscle, or a heart attack. You had an Echocardiogram (ultrasound of the heart) which was normal. You also had a stress test of your heart which revealed no blockages in the arteries of your heart.  PLEASE CONTINUE TO FOLLOW UP WITH ASPIRIN 81MG DAILY.  Please follow up with Dr. Muniz in 1-2 weeks. If you experience any worsening chest pain, palpitations, dizziness, or shortness of breath, please go to the nearest emergency room.      SECONDARY DISCHARGE DIAGNOSES  Diagnosis: Breast mass  Assessment and Plan of Treatment: You have a known breast mass for which you are scheduled for an outpatient biopsy. PLEASE FOLLOW UP WITH YOUR CARDIOLOGIST Dr. Jill Muniz PRIOR TO THIS PROCEDURE TO DISCUSS HOLDING YOUR PLAVIX.    Diagnosis: CAD (coronary artery disease)  Assessment and Plan of Treatment: You were found to have blockages in the arteries of your heart, also known as Coronary Artery Disease.   PLEASE CONTINUE ASPIRIN 81MG DAILY AND PLAVIX 75MG DAILY. DO NOT STOP THESE MEDICATIONS FOR ANY REASON AS THEY ARE KEEPING YOUR STENT OPEN AND PREVENTING A HEART ATTACK.   Please follow up with your cardiologist Dr. Jill Muniz in 1-2 weeks. For recurrent chest pain, please call your doctor or go to the emergency room.

## 2024-01-23 NOTE — DISCHARGE NOTE PROVIDER - ATTENDING DISCHARGE PHYSICAL EXAMINATION:
GENERAL: No acute distress, well-developed  HEAD:  Atraumatic, Normocephalic  ENT: EOMI, PERRLA, conjunctiva and sclera clear, Neck supple, No JVD, moist mucosa  CHEST/LUNG: Clear to auscultation bilaterally; No wheeze, equal breath sounds bilaterally   BACK: No spinal tenderness  HEART: Regular rate and rhythm; No murmurs, rubs, or gallops  ABDOMEN: Soft, Nontender, Nondistended; Bowel sounds present  EXTREMITIES:  No clubbing, cyanosis, or edema  PSYCH: Nl behavior, nl affect  NEUROLOGY: AAOx3, non-focal, cranial nerves intact  SKIN: Normal color, No rashes or lesions

## 2024-01-23 NOTE — PROGRESS NOTE ADULT - ASSESSMENT
82 y/o female Taiwanese speaking, with PMHx of CAD treated with PCI LAD in 11/10/23 (On Aspirin and Plavix), HTN, HLD, Gastritis (s/p EGD 8/2023), h/o chronic lacunar infarcts, chronic HAs and Anxiety, presented to [facility] ED c/o 5 days of intermittent left sided sharp chest pain at rest. Pt now admitted to cardiac tele for CP. CT chest pending.

## 2024-01-23 NOTE — DISCHARGE NOTE PROVIDER - CARE PROVIDER_API CALL
Odell Orozco  Internal Medicine  [address] [place] [address]  Phone: [phone]  Fax: [phone]  Follow Up Time:    Odell Orozco  Internal Medicine  [address] [place] [address]  Phone: [phone]  Fax: [phone]  Follow Up Time:     DORCAS ORTIZ  [address] [place] [address]  Phone: ()-  Fax: ()-  Follow Up Time: 2 weeks

## 2024-01-23 NOTE — DISCHARGE NOTE PROVIDER - PROVIDER TOKENS
PROVIDER:[TOKEN:[[id number]]] PROVIDER:[TOKEN:[[id number]]],PROVIDER:[TOKEN:[[id number]],FOLLOWUP:[2 weeks]]

## 2024-01-23 NOTE — DISCHARGE NOTE PROVIDER - NSDCMRMEDTOKEN_GEN_ALL_CORE_FT
aspirin 81 mg oral delayed release tablet: 1 tab(s) orally once a day  cetirizine 10 mg oral tablet: 1 tab(s) orally once a day  cloNIDine 0.2 mg/24 hr transdermal film, extended release: 1 patch transdermally once a week  clopidogrel 75 mg oral tablet: 1 tab(s) orally once a day  Imdur 30 mg oral tablet, extended release: 1 tab(s) orally once a day  Lexapro 5 mg oral tablet: 1 tab(s) orally once a day  Norvasc 5 mg oral tablet: 1 tab(s) orally once a day (at bedtime)  olmesartan 40 mg oral tablet: 1 tab(s) orally once a day  pantoprazole 40 mg oral delayed release tablet: 1 tab(s) orally once a day  pitavastatin (as calcium) 4 mg oral tablet: 1 tab(s) orally once a day   aspirin 81 mg oral delayed release tablet: 1 tab(s) orally once a day  cetirizine 10 mg oral tablet: 1 tab(s) orally once a day  cloNIDine 0.2 mg/24 hr transdermal film, extended release: 1 patch transdermally once a week  clopidogrel 75 mg oral tablet: 1 tab(s) orally once a day  isosorbide mononitrate 60 mg oral tablet, extended release: 1.5 tab(s) orally once a day  Lexapro 5 mg oral tablet: 1 tab(s) orally once a day  metoprolol succinate 25 mg oral tablet, extended release: 1 tab(s) orally once a day  Norvasc 5 mg oral tablet: 1 tab(s) orally once a day (at bedtime)  olmesartan 40 mg oral tablet: 1 tab(s) orally once a day  pantoprazole 40 mg oral delayed release tablet: 1 tab(s) orally once a day  pitavastatin (as calcium) 4 mg oral tablet: 1 tab(s) orally once a day

## 2024-01-23 NOTE — PROGRESS NOTE ADULT - NS ATTEND AMEND GEN_ALL_CORE FT
82 yo man PMHx of CAD s/p GULSHAN to [facility] Nov/2023 and HTN who presented with 5d hx of possibly cardiac chest pain.    Assessment  1. CAD s/p GULSHAN to mLAD Nov/2023  2. Possibly cardiac chest pain as well as reproducible sternal pain  ACS ruled out  ESR/CRP low and no pleuritic chest pain or frictional rub so pericarditis ruled out  Nuclear stress test w/o ischemia, TID, or wall motion abnormality w/ stress; increased R breast uptake  3. HTN    Plan  1. DAPT ASA 81mg QD and Plavix 75mg QD  2. High intensity statin  3. CT chest w/ IV contrast to r/o anterior chest wall or breast abnormality  4. Start metoprolol succinate 25mg PO QD and increase Imdur to 60mg PO QD for optimal anti anginal therapy  5. Amlodipine 5mg PO QD and losartan 100mg PO QD for HTN  6. Anticipate DC tomorrow after CT    During non face-to-face time, I reviewed relevant portions of the patient’s medical record. During face-to-face time, I took a relevant history and examined the patient. I also explained differential diagnoses, relevant cardiac diagnoses, workup, and management plan, which required a high level of medical decision making. I answered all questions related to the patient's medical conditions.

## 2024-01-23 NOTE — DISCHARGE NOTE PROVIDER - HOSPITAL COURSE
This is a 83 female, Latvian speaking, with PMHx of CAD treated with PCI LAD in 11/10/23 (On Aspirin and Plavix), HTN, HLD, Gastritis (s/p EGD 8/2023), h/o chronic lacunar infarcts, chronic HAs and Anxiety, presented to [facility] ED c/o 5 days of intermittent left sided sharp chest pain at rest, radiating to L arm/jaw associated with SOB. On day of admission, pain was like a burning sensation. Pain is sharp in nature, lasting a few minutes and relieved on its own. She also c/o upper gastric pain. Denies N/V, diarrhea, constipations, fever, dizziness      In the ER, /82, HR 64, R 18, T 97.7, O2 sat 96% RA. Labs with trop 9-> 11-> 10, ESR/CRP normal, EKG non ischemic, CXR pending. She received valium and Pepcid for anxiety and gastric symptoms and c/o immediately of SOB. Evaluated by ER attending, lungs were clear, O2 99% RA. she was deemed to be anxious and admitted to telemetry for further management of chest pain    NST 1/23/24:  Echo 1/22: EF 66%  CXR: Left basilar focal atelectasis. Azygos lobe developmental variant. Cardiomegaly/cardiac magnification, thoracic aortic calcification. Unremarkable mediastinum. Stable bony structures.    Pt admitted overnight for observation and telemetry monitoring. Pt seen and examined at bedside this AM without any complaints or events overnight, VSS, labs and telemetry reviewed and pt stable for discharge as discussed with Dr. Arora. Pt has received appropriate discharge instructions, including medication regimen, and follow up with Dr. Odell Kwong in 1-2 weeks.    Discharge medications: ASA 81mg QD, Plavix 75mg QD, Metoprolol 25mg, Imdur 60 qd, atorvastatin to 40mg qhs, amlodipine 5mg, losartan 100mg.   ***INCOMPLETE***    This is a 83 female, Omani speaking, with PMHx of CAD treated with PCI LAD in 11/10/23 (On Aspirin and Plavix), HTN, HLD, Gastritis (s/p EGD 8/2023), h/o chronic lacunar infarcts, chronic HAs and Anxiety, presented to [facility] ED c/o 5 days of intermittent left sided sharp chest pain at rest, radiating to L arm/jaw associated with SOB. On day of admission, pain was like a burning sensation. Pain is sharp in nature, lasting a few minutes and relieved on its own. She also c/o upper gastric pain. Denies N/V, diarrhea, constipations, fever, dizziness      In the ER, /82, HR 64, R 18, T 97.7, O2 sat 96% RA. Labs with trop 9-> 11-> 10, ESR/CRP normal, EKG non ischemic, CXR pending. She received valium and Pepcid for anxiety and gastric symptoms and c/o immediately of SOB. Evaluated by ER attending, lungs were clear, O2 99% RA. she was deemed to be anxious and admitted to telemetry for further management of chest pain    NST 1/23/24: Myocardial perfusion imaging is normal, LVSF is hyperdynamic without regional wall motion abnormalities, EKG stress test is normal. Abnormal tracer uptake noted in   the right breast, clinical correlation/dedicated imaging suggested.   Echo 1/22: EF 66%  CXR: Left basilar focal atelectasis. Azygos lobe developmental variant. Cardiomegaly/cardiac magnification, thoracic aortic calcification. Unremarkable mediastinum. Stable bony structures.  CT 1/24/24:     Pt admitted overnight for observation and telemetry monitoring. Pt seen and examined at bedside this AM without any complaints or events overnight, VSS, labs and telemetry reviewed and pt stable for discharge as discussed with Dr. Arora. Pt has received appropriate discharge instructions, including medication regimen, and follow up with Dr. Odell Kwong in 1-2 weeks.    Discharge medications: ASA 81mg QD, Plavix 75mg QD, Metoprolol 25mg, Imdur 60 qd, atorvastatin to 40mg qhs, amlodipine 5mg, losartan 100mg.   ***INCOMPLETE***    82 y/o female, American speaking, with PMHx of CAD treated with PCI LAD in 11/10/23 (On Aspirin and Plavix), HTN, HLD, Gastritis (s/p EGD 8/2023), h/o chronic lacunar infarcts, chronic HAs and anxiety, presented to [facility] ED c/o 5 days of intermittent left sided sharp chest pain at rest, radiating to L arm/jaw associated with SOB. On day of admission, pain was like a burning sensation w associated upper gastric pain. Pain is sharp in nature, lasting a few minutes and relieved on its own. Trop neg x3. ESR/CRP normal, EKG non ischemic, VSS. Pt was admitted to cardiac tele for f/u ACS. She was initiated on Imdur 30mg qd and uptitrated to Imdur 90mg qd on discharge. NST 1/23/24: Myocardial perfusion imaging is normal, LVSF is hyperdynamic w/o RWMA. Normal EKG stress test. Abnormal tracer uptake noted in the right breast, clinical correlation/dedicated imaging suggested. Echo 1/22: EF 66%, mild symmetric LVH, AS without significant stenosis. PASP 32 mmHg.  CXR: Left basilar focal atelectasis. Azygos lobe developmental variant. Cardiomegaly/cardiac magnification, thoracic aortic calcification. Unremarkable mediastinum. Stable bony structures. Pt further underwent a chest CT on 1/24/24 revealing ____. She has a scheduled outpatient biopsy for known breast mass.     Pt admitted overnight for observation and telemetry monitoring. Pt seen and examined at bedside this AM without any complaints or events overnight, VSS, labs and telemetry reviewed and pt stable for discharge as discussed with Dr. Arora. Pt has received appropriate discharge instructions, including medication regimen, and follow up with her cardiologist Dr. Jill Muniz in 1-2 weeks.       Discharge medications: ASA 81mg QD, Plavix 75mg QD, Metoprolol 25mg, Imdur 60 qd, atorvastatin to 40mg qhs, amlodipine 5mg, losartan 100mg.       ***INCOMPLETE***    82 y/o female, Mongolian speaking, with PMHx of CAD treated with PCI LAD in 11/10/23 (On Aspirin and Plavix), HTN, HLD, Gastritis (s/p EGD 8/2023), h/o chronic lacunar infarcts, chronic HAs and anxiety, presented to [facility] ED c/o 5 days of intermittent left sided sharp chest pain at rest, radiating to L arm/jaw associated with SOB. On day of admission, pain was like a burning sensation w associated upper gastric pain. Pain is sharp in nature, lasting a few minutes and relieved on its own. Trop neg x3. ESR/CRP normal, EKG non ischemic, VSS. Pt was admitted to cardiac tele for f/u ACS. She was initiated on Imdur 30mg qd and uptitrated to Imdur 90mg qd on discharge. NST 1/23/24: Myocardial perfusion imaging is normal, LVSF is hyperdynamic w/o RWMA. Normal EKG stress test. Abnormal tracer uptake noted in the right breast, clinical correlation/dedicated imaging suggested. Echo 1/22: EF 66%, mild symmetric LVH, AS without significant stenosis. PASP 32 mmHg.  CXR: Left basilar focal atelectasis. Azygos lobe developmental variant. Cardiomegaly/cardiac magnification, thoracic aortic calcification, unremarkable mediastinum. Stable bony structures. Pt further underwent a chest CT on 1/24/24 revealing: small airways disease with air trapping, no jayjay mass of the partially visualized breast parenchyma and no adenopathy. Annual mammographic evaluation is the consensus recommendation for breast cancer screening. She has a scheduled outpatient biopsy for known breast mass.     Pt admitted overnight for observation and telemetry monitoring. Pt seen and examined at bedside this AM without any complaints or events overnight, VSS, labs and telemetry reviewed and pt stable for discharge as discussed with Dr. Arora. Pt has received appropriate discharge instructions, including medication regimen, and follow up with her cardiologist Dr. Jill Muniz in 1-2 weeks.       Discharge medications: ASA 81mg QD, Plavix 75mg QD, Metoprolol 25mg, Imdur 60 qd, atorvastatin to 40mg qhs, amlodipine 5mg, losartan 100mg.     84 y/o female, [place] citizen speaking, with PMHx of CAD treated with PCI LAD in 11/10/23 (On Aspirin and Plavix), HTN, HLD, Gastritis (s/p EGD 8/2023), h/o chronic lacunar infarcts, chronic HAs and anxiety, presented to [facility] ED c/o 5 days of intermittent left sided sharp chest pain at rest, radiating to L arm/jaw associated with SOB. On day of admission, pain was like a burning sensation w associated upper gastric pain. Pain is sharp in nature, lasting a few minutes and relieved on its own. Trop neg x3. ESR/CRP normal, EKG non ischemic, VSS. Pt was admitted to cardiac tele for f/u ACS. She was initiated on Imdur 30mg qd and uptitrated to Imdur 90mg qd on discharge. NST 1/23/24: Myocardial perfusion imaging is normal, LVSF is hyperdynamic w/o RWMA. Normal EKG stress test. Abnormal tracer uptake noted in the right breast, clinical correlation/dedicated imaging suggested. Echo 1/22: EF 66%, mild symmetric LVH, AS without significant stenosis. PASP 32 mmHg.  CXR: Left basilar focal atelectasis. Azygos lobe developmental variant. Cardiomegaly/cardiac magnification, thoracic aortic calcification, unremarkable mediastinum. Stable bony structures. Pt further underwent a chest CT on 1/24/24 revealing: small airways disease with air trapping, no jayjay mass of the partially visualized breast parenchyma and no adenopathy. She has a scheduled outpatient biopsy for known breast mass for which pt was instructued to follow up with her outpt cardiologist JACKSON Muniz for candelaria op plavix management.     Pt admitted overnight for observation and telemetry monitoring. Pt seen and examined at bedside this AM without any complaints or events overnight, VSS, labs and telemetry reviewed and pt stable for discharge as discussed with Dr. Arora. Pt has received appropriate discharge instructions, including medication regimen, and follow up with her cardiologist Dr. Jill Muniz in 1-2 weeks.       Discharge medications: ASA 81mg QD, Plavix 75mg QD, Metoprolol 25mg, Imdur 60 qd, atorvastatin to 40mg qhs, amlodipine 5mg, losartan 100mg.     82 y/o female, Trinidadian speaking, with PMHx of CAD treated with PCI LAD in 11/10/23 (On Aspirin and Plavix), HTN, HLD, Gastritis (s/p EGD 8/2023), h/o chronic lacunar infarcts, chronic HAs and anxiety, presented to [facility] ED c/o 5 days of intermittent left sided sharp chest pain at rest, radiating to L arm/jaw associated with SOB. On day of admission, pain was like a burning sensation w associated upper gastric pain. Pain is sharp in nature, lasting a few minutes and relieved on its own. Trop neg x3. ESR/CRP normal, EKG non ischemic, VSS. Pt was admitted to cardiac tele for f/u ACS. She was initiated on Imdur 30mg qd and uptitrated to Imdur 90mg qd on discharge. NST 1/23/24: Myocardial perfusion imaging is normal, LVSF is hyperdynamic w/o RWMA. Normal EKG stress test. Abnormal tracer uptake noted in the right breast, clinical correlation/dedicated imaging suggested. Echo 1/22: EF 66%, mild symmetric LVH, AS without significant stenosis. PASP 32 mmHg.  CXR: Left basilar focal atelectasis. Azygos lobe developmental variant. Cardiomegaly/cardiac magnification, thoracic aortic calcification, unremarkable mediastinum. Stable bony structures. Pt further underwent a chest CT on 1/24/24 revealing: small airways disease with air trapping, no jayjay mass of the partially visualized breast parenchyma and no adenopathy. She has a scheduled outpatient biopsy for known breast mass for which pt was instructed to follow up with her outpt cardiologist JACKSON Muniz for candelaria op plavix management.     Pt admitted overnight for observation and telemetry monitoring. Pt seen and examined at bedside this AM without any complaints or events overnight, VSS, labs and telemetry reviewed and pt stable for discharge as discussed with Dr. Arora. Pt has received appropriate discharge instructions, including medication regimen, and follow up with her cardiologist Dr. Jill Muniz in 1-2 weeks.       Discharge medications: ASA 81mg QD, Plavix 75mg QD, Metoprolol 25mg, Imdur 60 qd, atorvastatin to 40mg qhs, amlodipine 5mg, losartan 100mg.     82 y/o female, Armenian speaking, with PMHx of CAD treated with PCI LAD in 11/10/23 (On Aspirin and Plavix), HTN, HLD, Gastritis (s/p EGD 8/2023), h/o chronic lacunar infarcts, chronic HAs and anxiety, presented to [facility] ED c/o 5 days of intermittent left sided sharp chest pain at rest, radiating to L arm/jaw associated with SOB. On day of admission, pain was like a burning sensation w associated upper gastric pain. Pain is sharp in nature, lasting a few minutes and relieved on its own. Trop neg x3. ESR/CRP normal, EKG non ischemic, VSS. Pt was admitted to cardiac tele for f/u ACS. She was initiated on Imdur 30mg qd and uptitrated to Imdur 90mg qd on discharge. NST 1/23/24: Myocardial perfusion imaging is normal, LVSF is hyperdynamic w/o RWMA. Normal EKG stress test. Abnormal tracer uptake noted in the right breast, clinical correlation/dedicated imaging suggested. Echo 1/22: EF 66%, mild symmetric LVH, AS without significant stenosis. PASP 32 mmHg.  CXR: Left basilar focal atelectasis. Azygos lobe developmental variant. Cardiomegaly/cardiac magnification, thoracic aortic calcification, unremarkable mediastinum. Stable bony structures. Pt further underwent a chest CT on 1/24/24 revealing: small airways disease with air trapping, no jayjay mass of the partially visualized breast parenchyma and no adenopathy. She has a scheduled outpatient biopsy for known breast mass for which pt was instructed to follow up with her outpt cardiologist JACKSON Muniz for candelaria op plavix management.     Pt admitted overnight for observation and telemetry monitoring. Pt seen and examined at bedside this AM without any complaints or events overnight, VSS, labs and telemetry reviewed and pt stable for discharge as discussed with Dr. Arora. Pt has received appropriate discharge instructions, including medication regimen, and follow up with her cardiologist Dr. Jill Muniz in 1-2 weeks.     Discharge medications: ASA 81mg QD, Plavix 75mg QD, Metoprolol 25mg, Imdur 60 qd, atorvastatin to 40mg qhs, amlodipine 5mg, losartan 100mg.

## 2024-01-23 NOTE — PROGRESS NOTE ADULT - PROBLEM SELECTOR PLAN 3
- F/u CT chest- for breast mass  - Scheduled for biopsy as outpt    F: None  E: Replete if K<4 or Mag<2  N: DASH Diet  GIppx: pantoprazole  VTEppx: none  Dispo: pending chest CT

## 2024-01-23 NOTE — PROGRESS NOTE ADULT - SUBJECTIVE AND OBJECTIVE BOX
Interventional Cardiology PA Adult Progress Note    Subjective Assessment: Pt evaluated at beside this AM. Pt appears well and NAD. Denies CP, SOB, palpitation, n/v/d, fever, LOC, or headache.   	  MEDICATIONS:  amLODIPine   Tablet 5 milliGRAM(s) Oral daily  isosorbide   mononitrate ER Tablet (IMDUR) 60 milliGRAM(s) Oral daily  losartan 100 milliGRAM(s) Oral daily  metoprolol succinate ER 25 milliGRAM(s) Oral daily  escitalopram 5 milliGRAM(s) Oral daily  pantoprazole    Tablet 40 milliGRAM(s) Oral before breakfast  atorvastatin 40 milliGRAM(s) Oral at bedtime  aspirin enteric coated 81 milliGRAM(s) Oral daily  clopidogrel Tablet 75 milliGRAM(s) Oral daily  influenza  Vaccine (HIGH DOSE) 0.7 milliLiter(s) IntraMuscular once      	    [PHYSICAL EXAM:  TELEMETRY:  T(C): 36.2 (01-23-24 @ 09:40), Max: 36.4 (01-22-24 @ 18:36)  HR: 56 (01-23-24 @ 09:40) (52 - 64)  BP: 133/63 (01-23-24 @ 09:40) (104/53 - 133/63)  RR: 17 (01-23-24 @ 09:40) (17 - 18)  SpO2: 96% (01-23-24 @ 09:40) (95% - 98%)  Wt(kg): --  I&O's Summary    22 Jan 2024 07:01  -  23 Jan 2024 07:00  --------------------------------------------------------  IN: 180 mL / OUT: 0 mL / NET: 180 mL    23 Jan 2024 07:01  -  23 Jan 2024 16:33  --------------------------------------------------------  IN: 0 mL / OUT: 0 mL / NET: 0 mL        Bill:  Central/PICC/Mid Line:                                         Appearance: Normal	  HEENT:   Normal oral mucosa, PERRL, EOMI	  Neck: Supple, - JVD; Carotid Bruit   Cardiovascular: Normal S1 S2, No JVD, No murmurs,   Respiratory: Lungs clear to auscultation. No Rales, Rhonchi, Wheezing	  Gastrointestinal:  Soft, Non-tender, + BS	  Skin: No rashes, No ecchymoses, No cyanosis  Extremities: Normal range of motion, No clubbing, cyanosis or edema  Vascular: Peripheral pulses palpable 2+ bilaterally  Neurologic: Non-focal  Psychiatry: A & O x 3, Mood & affect appropriate      	                        11.8   6.79  )-----------( 241      ( 23 Jan 2024 05:30 )             35.3     01-23    134<L>  |  98  |  24<H>  ----------------------------<  112<H>  4.3   |  25  |  1.29    Ca    9.0      23 Jan 2024 03:51  Phos  4.3     01-23  Mg     2.4     01-23    TPro  7.9  /  Alb  4.2  /  TBili  0.4  /  DBili  x   /  AST  15  /  ALT  10  /  AlkPhos  100  01-22    proBNP:   Lipid Profile:   HgA1c:   TSH:   PT/INR - ( 23 Jan 2024 05:32 )   PT: 11.3 sec;   INR: 0.99          PTT - ( 23 Jan 2024 05:32 )  PTT:33.7 sec

## 2024-01-23 NOTE — DISCHARGE NOTE PROVIDER - CARE PROVIDERS DIRECT ADDRESSES
,DirectAddress_Unknown ,DirectAddress_Unknown,[email or web address]

## 2024-01-24 ENCOUNTER — TRANSCRIPTION ENCOUNTER (OUTPATIENT)
Age: 84
End: 2024-01-24

## 2024-01-24 VITALS — RESPIRATION RATE: 19 BRPM | SYSTOLIC BLOOD PRESSURE: 136 MMHG | HEART RATE: 70 BPM | DIASTOLIC BLOOD PRESSURE: 64 MMHG

## 2024-01-24 LAB
ANION GAP SERPL CALC-SCNC: 8 MMOL/L — SIGNIFICANT CHANGE UP (ref 5–17)
BUN SERPL-MCNC: 21 MG/DL — SIGNIFICANT CHANGE UP (ref 7–23)
CALCIUM SERPL-MCNC: 9.2 MG/DL — SIGNIFICANT CHANGE UP (ref 8.4–10.5)
CHLORIDE SERPL-SCNC: 103 MMOL/L — SIGNIFICANT CHANGE UP (ref 96–108)
CO2 SERPL-SCNC: 24 MMOL/L — SIGNIFICANT CHANGE UP (ref 22–31)
CREAT SERPL-MCNC: 1.05 MG/DL — SIGNIFICANT CHANGE UP (ref 0.5–1.3)
EGFR: 52 ML/MIN/1.73M2 — LOW
GLUCOSE SERPL-MCNC: 124 MG/DL — HIGH (ref 70–99)
HCT VFR BLD CALC: 34.6 % — SIGNIFICANT CHANGE UP (ref 34.5–45)
HGB BLD-MCNC: 11.6 G/DL — SIGNIFICANT CHANGE UP (ref 11.5–15.5)
MAGNESIUM SERPL-MCNC: 2.1 MG/DL — SIGNIFICANT CHANGE UP (ref 1.6–2.6)
MCHC RBC-ENTMCNC: 29.1 PG — SIGNIFICANT CHANGE UP (ref 27–34)
MCHC RBC-ENTMCNC: 33.5 GM/DL — SIGNIFICANT CHANGE UP (ref 32–36)
MCV RBC AUTO: 86.9 FL — SIGNIFICANT CHANGE UP (ref 80–100)
NRBC # BLD: 0 /100 WBCS — SIGNIFICANT CHANGE UP (ref 0–0)
PLATELET # BLD AUTO: 223 K/UL — SIGNIFICANT CHANGE UP (ref 150–400)
POTASSIUM SERPL-MCNC: 4 MMOL/L — SIGNIFICANT CHANGE UP (ref 3.5–5.3)
POTASSIUM SERPL-SCNC: 4 MMOL/L — SIGNIFICANT CHANGE UP (ref 3.5–5.3)
RBC # BLD: 3.98 M/UL — SIGNIFICANT CHANGE UP (ref 3.8–5.2)
RBC # FLD: 12.7 % — SIGNIFICANT CHANGE UP (ref 10.3–14.5)
SODIUM SERPL-SCNC: 135 MMOL/L — SIGNIFICANT CHANGE UP (ref 135–145)
WBC # BLD: 6.11 K/UL — SIGNIFICANT CHANGE UP (ref 3.8–10.5)
WBC # FLD AUTO: 6.11 K/UL — SIGNIFICANT CHANGE UP (ref 3.8–10.5)

## 2024-01-24 PROCEDURE — 84484 ASSAY OF TROPONIN QUANT: CPT

## 2024-01-24 PROCEDURE — 80048 BASIC METABOLIC PNL TOTAL CA: CPT

## 2024-01-24 PROCEDURE — 83735 ASSAY OF MAGNESIUM: CPT

## 2024-01-24 PROCEDURE — 96375 TX/PRO/DX INJ NEW DRUG ADDON: CPT

## 2024-01-24 PROCEDURE — A9505: CPT

## 2024-01-24 PROCEDURE — 71260 CT THORAX DX C+: CPT

## 2024-01-24 PROCEDURE — 85652 RBC SED RATE AUTOMATED: CPT

## 2024-01-24 PROCEDURE — 80053 COMPREHEN METABOLIC PANEL: CPT

## 2024-01-24 PROCEDURE — 85610 PROTHROMBIN TIME: CPT

## 2024-01-24 PROCEDURE — 85025 COMPLETE CBC W/AUTO DIFF WBC: CPT

## 2024-01-24 PROCEDURE — 83880 ASSAY OF NATRIURETIC PEPTIDE: CPT

## 2024-01-24 PROCEDURE — 93306 TTE W/DOPPLER COMPLETE: CPT

## 2024-01-24 PROCEDURE — 96374 THER/PROPH/DIAG INJ IV PUSH: CPT

## 2024-01-24 PROCEDURE — 99239 HOSP IP/OBS DSCHRG MGMT >30: CPT

## 2024-01-24 PROCEDURE — 84100 ASSAY OF PHOSPHORUS: CPT

## 2024-01-24 PROCEDURE — 82550 ASSAY OF CK (CPK): CPT

## 2024-01-24 PROCEDURE — 93017 CV STRESS TEST TRACING ONLY: CPT

## 2024-01-24 PROCEDURE — 36415 COLL VENOUS BLD VENIPUNCTURE: CPT

## 2024-01-24 PROCEDURE — 99285 EMERGENCY DEPT VISIT HI MDM: CPT

## 2024-01-24 PROCEDURE — 93005 ELECTROCARDIOGRAM TRACING: CPT

## 2024-01-24 PROCEDURE — 86140 C-REACTIVE PROTEIN: CPT

## 2024-01-24 PROCEDURE — 71045 X-RAY EXAM CHEST 1 VIEW: CPT

## 2024-01-24 PROCEDURE — 85027 COMPLETE CBC AUTOMATED: CPT

## 2024-01-24 PROCEDURE — 71260 CT THORAX DX C+: CPT | Mod: 26

## 2024-01-24 PROCEDURE — A9500: CPT

## 2024-01-24 PROCEDURE — 82553 CREATINE MB FRACTION: CPT

## 2024-01-24 PROCEDURE — 85730 THROMBOPLASTIN TIME PARTIAL: CPT

## 2024-01-24 PROCEDURE — 78452 HT MUSCLE IMAGE SPECT MULT: CPT

## 2024-01-24 RX ORDER — ISOSORBIDE MONONITRATE 60 MG/1
1.5 TABLET, EXTENDED RELEASE ORAL
Qty: 45 | Refills: 2
Start: 2024-01-24 | End: 2024-04-22

## 2024-01-24 RX ORDER — ISOSORBIDE MONONITRATE 60 MG/1
1 TABLET, EXTENDED RELEASE ORAL
Refills: 0 | DISCHARGE

## 2024-01-24 RX ORDER — ISOSORBIDE MONONITRATE 60 MG/1
90 TABLET, EXTENDED RELEASE ORAL DAILY
Refills: 0 | Status: DISCONTINUED | OUTPATIENT
Start: 2024-01-25 | End: 2024-01-24

## 2024-01-24 RX ORDER — ISOSORBIDE MONONITRATE 60 MG/1
60 TABLET, EXTENDED RELEASE ORAL DAILY
Refills: 0 | Status: DISCONTINUED | OUTPATIENT
Start: 2024-01-24 | End: 2024-01-24

## 2024-01-24 RX ORDER — METOPROLOL TARTRATE 50 MG
1 TABLET ORAL
Qty: 30 | Refills: 2
Start: 2024-01-24 | End: 2024-04-22

## 2024-01-24 RX ADMIN — Medication 25 MILLIGRAM(S): at 05:27

## 2024-01-24 RX ADMIN — ISOSORBIDE MONONITRATE 60 MILLIGRAM(S): 60 TABLET, EXTENDED RELEASE ORAL at 11:22

## 2024-01-24 RX ADMIN — AMLODIPINE BESYLATE 5 MILLIGRAM(S): 2.5 TABLET ORAL at 05:27

## 2024-01-24 RX ADMIN — LOSARTAN POTASSIUM 100 MILLIGRAM(S): 100 TABLET, FILM COATED ORAL at 05:27

## 2024-01-24 RX ADMIN — Medication 81 MILLIGRAM(S): at 11:21

## 2024-01-24 RX ADMIN — CLOPIDOGREL BISULFATE 75 MILLIGRAM(S): 75 TABLET, FILM COATED ORAL at 11:22

## 2024-01-24 RX ADMIN — PANTOPRAZOLE SODIUM 40 MILLIGRAM(S): 20 TABLET, DELAYED RELEASE ORAL at 05:44

## 2024-01-24 RX ADMIN — ESCITALOPRAM OXALATE 5 MILLIGRAM(S): 10 TABLET, FILM COATED ORAL at 11:22

## 2024-01-24 NOTE — DISCHARGE NOTE NURSING/CASE MANAGEMENT/SOCIAL WORK - PATIENT PORTAL LINK FT
You can access the FollowMyHealth Patient Portal offered by [facility] by registering at the following website: [email or web address]. By joining [employer]’s FollowMyHealth portal, you will also be able to view your health information using other applications (apps) compatible with our system.

## 2024-01-29 DIAGNOSIS — K29.70 GASTRITIS, UNSPECIFIED, WITHOUT BLEEDING: ICD-10-CM

## 2024-01-29 DIAGNOSIS — I10 ESSENTIAL (PRIMARY) HYPERTENSION: ICD-10-CM

## 2024-01-29 DIAGNOSIS — N63.0 UNSPECIFIED LUMP IN UNSPECIFIED BREAST: ICD-10-CM

## 2024-01-29 DIAGNOSIS — E78.5 HYPERLIPIDEMIA, UNSPECIFIED: ICD-10-CM

## 2024-01-29 DIAGNOSIS — R06.02 SHORTNESS OF BREATH: ICD-10-CM

## 2024-01-29 DIAGNOSIS — Z90.49 ACQUIRED ABSENCE OF OTHER SPECIFIED PARTS OF DIGESTIVE TRACT: ICD-10-CM

## 2024-01-29 DIAGNOSIS — F41.9 ANXIETY DISORDER, UNSPECIFIED: ICD-10-CM

## 2024-01-29 DIAGNOSIS — E78.00 PURE HYPERCHOLESTEROLEMIA, UNSPECIFIED: ICD-10-CM

## 2024-01-29 DIAGNOSIS — Z79.02 LONG TERM (CURRENT) USE OF ANTITHROMBOTICS/ANTIPLATELETS: ICD-10-CM

## 2024-01-29 DIAGNOSIS — I25.10 ATHEROSCLEROTIC HEART DISEASE OF NATIVE CORONARY ARTERY WITHOUT ANGINA PECTORIS: ICD-10-CM

## 2024-01-29 DIAGNOSIS — R07.89 OTHER CHEST PAIN: ICD-10-CM

## 2024-01-29 DIAGNOSIS — Z79.82 LONG TERM (CURRENT) USE OF ASPIRIN: ICD-10-CM

## 2024-09-24 VITALS
DIASTOLIC BLOOD PRESSURE: 91 MMHG | TEMPERATURE: 97 F | HEART RATE: 61 BPM | RESPIRATION RATE: 16 BRPM | OXYGEN SATURATION: 99 % | SYSTOLIC BLOOD PRESSURE: 172 MMHG

## 2024-09-24 LAB
ANION GAP SERPL CALC-SCNC: 11 MMOL/L — SIGNIFICANT CHANGE UP (ref 5–17)
APTT BLD: 35.2 SEC — SIGNIFICANT CHANGE UP (ref 24.5–35.6)
BASOPHILS # BLD AUTO: 0.03 K/UL — SIGNIFICANT CHANGE UP (ref 0–0.2)
BASOPHILS NFR BLD AUTO: 0.5 % — SIGNIFICANT CHANGE UP (ref 0–2)
BUN SERPL-MCNC: 16 MG/DL — SIGNIFICANT CHANGE UP (ref 7–23)
CALCIUM SERPL-MCNC: 9.8 MG/DL — SIGNIFICANT CHANGE UP (ref 8.4–10.5)
CHLORIDE SERPL-SCNC: 100 MMOL/L — SIGNIFICANT CHANGE UP (ref 96–108)
CK MB CFR SERPL CALC: 1.5 NG/ML — SIGNIFICANT CHANGE UP (ref 0–6.7)
CK SERPL-CCNC: 57 U/L — SIGNIFICANT CHANGE UP (ref 25–170)
CO2 SERPL-SCNC: 25 MMOL/L — SIGNIFICANT CHANGE UP (ref 22–31)
CREAT SERPL-MCNC: 0.95 MG/DL — SIGNIFICANT CHANGE UP (ref 0.5–1.3)
EGFR: 59 ML/MIN/1.73M2 — LOW
EOSINOPHIL # BLD AUTO: 0.42 K/UL — SIGNIFICANT CHANGE UP (ref 0–0.5)
EOSINOPHIL NFR BLD AUTO: 6.5 % — HIGH (ref 0–6)
GLUCOSE SERPL-MCNC: 96 MG/DL — SIGNIFICANT CHANGE UP (ref 70–99)
HCT VFR BLD CALC: 36.7 % — SIGNIFICANT CHANGE UP (ref 34.5–45)
HGB BLD-MCNC: 11.9 G/DL — SIGNIFICANT CHANGE UP (ref 11.5–15.5)
IMM GRANULOCYTES NFR BLD AUTO: 0.2 % — SIGNIFICANT CHANGE UP (ref 0–0.9)
INR BLD: 1.06 — SIGNIFICANT CHANGE UP (ref 0.85–1.16)
LYMPHOCYTES # BLD AUTO: 2.26 K/UL — SIGNIFICANT CHANGE UP (ref 1–3.3)
LYMPHOCYTES # BLD AUTO: 34.8 % — SIGNIFICANT CHANGE UP (ref 13–44)
MCHC RBC-ENTMCNC: 28 PG — SIGNIFICANT CHANGE UP (ref 27–34)
MCHC RBC-ENTMCNC: 32.4 GM/DL — SIGNIFICANT CHANGE UP (ref 32–36)
MCV RBC AUTO: 86.4 FL — SIGNIFICANT CHANGE UP (ref 80–100)
MONOCYTES # BLD AUTO: 0.42 K/UL — SIGNIFICANT CHANGE UP (ref 0–0.9)
MONOCYTES NFR BLD AUTO: 6.5 % — SIGNIFICANT CHANGE UP (ref 2–14)
NEUTROPHILS # BLD AUTO: 3.36 K/UL — SIGNIFICANT CHANGE UP (ref 1.8–7.4)
NEUTROPHILS NFR BLD AUTO: 51.5 % — SIGNIFICANT CHANGE UP (ref 43–77)
NRBC # BLD: 0 /100 WBCS — SIGNIFICANT CHANGE UP (ref 0–0)
NT-PROBNP SERPL-SCNC: 701 PG/ML — HIGH (ref 0–300)
PLATELET # BLD AUTO: 231 K/UL — SIGNIFICANT CHANGE UP (ref 150–400)
POTASSIUM SERPL-MCNC: 4.2 MMOL/L — SIGNIFICANT CHANGE UP (ref 3.5–5.3)
POTASSIUM SERPL-SCNC: 4.2 MMOL/L — SIGNIFICANT CHANGE UP (ref 3.5–5.3)
PROTHROM AB SERPL-ACNC: 12.4 SEC — SIGNIFICANT CHANGE UP (ref 9.9–13.4)
RBC # BLD: 4.25 M/UL — SIGNIFICANT CHANGE UP (ref 3.8–5.2)
RBC # FLD: 12.9 % — SIGNIFICANT CHANGE UP (ref 10.3–14.5)
SODIUM SERPL-SCNC: 136 MMOL/L — SIGNIFICANT CHANGE UP (ref 135–145)
TROPONIN T, HIGH SENSITIVITY RESULT: 9 NG/L — SIGNIFICANT CHANGE UP (ref 0–51)
WBC # BLD: 6.5 K/UL — SIGNIFICANT CHANGE UP (ref 3.8–10.5)
WBC # FLD AUTO: 6.5 K/UL — SIGNIFICANT CHANGE UP (ref 3.8–10.5)

## 2024-09-24 PROCEDURE — 99285 EMERGENCY DEPT VISIT HI MDM: CPT

## 2024-09-24 PROCEDURE — 71045 X-RAY EXAM CHEST 1 VIEW: CPT | Mod: 26

## 2024-09-24 RX ORDER — ASPIRIN 325 MG
324 TABLET ORAL ONCE
Refills: 0 | Status: COMPLETED | OUTPATIENT
Start: 2024-09-24 | End: 2024-09-24

## 2024-09-24 RX ORDER — ACETAMINOPHEN 325 MG
1000 TABLET ORAL ONCE
Refills: 0 | Status: COMPLETED | OUTPATIENT
Start: 2024-09-24 | End: 2024-09-24

## 2024-09-24 RX ADMIN — Medication 400 MILLIGRAM(S): at 22:56

## 2024-09-24 NOTE — ED ADULT TRIAGE NOTE - CHIEF COMPLAINT QUOTE
Pt. presents to the ED for CP that radiates to her left arm and jaw. Pt. took 3 SL nitro at home today. Pt. presents to the ED for CP that radiates to her left arm and jaw. Pt. took 3 SL nitro at home today. HX of 2 stents.

## 2024-09-24 NOTE — ED PROVIDER NOTE - CLINICAL SUMMARY MEDICAL DECISION MAKING FREE TEXT BOX
VS here with /91, otherwise normal  EKG normal sinus rhythm nonischemic  Concern for ACS, patient has history of CAD with stents and now reports pressure-like chest pain on exertion radiating to the left arm and jaw that feels similar to pain she had with prior stents  Plan for labs, troponin, CXR, aspirin, cardiology admission    Patient also endorses 2 days of throbbing headache, per chart review has history of chronic headaches, low suspicion for acute emergent cause of headache however will get CT head in case patient ends up getting heparin or any anticoagulation during admission

## 2024-09-24 NOTE — ED PROVIDER NOTE - PHYSICAL EXAMINATION
CONST: nontoxic NAD speaking in full sentences  HEAD: atraumatic  EYES: conjunctivae clear  NECK: supple  CARD: regular rate and rhythm, equal pulses bilaterally  CHEST: breathing comfortably, no stridor/retractions/tripoding, clear BS  ABD: soft nontender nondistended  EXT: FROM  SKIN: warm, dry  NEURO: awake alert answering questions following commands moving all extremities ambulating steady unassisted gait

## 2024-09-24 NOTE — ED ADULT TRIAGE NOTE - AS TEMP SITE
COMMENTS:  Remains on ferrous sulfate supplementation. Hematocrit (9/20) decreased to 25.5% and reticulocyte count 5.9%, most recent (10/4) improved with hct 26.9 and appropriately high retic at 6.6%.  Evaluated 10/28 with HCT 31 and retic 4.4. Hemodynamically stable on room air. Converted to pediatric MVI with Fe.    PLANS:   - Continue pediatric MVI with Fe 1 mL     oral

## 2024-09-24 NOTE — ED PROVIDER NOTE - OBJECTIVE STATEMENT
84-year-old Malagasy-speaking female with CAD s/p 2 stents 11/23 on Plavix only, HTN, HLD, gastritis, chronic headaches, presents for evaluation.  States over the last few days has had intermittent left-sided sharp and pressure-like chest pain radiating to the left arm and jaw that feels similar to the pain she had when she needed stents.  Patient says symptoms are worsened with exertion.  Took sublingual nitro today, patient reports mild relief but not complete resolution.  Also reports over the last few days she has had a constant throbbing pounding headache.  Per chart review, patient was admitted for chest pain January 2024 -   NST 1/23/24: Myocardial perfusion imaging is normal, LVSF is hyperdynamic w/o RWMA. Normal EKG stress test. Abnormal tracer uptake noted in the right breast, clinical correlation/dedicated imaging suggested. Echo 1/22: EF 66%, mild symmetric LVH, AS without significant stenosis. PASP 32 mmHg.

## 2024-09-25 ENCOUNTER — INPATIENT (INPATIENT)
Facility: HOSPITAL | Age: 84
LOS: 6 days | Discharge: HOME HEALTH | DRG: 206 | End: 2024-10-02
Attending: INTERNAL MEDICINE | Admitting: INTERNAL MEDICINE
Payer: MEDICARE

## 2024-09-25 DIAGNOSIS — F41.9 ANXIETY DISORDER, UNSPECIFIED: ICD-10-CM

## 2024-09-25 DIAGNOSIS — Z90.49 ACQUIRED ABSENCE OF OTHER SPECIFIED PARTS OF DIGESTIVE TRACT: Chronic | ICD-10-CM

## 2024-09-25 DIAGNOSIS — I20.0 UNSTABLE ANGINA: ICD-10-CM

## 2024-09-25 DIAGNOSIS — E78.5 HYPERLIPIDEMIA, UNSPECIFIED: ICD-10-CM

## 2024-09-25 DIAGNOSIS — I10 ESSENTIAL (PRIMARY) HYPERTENSION: ICD-10-CM

## 2024-09-25 DIAGNOSIS — Z98.890 OTHER SPECIFIED POSTPROCEDURAL STATES: Chronic | ICD-10-CM

## 2024-09-25 DIAGNOSIS — Z78.9 OTHER SPECIFIED HEALTH STATUS: ICD-10-CM

## 2024-09-25 DIAGNOSIS — K29.70 GASTRITIS, UNSPECIFIED, WITHOUT BLEEDING: ICD-10-CM

## 2024-09-25 PROBLEM — I25.10 ATHEROSCLEROTIC HEART DISEASE OF NATIVE CORONARY ARTERY WITHOUT ANGINA PECTORIS: Chronic | Status: ACTIVE | Noted: 2024-01-22

## 2024-09-25 LAB
A1C WITH ESTIMATED AVERAGE GLUCOSE RESULT: 5.7 % — HIGH (ref 4–5.6)
ADD ON TEST-SPECIMEN IN LAB: SIGNIFICANT CHANGE UP
ALBUMIN SERPL ELPH-MCNC: 4.5 G/DL — SIGNIFICANT CHANGE UP (ref 3.3–5)
ALP SERPL-CCNC: 92 U/L — SIGNIFICANT CHANGE UP (ref 40–120)
ALT FLD-CCNC: 12 U/L — SIGNIFICANT CHANGE UP (ref 10–45)
ANION GAP SERPL CALC-SCNC: 11 MMOL/L — SIGNIFICANT CHANGE UP (ref 5–17)
APPEARANCE UR: CLEAR — SIGNIFICANT CHANGE UP
APTT BLD: 34.7 SEC — SIGNIFICANT CHANGE UP (ref 24.5–35.6)
AST SERPL-CCNC: 19 U/L — SIGNIFICANT CHANGE UP (ref 10–40)
BASOPHILS # BLD AUTO: 0.03 K/UL — SIGNIFICANT CHANGE UP (ref 0–0.2)
BASOPHILS NFR BLD AUTO: 0.5 % — SIGNIFICANT CHANGE UP (ref 0–2)
BILIRUB DIRECT SERPL-MCNC: <0.2 MG/DL — SIGNIFICANT CHANGE UP (ref 0–0.3)
BILIRUB INDIRECT FLD-MCNC: SIGNIFICANT CHANGE UP (ref 0.2–1)
BILIRUB SERPL-MCNC: 0.6 MG/DL — SIGNIFICANT CHANGE UP (ref 0.2–1.2)
BILIRUB UR-MCNC: NEGATIVE — SIGNIFICANT CHANGE UP
BUN SERPL-MCNC: 15 MG/DL — SIGNIFICANT CHANGE UP (ref 7–23)
CALCIUM SERPL-MCNC: 9.6 MG/DL — SIGNIFICANT CHANGE UP (ref 8.4–10.5)
CHLORIDE SERPL-SCNC: 100 MMOL/L — SIGNIFICANT CHANGE UP (ref 96–108)
CHOLEST SERPL-MCNC: 188 MG/DL — SIGNIFICANT CHANGE UP
CK MB CFR SERPL CALC: 1.6 NG/ML — SIGNIFICANT CHANGE UP (ref 0–6.7)
CK SERPL-CCNC: 54 U/L — SIGNIFICANT CHANGE UP (ref 25–170)
CO2 SERPL-SCNC: 26 MMOL/L — SIGNIFICANT CHANGE UP (ref 22–31)
COLOR SPEC: YELLOW — SIGNIFICANT CHANGE UP
CREAT SERPL-MCNC: 0.92 MG/DL — SIGNIFICANT CHANGE UP (ref 0.5–1.3)
DIFF PNL FLD: NEGATIVE — SIGNIFICANT CHANGE UP
EGFR: 61 ML/MIN/1.73M2 — SIGNIFICANT CHANGE UP
EOSINOPHIL # BLD AUTO: 0.32 K/UL — SIGNIFICANT CHANGE UP (ref 0–0.5)
EOSINOPHIL NFR BLD AUTO: 5.3 % — SIGNIFICANT CHANGE UP (ref 0–6)
ESTIMATED AVERAGE GLUCOSE: 117 MG/DL — HIGH (ref 68–114)
GLUCOSE SERPL-MCNC: 93 MG/DL — SIGNIFICANT CHANGE UP (ref 70–99)
GLUCOSE UR QL: NEGATIVE MG/DL — SIGNIFICANT CHANGE UP
HCT VFR BLD CALC: 38 % — SIGNIFICANT CHANGE UP (ref 34.5–45)
HDLC SERPL-MCNC: 69 MG/DL — SIGNIFICANT CHANGE UP
HGB BLD-MCNC: 12.4 G/DL — SIGNIFICANT CHANGE UP (ref 11.5–15.5)
IMM GRANULOCYTES NFR BLD AUTO: 0.2 % — SIGNIFICANT CHANGE UP (ref 0–0.9)
INR BLD: 1.04 — SIGNIFICANT CHANGE UP (ref 0.85–1.16)
KETONES UR-MCNC: NEGATIVE MG/DL — SIGNIFICANT CHANGE UP
LEUKOCYTE ESTERASE UR-ACNC: NEGATIVE — SIGNIFICANT CHANGE UP
LIPID PNL WITH DIRECT LDL SERPL: 105 MG/DL — HIGH
LYMPHOCYTES # BLD AUTO: 1.83 K/UL — SIGNIFICANT CHANGE UP (ref 1–3.3)
LYMPHOCYTES # BLD AUTO: 30.2 % — SIGNIFICANT CHANGE UP (ref 13–44)
MAGNESIUM SERPL-MCNC: 2.3 MG/DL — SIGNIFICANT CHANGE UP (ref 1.6–2.6)
MCHC RBC-ENTMCNC: 28.9 PG — SIGNIFICANT CHANGE UP (ref 27–34)
MCHC RBC-ENTMCNC: 32.6 GM/DL — SIGNIFICANT CHANGE UP (ref 32–36)
MCV RBC AUTO: 88.6 FL — SIGNIFICANT CHANGE UP (ref 80–100)
MONOCYTES # BLD AUTO: 0.42 K/UL — SIGNIFICANT CHANGE UP (ref 0–0.9)
MONOCYTES NFR BLD AUTO: 6.9 % — SIGNIFICANT CHANGE UP (ref 2–14)
NEUTROPHILS # BLD AUTO: 3.45 K/UL — SIGNIFICANT CHANGE UP (ref 1.8–7.4)
NEUTROPHILS NFR BLD AUTO: 56.9 % — SIGNIFICANT CHANGE UP (ref 43–77)
NITRITE UR-MCNC: NEGATIVE — SIGNIFICANT CHANGE UP
NON HDL CHOLESTEROL: 119 MG/DL — SIGNIFICANT CHANGE UP
NRBC # BLD: 0 /100 WBCS — SIGNIFICANT CHANGE UP (ref 0–0)
PH UR: 7 — SIGNIFICANT CHANGE UP (ref 5–8)
PLATELET # BLD AUTO: 221 K/UL — SIGNIFICANT CHANGE UP (ref 150–400)
POTASSIUM SERPL-MCNC: 4 MMOL/L — SIGNIFICANT CHANGE UP (ref 3.5–5.3)
POTASSIUM SERPL-SCNC: 4 MMOL/L — SIGNIFICANT CHANGE UP (ref 3.5–5.3)
PROT SERPL-MCNC: 8.2 G/DL — SIGNIFICANT CHANGE UP (ref 6–8.3)
PROT UR-MCNC: NEGATIVE MG/DL — SIGNIFICANT CHANGE UP
PROTHROM AB SERPL-ACNC: 11.9 SEC — SIGNIFICANT CHANGE UP (ref 9.9–13.4)
RBC # BLD: 4.29 M/UL — SIGNIFICANT CHANGE UP (ref 3.8–5.2)
RBC # FLD: 13.1 % — SIGNIFICANT CHANGE UP (ref 10.3–14.5)
SODIUM SERPL-SCNC: 137 MMOL/L — SIGNIFICANT CHANGE UP (ref 135–145)
SP GR SPEC: 1.01 — SIGNIFICANT CHANGE UP (ref 1–1.03)
TRIGL SERPL-MCNC: 77 MG/DL — SIGNIFICANT CHANGE UP
TROPONIN T, HIGH SENSITIVITY RESULT: 8 NG/L — SIGNIFICANT CHANGE UP (ref 0–51)
TROPONIN T, HIGH SENSITIVITY RESULT: 9 NG/L — SIGNIFICANT CHANGE UP (ref 0–51)
TSH SERPL-MCNC: 1.51 UIU/ML — SIGNIFICANT CHANGE UP (ref 0.27–4.2)
UROBILINOGEN FLD QL: 1 MG/DL — SIGNIFICANT CHANGE UP (ref 0.2–1)
WBC # BLD: 6.06 K/UL — SIGNIFICANT CHANGE UP (ref 3.8–10.5)
WBC # FLD AUTO: 6.06 K/UL — SIGNIFICANT CHANGE UP (ref 3.8–10.5)

## 2024-09-25 PROCEDURE — 99223 1ST HOSP IP/OBS HIGH 75: CPT

## 2024-09-25 PROCEDURE — 70450 CT HEAD/BRAIN W/O DYE: CPT | Mod: 26

## 2024-09-25 RX ORDER — CLONIDINE HYDROCHLORIDE 0.2 MG/1
0.1 TABLET ORAL DAILY
Refills: 0 | Status: DISCONTINUED | OUTPATIENT
Start: 2024-09-25 | End: 2024-09-29

## 2024-09-25 RX ORDER — LOSARTAN POTASSIUM 100 MG/1
100 TABLET, FILM COATED ORAL DAILY
Refills: 0 | Status: DISCONTINUED | OUTPATIENT
Start: 2024-09-25 | End: 2024-09-25

## 2024-09-25 RX ORDER — AMLODIPINE BESYLATE 5 MG
5 TABLET ORAL ONCE
Refills: 0 | Status: COMPLETED | OUTPATIENT
Start: 2024-09-25 | End: 2024-09-25

## 2024-09-25 RX ORDER — DOXAZOSIN 2 MG/1
1 TABLET ORAL
Refills: 0 | DISCHARGE

## 2024-09-25 RX ORDER — ISOSORBIDE MONONITRATE 30 MG
60 TABLET, EXTENDED RELEASE 24 HR ORAL DAILY
Refills: 0 | Status: DISCONTINUED | OUTPATIENT
Start: 2024-09-25 | End: 2024-09-29

## 2024-09-25 RX ORDER — ENOXAPARIN SODIUM 150 MG/ML
40 INJECTION SUBCUTANEOUS EVERY 24 HOURS
Refills: 0 | Status: DISCONTINUED | OUTPATIENT
Start: 2024-09-25 | End: 2024-10-02

## 2024-09-25 RX ORDER — PANTOPRAZOLE SODIUM 40 MG/1
40 TABLET, DELAYED RELEASE ORAL
Refills: 0 | Status: DISCONTINUED | OUTPATIENT
Start: 2024-09-25 | End: 2024-10-02

## 2024-09-25 RX ORDER — ACETAMINOPHEN 325 MG
650 TABLET ORAL EVERY 6 HOURS
Refills: 0 | Status: DISCONTINUED | OUTPATIENT
Start: 2024-09-25 | End: 2024-09-26

## 2024-09-25 RX ORDER — HYDROCHLOROTHIAZIDE 50 MG/1
1 TABLET ORAL
Refills: 0 | DISCHARGE

## 2024-09-25 RX ORDER — ESCITALOPRAM OXALATE 10 MG
1 TABLET ORAL
Refills: 0 | DISCHARGE

## 2024-09-25 RX ORDER — ESCITALOPRAM OXALATE 10 MG
10 TABLET ORAL DAILY
Refills: 0 | Status: DISCONTINUED | OUTPATIENT
Start: 2024-09-25 | End: 2024-10-02

## 2024-09-25 RX ORDER — OLMESARTAN MEDOXOMIL 40 MG/1
1 TABLET ORAL
Refills: 0 | DISCHARGE

## 2024-09-25 RX ORDER — AMLODIPINE BESYLATE 5 MG
5 TABLET ORAL DAILY
Refills: 0 | Status: DISCONTINUED | OUTPATIENT
Start: 2024-09-25 | End: 2024-09-25

## 2024-09-25 RX ORDER — AMLODIPINE BESYLATE 5 MG
10 TABLET ORAL EVERY 24 HOURS
Refills: 0 | Status: DISCONTINUED | OUTPATIENT
Start: 2024-09-26 | End: 2024-10-02

## 2024-09-25 RX ORDER — METOPROLOL TARTRATE 50 MG
25 TABLET ORAL DAILY
Refills: 0 | Status: DISCONTINUED | OUTPATIENT
Start: 2024-09-25 | End: 2024-09-25

## 2024-09-25 RX ORDER — INFLUENZA VIRUS VACCINE 15; 15; 15; 15 UG/.5ML; UG/.5ML; UG/.5ML; UG/.5ML
0.5 SUSPENSION INTRAMUSCULAR ONCE
Refills: 0 | Status: DISCONTINUED | OUTPATIENT
Start: 2024-09-25 | End: 2024-10-02

## 2024-09-25 RX ORDER — ATORVASTATIN CALCIUM 10 MG/1
20 TABLET, FILM COATED ORAL AT BEDTIME
Refills: 0 | Status: DISCONTINUED | OUTPATIENT
Start: 2024-09-25 | End: 2024-10-02

## 2024-09-25 RX ORDER — ESCITALOPRAM OXALATE 10 MG
5 TABLET ORAL DAILY
Refills: 0 | Status: DISCONTINUED | OUTPATIENT
Start: 2024-09-25 | End: 2024-09-25

## 2024-09-25 RX ORDER — PITAVASTATIN CALCIUM 1.04 MG/1
1 TABLET, FILM COATED ORAL
Refills: 0 | DISCHARGE

## 2024-09-25 RX ORDER — DEXLANSOPRAZOLE 60 MG
1 CAPSULE, DELAYED RELEASE, BIPHASIC ORAL
Refills: 0 | DISCHARGE

## 2024-09-25 RX ORDER — CETIRIZINE HYDROCHLORIDE 10 MG/1
1 TABLET ORAL
Refills: 0 | DISCHARGE

## 2024-09-25 RX ORDER — CETIRIZINE HYDROCHLORIDE 10 MG/1
10 TABLET ORAL DAILY
Refills: 0 | Status: DISCONTINUED | OUTPATIENT
Start: 2024-09-25 | End: 2024-10-02

## 2024-09-25 RX ORDER — METOPROLOL TARTRATE 50 MG
50 TABLET ORAL DAILY
Refills: 0 | Status: DISCONTINUED | OUTPATIENT
Start: 2024-09-25 | End: 2024-09-25

## 2024-09-25 RX ORDER — DOXAZOSIN 2 MG/1
4 TABLET ORAL DAILY
Refills: 0 | Status: DISCONTINUED | OUTPATIENT
Start: 2024-09-25 | End: 2024-09-27

## 2024-09-25 RX ORDER — ASPIRIN 325 MG
81 TABLET ORAL DAILY
Refills: 0 | Status: DISCONTINUED | OUTPATIENT
Start: 2024-09-25 | End: 2024-09-25

## 2024-09-25 RX ORDER — DOXAZOSIN 2 MG/1
2 TABLET ORAL DAILY
Refills: 0 | Status: DISCONTINUED | OUTPATIENT
Start: 2024-09-25 | End: 2024-09-25

## 2024-09-25 RX ORDER — METOPROLOL TARTRATE 50 MG
100 TABLET ORAL DAILY
Refills: 0 | Status: DISCONTINUED | OUTPATIENT
Start: 2024-09-25 | End: 2024-09-29

## 2024-09-25 RX ORDER — SPIRONOLACTONE 100 MG/1
25 TABLET, FILM COATED ORAL DAILY
Refills: 0 | Status: DISCONTINUED | OUTPATIENT
Start: 2024-09-25 | End: 2024-09-28

## 2024-09-25 RX ORDER — LOSARTAN POTASSIUM 100 MG/1
50 TABLET, FILM COATED ORAL DAILY
Refills: 0 | Status: DISCONTINUED | OUTPATIENT
Start: 2024-09-25 | End: 2024-10-02

## 2024-09-25 RX ADMIN — Medication 5 MILLIGRAM(S): at 11:17

## 2024-09-25 RX ADMIN — CETIRIZINE HYDROCHLORIDE 10 MILLIGRAM(S): 10 TABLET ORAL at 17:43

## 2024-09-25 RX ADMIN — Medication 75 MILLIGRAM(S): at 11:16

## 2024-09-25 RX ADMIN — Medication 0.4 MILLIGRAM(S): at 05:02

## 2024-09-25 RX ADMIN — Medication 10 MILLIGRAM(S): at 11:15

## 2024-09-25 RX ADMIN — PANTOPRAZOLE SODIUM 40 MILLIGRAM(S): 40 TABLET, DELAYED RELEASE ORAL at 06:29

## 2024-09-25 RX ADMIN — CLONIDINE HYDROCHLORIDE 0.1 MILLIGRAM(S): 0.2 TABLET ORAL at 07:40

## 2024-09-25 RX ADMIN — Medication 324 MILLIGRAM(S): at 01:11

## 2024-09-25 RX ADMIN — Medication 650 MILLIGRAM(S): at 23:34

## 2024-09-25 RX ADMIN — SPIRONOLACTONE 25 MILLIGRAM(S): 100 TABLET, FILM COATED ORAL at 11:15

## 2024-09-25 RX ADMIN — ATORVASTATIN CALCIUM 20 MILLIGRAM(S): 10 TABLET, FILM COATED ORAL at 21:04

## 2024-09-25 RX ADMIN — ENOXAPARIN SODIUM 40 MILLIGRAM(S): 150 INJECTION SUBCUTANEOUS at 17:43

## 2024-09-25 RX ADMIN — Medication 5 MILLIGRAM(S): at 06:29

## 2024-09-25 RX ADMIN — LOSARTAN POTASSIUM 50 MILLIGRAM(S): 100 TABLET, FILM COATED ORAL at 06:26

## 2024-09-25 NOTE — H&P ADULT - PROBLEM SELECTOR PLAN 1
ECG NSR@61bpm with non specific ST-T wave change; Troponin T HS 9 neg; CK 57 CKMB 1.5  hx CAD s/p  PCI GULSHAN LAD in 11/10/23  @[facility] w/ Dr. Gregory (d/c on  Aspirin and Plavix; now only on Plavix)TTE 1/22/24 EF 55% mild symmetric LVH, AS w/o significant stenosis, PASP 32mm Hg   Telemetry, ECG, CE in AM  -continue ASA Ec 81mg QD; Plavix 75mg QD, Imdur M 60mg QD,   -TTE in AM  -NPO for further cardiac workup  NST?

## 2024-09-25 NOTE — PATIENT PROFILE ADULT - FALL HARM RISK - HARM RISK INTERVENTIONS

## 2024-09-25 NOTE — ED ADULT NURSE NOTE - CHIEF COMPLAINT QUOTE
Pt. presents to the ED for CP that radiates to her left arm and jaw. Pt. took 3 SL nitro at home today. HX of 2 stents.

## 2024-09-25 NOTE — H&P ADULT - HISTORY OF PRESENT ILLNESS
A&O X3 Full Code    85 y/o [place] Islander speaking  female  with PMHx of HTN, HLD, Gastritis (s/p EGD 8/2023) , h/o chronic lacunar infarcts, chronic HA, Anxiety, hx of L Breast Lumpectomy w/seed implant 5/10/24 CAD s/p  PCI GULSHAN LAD in 11/10/23  @[facility] w/ Dr. Gregory (d/c on  Aspirin and Plavix; now only on Plavix), and TTE 1/22/24 EF 55% mild symmetric LVH, AS w/o significant stenosis, PASP 32mm Hg presents this evening, 9/25/24, ER complaining of  intermittent minimal exertional  left-sided sharp and pressure-like chest pain radiating to the left arm and jaw X 4 days.    In speaking with  patient, she reports  feeling relatively well for the past week. However, four days ago, she began experiencing intermittent sharp and pressure-like pain in the left side of her chest, radiating down her left arm and jaw. This pain worsens with exertion and reminds her of her previous stent placement on November 10, 2023, for a GULSHAN in the LAD. She has taken two sublingual nitroglycerin tablets, which provided some relief. Additionally, the patient mentioned that over the past two days, she has been experiencing a constant throbbing headache, which is consistent with her history of chronic headaches. She confirmed that she has been compliant with her medical follow-up and medication regimen.  Patient denies any fever, recent URI , SOB, palpitations, diaphoresis, dizziness or lightheadedness, abdominal discomfort, or nausea and diarrhea.      In ER ECG reveals NSR@61bpm with non specific ST-T wave changes, Troponin T HS neg 9, CK57, CKMB 1.5,  rest of labs unremarkable, CT of head w/o contrast no acute pathology seen, no hemorrhage no infarct and CXR bedside reveals no acute pathology.  Patient received ASA 324mgPO X1 and Tylenol 1000mg IV X1.  VSS: Temp 97.3F; /91; HR 61; RR 16; O2 on RA 99%.    Given patient's symptoms, Unstable Angina, and hx of CAD s/p PCI and stents, pt. is now admitted to [facility] for cardiac workup to rule out ACS.  TTE in AM.           NST 1/23/24: Myocardial perfusion imaging is normal, LVSF is hyperdynamic w/o RWMA. Normal EKG stress test. Abnormal tracer uptake noted in the right breast, clinical correlation/dedicated imaging suggested. Echo 1/22: EF 66%, mild symmetric LVH, AS without significant stenosis. PASP 32 mmHg.  CT on 1/24/24 revealing: small airways disease with air trapping, no jayjay mass of the partially visualized breast parenchyma and no adenopathy. She has a scheduled outpatient biopsy for known breast mass for which pt was instructed to follow up with her outpt cardiologist JACKSON Muniz for candelaria op plavix management.          84-year-old [place] Islander-speaking female with CAD s/p 2 stents 11/23 on Plavix only, HTN, HLD, gastritis, chronic headaches, presents for evaluation.   States over the last few days has had intermittent left-sided sharp and pressure-like chest pain radiating to the left arm and jaw that feels similar to the pain she had when she needed stents.  Patient says symptoms are worsened with exertion.  Took sublingual nitro today, patient reports mild relief but not complete resolution.  Also reports over the last few days she has had a constant throbbing pounding headache.  	Per chart review, patient was admitted for chest pain January 2024 -   NST 1/23/24: Myocardial perfusion imaging is normal, LVSF is hyperdynamic w/o RWMA. Normal EKG stress test. Abnormal tracer uptake noted in the right breast, clinical correlation/dedicated imaging suggested. Echo 1/22: EF 66%, mi A&O X3 Full Code Italian  Rick # [id number]    83 y/o Italian speaking  female  with PMHx of HTN, HLD, Gastritis (s/p EGD 8/2023) , h/o chronic lacunar infarcts, chronic HA, Anxiety, hx of L Breast Lumpectomy w/seed implant 5/10/24 CAD s/p  PCI UGLSHAN LAD in 11/10/23  @[facility] w/ Dr. Gregory (d/c on  Aspirin and Plavix; now only on Plavix), and TTE 1/22/24 EF 55% mild symmetric LVH, AS w/o significant stenosis, PASP 32mm Hg presents this evening, 9/25/24, ER complaining of  intermittent minimal exertional  left-sided sharp and pressure-like chest pain radiating to the left arm and jaw X 4 days.    In speaking with  patient, she reports  feeling relatively well for the past week. However, four days ago, she began experiencing intermittent sharp and pressure-like pain in the left side of her chest, radiating down her left arm and jaw. This pain worsens with exertion and reminds her of her previous stent placement on November 10, 2023, for a GULSHAN in the LAD. She has taken two sublingual nitroglycerin tablets, which provided some relief. Additionally, the patient mentioned that over the past two days, she has been experiencing a constant throbbing headache, which is consistent with her history of chronic headaches. She confirmed that she has been compliant with her medical follow-up and medication regimen.  Patient denies any fever, recent URI , SOB, palpitations, diaphoresis, dizziness or lightheadedness, abdominal discomfort, or nausea and diarrhea.      In ER ECG reveals NSR@61bpm with non specific ST-T wave changes, Troponin T HS neg 9, CK57, CKMB 1.5,  rest of labs unremarkable, CT of head w/o contrast no acute pathology seen, no hemorrhage no infarct and CXR bedside reveals no acute pathology.  Patient received ASA 324mgPO X1 and Tylenol 1000mg IV X1.  VSS: Temp 97.3F; /91; HR 61; RR 16; O2 on RA 99%.    Given patient's symptoms, Unstable Angina, and hx of CAD s/p PCI and stents, pt. is now admitted to [facility] for cardiac workup to rule out ACS.  TTE in AM.           NST 1/23/24: Myocardial perfusion imaging is normal, LVSF is hyperdynamic w/o RWMA. Normal EKG stress test. Abnormal tracer uptake noted in the right breast, clinical correlation/dedicated imaging suggested. Echo 1/22: EF 66%, mild symmetric LVH, AS without significant stenosis. PASP 32 mmHg.  CT on 1/24/24 revealing: small airways disease with air trapping, no jayjay mass of the partially visualized breast parenchyma and no adenopathy. She has a scheduled outpatient biopsy for known breast mass for which pt was instructed to follow up with her outpt cardiologist JACKSON Muniz for candelaria op plavix management.          84-year-old Italian-speaking female with CAD s/p 2 stents 11/23 on Plavix only, HTN, HLD, gastritis, chronic headaches, presents for evaluation.   States over the last few days has had intermittent left-sided sharp and pressure-like chest pain radiating to the left arm and jaw that feels similar to the pain she had when she needed stents.  Patient says symptoms are worsened with exertion.  Took sublingual nitro today, patient reports mild relief but not complete resolution.  Also reports over the last few days she has had a constant throbbing pounding headache.  	Per chart review, patient was admitted for chest pain January 2024 -   NST 1/23/24: Myocardial perfusion imaging is normal, LVSF is hyperdynamic w/o RWMA. Normal EKG stress test. Abnormal tracer uptake noted in the right breast, clinical correlation/dedicated imaging suggested. Echo 1/22: EF 66%, mi

## 2024-09-25 NOTE — H&P ADULT - NSHPLABSRESULTS_GEN_ALL_CORE
11.9   6.50  )-----------( 231      ( 24 Sep 2024 22:34 )             36.7       09-24    136  |  100  |  16  ----------------------------<  96  4.2   |  25  |  0.95    Ca    9.8      24 Sep 2024 22:34        PT/INR - ( 24 Sep 2024 23:41 )   PT: 12.4 sec;   INR: 1.06          PTT - ( 24 Sep 2024 23:41 )  PTT:35.2 sec    CARDIAC MARKERS ( 24 Sep 2024 22:34 )  x     / x     / x     / x     / 1.5 ng/mL        Urinalysis Basic - ( 24 Sep 2024 22:34 )    Color: x / Appearance: x / SG: x / pH: x  Gluc: 96 mg/dL / Ketone: x  / Bili: x / Urobili: x   Blood: x / Protein: x / Nitrite: x   Leuk Esterase: x / RBC: x / WBC x   Sq Epi: x / Non Sq Epi: x / Bacteria: x        EKG: NSR@61bpm with non specific ST-T wave changes

## 2024-09-25 NOTE — H&P ADULT - NSHPREVIEWOFSYSTEMS_GEN_ALL_CORE
GENERAL, CONSTITUTIONAL : denies recent weight loss, fever, chills  EYES, VISION: denies changes in vision   EARS, NOSE, THROAT: denies hearing loss  HEART, CARDIOVASCULAR: Admits to left-sided  chest pain radiating down left arm and jaw, denies arrhythmia, palpitations,   RESPIRATORY: Denies cough, SOB, wheezing, PND, orthopnea  GASTROINTESTINAL: Admit to acid reflux, Denies abdominal pain, , bloody stool, dark tarry stool  GENITOURINARY: Denies frequent urination, urgency  MUSCULOSKELETAL Admits to joint pain , musculoskeletal pain.   SKIN & INTEGUMENTARY Denies rashes, sores, blisters, blisters, growths.  NEUROLOGICAL: Denies numbness or tingling sensations, sensation loss, burning.   PSYCHIATRIC: Admits to  nervousness, anxiety, depression  ENDOCRINE Denies heat or cold intolerance, excessive thirst  HEMATOLOGIC/LYMPHATIC: Denies abnormal bleeding, bleeding of any kind

## 2024-09-25 NOTE — H&P ADULT - ASSESSMENT
85 y/o Citizen of [place] speaking  female  with PMHx of HTN, HLD, Gastritis (s/p EGD 8/2023) , h/o chronic lacunar infarcts, chronic HA, Anxiety, hx of L Breast Lumpectomy w/seed implant 5/10/24 CAD s/p  PCI GULSHAN LAD in 11/10/23  @[facility] w/ Dr. Gregory (d/c on  Aspirin and Plavix; now only on Plavix), and TTE 1/22/24 EF 55% mild symmetric LVH, AS w/o significant stenosis, PASP 32mm Hg presents this evening, 9/25/24, ER complaining of  intermittent minimal exertional  left-sided sharp and pressure-like chest pain radiating to the left arm and jaw X 4 days.

## 2024-09-25 NOTE — H&P ADULT - NSHPPHYSICALEXAM_GEN_ALL_CORE
T(C): 36.7 (09-25-24 @ 02:30), Max: 36.7 (09-25-24 @ 02:30)  HR: 63 (09-25-24 @ 02:30) (61 - 63)  BP: 152/81 (09-25-24 @ 02:30) (152/81 - 172/91)  RR: 18 (09-25-24 @ 02:30) (16 - 18)  SpO2: 99% (09-25-24 @ 02:30) (99% - 99%)  Wt(kg): --    Appearance: NAD  HEENT:   Normal oral mucosa, EOMI	  Neck: Supple,  - JVD; No Carotid Bruit and 2+ pulses B/L  Cardiovascular: Normal S1 S2,  No murmurs  Respiratory: Lungs clear to auscultation/No Rales, Rhonchi, Wheezing	  Gastrointestinal:  Soft, Non-tender, + BS	  Skin: No rashes, No ecchymoses, No cyanosis  Extremities: Normal range of motion, No clubbing, cyanosis or edema  Vascular: Femoral pulses 2+ b/l without bruit, DP 2+ b/l, PT 1+ b/l  Neurologic: Non-focal  Psychiatry: A & O x 3, Mood & affect appropriate T(C): 36.7 (09-25-24 @ 02:30), Max: 36.7 (09-25-24 @ 02:30)  HR: 63 (09-25-24 @ 02:30) (61 - 63)  BP: 152/81 (09-25-24 @ 02:30) (152/81 - 172/91)  RR: 18 (09-25-24 @ 02:30) (16 - 18)  SpO2: 99% (09-25-24 @ 02:30) (99% - 99%)  Wt(kg): --    Appearance: NAD  HEENT:   Normal oral mucosa, EOMI	  Neck: Supple,  - JVD; No Carotid Bruit and 2+ pulses B/L  Cardiovascular: Normal S1 S2,  No murmurs  Respiratory: Lungs with mild b/l crackles at base  Gastrointestinal:  Soft, Non-tender, + BS	  Skin: No rashes, No ecchymoses, No cyanosis  Extremities: Normal range of motion, No clubbing, cyanosis or edema  Vascular: Femoral pulses 2+ b/l without bruit, DP 2+ b/l, PT 1+ b/l  Neurologic: Non-focal  Psychiatry: A & O x 3, Mood & affect appropriate

## 2024-09-25 NOTE — ED ADULT NURSE NOTE - OBJECTIVE STATEMENT
Received patient accompanied by  via stretcher BIBEMS with chief complaint of chest pain. Said complaint started few hours prior to consult, radiating to left arm and jaw.  On PE, AOX4, speaking full sentences without difficulty. Patient not in active cardiac or respiratory distress, no noted neurologic deficits.  No obvious trauma/injury/deformity noted. Patient oriented to ED area. All needs attended. POC reviewed. Placed on continous cardiac monitoring. Fall risk precautions maintained. Purposeful proactive hourly rounding in progress.

## 2024-09-25 NOTE — ED ADULT NURSE NOTE - NSFALLUNIVINTERV_ED_ALL_ED
Bed/Stretcher in lowest position, wheels locked, appropriate side rails in place/Call bell, personal items and telephone in reach/Instruct patient to call for assistance before getting out of bed/chair/stretcher/Non-slip footwear applied when patient is off stretcher/[place] to call system/Physically safe environment - no spills, clutter or unnecessary equipment/Purposeful proactive rounding/Room/bathroom lighting operational, light cord in reach

## 2024-09-25 NOTE — H&P ADULT - NS ATTEND AMEND GEN_ALL_CORE FT
83 y/o Beninese speaking  female  with PMHx of HTN, HLD, Gastritis (s/p EGD 8/2023) , h/o chronic lacunar infarcts, chronic HA, Anxiety, hx of L Breast Lumpectomy w/seed implant 5/10/24 CAD s/p  PCI GULSHAN LAD in 11/10/23  @[facility] w/ Dr. Gregory (d/c on  Aspirin and Plavix; now only on Plavix), and TTE 1/22/24 EF 55% mild symmetric LVH, AS w/o significant stenosis, PASP 32mm Hg presents this evening, 9/25/24, ER complaining of  intermittent minimal exertional  left-sided sharp and pressure-like chest pain radiating to the left arm and jaw X 4 days.    1. Atypical chest pain  similar chest pain from prior admission with some component of reproducibility on palpation. HS Troponin negative, stress test negative in 1.24  possibly related to BP.    2. HTN  Discussed importance of compliance.   increase amlodipine, toprol, add aldactone.    During non face-to-face time, I reviewed relevant portions of the patient’s medical record; including vitals, labs, medications, cardiac studies, remaining additional imaging and consultant recommendations. During face-to-face time, I took a relevant history and examined the patient. I also explained to the patient their diagnoses, and specific cardiopulmonary management plan, which required a high level of medical decision making.  I answered all questions related to the patient's medical conditions.

## 2024-09-26 ENCOUNTER — TRANSCRIPTION ENCOUNTER (OUTPATIENT)
Age: 84
End: 2024-09-26

## 2024-09-26 LAB
ADD ON TEST-SPECIMEN IN LAB: SIGNIFICANT CHANGE UP
ALBUMIN SERPL ELPH-MCNC: 4.1 G/DL — SIGNIFICANT CHANGE UP (ref 3.3–5)
ALP SERPL-CCNC: 89 U/L — SIGNIFICANT CHANGE UP (ref 40–120)
ALT FLD-CCNC: 10 U/L — SIGNIFICANT CHANGE UP (ref 10–45)
ANION GAP SERPL CALC-SCNC: 10 MMOL/L — SIGNIFICANT CHANGE UP (ref 5–17)
APPEARANCE UR: CLEAR — SIGNIFICANT CHANGE UP
AST SERPL-CCNC: 18 U/L — SIGNIFICANT CHANGE UP (ref 10–40)
BACTERIA # UR AUTO: NEGATIVE /HPF — SIGNIFICANT CHANGE UP
BASOPHILS # BLD AUTO: 0.04 K/UL — SIGNIFICANT CHANGE UP (ref 0–0.2)
BASOPHILS NFR BLD AUTO: 0.6 % — SIGNIFICANT CHANGE UP (ref 0–2)
BILIRUB SERPL-MCNC: 0.5 MG/DL — SIGNIFICANT CHANGE UP (ref 0.2–1.2)
BILIRUB UR-MCNC: NEGATIVE — SIGNIFICANT CHANGE UP
BUN SERPL-MCNC: 18 MG/DL — SIGNIFICANT CHANGE UP (ref 7–23)
CALCIUM SERPL-MCNC: 9.5 MG/DL — SIGNIFICANT CHANGE UP (ref 8.4–10.5)
CAST: 0 /LPF — SIGNIFICANT CHANGE UP (ref 0–4)
CHLORIDE SERPL-SCNC: 101 MMOL/L — SIGNIFICANT CHANGE UP (ref 96–108)
CO2 SERPL-SCNC: 24 MMOL/L — SIGNIFICANT CHANGE UP (ref 22–31)
COLOR SPEC: YELLOW — SIGNIFICANT CHANGE UP
CREAT SERPL-MCNC: 1.04 MG/DL — SIGNIFICANT CHANGE UP (ref 0.5–1.3)
CRP SERPL-MCNC: <3 MG/L — SIGNIFICANT CHANGE UP (ref 0–4)
DIFF PNL FLD: NEGATIVE — SIGNIFICANT CHANGE UP
EGFR: 53 ML/MIN/1.73M2 — LOW
EOSINOPHIL # BLD AUTO: 0.45 K/UL — SIGNIFICANT CHANGE UP (ref 0–0.5)
EOSINOPHIL NFR BLD AUTO: 6.2 % — HIGH (ref 0–6)
ERYTHROCYTE [SEDIMENTATION RATE] IN BLOOD: 28 MM/HR — HIGH
GLUCOSE BLDC GLUCOMTR-MCNC: 125 MG/DL — HIGH (ref 70–99)
GLUCOSE SERPL-MCNC: 96 MG/DL — SIGNIFICANT CHANGE UP (ref 70–99)
GLUCOSE UR QL: NEGATIVE MG/DL — SIGNIFICANT CHANGE UP
HCT VFR BLD CALC: 37.2 % — SIGNIFICANT CHANGE UP (ref 34.5–45)
HGB BLD-MCNC: 12 G/DL — SIGNIFICANT CHANGE UP (ref 11.5–15.5)
IMM GRANULOCYTES NFR BLD AUTO: 0.3 % — SIGNIFICANT CHANGE UP (ref 0–0.9)
KETONES UR-MCNC: NEGATIVE MG/DL — SIGNIFICANT CHANGE UP
LEUKOCYTE ESTERASE UR-ACNC: ABNORMAL
LYMPHOCYTES # BLD AUTO: 2.67 K/UL — SIGNIFICANT CHANGE UP (ref 1–3.3)
LYMPHOCYTES # BLD AUTO: 36.9 % — SIGNIFICANT CHANGE UP (ref 13–44)
MAGNESIUM SERPL-MCNC: 2.2 MG/DL — SIGNIFICANT CHANGE UP (ref 1.6–2.6)
MCHC RBC-ENTMCNC: 28.2 PG — SIGNIFICANT CHANGE UP (ref 27–34)
MCHC RBC-ENTMCNC: 32.3 GM/DL — SIGNIFICANT CHANGE UP (ref 32–36)
MCV RBC AUTO: 87.3 FL — SIGNIFICANT CHANGE UP (ref 80–100)
MONOCYTES # BLD AUTO: 0.45 K/UL — SIGNIFICANT CHANGE UP (ref 0–0.9)
MONOCYTES NFR BLD AUTO: 6.2 % — SIGNIFICANT CHANGE UP (ref 2–14)
NEUTROPHILS # BLD AUTO: 3.61 K/UL — SIGNIFICANT CHANGE UP (ref 1.8–7.4)
NEUTROPHILS NFR BLD AUTO: 49.8 % — SIGNIFICANT CHANGE UP (ref 43–77)
NITRITE UR-MCNC: NEGATIVE — SIGNIFICANT CHANGE UP
NRBC # BLD: 0 /100 WBCS — SIGNIFICANT CHANGE UP (ref 0–0)
PH UR: 6.5 — SIGNIFICANT CHANGE UP (ref 5–8)
PLATELET # BLD AUTO: 239 K/UL — SIGNIFICANT CHANGE UP (ref 150–400)
POTASSIUM SERPL-MCNC: 4 MMOL/L — SIGNIFICANT CHANGE UP (ref 3.5–5.3)
POTASSIUM SERPL-SCNC: 4 MMOL/L — SIGNIFICANT CHANGE UP (ref 3.5–5.3)
PROT SERPL-MCNC: 7.9 G/DL — SIGNIFICANT CHANGE UP (ref 6–8.3)
PROT UR-MCNC: NEGATIVE MG/DL — SIGNIFICANT CHANGE UP
RBC # BLD: 4.26 M/UL — SIGNIFICANT CHANGE UP (ref 3.8–5.2)
RBC # FLD: 12.8 % — SIGNIFICANT CHANGE UP (ref 10.3–14.5)
RBC CASTS # UR COMP ASSIST: 1 /HPF — SIGNIFICANT CHANGE UP (ref 0–4)
SODIUM SERPL-SCNC: 135 MMOL/L — SIGNIFICANT CHANGE UP (ref 135–145)
SP GR SPEC: 1.01 — SIGNIFICANT CHANGE UP (ref 1–1.03)
SQUAMOUS # UR AUTO: 0 /HPF — SIGNIFICANT CHANGE UP (ref 0–5)
UROBILINOGEN FLD QL: 0.2 MG/DL — SIGNIFICANT CHANGE UP (ref 0.2–1)
WBC # BLD: 7.24 K/UL — SIGNIFICANT CHANGE UP (ref 3.8–10.5)
WBC # FLD AUTO: 7.24 K/UL — SIGNIFICANT CHANGE UP (ref 3.8–10.5)
WBC UR QL: 1 /HPF — SIGNIFICANT CHANGE UP (ref 0–5)

## 2024-09-26 PROCEDURE — 99239 HOSP IP/OBS DSCHRG MGMT >30: CPT

## 2024-09-26 PROCEDURE — 93010 ELECTROCARDIOGRAM REPORT: CPT

## 2024-09-26 RX ORDER — CYCLOBENZAPRINE HCL 10 MG
5 TABLET ORAL ONCE
Refills: 0 | Status: COMPLETED | OUTPATIENT
Start: 2024-09-26 | End: 2024-09-26

## 2024-09-26 RX ORDER — AMLODIPINE BESYLATE 5 MG
1 TABLET ORAL
Qty: 30 | Refills: 3
Start: 2024-09-26 | End: 2025-01-23

## 2024-09-26 RX ORDER — DOXAZOSIN 2 MG/1
1 TABLET ORAL
Qty: 0 | Refills: 0 | DISCHARGE
Start: 2024-09-26

## 2024-09-26 RX ORDER — CYCLOBENZAPRINE HCL 10 MG
1 TABLET ORAL
Qty: 30 | Refills: 0
Start: 2024-09-26 | End: 2024-10-25

## 2024-09-26 RX ORDER — SODIUM CHLORIDE 0.9 % (FLUSH) 0.9 %
1000 SYRINGE (ML) INJECTION
Refills: 0 | Status: DISCONTINUED | OUTPATIENT
Start: 2024-09-26 | End: 2024-10-02

## 2024-09-26 RX ORDER — SODIUM CHLORIDE 0.9 % (FLUSH) 0.9 %
500 SYRINGE (ML) INJECTION ONCE
Refills: 0 | Status: DISCONTINUED | OUTPATIENT
Start: 2024-09-26 | End: 2024-09-26

## 2024-09-26 RX ORDER — SPIRONOLACTONE 100 MG/1
1 TABLET, FILM COATED ORAL
Qty: 30 | Refills: 3
Start: 2024-09-26 | End: 2025-01-23

## 2024-09-26 RX ORDER — LOSARTAN POTASSIUM 100 MG/1
1 TABLET, FILM COATED ORAL
Qty: 30 | Refills: 3
Start: 2024-09-26 | End: 2025-01-23

## 2024-09-26 RX ORDER — ACETAMINOPHEN 325 MG
650 TABLET ORAL ONCE
Refills: 0 | Status: DISCONTINUED | OUTPATIENT
Start: 2024-09-27 | End: 2024-09-29

## 2024-09-26 RX ORDER — DOXAZOSIN 2 MG/1
1 TABLET ORAL
Refills: 0 | DISCHARGE

## 2024-09-26 RX ORDER — METOPROLOL TARTRATE 50 MG
1 TABLET ORAL
Qty: 30 | Refills: 3
Start: 2024-09-26 | End: 2025-01-23

## 2024-09-26 RX ORDER — MAG HYDROX/ALUMINUM HYD/SIMETH 200-200-20
30 SUSPENSION, ORAL (FINAL DOSE FORM) ORAL EVERY 6 HOURS
Refills: 0 | Status: DISCONTINUED | OUTPATIENT
Start: 2024-09-26 | End: 2024-10-02

## 2024-09-26 RX ORDER — ATORVASTATIN CALCIUM 10 MG/1
1 TABLET, FILM COATED ORAL
Qty: 0 | Refills: 0 | DISCHARGE
Start: 2024-09-26

## 2024-09-26 RX ORDER — CYCLOBENZAPRINE HCL 10 MG
5 TABLET ORAL DAILY
Refills: 0 | Status: DISCONTINUED | OUTPATIENT
Start: 2024-09-27 | End: 2024-10-02

## 2024-09-26 RX ORDER — BISOPROLOL FUMARATE 5 MG/1
1 TABLET, FILM COATED ORAL
Refills: 0 | DISCHARGE

## 2024-09-26 RX ORDER — ACETAMINOPHEN 325 MG
650 TABLET ORAL ONCE
Refills: 0 | Status: COMPLETED | OUTPATIENT
Start: 2024-09-26 | End: 2024-09-26

## 2024-09-26 RX ORDER — PANTOPRAZOLE SODIUM 40 MG/1
1 TABLET, DELAYED RELEASE ORAL
Qty: 0 | Refills: 0 | DISCHARGE
Start: 2024-09-26

## 2024-09-26 RX ADMIN — Medication 200 MILLIGRAM(S): at 21:11

## 2024-09-26 RX ADMIN — Medication 75 MILLIGRAM(S): at 11:53

## 2024-09-26 RX ADMIN — Medication 200 MILLIGRAM(S): at 16:44

## 2024-09-26 RX ADMIN — Medication 75 MILLILITER(S): at 17:16

## 2024-09-26 RX ADMIN — Medication 30 MILLILITER(S): at 03:56

## 2024-09-26 RX ADMIN — Medication 30 MILLILITER(S): at 10:08

## 2024-09-26 RX ADMIN — Medication 100 MILLIGRAM(S): at 05:43

## 2024-09-26 RX ADMIN — Medication 650 MILLIGRAM(S): at 00:34

## 2024-09-26 RX ADMIN — Medication 650 MILLIGRAM(S): at 12:04

## 2024-09-26 RX ADMIN — Medication 200 MILLIGRAM(S): at 11:47

## 2024-09-26 RX ADMIN — Medication 10 MILLIGRAM(S): at 11:55

## 2024-09-26 RX ADMIN — ENOXAPARIN SODIUM 40 MILLIGRAM(S): 150 INJECTION SUBCUTANEOUS at 16:44

## 2024-09-26 RX ADMIN — SPIRONOLACTONE 25 MILLIGRAM(S): 100 TABLET, FILM COATED ORAL at 11:53

## 2024-09-26 RX ADMIN — ATORVASTATIN CALCIUM 20 MILLIGRAM(S): 10 TABLET, FILM COATED ORAL at 21:11

## 2024-09-26 RX ADMIN — Medication 5 MILLIGRAM(S): at 12:25

## 2024-09-26 RX ADMIN — DOXAZOSIN 4 MILLIGRAM(S): 2 TABLET ORAL at 05:44

## 2024-09-26 RX ADMIN — CLONIDINE HYDROCHLORIDE 0.1 MILLIGRAM(S): 0.2 TABLET ORAL at 05:44

## 2024-09-26 RX ADMIN — Medication 60 MILLIGRAM(S): at 11:53

## 2024-09-26 RX ADMIN — Medication 650 MILLIGRAM(S): at 13:03

## 2024-09-26 RX ADMIN — CETIRIZINE HYDROCHLORIDE 10 MILLIGRAM(S): 10 TABLET ORAL at 11:54

## 2024-09-26 RX ADMIN — Medication 200 MILLIGRAM(S): at 10:07

## 2024-09-26 RX ADMIN — Medication 200 MILLIGRAM(S): at 22:11

## 2024-09-26 RX ADMIN — PANTOPRAZOLE SODIUM 40 MILLIGRAM(S): 40 TABLET, DELAYED RELEASE ORAL at 05:43

## 2024-09-26 RX ADMIN — LOSARTAN POTASSIUM 50 MILLIGRAM(S): 100 TABLET, FILM COATED ORAL at 05:43

## 2024-09-26 NOTE — DISCHARGE NOTE PROVIDER - CARE PROVIDER_API CALL
jelani cortez  Phone: (   )    -  Fax: (   )    -  Established Patient  Follow Up Time:    Dr Alistair Fuentes [facility]  Phone: (   )    -  Fax: (   )    -  Established Patient  Scheduled Appointment: 10/08/2024 01:30 PM   Dr Alistair Fuentes [facility]  Phone: (   )    -  Fax: (   )    -  Established Patient  Scheduled Appointment: 10/08/2024 01:30 PM    Raghu Fernandez  Otolaryngology  [address] [place] [address], [address]  [place] [address]  Phone: [phone]  Fax: [phone]  Scheduled Appointment: 10/23/2024 01:45 PM   Raghu Fernandez  Otolaryngology  [address], [address]  [place] [address]  Phone: [phone]  Fax: [phone]  Scheduled Appointment: 10/23/2024 01:45 PM    Dr Alistair Fuentes [facility]  Phone: (   )    -  Fax: (   )    -  Established Patient  Scheduled Appointment: 10/08/2024 01:30 PM    Sienna Samuel  Neurology  [address] [address]  Phone: [phone]  Fax: [phone]  Scheduled Appointment: 02/11/2025 10:00 AM

## 2024-09-26 NOTE — PROGRESS NOTE ADULT - SUBJECTIVE AND OBJECTIVE BOX
Interventional Cardiology PA Adult Progress Note    Overnight Events:      Subjective Assessment:      ROS Negative except as per Subjective and HPI  	  MEDICATIONS:  amLODIPine   Tablet 10 milliGRAM(s) Oral every 24 hours  cloNIDine 0.1 milliGRAM(s) Oral daily  doxazosin 4 milliGRAM(s) Oral daily  isosorbide   mononitrate ER Tablet (IMDUR) 60 milliGRAM(s) Oral daily  losartan 50 milliGRAM(s) Oral daily  metoprolol succinate  milliGRAM(s) Oral daily  nitroglycerin     SubLingual 0.4 milliGRAM(s) SubLingual every 5 minutes PRN  spironolactone 25 milliGRAM(s) Oral daily  cetirizine 10 milliGRAM(s) Oral daily  ipratropium    for Nebulization 500 MICROGram(s) Nebulizer every 6 hours PRN  escitalopram 10 milliGRAM(s) Oral daily  ibuprofen  Tablet. 200 milliGRAM() Oral every 6 hours  aluminum hydroxide/magnesium hydroxide/simethicone Suspension 30 milliLiter(s) Oral every 6 hours PRN  pantoprazole    Tablet 40 milliGRAM(s) Oral before breakfast  atorvastatin 20 milliGRAM(s) Oral at bedtime  clopidogrel Tablet 75 milliGRAM(s) Oral daily  enoxaparin Injectable 40 milliGRAM(s) SubCutaneous every 24 hours  influenza  Vaccine (HIGH DOSE) 0.5 milliLiter(s) IntraMuscular once  sodium chloride 0.9%. 1000 milliLiter(s) IV Continuous <Continuous>        [PHYSICAL EXAM:  TELEMETRY:  T(C): 36.9 (09-26-24 @ 11:50), Max: 36.9 (09-26-24 @ 11:50)  HR: 80 (09-26-24 @ 11:50) (63 - 80)  BP: 159/71 (09-26-24 @ 11:50) (118/65 - 184/78)  RR: 17 (09-26-24 @ 11:50) (16 - 17)  SpO2: 98% (09-26-24 @ 11:50) (95% - 98%)  Wt(kg): --  I&O's Summary    25 Sep 2024 07:01  -  26 Sep 2024 07:00  --------------------------------------------------------  IN: 1280 mL / OUT: 1500 mL / NET: -220 mL    26 Sep 2024 07:01  -  26 Sep 2024 16:42  --------------------------------------------------------  IN: 480 mL / OUT: 400 mL / NET: 80 mL                                    Appearance: Normal	  HEENT:   Normal oral mucosa, PERRL, EOMI	  Neck: Supple, - JVD  Cardiovascular: Normal S1 S2, No JVD, No murmurs,   Respiratory: Lungs clear to auscultation  Gastrointestinal:  Soft, Non-tender, + BS	  Skin: No rashes, No ecchymoses, No cyanosis  Extremities: Normal range of motion, No clubbing, cyanosis or edema  Vascular: Peripheral pulses palpable 2+ bilaterally  Neurologic: Non-focal  Psychiatry: A & O x 3, Mood & affect appropriate  	  CARDIAC MARKERS:                   12.0   7.24  )-----------( 239      ( 26 Sep 2024 05:30 )             37.2     09-26    135  |  101  |  18  ----------------------------<  96  4.0   |  24  |  1.04    Ca    9.5      26 Sep 2024 05:30  Mg     2.2     09-26    TPro  7.9  /  Alb  4.1  /  TBili  0.5  /  DBili  x   /  AST  18  /  ALT  10  /  AlkPhos  89  09-26      PT/INR - ( 25 Sep 2024 08:21 )   PT: 11.9 sec;   INR: 1.04          PTT - ( 25 Sep 2024 08:21 )  PTT:34.7 sec     Interventional Cardiology PA Adult Progress Note    Subjective Assessment:  Patient seen and examined at bedside. Patient endorsing 10/10 chest pain, which was accompanied by HTN SBP 180s. CP reproducible. EKG non ischemic. Given start Motrin.     ROS Negative except as per Subjective and HPI  	  MEDICATIONS:  amLODIPine   Tablet 10 milliGRAM(s) Oral every 24 hours  cloNIDine 0.1 milliGRAM(s) Oral daily  doxazosin 4 milliGRAM(s) Oral daily  isosorbide   mononitrate ER Tablet (IMDUR) 60 milliGRAM(s) Oral daily  losartan 50 milliGRAM(s) Oral daily  metoprolol succinate  milliGRAM(s) Oral daily  nitroglycerin     SubLingual 0.4 milliGRAM(s) SubLingual every 5 minutes PRN  spironolactone 25 milliGRAM(s) Oral daily  cetirizine 10 milliGRAM(s) Oral daily  ipratropium    for Nebulization 500 MICROGram(s) Nebulizer every 6 hours PRN  escitalopram 10 milliGRAM(s) Oral daily  ibuprofen  Tablet. 200 milliGRAM() Oral every 6 hours  aluminum hydroxide/magnesium hydroxide/simethicone Suspension 30 milliLiter(s) Oral every 6 hours PRN  pantoprazole    Tablet 40 milliGRAM(s) Oral before breakfast  atorvastatin 20 milliGRAM(s) Oral at bedtime  clopidogrel Tablet 75 milliGRAM(s) Oral daily  enoxaparin Injectable 40 milliGRAM(s) SubCutaneous every 24 hours  influenza  Vaccine (HIGH DOSE) 0.5 milliLiter(s) IntraMuscular once  sodium chloride 0.9%. 1000 milliLiter(s) IV Continuous <Continuous>        [PHYSICAL EXAM:  TELEMETRY:  T(C): 36.9 (09-26-24 @ 11:50), Max: 36.9 (09-26-24 @ 11:50)  HR: 80 (09-26-24 @ 11:50) (63 - 80)  BP: 159/71 (09-26-24 @ 11:50) (118/65 - 184/78)  RR: 17 (09-26-24 @ 11:50) (16 - 17)  SpO2: 98% (09-26-24 @ 11:50) (95% - 98%)  Wt(kg): --  I&O's Summary    25 Sep 2024 07:01  -  26 Sep 2024 07:00  --------------------------------------------------------  IN: 1280 mL / OUT: 1500 mL / NET: -220 mL    26 Sep 2024 07:01  -  26 Sep 2024 16:42  --------------------------------------------------------  IN: 480 mL / OUT: 400 mL / NET: 80 mL                                    Appearance: Normal	  HEENT:   Normal oral mucosa, PERRL, EOMI	  Neck: Supple, - JVD  Cardiovascular: Normal S1 S2, No JVD, No murmurs,   Respiratory: Lungs clear to auscultation  Gastrointestinal:  Soft, Non-tender, + BS	  Skin: No rashes, No ecchymoses, No cyanosis  Extremities: Normal range of motion, No clubbing, cyanosis or edema  Vascular: Peripheral pulses palpable 2+ bilaterally  Neurologic: Non-focal  Psychiatry: A & O x 3, Mood & affect appropriate  	  CARDIAC MARKERS:                   12.0   7.24  )-----------( 239      ( 26 Sep 2024 05:30 )             37.2     09-26    135  |  101  |  18  ----------------------------<  96  4.0   |  24  |  1.04    Ca    9.5      26 Sep 2024 05:30  Mg     2.2     09-26    TPro  7.9  /  Alb  4.1  /  TBili  0.5  /  DBili  x   /  AST  18  /  ALT  10  /  AlkPhos  89  09-26      PT/INR - ( 25 Sep 2024 08:21 )   PT: 11.9 sec;   INR: 1.04          PTT - ( 25 Sep 2024 08:21 )  PTT:34.7 sec

## 2024-09-26 NOTE — DISCHARGE NOTE NURSING/CASE MANAGEMENT/SOCIAL WORK - PATIENT PORTAL LINK FT
You can access the FollowMyHealth Patient Portal offered by [facility] by registering at the following website: [email or web address]. By joining [employer]’s FollowMyHealth portal, you will also be able to view your health information using other applications (apps) compatible with our system.

## 2024-09-26 NOTE — DISCHARGE NOTE PROVIDER - NSDCFUSCHEDAPPT_GEN_ALL_CORE_FT
Raghu Fernandez  [facility]  OTOLARYNG [address] [place] [address] [address]  Scheduled Appointment: 10/23/2024

## 2024-09-26 NOTE — DISCHARGE NOTE PROVIDER - CARE PROVIDERS DIRECT ADDRESSES
,DirectAddress_Unknown ,DirectAddress_Unknown,DirectAddress_Unknown ,DirectAddress_Unknown,DirectAddress_Unknown,[email or web address]

## 2024-09-26 NOTE — DISCHARGE NOTE PROVIDER - NSDCMRMEDTOKEN_GEN_ALL_CORE_FT
aspirin 81 mg oral delayed release tablet: 1 tab(s) orally once a day  bisoprolol 5 mg oral tablet: 1 tab(s) orally once a day  cetirizine 10 mg oral tablet: 1 tab(s) orally once a day  cetirizine 10 mg oral tablet: 1 tab(s) orally once a day  cloNIDine 0.1 mg oral tablet: 1 tab(s) orally once a day  clopidogrel 75 mg oral tablet: 1 tab(s) orally once a day  doxazosin 4 mg oral tablet: 1 tab(s) orally once a day  escitalopram 10 mg oral tablet: 1 tab(s) orally once a day  isosorbide mononitrate 60 mg oral tablet, extended release: 1.5 tab(s) orally once a day  Livalo 4 mg oral tablet: 1 tab(s) orally once a day  nitroglycerin 0.4 mg sublingual tablet: 1 tab(s) sublingually 3 times a day as needed for  chest pain  Norvasc 5 mg oral tablet: 1 tab(s) orally once a day (at bedtime)  olmesartan 40 mg oral tablet: 1 tab(s) orally once a day  pantoprazole 40 mg oral delayed release tablet: 1 tab(s) orally once a day   amLODIPine 10 mg oral tablet: 1 tab(s) orally every 24 hours  atorvastatin 20 mg oral tablet: 1 tab(s) orally once a day (at bedtime)  cetirizine 10 mg oral tablet: 1 tab(s) orally once a day  cetirizine 10 mg oral tablet: 1 tab(s) orally once a day  cloNIDine 0.1 mg oral tablet: 1 tab(s) orally once a day  clopidogrel 75 mg oral tablet: 1 tab(s) orally once a day  doxazosin 4 mg oral tablet: 1 tab(s) orally once a day  escitalopram 10 mg oral tablet: 1 tab(s) orally once a day  isosorbide mononitrate 60 mg oral tablet, extended release: 1.5 tab(s) orally once a day  Livalo 4 mg oral tablet: 1 tab(s) orally once a day  losartan 50 mg oral tablet: 1 tab(s) orally once a day  metoprolol succinate 100 mg oral tablet, extended release: 1 tab(s) orally once a day  nitroglycerin 0.4 mg sublingual tablet: 1 tab(s) sublingually 3 times a day as needed for  chest pain  pantoprazole 40 mg oral delayed release tablet: 1 tab(s) orally once a day (before a meal)  spironolactone 25 mg oral tablet: 1 tab(s) orally once a day   amLODIPine 10 mg oral tablet: 1 tab(s) orally every 24 hours  atorvastatin 20 mg oral tablet: 1 tab(s) orally once a day (at bedtime)  cetirizine 10 mg oral tablet: 1 tab(s) orally once a day  cetirizine 10 mg oral tablet: 1 tab(s) orally once a day  cloNIDine 0.1 mg oral tablet: 1 tab(s) orally once a day  clopidogrel 75 mg oral tablet: 1 tab(s) orally once a day  cyclobenzaprine 5 mg oral tablet: 1 tab(s) orally once a day  doxazosin 4 mg oral tablet: 1 tab(s) orally once a day  escitalopram 10 mg oral tablet: 1 tab(s) orally once a day  isosorbide mononitrate 60 mg oral tablet, extended release: 1.5 tab(s) orally once a day  Livalo 4 mg oral tablet: 1 tab(s) orally once a day  losartan 50 mg oral tablet: 1 tab(s) orally once a day  metoprolol succinate 100 mg oral tablet, extended release: 1 tab(s) orally once a day  nitroglycerin 0.4 mg sublingual tablet: 1 tab(s) sublingually 3 times a day as needed for  chest pain  pantoprazole 40 mg oral delayed release tablet: 1 tab(s) orally once a day (before a meal)  spironolactone 25 mg oral tablet: 1 tab(s) orally once a day   amLODIPine 10 mg oral tablet: 1 tab(s) orally every 24 hours  atorvastatin 20 mg oral tablet: 1 tab(s) orally once a day (at bedtime)  Bedside Commode: ICD code I95.1 Length of need: (99 months)  cetirizine 10 mg oral tablet: 1 tab(s) orally once a day  cetirizine 10 mg oral tablet: 1 tab(s) orally once a day  cloNIDine 0.1 mg oral tablet: 1 tab(s) orally once a day  clopidogrel 75 mg oral tablet: 1 tab(s) orally once a day  cyclobenzaprine 5 mg oral tablet: 1 tab(s) orally once a day  doxazosin 4 mg oral tablet: 1 tab(s) orally once a day  escitalopram 10 mg oral tablet: 1 tab(s) orally once a day  isosorbide mononitrate 60 mg oral tablet, extended release: 1.5 tab(s) orally once a day  Livalo 4 mg oral tablet: 1 tab(s) orally once a day  losartan 50 mg oral tablet: 1 tab(s) orally once a day  metoprolol succinate 100 mg oral tablet, extended release: 1 tab(s) orally once a day  nitroglycerin 0.4 mg sublingual tablet: 1 tab(s) sublingually 3 times a day as needed for  chest pain  pantoprazole 40 mg oral delayed release tablet: 1 tab(s) orally once a day (before a meal)  Recliner Chair: Pt requires recliner chair due to chronic orthostatic hypotension  ICD: I95.1  spironolactone 25 mg oral tablet: 1 tab(s) orally once a day   amLODIPine 10 mg oral tablet: 1 tab(s) orally every 24 hours  Bedside Commode: ICD code I95.1 Length of need: (99 months)  cetirizine 10 mg oral tablet: 1 tab(s) orally once a day  cetirizine 10 mg oral tablet: 1 tab(s) orally once a day  clopidogrel 75 mg oral tablet: 1 tab(s) orally once a day  cyclobenzaprine 5 mg oral tablet: 1 tab(s) orally once a day  escitalopram 10 mg oral tablet: 1 tab(s) orally once a day  Livalo 4 mg oral tablet: 1 tab(s) orally once a day  losartan 50 mg oral tablet: 1 tab(s) orally once a day  metoprolol succinate 100 mg oral tablet, extended release: 1 tab(s) orally once a day  nitroglycerin 0.4 mg sublingual tablet: 1 tab(s) sublingually 3 times a day as needed for  chest pain  pantoprazole 40 mg oral delayed release tablet: 1 tab(s) orally once a day (before a meal)  Recliner Chair: Pt requires recliner chair due to chronic orthostatic hypotension  ICD: I95.1   acetaminophen 325 mg oral tablet: 3 tab(s) orally every 6 hours  amLODIPine 10 mg oral tablet: 1 tab(s) orally every 24 hours  Bedside Commode: ICD code I95.1 Length of need: (99 months)  cetirizine 10 mg oral tablet: 1 tab(s) orally once a day  clopidogrel 75 mg oral tablet: 1 tab(s) orally once a day  cyclobenzaprine 5 mg oral tablet: 1 tab(s) orally once a day  escitalopram 10 mg oral tablet: 1 tab(s) orally once a day  Livalo 4 mg oral tablet: 1 tab(s) orally once a day  losartan 50 mg oral tablet: 1 tab(s) orally once a day  nitroglycerin 0.4 mg sublingual tablet: 1 tab(s) sublingually 3 times a day as needed for  chest pain  pantoprazole 40 mg oral delayed release tablet: 1 tab(s) orally once a day (before a meal)  ranolazine 500 mg oral tablet, extended release: 1 tab(s) orally 2 times a day  Toprol-XL 50 mg oral tablet, extended release: 1 tab(s) orally once a day

## 2024-09-26 NOTE — PROGRESS NOTE ADULT - ASSESSMENT
85 y/o [place] speaking  female  with PMHx of HTN, HLD, Gastritis (s/p EGD 8/2023) , h/o chronic lacunar infarcts, chronic HA, Anxiety, hx of L Breast Lumpectomy w/seed implant 5/10/24 CAD s/p  PCI GULSHAN LAD in 11/10/23  @[facility] w/ Dr. Gregory (d/c on  Aspirin and Plavix; now only on Plavix) who presented for CP that is reproducible. Trop neg x 2. Patient with normal NST from January of this year. Will manage HTN and start Flexiril daily for chest pain.

## 2024-09-26 NOTE — DISCHARGE NOTE PROVIDER - HOSPITAL COURSE
83 y/o Cameroonian speaking  female  with PMHx of HTN, HLD, Gastritis (s/p EGD 8/2023) , h/o chronic lacunar infarcts, chronic HA, Anxiety, hx of L Breast Lumpectomy w/seed implant 5/10/24 CAD s/p  PCI GULSHAN LAD in 11/10/23  @[facility] w/ Dr. Gregory (d/c on  Aspirin and Plavix; now only on Plavix), and TTE 1/22/24 EF 55% mild symmetric LVH, AS w/o significant stenosis, PASP 32mm Hg presents this evening, 9/25/24, ER complaining of  intermittent minimal exertional  left-sided sharp and pressure-like chest pain radiating to the left arm and jaw X 4 days, similar to previous admission with chest pain. Trop T negative x 3, EKG w/o changes. Chest pain is reproducible. Chest pain likely a component of uncontrolled htn vs anxiety vs costochondritis.     NST 1/23/24: Myocardial perfusion imaging is normal, LVSF is hyperdynamic w/o RWMA. Normal EKG stress test. Abnormal tracer uptake noted in the right breast, clinical correlation/dedicated imaging suggested.  Echo 1/22: EF 66%, mild symmetric LVH, AS without significant stenosis. PASP 32 mmHg.  CT on 1/24/24 revealing: small airways disease with air trapping, no jayjay mass of the partially visualized breast parenchyma and no adenopathy.    she has a scheduled outpatient biopsy for known breast mass for which pt was instructed to follow up with her outpt cardiologist Dr. Muniz for candelaria op plavix management.     Pt admitted overnight for observation and telemetry monitoring. Pt seen and examined at bedside this AM without any complaints or events overnight, VSS, labs and telemetry reviewed and pt stable for discharge as discussed with Dr. Briones. Pt has received appropriate discharge instructions, including medication regimen and follow up with  __ in 1-2 weeks.    Discharge medications: Amlodipine 10 mg daily (increased from 5mg daily), Clonidine 0.1 mg daily, Losartan 50 mg daily, Toprol 100 mg daily (increased from 50mg daily), Spironolactone 25 mg daily (NEW MEDICATION), Plavix 75 mg daily, Lipitor 40 mg daily (increased from 20 mg daily), Imdur 60 mg daily. and to continue the rest of her prescribed medications.     Pt discharge copies detail cardiovascular history, medication testing/treatments OR has created a patient portal account and instructed to provider their records at their 1st appointment.    CVD (GLP-1 receptor agonist, SGLT2 inhibitor) meds discussed w/ patients and encouraged to discuss further with PMD or endo at next visit.           85 y/o Norwegian speaking  female  with PMHx of HTN, HLD, Gastritis (s/p EGD 8/2023) , h/o chronic lacunar infarcts, chronic HA, Anxiety, hx of L Breast Lumpectomy w/seed implant 5/10/24 CAD s/p  PCI GULSHAN LAD in 11/10/23  @[facility] w/ Dr. Gregory (d/c on  Aspirin and Plavix; now only on Plavix), and TTE 1/22/24 EF 55% mild symmetric LVH, AS w/o significant stenosis, PASP 32mm Hg presents this evening, 9/25/24, ER complaining of  intermittent minimal exertional  left-sided sharp and pressure-like chest pain radiating to the left arm and jaw X 4 days, similar to previous admission with chest pain. Trop T negative x 3, EKG w/o changes. Chest pain is reproducible. Chest pain likely a component of uncontrolled htn vs anxiety vs costochondritis.     NST 1/23/24: Myocardial perfusion imaging is normal, LVSF is hyperdynamic w/o RWMA. Normal EKG stress test. Abnormal tracer uptake noted in the right breast, clinical correlation/dedicated imaging suggested.  Echo 1/22: EF 66%, mild symmetric LVH, AS without significant stenosis. PASP 32 mmHg.  CT on 1/24/24 revealing: small airways disease with air trapping, no jayjay mass of the partially visualized breast parenchyma and no adenopathy.    she has a scheduled outpatient biopsy for known breast mass for which pt was instructed to follow up with her outpt cardiologist Dr. Muniz for candelaria op plavix management.     Pt admitted overnight for observation and telemetry monitoring. Pt seen and examined at bedside this AM without any complaints or events overnight, VSS, labs and telemetry reviewed and pt stable for discharge as discussed with Dr. Briones. Pt has received appropriate discharge instructions, including medication regimen and follow up with Dr. Fuentes (outpatient cardiologist) in 1-2 weeks.    Discharge medications: Amlodipine 10 mg daily (increased from 5mg daily), Clonidine 0.1 mg daily, Losartan 50 mg daily, Toprol 100 mg daily (dc Bisoprolol), Spironolactone 25 mg daily (NEW MEDICATION), Plavix 75 mg daily, Lipitor 40 mg daily (increased from 20 mg daily), Imdur 60 mg daily. and to continue the rest of her prescribed medications.     Pt discharge copies detail cardiovascular history, medication testing/treatments OR has created a patient portal account and instructed to provider their records at their 1st appointment.    CVD (GLP-1 receptor agonist, SGLT2 inhibitor) meds discussed w/ patients and encouraged to discuss further with PMD or endo at next visit.           85 y/o Surinamese speaking  female  with PMHx of HTN, HLD, Gastritis (s/p EGD 8/2023) , h/o chronic lacunar infarcts, chronic HA, Anxiety, hx of L Breast Lumpectomy w/seed implant 5/10/24 CAD s/p  PCI GULSHAN LAD in 11/10/23  @[facility] w/ Dr. Gregory (d/c on  Aspirin and Plavix; now only on Plavix), and TTE 1/22/24 EF 55% mild symmetric LVH, AS w/o significant stenosis, PASP 32mm Hg presents this evening, 9/25/24, ER complaining of  intermittent minimal exertional  left-sided sharp and pressure-like chest pain radiating to the left arm and jaw X 4 days, similar to previous admission with chest pain. Trop T negative x 3, EKG w/o changes. Chest pain is reproducible. Chest pain likely a component of uncontrolled htn vs anxiety vs costochondritis.     NST 1/23/24: Myocardial perfusion imaging is normal, LVSF is hyperdynamic w/o RWMA. Normal EKG stress test. Abnormal tracer uptake noted in the right breast, clinical correlation/dedicated imaging suggested.  Echo 1/22: EF 66%, mild symmetric LVH, AS without significant stenosis. PASP 32 mmHg.  CT on 1/24/24 revealing: small airways disease with air trapping, no jayjay mass of the partially visualized breast parenchyma and no adenopathy.    she has a scheduled outpatient biopsy for known breast mass for which pt was instructed to follow up with her outpt cardiologist Dr. Muniz for candelaria op plavix management.     Pt admitted overnight for observation and telemetry monitoring. Pt seen and examined at bedside this AM without any complaints or events overnight, VSS, labs and telemetry reviewed and pt stable for discharge as discussed with Dr. Briones. Pt has received appropriate discharge instructions, including medication regimen and follow up with Dr. Fuentes (outpatient cardiologist) in 1-2 weeks.    Discharge medications: Amlodipine 10 mg daily (increased from 5mg daily), Clonidine 0.1 mg daily, Losartan 50 mg daily (dc olmesartan), Toprol 100 mg daily (dc Bisoprolol), Spironolactone 25 mg daily (NEW MEDICATION), Plavix 75 mg daily, Lipitor 40 mg daily (increased from 20 mg daily), Imdur 60 mg daily. and to continue the rest of her prescribed medications.     Pt discharge copies detail cardiovascular history, medication testing/treatments OR has created a patient portal account and instructed to provider their records at their 1st appointment.    CVD (GLP-1 receptor agonist, SGLT2 inhibitor) meds discussed w/ patients and encouraged to discuss further with PMD or endo at next visit.           85 y/o Taiwanese speaking  female  with PMHx of HTN, HLD, Gastritis (s/p EGD 8/2023) , h/o chronic lacunar infarcts, chronic HA, Anxiety, hx of L Breast Lumpectomy w/seed implant 5/10/24 CAD s/p  PCI GULSHAN LAD in 11/10/23  @[facility] w/ Dr. Gregory (d/c on  Aspirin and Plavix; now only on Plavix), and TTE 1/22/24 EF 55% mild symmetric LVH, AS w/o significant stenosis, PASP 32mm Hg presents this evening, 9/25/24, ER complaining of  intermittent minimal exertional  left-sided sharp and pressure-like chest pain radiating to the left arm and jaw X 4 days, similar to previous admission with chest pain. Trop T negative x 3, EKG w/o changes. Chest pain is reproducible. Chest pain likely a component of uncontrolled htn vs anxiety vs costochondritis.     NST (1/23/24): Myocardial perfusion imaging is normal, LVSF is hyperdynamic w/o RWMA. Normal EKG stress test. Abnormal tracer uptake noted in the right breast, clinical correlation/dedicated imaging suggested.    TTE (1/22/24): EF 66%, mild symmetric LVH, AS without significant stenosis. PASP 32 mmHg.  CT on 1/24/24 revealing: small airways disease with air trapping, no jayjay mass of the partially visualized breast parenchyma and no adenopathy.    She has a scheduled outpatient biopsy for known breast mass for which pt was instructed to follow up with her outpatient cardiologist Dr. Muniz for perioperative Plavix management.     Pt admitted overnight for observation and telemetry monitoring. Pt seen and examined at bedside this AM without any complaints or events overnight, VSS, labs and telemetry reviewed and pt stable for discharge as discussed with Dr. Briones. Pt has received appropriate discharge instructions, including medication regimen and follow up with Dr. Fuentes (outpatient cardiologist) in 1-2 weeks.    Discharge medications: Amlodipine 10 mg daily (increased from 5mg daily), Clonidine 0.1 mg daily, Losartan 50 mg daily (dc olmesartan), Toprol 100 mg daily (dc Bisoprolol), Spironolactone 25 mg daily (NEW MEDICATION), Plavix 75 mg daily, Lipitor 40 mg daily (increased from 20 mg daily), Imdur 60 mg daily. and to continue the rest of her prescribed medications.     Pt discharge copies detail cardiovascular history, medication testing/treatments OR has created a patient portal account and instructed to provider their records at their 1st appointment.    CVD (GLP-1 receptor agonist, SGLT2 inhibitor) meds discussed w/ patients and encouraged to discuss further with PMD or endo at next visit.           85 y/o Egyptian speaking  female  with PMHx of HTN, HLD, Gastritis (s/p EGD 8/2023) , h/o chronic lacunar infarcts, chronic HA, Anxiety, hx of L Breast Lumpectomy w/seed implant 5/10/24 CAD s/p  PCI GULSHAN LAD in 11/10/23  @[facility] w/ Dr. Gregory (d/c on  Aspirin and Plavix; now only on Plavix), and TTE 1/22/24 EF 55% mild symmetric LVH, AS w/o significant stenosis, PASP 32mm Hg presents this evening, 9/25/24, ER complaining of  intermittent minimal exertional  left-sided sharp and pressure-like chest pain radiating to the left arm and jaw X 4 days, similar to previous admission with chest pain. Trop T negative x 3, EKG w/o changes. Chest pain is reproducible. Chest pain likely a component of uncontrolled htn vs anxiety vs costochondritis.     NST (1/23/24): Myocardial perfusion imaging is normal, LVSF is hyperdynamic w/o RWMA. Normal EKG stress test. Abnormal tracer uptake noted in the right breast, clinical correlation/dedicated imaging suggested.    TTE (1/22/24): EF 66%, mild symmetric LVH, AS without significant stenosis. PASP 32 mmHg.  CT on 1/24/24 revealing: small airways disease with air trapping, no jayjay mass of the partially visualized breast parenchyma and no adenopathy.    She has a scheduled outpatient biopsy for known breast mass for which pt was instructed to follow up with her outpatient cardiologist Dr. Muniz for perioperative Plavix management.     Pt admitted overnight for observation and telemetry monitoring. Pt seen and examined at bedside this AM without any complaints or events overnight, VSS, labs and telemetry reviewed and pt stable for discharge as discussed with Dr. Briones. Pt has received appropriate discharge instructions, including medication regimen and follow up with Dr. Fuentes @ [place] on 10/8/24 1:30 PM.    Discharge medications: Amlodipine 10 mg daily (increased from 5mg daily), Clonidine 0.1 mg daily, Losartan 50 mg daily (dc olmesartan), Toprol 100 mg daily (dc Bisoprolol), Spironolactone 25 mg daily (NEW MEDICATION), Plavix 75 mg daily, Lipitor 40 mg daily (increased from 20 mg daily), Imdur 60 mg daily. and to continue the rest of her prescribed medications.     Pt discharge copies detail cardiovascular history, medication testing/treatments OR has created a patient portal account and instructed to provider their records at their 1st appointment.    CVD (GLP-1 receptor agonist, SGLT2 inhibitor) meds discussed w/ patients and encouraged to discuss further with PMD or endo at next visit.           83 y/o Croatian speaking  female  with PMHx of HTN, HLD, Gastritis (s/p EGD 8/2023) , h/o chronic lacunar infarcts, chronic HA, Anxiety, hx of L Breast Lumpectomy w/seed implant 5/10/24 CAD s/p  PCI GULSHAN LAD in 11/10/23  @[facility] w/ Dr. Gregory (d/c on  Aspirin and Plavix; now only on Plavix), and TTE 1/22/24 EF 55% mild symmetric LVH, AS w/o significant stenosis, PASP 32mm Hg presents this evening, 9/25/24, ER complaining of  intermittent minimal exertional  left-sided sharp and pressure-like chest pain radiating to the left arm and jaw X 4 days, similar to previous admission with chest pain. Trop T negative x 3, EKG w/o changes. Chest pain is reproducible. Chest pain likely a component of uncontrolled htn vs anxiety vs costochondritis.     NST (1/23/24): Myocardial perfusion imaging is normal, LVSF is hyperdynamic w/o RWMA. Normal EKG stress test. Abnormal tracer uptake noted in the right breast, clinical correlation/dedicated imaging suggested.    TTE (1/22/24): EF 66%, mild symmetric LVH, AS without significant stenosis. PASP 32 mmHg.  CT on 1/24/24 revealing: small airways disease with air trapping, no jayjay mass of the partially visualized breast parenchyma and no adenopathy.    Pt admitted  for observation and telemetry monitoring. She has a scheduled outpatient biopsy for known breast mass for which pt was instructed to follow up with her outpatient cardiologist Dr. Muniz for perioperative Plavix management. Patient noted to be orthostatic throughout hospital admission treated with IVF and medications adjusted:  Clonidine, Doxazosin, and Imdur discontinued.    Pt seen and examined at bedside this AM without any complaints or events overnight, VSS, labs and telemetry reviewed and pt stable for discharge as discussed with Dr. Briones. Pt has received appropriate discharge instructions, including medication regimen and follow up with Dr. Fuentes @ [place] on 10/8/24 1:30 PM.    Discharge medications: Amlodipine 10 mg daily (increased from 5mg daily),  Losartan 50 mg daily (dc olmesartan), Toprol XL 50 mg daily (dc Bisoprolol),  Ranexa 500 mg BID, Plavix 75 mg daily, Lipitor 40 mg daily (increased from 20 mg daily),  and to continue the rest of her prescribed medications.     Pt discharge copies detail cardiovascular history, medication testing/treatments OR has created a patient portal account and instructed to provider their records at their 1st appointment.    CVD (GLP-1 receptor agonist, SGLT2 inhibitor) meds discussed w/ patients and encouraged to discuss further with PMD or endo at next visit.           85 y/o Tanzanian speaking  female  with PMHx of HTN, HLD, Gastritis (s/p EGD 8/2023) , h/o chronic lacunar infarcts, chronic HA, Anxiety, hx of L Breast Lumpectomy w/seed implant 5/10/24 CAD s/p  PCI GULSHAN LAD in 11/10/23  @[facility] w/ Dr. Gregory (d/c on  Aspirin and Plavix; now only on Plavix), and TTE 1/22/24 EF 55% mild symmetric LVH, AS w/o significant stenosis, PASP 32mm Hg presents this evening, 9/25/24, ER complaining of  intermittent minimal exertional  left-sided sharp and pressure-like chest pain radiating to the left arm and jaw X 4 days, similar to previous admission with chest pain. Trop T negative x 3, EKG w/o changes. Chest pain is reproducible. Chest pain likely a component of uncontrolled htn vs anxiety vs costochondritis.     NST (1/23/24): Myocardial perfusion imaging is normal, LVSF is hyperdynamic w/o RWMA. Normal EKG stress test. Abnormal tracer uptake noted in the right breast, clinical correlation/dedicated imaging suggested.    TTE (1/22/24): EF 66%, mild symmetric LVH, AS without significant stenosis. PASP 32 mmHg.  CT on 1/24/24 revealing: small airways disease with air trapping, no jayjay mass of the partially visualized breast parenchyma and no adenopathy.    Pt admitted  for observation and telemetry monitoring. She has a scheduled outpatient biopsy for known breast mass for which pt was instructed to follow up with her outpatient cardiologist for perioperative Plavix management. Patient noted to be orthostatic throughout hospital admission treated with IVF and medications adjusted:  Clonidine, Doxazosin, and Imdur discontinued.  PT evaluated patient and recommended JESÚS however patient and family prefer for patient to be discharged home. Patient to receive home care, PT, OT and follow-up with ENT and Neurology as outpatient.      Pt seen and examined at bedside this AM without any complaints or events overnight, VSS, labs and telemetry reviewed and pt stable for discharge as discussed with Dr. Briones. Pt has received appropriate discharge instructions, including medication regimen and follow up with Dr. Fuentes @ [place] on 10/8/24 1:30 PM.    Discharge medications: Amlodipine 10 mg daily (increased from 5mg daily),  Losartan 50 mg daily (dc olmesartan), Toprol XL 50 mg daily (dc Bisoprolol),  Ranexa 500 mg BID, Plavix 75 mg daily, Lipitor 40 mg daily (increased from 20 mg daily),  and to continue the rest of her prescribed medications.     Pt discharge copies detail cardiovascular history, medication testing/treatments OR has created a patient portal account and instructed to provider their records at their 1st appointment.    CVD (GLP-1 receptor agonist, SGLT2 inhibitor) meds discussed w/ patients and encouraged to discuss further with PMD or endo at next visit.           85 y/o Greek speaking  female  with PMHx of HTN, HLD, Gastritis (s/p EGD 8/2023) , h/o chronic lacunar infarcts, chronic HA, Anxiety, hx of L Breast Lumpectomy w/seed implant 5/10/24 CAD s/p  PCI GULSHAN LAD in 11/10/23  @[facility] w/ Dr. Gregory (d/c on  Aspirin and Plavix; now only on Plavix), and TTE 1/22/24 EF 55% mild symmetric LVH, AS w/o significant stenosis, PASP 32mm Hg presents this evening, 9/25/24, ER complaining of  intermittent minimal exertional  left-sided sharp and pressure-like chest pain radiating to the left arm and jaw X 4 days, similar to previous admission with chest pain. Trop T negative x 3, EKG w/o changes. Chest pain is reproducible. Chest pain likely a component of uncontrolled htn vs anxiety vs costochondritis.     NST (1/23/24): Myocardial perfusion imaging is normal, LVSF is hyperdynamic w/o RWMA. Normal EKG stress test. Abnormal tracer uptake noted in the right breast, clinical correlation/dedicated imaging suggested.    TTE (1/22/24): EF 66%, mild symmetric LVH, AS without significant stenosis. PASP 32 mmHg.  CT on 1/24/24 revealing: small airways disease with air trapping, no jayjay mass of the partially visualized breast parenchyma and no adenopathy.    Pt admitted  for observation and telemetry monitoring. She has a scheduled outpatient biopsy for known breast mass for which pt was instructed to follow up with her outpatient cardiologist for perioperative Plavix management. Patient noted to be orthostatic throughout hospital admission treated with IVF and medications adjusted:  Clonidine, Doxazosin, and Imdur discontinued.  PT evaluated patient and recommended JESÚS however patient and family prefer for patient to be discharged home. Patient to receive home care, PT, OT and follow-up with ENT and Neurology as outpatient. Earliest appt available for Neurology is 2/11/25 at 10 AM; if sooner appt becomes available office will contact patient.      Pt seen and examined at bedside this AM without any complaints or events overnight, VSS, labs and telemetry reviewed and pt stable for discharge as discussed with Dr. Briones. Pt has received appropriate discharge instructions, including medication regimen and follow up with Dr. Fuentes @ [place] on 10/8/24 1:30 PM.    Discharge medications: Amlodipine 10 mg daily (increased from 5mg daily),  Losartan 50 mg daily (dc olmesartan), Toprol XL 50 mg daily (dc Bisoprolol),  Ranexa 500 mg BID, Plavix 75 mg daily, Lipitor 40 mg daily (increased from 20 mg daily),  and to continue the rest of her prescribed medications.     Pt discharge copies detail cardiovascular history, medication testing/treatments OR has created a patient portal account and instructed to provider their records at their 1st appointment.    CVD (GLP-1 receptor agonist, SGLT2 inhibitor) meds discussed w/ patients and encouraged to discuss further with PMD or endo at next visit.           85 y/o St Helen[place] speaking  female  with PMHx of HTN, HLD, Gastritis (s/p EGD 8/2023) , h/o chronic lacunar infarcts, chronic HA, Anxiety, hx of L Breast Lumpectomy w/seed implant 5/10/24 CAD s/p  PCI GULSHAN LAD in 11/10/23  @[facility] w/ Dr. Gregory (d/c on  Aspirin and Plavix; now only on Plavix), and TTE 1/22/24 EF 55% mild symmetric LVH, AS w/o significant stenosis, PASP 32mm Hg presents this evening, 9/25/24, ER complaining of  intermittent minimal exertional  left-sided sharp and pressure-like chest pain radiating to the left arm and jaw X 4 days, similar to previous admission with chest pain. Trop T negative x 3, EKG w/o changes. Chest pain is reproducible. Chest pain likely a component of uncontrolled htn vs anxiety vs costochondritis.     NST (1/23/24): Myocardial perfusion imaging is normal, LVSF is hyperdynamic w/o RWMA. Normal EKG stress test. Abnormal tracer uptake noted in the right breast, clinical correlation/dedicated imaging suggested.    TTE (1/22/24): EF 66%, mild symmetric LVH, AS without significant stenosis. PASP 32 mmHg.  CT on 1/24/24 revealing: small airways disease with air trapping, no jayjay mass of the partially visualized breast parenchyma and no adenopathy.    Pt admitted  for observation and telemetry monitoring. She has a scheduled outpatient biopsy for known breast mass for which pt was instructed to follow up with her outpatient cardiologist for perioperative Plavix management. Patient noted to be orthostatic throughout hospital admission treated with IVF and medications adjusted:  Clonidine, Doxazosin, and Imdur discontinued.  PT evaluated patient and recommended JESÚS however patient and family prefer for patient to be discharged home. Patient to receive home care, PT, OT and follow-up with ENT and Neurology as outpatient. Earliest appt available for Neurology is 2/11/25 at 10 AM; if sooner appt becomes available office will contact patient.      Pt seen and examined at bedside this AM without any complaints or events overnight, VSS, labs and telemetry reviewed and pt stable for discharge as discussed with Dr. Briones. Pt has received appropriate discharge instructions, including medication regimen and follow up with Dr. Fuentes @ [place] on 10/8/24 1:30 PM.    Discharge medications: Amlodipine 10 mg daily (increased from 5mg daily),  Losartan 50 mg daily (dc olmesartan), Toprol XL 50 mg daily (dc Bisoprolol),  Ranexa 500 mg BID, Plavix 75 mg daily, Livalo 4 mg daily and to continue the rest of her prescribed medications.     Pt discharge copies detail cardiovascular history, medication testing/treatments OR has created a patient portal account and instructed to provider their records at their 1st appointment.    CVD (GLP-1 receptor agonist, SGLT2 inhibitor) meds discussed w/ patients and encouraged to discuss further with PMD or endo at next visit.

## 2024-09-26 NOTE — PROGRESS NOTE ADULT - PROBLEM SELECTOR PLAN 1
ECG NSR@61bpm with non specific ST-T wave change; Troponin T HS 9 neg; CK 57 CKMB 1.5  hx CAD s/p  PCI GULSHAN LAD in 11/10/23  @[facility] w/ Dr. Gregory (d/c on  Aspirin and Plavix; now only on Plavix)TTE 1/22/24 EF 55% mild symmetric LVH, AS w/o significant stenosis, PASP 32mm Hg   Telemetry, ECG, CE in AM  -continue ASA Ec 81mg QD; Plavix 75mg QD, Imdur M 60mg QD.  - starting Flexiril and Tylenol ECG NSR@61bpm with non specific ST-T wave change; Troponin T HS 9 neg; CK 57 CKMB 1.5  hx CAD s/p  PCI GULSHAN LAD in 11/10/23  @[facility] w/ Dr. Gregory (d/c on  Aspirin and Plavix; now only on Plavix)TTE 1/22/24 EF 55% mild symmetric LVH, AS w/o significant stenosis, PASP 32mm Hg   - NST 1/2024: normal  -continue ASA Ec 81mg QD; Plavix 75mg QD, Imdur M 60mg QD.  - starting Flexiril and Tylenol

## 2024-09-26 NOTE — DISCHARGE NOTE PROVIDER - PROVIDER TOKENS
FREE:[LAST:[dana],FIRST:[jelani],PHONE:[(   )    -],FAX:[(   )    -],ESTABLISHEDPATIENT:[T]] FREE:[LAST:[Gina],FIRST:[],PHONE:[(   )    -],FAX:[(   )    -],ADDRESS:[[facility]],SCHEDULEDAPPT:[10/08/2024],SCHEDULEDAPPTTIME:[01:30 PM],ESTABLISHEDPATIENT:[T]] FREE:[LAST:[Gina],FIRST:[],PHONE:[(   )    -],FAX:[(   )    -],ADDRESS:[[facility]],SCHEDULEDAPPT:[10/08/2024],SCHEDULEDAPPTTIME:[01:30 PM],ESTABLISHEDPATIENT:[T]],PROVIDER:[TOKEN:[[id number]],SCHEDULEDAPPT:[10/23/2024],SCHEDULEDAPPTTIME:[01:45 PM]] PROVIDER:[TOKEN:[[id number]],SCHEDULEDAPPT:[10/23/2024],SCHEDULEDAPPTTIME:[01:45 PM]],FREE:[LAST:[Gina],FIRST:[],PHONE:[(   )    -],FAX:[(   )    -],ADDRESS:[[facility]],SCHEDULEDAPPT:[10/08/2024],SCHEDULEDAPPTTIME:[01:30 PM],ESTABLISHEDPATIENT:[T]],PROVIDER:[TOKEN:[[id number]],SCHEDULEDAPPT:[02/11/2025],SCHEDULEDAPPTTIME:[10:00 AM]]

## 2024-09-26 NOTE — DISCHARGE NOTE PROVIDER - NSDCCPCAREPLAN_GEN_ALL_CORE_FT
PRINCIPAL DISCHARGE DIAGNOSIS  Diagnosis: Chest pain  Assessment and Plan of Treatment: You presented to the ED with chest pain. Your troponins or enzyme that leaks when there is damage to the myocardium or during a heart attack was negative. Your EKGs were normal and did not indicate a large blockage in your heart. We believe this chest pain could be from uncontrolled high blood pressure.   Please follow up with your cardiologist.  Please START TOPROL 100 mg daily as well as SPIRONOLACTONE 25 mg DAILY. increase your home Amlodipine 5 mg daily to Amlodipine 10 mg candice de la torre       PRINCIPAL DISCHARGE DIAGNOSIS  Diagnosis: Chest pain  Assessment and Plan of Treatment: You presented to the ED with chest pain. Your troponins or enzyme that leaks when there is damage to the myocardium or during a heart attack was negative. Your EKGs were normal and did not indicate a large blockage in your heart. We believe this chest pain could be from uncontrolled high blood pressure.   Please follow up with your cardiologist.  Please START TOPROL 100 mg daily, Losartan 50 mg daily as well as SPIRONOLACTONE 25 mg daily.  Please STOP Olmesartan daily, Amlodipine 5 mg daily and Bisoprolol daily.    Please follow up with your outpatient cardiolgoist Dr. Fuentes at [place] on 9/30/24 (your previously scheduled appointment)  please continue Flexiril 5 mg PO daily as well as tylenol 650 mg daily.      SECONDARY DISCHARGE DIAGNOSES  Diagnosis: CAD (coronary artery disease)  Assessment and Plan of Treatment: Please continue your daily plavix to prevent stent thrombosis.   Please follow up with your cardiologist as above.    Diagnosis: Hyperlipidemia  Assessment and Plan of Treatment: Please continue your daily statin to ensure your cardiac stent remains open.     PRINCIPAL DISCHARGE DIAGNOSIS  Diagnosis: Chest pain  Assessment and Plan of Treatment: You presented to the ED with chest pain. Your troponins or enzyme that leaks when there is damage to the myocardium or during a heart attack was negative. Your EKGs were normal and did not indicate a large blockage in your heart. We believe this chest pain could be from uncontrolled high blood pressure.   Please follow up with your cardiologist Dr. Newman within the next 1-2 weeks by calling [phone].   Please START Metoprolol Succinate 100 mg daily, Losartan 50 mg daily as well as SPIRONOLACTONE 25 mg daily.  Please STOP Olmesartan daily, Amlodipine 5 mg daily and Bisoprolol daily.    Please follow up with your outpatient cardiolgoist Dr. Fuentes at [place] on 9/30/24 (your previously scheduled appointment)  please continue Flexiril 5 mg PO daily as well as tylenol 650 mg daily.      SECONDARY DISCHARGE DIAGNOSES  Diagnosis: CAD (coronary artery disease)  Assessment and Plan of Treatment: Please continue your daily plavix to prevent stent thrombosis.   Please follow up with your cardiologist as above.    Diagnosis: Hyperlipidemia  Assessment and Plan of Treatment: Please continue your daily statin to ensure your cardiac stent remains open.     PRINCIPAL DISCHARGE DIAGNOSIS  Diagnosis: Chest pain  Assessment and Plan of Treatment: You presented to the ED with chest pain. Your troponins or enzyme that leaks when there is damage to the myocardium or during a heart attack was negative. Your echocardiogram was normal. Your EKGs were normal and did not indicate a large blockage in your heart. We believe this chest pain is from costochrondritis (inflammation in the costosternal joints of your chest where your ribs meet your breastbone).   We had wanted to perform a PYP scan to look for amyloidosis in your heart (a genetic condition that causes the heart to be stuff) however this cannot be done until November. Please follow up with your cardiologist as scheduled to help schedule this test.   Please continue Ranexa 500mg 2x/day, Flexeril 5mg daily, Tylenol 975mg every 6 hours.   Please follow up with your outpatient cardiolgoist Dr. Fuentes at [place] on 9/30/24 (your previously scheduled appointment)      SECONDARY DISCHARGE DIAGNOSES  Diagnosis: CAD (coronary artery disease)  Assessment and Plan of Treatment: Please continue your daily plavix to prevent stent thrombosis.   Please follow up with your cardiologist as above.    Diagnosis: Hyperlipidemia  Assessment and Plan of Treatment: Please continue your daily atorvastatin 20mg at bedtime to ensure your cardiac stent remains open.     PRINCIPAL DISCHARGE DIAGNOSIS  Diagnosis: Chest pain  Assessment and Plan of Treatment: You presented to the ED with chest pain. Your troponins or enzyme that leaks when there is damage to the myocardium or during a heart attack was negative. Your echocardiogram was normal. Your EKGs were normal and did not indicate a large blockage in your heart. We believe this chest pain is from costochrondritis (inflammation in the costosternal joints of your chest where your ribs meet your breastbone).   We had wanted to perform a PYP scan to look for amyloidosis in your heart (a genetic condition that causes the heart to be stuff) however this cannot be done until November. Please follow up with your cardiologist as scheduled to help schedule this test.   Please continue Ranexa 500mg 2x/day, Flexeril 5mg daily, Tylenol 975mg every 6 hours.   Please follow up with your outpatient cardiolgoist Dr. Fuentes at [place] on 9/30/24 (your previously scheduled appointment)      SECONDARY DISCHARGE DIAGNOSES  Diagnosis: CAD (coronary artery disease)  Assessment and Plan of Treatment: Please continue your daily plavix to prevent stent thrombosis.   Please follow up with your cardiologist as above.    Diagnosis: Hyperlipidemia  Assessment and Plan of Treatment: Please continue your daily atorvastatin 20mg at bedtime to ensure your cardiac stent remains open.    Diagnosis: Orthostatic hypotension  Assessment and Plan of Treatment: Orthostatic hypotension is a drop in blood pressure upon changing positions, such as moving from sitting to standing. Orthostatic hypotension may cause lightheadedness and dizziness, which can result in passing out, fatigue and nausea. It could also contribute to gait instability and falls.   To avoid orthostatic hypotension:    • Avoid suddenly standing from a sitting bending or lying position; take it in stages   • Before getting out of bed perform arm and leg exercises when lying and again when sitting; crossing and uncrossing your legs firmly may also help(blood pressure is lowest in the morning so extra care may need to be taken)   • Avoid prolonged sitting, lying or standing e.g. bus queues; if this is unavoidable clench and unclench your calf muscles to encourage the blood flow.   • Wear high support stockings during the day and remove them before going to bed   • Some people have large drops in blood pressure 1 to 2 hours after meals; smaller and more frequent meals avoid this.   • Drink enough fluids to keep your urine clear throughout the day; try to drink 1 to 2 liters earlier in the day. Avoid excessive alcohol.       PRINCIPAL DISCHARGE DIAGNOSIS  Diagnosis: Chest pain  Assessment and Plan of Treatment: You presented to the ED with chest pain. Your troponins or enzyme that leaks when there is damage to the myocardium or during a heart attack was negative. Your echocardiogram was normal. Your EKGs were normal and did not indicate a large blockage in your heart. We believe this chest pain is from costochrondritis (inflammation in the costosternal joints of your chest where your ribs meet your breastbone).   We had wanted to perform a PYP scan to look for amyloidosis in your heart (a genetic condition that causes the heart to be stIff) however this cannot be done until November. Please follow up with your cardiologist as scheduled to help schedule this test.   Please continue Ranexa 500mg 2x/day, Flexeril 5mg daily, Tylenol 975mg every 6 hours.   Please follow up with your outpatient cardiolgoist Dr. Fuentes at [place] on 10/8/24 AT 1:30 PM      SECONDARY DISCHARGE DIAGNOSES  Diagnosis: Orthostatic hypotension  Assessment and Plan of Treatment: Orthostatic hypotension is a drop in blood pressure upon changing positions, such as moving from sitting to standing. Orthostatic hypotension may cause lightheadedness and dizziness, which can result in passing out, fatigue and nausea. It could also contribute to gait instability and falls.   To avoid orthostatic hypotension:    • Avoid suddenly standing from a sitting bending or lying position; take it in stages   • Before getting out of bed perform arm and leg exercises when lying and again when sitting; crossing and uncrossing your legs firmly may also help(blood pressure is lowest in the morning so extra care may need to be taken)   • Avoid prolonged sitting, lying or standing e.g. bus queues; if this is unavoidable clench and unclench your calf muscles to encourage the blood flow.   • Wear high support stockings during the day and remove them before going to bed   • Some people have large drops in blood pressure 1 to 2 hours after meals; smaller and more frequent meals avoid this.   • Drink enough fluids to keep your urine clear throughout the day; try to drink 1 to 2 liters earlier in the day. Avoid excessive alcohol.  Follow up with ENT Dr. Raghu Fernandez 10/23/24 at 1:45 PM.      Diagnosis: CAD (coronary artery disease)  Assessment and Plan of Treatment: Please continue your daily plavix to prevent stent thrombosis.   Please follow up with your cardiologist as above.    Diagnosis: Hyperlipidemia  Assessment and Plan of Treatment: Please continue your daily atorvastatin 20mg at bedtime to ensure your cardiac stent remains open.    Diagnosis: Hypertension  Assessment and Plan of Treatment: For blood pressure that is too high or too low please see your doctor or go to the emergency room as necessary. Please STOP OLMESARTAN, BISOPROLOL, AND IMDUR. PLEASE TAKE LOSARTAN AND TOPROL XL INSTEAD. YOUR AMLODIPINE WAS INCREASED TO 10 MG DAILY        PRINCIPAL DISCHARGE DIAGNOSIS  Diagnosis: Chest pain  Assessment and Plan of Treatment: You presented to the ED with chest pain. Your troponins or enzyme that leaks when there is damage to the myocardium or during a heart attack was negative. Your echocardiogram was normal. Your EKGs were normal and did not indicate a large blockage in your heart. We believe this chest pain is from costochrondritis (inflammation in the costosternal joints of your chest where your ribs meet your breastbone).   We had wanted to perform a PYP scan to look for amyloidosis in your heart (a genetic condition that causes the heart to be stIff) however this cannot be done until November. Please follow up with your cardiologist as scheduled to help schedule this test.   Please continue Ranexa 500mg 2x/day, Flexeril 5mg daily, Tylenol 975mg every 6 hours.   Please follow up with your outpatient cardiolgoist Dr. Fuentes at [place] on 10/8/24 AT 1:30 PM      SECONDARY DISCHARGE DIAGNOSES  Diagnosis: Orthostatic hypotension  Assessment and Plan of Treatment: Orthostatic hypotension is a drop in blood pressure upon changing positions, such as moving from sitting to standing. Orthostatic hypotension may cause lightheadedness and dizziness, which can result in passing out, fatigue and nausea. It could also contribute to gait instability and falls.   To avoid orthostatic hypotension:    • Avoid suddenly standing from a sitting bending or lying position; take it in stages   • Before getting out of bed perform arm and leg exercises when lying and again when sitting; crossing and uncrossing your legs firmly may also help(blood pressure is lowest in the morning so extra care may need to be taken)   • Avoid prolonged sitting, lying or standing e.g. bus queues; if this is unavoidable clench and unclench your calf muscles to encourage the blood flow.   • Wear high support stockings during the day and remove them before going to bed   • Some people have large drops in blood pressure 1 to 2 hours after meals; smaller and more frequent meals avoid this.   • Drink enough fluids to keep your urine clear throughout the day; try to drink 1 to 2 liters earlier in the day. Avoid excessive alcohol.  Follow up with ENT Dr. Raghu Fernandez 10/23/24 at 1:45 PM.   Follow-up with Neurology Dr. Samuel 2/11/25 10 AM. This is the soonest appointment they have. If an earlier appt becomes available the office will contact you.       Diagnosis: CAD (coronary artery disease)  Assessment and Plan of Treatment: Please continue your daily plavix to prevent stent thrombosis.   Please follow up with your cardiologist as above.    Diagnosis: Hyperlipidemia  Assessment and Plan of Treatment: Please continue your Livalo 4 mg daily to keep your cholesterol low. High cholesterol contributes to heart disease.      Diagnosis: Hypertension  Assessment and Plan of Treatment: For blood pressure that is too high or too low please see your doctor or go to the emergency room as necessary. Please STOP OLMESARTAN, BISOPROLOL, AND IMDUR. PLEASE TAKE LOSARTAN AND TOPROL XL INSTEAD. YOUR AMLODIPINE WAS INCREASED TO 10 MG DAILY        PRINCIPAL DISCHARGE DIAGNOSIS  Diagnosis: Chest pain  Assessment and Plan of Treatment: You presented to the ED with chest pain. Your troponins or enzyme that leaks when there is damage to the myocardium or during a heart attack was negative. Your echocardiogram was normal. Your EKGs were normal and did not indicate a large blockage in your heart. We believe this chest pain is from costochrondritis (inflammation in the costosternal joints of your chest where your ribs meet your breastbone).   We had wanted to perform a PYP scan to look for amyloidosis in your heart (a genetic condition that causes the heart to be stIff) however this cannot be done until November. Please follow up with your cardiologist as scheduled to help schedule this test.   Please continue Ranexa 500mg 2x/day, Flexeril 5mg daily, Tylenol 975mg every 6 hours.   Please follow up with your outpatient cardiolgoist Dr. Fuentes at [place] on 10/8/24 AT 1:30 PM      SECONDARY DISCHARGE DIAGNOSES  Diagnosis: Orthostatic hypotension  Assessment and Plan of Treatment: Orthostatic hypotension is a drop in blood pressure upon changing positions, such as moving from sitting to standing. Orthostatic hypotension may cause lightheadedness and dizziness, which can result in passing out, fatigue and nausea. It could also contribute to gait instability and falls.   To avoid orthostatic hypotension:    • Avoid suddenly standing from a sitting bending or lying position; take it in stages   • Before getting out of bed perform arm and leg exercises when lying and again when sitting; crossing and uncrossing your legs firmly may also help(blood pressure is lowest in the morning so extra care may need to be taken)   • Avoid prolonged sitting, lying or standing e.g. bus queues; if this is unavoidable clench and unclench your calf muscles to encourage the blood flow.   • Wear high support stockings during the day and remove them before going to bed   • Some people have large drops in blood pressure 1 to 2 hours after meals; smaller and more frequent meals avoid this.   • Drink enough fluids to keep your urine clear throughout the day; try to drink 1 to 2 liters earlier in the day. Avoid excessive alcohol.  Follow up with ENT Dr. Raghu Fernandez 10/23/24 at 1:45 PM.   Follow-up with Neurology Dr. Samuel 2/11/25 10 AM. This is the soonest appointment they have. If an earlier appt becomes available the office will contact you.       Diagnosis: CAD (coronary artery disease)  Assessment and Plan of Treatment: Please continue your daily plavix to prevent stent thrombosis.   Please follow up with your cardiologist as above. You are no longer on Aspirin and were previously desensitized to Aspirin. Should you require Aspirin in the future you would require another desensitization.    Diagnosis: Hyperlipidemia  Assessment and Plan of Treatment: Please continue your Livalo 4 mg daily to keep your cholesterol low. High cholesterol contributes to heart disease.      Diagnosis: Hypertension  Assessment and Plan of Treatment: For blood pressure that is too high or too low please see your doctor or go to the emergency room as necessary. Please STOP OLMESARTAN, BISOPROLOL, AND IMDUR. PLEASE TAKE LOSARTAN AND TOPROL XL INSTEAD. YOUR AMLODIPINE WAS INCREASED TO 10 MG DAILY

## 2024-09-26 NOTE — DISCHARGE NOTE NURSING/CASE MANAGEMENT/SOCIAL WORK - NSTRANSFERBELONGINGSDISPO_GEN_A_NUR
Caller would like to discuss an/a  for . Writer advised caller of callback within 24-72 hours. Patient Name: Alex Soni  Caller Name: Patient  Name of Facility: n/a  Callback Number: [phone]  Best Availability: anytime  Can A Detailed Message Be left? yes  Fax Number: n/a  Additional Info: Patient did not want to put any information in regarding why he is calling. Patient only wants to speak with his PCP. Patient would like the PCP to call him back. Please advise.   Did you confirm the message with the caller?: yes    Thank you,  Rai Trevino with patient

## 2024-09-26 NOTE — PROGRESS NOTE ADULT - PROBLEM SELECTOR PLAN 2
- continue Toprol 100 mg daily, Amlodipine 10 mg daily, Losartan 50 mg daily and Spironolactone 25 mg daily as well as Imdur

## 2024-09-26 NOTE — DISCHARGE NOTE PROVIDER - ATTENDING DISCHARGE PHYSICAL EXAMINATION:
Admitted with atypical chest pain, negative troponins. Normal stress test earlier this year. On examination CP somewhat reproducible on palpation on left breast. Mass present, outpt biopsy.  Dizziness 2/2 postural hypotension, kept another day, IV hydration, symptoms improved, orthostatics resolved. Atypical chest pain- Improved. Pain is reproducible, she does have history of CAD on plavix. Have attempted low dose ibuprofen without relief, Will attempt adding lidocaine patch, repeat EKG and troponin remain negative. Difficulty with ambulation.   Orthostatic hypotension: recheck today, now off alpha blockers, imdur. SPEP/UPEP elevated but ratio normal. Doubt amyloid, but PYP scan in AM, neuro f.u as outpatient.  - Breast Lumpectomy- may be a component of why she has reproducible features. will try to optimize pain regimen.  dizziness: normal neuro exam, ENT and neuro f.u, hx of lacunar CVAs.

## 2024-09-27 LAB
ALBUMIN SERPL ELPH-MCNC: 3.8 G/DL — SIGNIFICANT CHANGE UP (ref 3.3–5)
ALP SERPL-CCNC: 75 U/L — SIGNIFICANT CHANGE UP (ref 40–120)
ALT FLD-CCNC: 8 U/L — LOW (ref 10–45)
ANION GAP SERPL CALC-SCNC: 9 MMOL/L — SIGNIFICANT CHANGE UP (ref 5–17)
AST SERPL-CCNC: 16 U/L — SIGNIFICANT CHANGE UP (ref 10–40)
BILIRUB SERPL-MCNC: 0.6 MG/DL — SIGNIFICANT CHANGE UP (ref 0.2–1.2)
BUN SERPL-MCNC: 21 MG/DL — SIGNIFICANT CHANGE UP (ref 7–23)
CALCIUM SERPL-MCNC: 9.2 MG/DL — SIGNIFICANT CHANGE UP (ref 8.4–10.5)
CHLORIDE SERPL-SCNC: 101 MMOL/L — SIGNIFICANT CHANGE UP (ref 96–108)
CO2 SERPL-SCNC: 24 MMOL/L — SIGNIFICANT CHANGE UP (ref 22–31)
CREAT SERPL-MCNC: 1.19 MG/DL — SIGNIFICANT CHANGE UP (ref 0.5–1.3)
EGFR: 45 ML/MIN/1.73M2 — LOW
GLUCOSE SERPL-MCNC: 108 MG/DL — HIGH (ref 70–99)
HCT VFR BLD CALC: 33.2 % — LOW (ref 34.5–45)
HGB BLD-MCNC: 11 G/DL — LOW (ref 11.5–15.5)
MAGNESIUM SERPL-MCNC: 2.3 MG/DL — SIGNIFICANT CHANGE UP (ref 1.6–2.6)
MCHC RBC-ENTMCNC: 29.5 PG — SIGNIFICANT CHANGE UP (ref 27–34)
MCHC RBC-ENTMCNC: 33.1 GM/DL — SIGNIFICANT CHANGE UP (ref 32–36)
MCV RBC AUTO: 89 FL — SIGNIFICANT CHANGE UP (ref 80–100)
NRBC # BLD: 0 /100 WBCS — SIGNIFICANT CHANGE UP (ref 0–0)
PLATELET # BLD AUTO: 193 K/UL — SIGNIFICANT CHANGE UP (ref 150–400)
POTASSIUM SERPL-MCNC: 4.2 MMOL/L — SIGNIFICANT CHANGE UP (ref 3.5–5.3)
POTASSIUM SERPL-SCNC: 4.2 MMOL/L — SIGNIFICANT CHANGE UP (ref 3.5–5.3)
PROT SERPL-MCNC: 7.1 G/DL — SIGNIFICANT CHANGE UP (ref 6–8.3)
RBC # BLD: 3.73 M/UL — LOW (ref 3.8–5.2)
RBC # FLD: 13.1 % — SIGNIFICANT CHANGE UP (ref 10.3–14.5)
SODIUM SERPL-SCNC: 134 MMOL/L — LOW (ref 135–145)
WBC # BLD: 7.22 K/UL — SIGNIFICANT CHANGE UP (ref 3.8–10.5)
WBC # FLD AUTO: 7.22 K/UL — SIGNIFICANT CHANGE UP (ref 3.8–10.5)

## 2024-09-27 PROCEDURE — 99232 SBSQ HOSP IP/OBS MODERATE 35: CPT

## 2024-09-27 RX ORDER — SODIUM CHLORIDE 0.9 % (FLUSH) 0.9 %
1000 SYRINGE (ML) INJECTION
Refills: 0 | Status: DISCONTINUED | OUTPATIENT
Start: 2024-09-27 | End: 2024-10-02

## 2024-09-27 RX ORDER — SODIUM CHLORIDE 0.9 % (FLUSH) 0.9 %
250 SYRINGE (ML) INJECTION ONCE
Refills: 0 | Status: COMPLETED | OUTPATIENT
Start: 2024-09-27 | End: 2024-09-27

## 2024-09-27 RX ADMIN — SPIRONOLACTONE 25 MILLIGRAM(S): 100 TABLET, FILM COATED ORAL at 12:20

## 2024-09-27 RX ADMIN — PANTOPRAZOLE SODIUM 40 MILLIGRAM(S): 40 TABLET, DELAYED RELEASE ORAL at 05:35

## 2024-09-27 RX ADMIN — Medication 60 MILLIGRAM(S): at 12:21

## 2024-09-27 RX ADMIN — LOSARTAN POTASSIUM 50 MILLIGRAM(S): 100 TABLET, FILM COATED ORAL at 05:33

## 2024-09-27 RX ADMIN — Medication 200 MILLIGRAM(S): at 04:05

## 2024-09-27 RX ADMIN — Medication 75 MILLIGRAM(S): at 12:20

## 2024-09-27 RX ADMIN — DOXAZOSIN 4 MILLIGRAM(S): 2 TABLET ORAL at 05:34

## 2024-09-27 RX ADMIN — Medication 75 MILLILITER(S): at 20:30

## 2024-09-27 RX ADMIN — Medication 200 MILLIGRAM(S): at 05:05

## 2024-09-27 RX ADMIN — Medication 100 MILLILITER(S): at 12:46

## 2024-09-27 RX ADMIN — CETIRIZINE HYDROCHLORIDE 10 MILLIGRAM(S): 10 TABLET ORAL at 12:19

## 2024-09-27 RX ADMIN — Medication 250 MILLILITER(S): at 12:34

## 2024-09-27 RX ADMIN — Medication 200 MILLIGRAM(S): at 10:25

## 2024-09-27 RX ADMIN — Medication 200 MILLIGRAM(S): at 11:38

## 2024-09-27 RX ADMIN — Medication 100 MILLIGRAM(S): at 05:34

## 2024-09-27 RX ADMIN — Medication 30 MILLILITER(S): at 08:25

## 2024-09-27 RX ADMIN — Medication 200 MILLIGRAM(S): at 00:00

## 2024-09-27 RX ADMIN — Medication 10 MILLIGRAM(S): at 12:20

## 2024-09-27 RX ADMIN — ATORVASTATIN CALCIUM 20 MILLIGRAM(S): 10 TABLET, FILM COATED ORAL at 21:33

## 2024-09-27 RX ADMIN — Medication 200 MILLIGRAM(S): at 21:35

## 2024-09-27 RX ADMIN — CLONIDINE HYDROCHLORIDE 0.1 MILLIGRAM(S): 0.2 TABLET ORAL at 05:35

## 2024-09-27 NOTE — PROGRESS NOTE ADULT - SUBJECTIVE AND OBJECTIVE BOX
Cardiology PA Progress Note    S: Pt seen and examined bedside. Pt notes some dizziness w/ walking to bathroom.   Encounter conducted via [employer] ID [id number]. / #[id number]  Patient denies C/P, SOB, N/V, dizziness, palpitations, and diaphoresis.  Pt denies fever/chills, dysuria, abdominal pain, diarrhea, and cough  12 Point ROS otherwise negative except as per HPI/subjective.     O: Vital Signs Last 24 Hrs  T(C): 36.8 (27 Sep 2024 05:32), Max: 36.9 (26 Sep 2024 21:00)  T(F): 98.2 (27 Sep 2024 05:32), Max: 98.4 (26 Sep 2024 21:00)  HR: 80 (27 Sep 2024 08:54) (72 - 80)  BP: 121/56 (27 Sep 2024 08:54) (103/51 - 137/63)  BP(mean): 80 (27 Sep 2024 08:54) (73 - 90)  RR: 17 (27 Sep 2024 08:54) (17 - 17)  SpO2: 98% (27 Sep 2024 08:54) (95% - 98%)    Parameters below as of 27 Sep 2024 08:54  Patient On (Oxygen Delivery Method): room air    PHYSICAL EXAM:  GEN: NAD  HEENT: No JVD  PULM:  CTA B/L  CARD:  RRR, S1 and S2   ABD: +BS, NT, soft/ND	  EXT: No Edema B/L LE  NEURO: A+Ox3, no focal deficit  PSYCH: Mood Appropriate    LABS:                        11.0   7.22  )-----------( 193      ( 27 Sep 2024 05:30 )             33.2         134[L]  |  101  |  21  ----------------------------<  108[H]  4.2   |  24  |  1.19    Ca    9.2      27 Sep 2024 05:30  Mg     2.3         TPro  7.1  /  Alb  3.8  /  TBili  0.6  /  DBili  x   /  AST  16  /  ALT  8[L]  /  AlkPhos  75   @ 07:01  -   @ 07:00  --------------------------------------------------------  IN: 840 mL / OUT: 600 mL / NET: 240 mL     @ 07:01  -   @ 19:58  --------------------------------------------------------  IN: 840 mL / OUT: 0 mL / NET: 840 mL      Daily     Daily Weight in k.9 (27 Sep 2024 05:13)

## 2024-09-27 NOTE — PROGRESS NOTE ADULT - PROBLEM SELECTOR PLAN 1
ECG NSR@61bpm with non specific ST-T wave change; Troponin T HS 9 neg; CK 57 CKMB 1.5  - Hx CAD s/p  PCI GULSHAN LAD in 11/10/23  @[facility] w/ Dr. Gregory (d/c on DAPT; now only on Plavix)   - TTE (1/22/24): EF 55% mild symmetric LVH, AS w/o significant stenosis, PASP 32mm Hg   - NST (1/2024): normal  - Continue: Plavix 75mg QD, Imdur M 60mg QD.  - Continue: Flexeril and Tylenol (for treatment of costochondritis)

## 2024-09-27 NOTE — PROGRESS NOTE ADULT - ASSESSMENT
83 y/o Polish speaking  female  with PMHx of HTN, HLD, Gastritis (s/p EGD 8/2023) , h/o chronic lacunar infarcts, chronic HA, Anxiety, hx of L Breast Lumpectomy w/seed implant 5/10/24 CAD s/p  PCI GULSHAN LAD in 11/10/23  @[facility] w/ Dr. Gregory (d/c on  Aspirin and Plavix; now only on Plavix) who presented for CP that is reproducible, treating for costochondritis course c/b orthostatic hypotension.

## 2024-09-27 NOTE — PROGRESS NOTE ADULT - PROBLEM SELECTOR PLAN 2
- Continue: Toprol 100 mg QD, Amlodipine 10 mg QD, Losartan 50 mg QD, and Spironolactone 25 mg QD, Imdur 60 mg QD, Clonidine 0.1 mg QD     ##Orthostatic hypotension   - STOPPED Doxazosin  - s/p IVF hydration 9/27 w/ persistence of orthostasis  - Continue: IV NS 75 cc/hr x 12 hours

## 2024-09-28 ENCOUNTER — RESULT REVIEW (OUTPATIENT)
Age: 84
End: 2024-09-28

## 2024-09-28 LAB
ANION GAP SERPL CALC-SCNC: 7 MMOL/L — SIGNIFICANT CHANGE UP (ref 5–17)
BUN SERPL-MCNC: 17 MG/DL — SIGNIFICANT CHANGE UP (ref 7–23)
CALCIUM SERPL-MCNC: 9.5 MG/DL — SIGNIFICANT CHANGE UP (ref 8.4–10.5)
CHLORIDE SERPL-SCNC: 104 MMOL/L — SIGNIFICANT CHANGE UP (ref 96–108)
CK MB CFR SERPL CALC: 1.8 NG/ML — SIGNIFICANT CHANGE UP (ref 0–6.7)
CK SERPL-CCNC: 74 U/L — SIGNIFICANT CHANGE UP (ref 25–170)
CO2 SERPL-SCNC: 28 MMOL/L — SIGNIFICANT CHANGE UP (ref 22–31)
CREAT SERPL-MCNC: 1 MG/DL — SIGNIFICANT CHANGE UP (ref 0.5–1.3)
EGFR: 55 ML/MIN/1.73M2 — LOW
GLUCOSE SERPL-MCNC: 97 MG/DL — SIGNIFICANT CHANGE UP (ref 70–99)
HCT VFR BLD CALC: 37.3 % — SIGNIFICANT CHANGE UP (ref 34.5–45)
HGB BLD-MCNC: 12.1 G/DL — SIGNIFICANT CHANGE UP (ref 11.5–15.5)
MAGNESIUM SERPL-MCNC: 2.3 MG/DL — SIGNIFICANT CHANGE UP (ref 1.6–2.6)
MCHC RBC-ENTMCNC: 29 PG — SIGNIFICANT CHANGE UP (ref 27–34)
MCHC RBC-ENTMCNC: 32.4 GM/DL — SIGNIFICANT CHANGE UP (ref 32–36)
MCV RBC AUTO: 89.4 FL — SIGNIFICANT CHANGE UP (ref 80–100)
NRBC # BLD: 0 /100 WBCS — SIGNIFICANT CHANGE UP (ref 0–0)
PLATELET # BLD AUTO: 204 K/UL — SIGNIFICANT CHANGE UP (ref 150–400)
POTASSIUM SERPL-MCNC: 5.2 MMOL/L — SIGNIFICANT CHANGE UP (ref 3.5–5.3)
POTASSIUM SERPL-SCNC: 5.2 MMOL/L — SIGNIFICANT CHANGE UP (ref 3.5–5.3)
RBC # BLD: 4.17 M/UL — SIGNIFICANT CHANGE UP (ref 3.8–5.2)
RBC # FLD: 12.9 % — SIGNIFICANT CHANGE UP (ref 10.3–14.5)
SODIUM SERPL-SCNC: 139 MMOL/L — SIGNIFICANT CHANGE UP (ref 135–145)
TROPONIN T, HIGH SENSITIVITY RESULT: 7 NG/L — SIGNIFICANT CHANGE UP (ref 0–51)
WBC # BLD: 7.32 K/UL — SIGNIFICANT CHANGE UP (ref 3.8–10.5)
WBC # FLD AUTO: 7.32 K/UL — SIGNIFICANT CHANGE UP (ref 3.8–10.5)

## 2024-09-28 PROCEDURE — 99233 SBSQ HOSP IP/OBS HIGH 50: CPT

## 2024-09-28 PROCEDURE — 93010 ELECTROCARDIOGRAM REPORT: CPT

## 2024-09-28 PROCEDURE — 93306 TTE W/DOPPLER COMPLETE: CPT | Mod: 26

## 2024-09-28 RX ORDER — ACETAMINOPHEN 325 MG
1000 TABLET ORAL ONCE
Refills: 0 | Status: COMPLETED | OUTPATIENT
Start: 2024-09-28 | End: 2024-09-28

## 2024-09-28 RX ADMIN — CETIRIZINE HYDROCHLORIDE 10 MILLIGRAM(S): 10 TABLET ORAL at 13:41

## 2024-09-28 RX ADMIN — Medication 200 MILLIGRAM(S): at 04:28

## 2024-09-28 RX ADMIN — Medication 10 MILLIGRAM(S): at 13:40

## 2024-09-28 RX ADMIN — PANTOPRAZOLE SODIUM 40 MILLIGRAM(S): 40 TABLET, DELAYED RELEASE ORAL at 06:18

## 2024-09-28 RX ADMIN — Medication 200 MILLIGRAM(S): at 05:28

## 2024-09-28 RX ADMIN — LOSARTAN POTASSIUM 50 MILLIGRAM(S): 100 TABLET, FILM COATED ORAL at 13:40

## 2024-09-28 RX ADMIN — ENOXAPARIN SODIUM 40 MILLIGRAM(S): 150 INJECTION SUBCUTANEOUS at 18:43

## 2024-09-28 RX ADMIN — Medication 100 MILLIGRAM(S): at 10:35

## 2024-09-28 RX ADMIN — Medication 200 MILLIGRAM(S): at 22:05

## 2024-09-28 RX ADMIN — Medication 75 MILLIGRAM(S): at 13:40

## 2024-09-28 RX ADMIN — ATORVASTATIN CALCIUM 20 MILLIGRAM(S): 10 TABLET, FILM COATED ORAL at 22:05

## 2024-09-28 NOTE — PHYSICAL THERAPY INITIAL EVALUATION ADULT - PERTINENT HX OF CURRENT PROBLEM, REHAB EVAL
Patient is 83 y/o Palestinian speaking  female  with PMHx of HTN, HLD, Gastritis (s/p EGD 8/2023) , h/o chronic lacunar infarcts, chronic HA, Anxiety, hx of L Breast Lumpectomy w/seed implant 5/10/24 CAD s/p  PCI GULSHAN LAD in 11/10/23  @[facility] w/ Dr. Gregory (d/c on  Aspirin and Plavix; now only on Plavix) who presented for CP that is reproducible, treating for costochondritis course c/b orthostatic hypotension.

## 2024-09-28 NOTE — PHYSICAL THERAPY INITIAL EVALUATION ADULT - ADDITIONAL COMMENTS
Patient lives with  in 2nd floor apartment , +elevator access (however patient prefers to take the stairs). PTA, pt was independent with all ADLs and functional mobility, without the use of any AD. Patient has tub shower.

## 2024-09-28 NOTE — PHYSICAL THERAPY INITIAL EVALUATION ADULT - GENERAL OBSERVATIONS, REHAB EVAL
Patient encountered semi-supine in bed in no apparent distress, +heplock +tele +pulseOx.  present at bedside. Agreeable to PT valdemar.

## 2024-09-28 NOTE — PHYSICAL THERAPY INITIAL EVALUATION ADULT - LEVEL OF INDEPENDENCE: SIT/STAND, REHAB EVAL
close supervision ; patient c/o inc headache and dizziness remains at 6/10. Performed standing marches, with no change in sx. BP taken, (+) orthostatic. Patient seated, further activity deferred. -- PA Chantale notified

## 2024-09-28 NOTE — PROGRESS NOTE ADULT - PROBLEM SELECTOR PLAN 1
ECG NSR@61bpm with non specific ST-T wave change; Troponin T HS 9 neg; CK 57 CKMB 1.5  - s/p cardiac cath (11/10/23 at [facility] w/ Kodra): DESx1 LAD       o Only on Plavix   - TTE (1/22/24): EF 55% mild symmetric LVH, AS w/o significant stenosis, PASP 32mm Hg   - TTE (09/28/24): LVEF 60-65%, no significant valvular disease, no e/o PH, PASP 27 mmHg, no pericardial effusion, no significant change from prior.   - NST (1/2024): normal  - Continue: Plavix 75mg QD, Imdur M 60mg QD.  - Continue: Flexeril and Tylenol (for treatment of costochondritis)

## 2024-09-28 NOTE — PROGRESS NOTE ADULT - ASSESSMENT
83 y/o Chadian speaking  female  with PMHx of HTN, HLD, Gastritis (s/p EGD 8/2023) , h/o chronic lacunar infarcts, chronic HA, Anxiety, hx of L Breast Lumpectomy w/seed implant 5/10/24 CAD s/p  PCI GULSHAN LAD in 11/10/23  @[facility] w/ Dr. Gregory (d/c on  Aspirin and Plavix; now only on Plavix) who presented for CP that is reproducible, treating for costochondritis course c/b orthostatic hypotension.

## 2024-09-28 NOTE — PHYSICAL THERAPY INITIAL EVALUATION ADULT - NSPTDISCHREC_GEN_A_CORE
CMS SUB-3  Measure  Has the patient ever used any alcohol or any other substance such as cocaine, marijuana, ecstasy, pain medications, heroin, methamphetamines, etc. at any point in their lifetime? No    If yes,...  1. Is the pt interested in being prescribed an FDA-approved medication for alcohol or opiate addiction at discharge? No  2. Is the pt interested in an aftercare appointment for addictions treatment (i.e., AODA PHP/IOP, RTC, MH PHP/IOP, OP therapy, OP psychiatry, OP PCP, OP NP/PA)? No    Therapist will notify patient's inpatient attending physician and .   Peggy Lagos LCSW, SAC   pending functional progress

## 2024-09-28 NOTE — PROGRESS NOTE ADULT - SUBJECTIVE AND OBJECTIVE BOX
Cardiology PA Progress Note    S: Pt seen and examined bedside. Pt states having some headache. Encounter conducted via Language Line [place]  [place] ID#[id number].   Patient denies C/P, SOB, N/V, dizziness, palpitations, and diaphoresis.  Pt denies fever/chills, dysuria, abdominal pain, diarrhea, and cough  12 Point ROS otherwise negative except as per HPI/subjective.     O: Vital Signs Last 24 Hrs  T(C): 36.8 (28 Sep 2024 10:25), Max: 36.8 (28 Sep 2024 01:51)  T(F): 98.3 (28 Sep 2024 10:25), Max: 98.3 (28 Sep 2024 01:51)  HR: 77 (28 Sep 2024 16:45) (68 - 77)  BP: 140/66 (28 Sep 2024 16:45) (140/66 - 182/81)  BP(mean): 100 (27 Sep 2024 21:18) (100 - 100)  RR: 19 (28 Sep 2024 16:45) (12 - 20)  SpO2: 97% (28 Sep 2024 14:50) (97% - 97%)    Parameters below as of 28 Sep 2024 16:45  Patient On (Oxygen Delivery Method): room air    PHYSICAL EXAM:  GEN: NAD  HEENT: No JVD  PULM:  CTA B/L  CARD:  RRR, S1 and S2   ABD: +BS, NT, soft/ND	  EXT: No Edema B/L LE  NEURO: A+Ox3, no focal deficit  PSYCH: Mood Appropriate    LABS:                        12.1   7.32  )-----------( 204      ( 28 Sep 2024 06:09 )             37.3     09-28    139  |  104  |  17  ----------------------------<  97  5.2   |  28  |  1.00    Ca    9.5      28 Sep 2024 06:09  Mg     2.3     09-28    TPro  7.1  /  Alb  3.8  /  TBili  0.6  /  DBili  x   /  AST  16  /  ALT  8[L]  /  AlkPhos  75  09-27 09-27 @ 07:01  -  09-28 @ 07:00  --------------------------------------------------------  IN: 1590 mL / OUT: 550 mL / NET: 1040 mL    09-28 @ 07:01  - 09-28 @ 18:31  --------------------------------------------------------  IN: 300 mL / OUT: 300 mL / NET: 0 mL

## 2024-09-28 NOTE — PROGRESS NOTE ADULT - PROBLEM SELECTOR PLAN 2
- Continue: Toprol 100 mg QD, Amlodipine 10 mg QD, Losartan 50 mg QD, Imdur 60 mg QD  - HOLD: Clonidine 0.1 mg QD; Spironolactone 25 mg QD; Doxazosin 0.4 mg QHS     ##Orthostatic hypotension   - STOPPED Doxazosin; Clonidine; Spironolactone   - s/p IVF hydration 9/27 + 9/28 w/ persistence of orthostasis

## 2024-09-29 DIAGNOSIS — R07.89 OTHER CHEST PAIN: ICD-10-CM

## 2024-09-29 LAB
ANION GAP SERPL CALC-SCNC: 11 MMOL/L — SIGNIFICANT CHANGE UP (ref 5–17)
BUN SERPL-MCNC: 23 MG/DL — SIGNIFICANT CHANGE UP (ref 7–23)
CALCIUM SERPL-MCNC: 9.5 MG/DL — SIGNIFICANT CHANGE UP (ref 8.4–10.5)
CHLORIDE SERPL-SCNC: 102 MMOL/L — SIGNIFICANT CHANGE UP (ref 96–108)
CO2 SERPL-SCNC: 22 MMOL/L — SIGNIFICANT CHANGE UP (ref 22–31)
CREAT SERPL-MCNC: 1.1 MG/DL — SIGNIFICANT CHANGE UP (ref 0.5–1.3)
EGFR: 49 ML/MIN/1.73M2 — LOW
GLUCOSE SERPL-MCNC: 96 MG/DL — SIGNIFICANT CHANGE UP (ref 70–99)
HCT VFR BLD CALC: 36.2 % — SIGNIFICANT CHANGE UP (ref 34.5–45)
HGB BLD-MCNC: 12.2 G/DL — SIGNIFICANT CHANGE UP (ref 11.5–15.5)
KAPPA LC SER QL IFE: 3.96 MG/DL — HIGH (ref 0.33–1.94)
KAPPA/LAMBDA FREE LIGHT CHAIN RATIO, SERUM: 1.23 RATIO — SIGNIFICANT CHANGE UP (ref 0.26–1.65)
LAMBDA LC SER QL IFE: 3.22 MG/DL — HIGH (ref 0.57–2.63)
MAGNESIUM SERPL-MCNC: 2.2 MG/DL — SIGNIFICANT CHANGE UP (ref 1.6–2.6)
MCHC RBC-ENTMCNC: 29.5 PG — SIGNIFICANT CHANGE UP (ref 27–34)
MCHC RBC-ENTMCNC: 33.7 GM/DL — SIGNIFICANT CHANGE UP (ref 32–36)
MCV RBC AUTO: 87.7 FL — SIGNIFICANT CHANGE UP (ref 80–100)
NRBC # BLD: 0 /100 WBCS — SIGNIFICANT CHANGE UP (ref 0–0)
PLATELET # BLD AUTO: 214 K/UL — SIGNIFICANT CHANGE UP (ref 150–400)
POTASSIUM SERPL-MCNC: 4.4 MMOL/L — SIGNIFICANT CHANGE UP (ref 3.5–5.3)
POTASSIUM SERPL-SCNC: 4.4 MMOL/L — SIGNIFICANT CHANGE UP (ref 3.5–5.3)
RBC # BLD: 4.13 M/UL — SIGNIFICANT CHANGE UP (ref 3.8–5.2)
RBC # FLD: 12.9 % — SIGNIFICANT CHANGE UP (ref 10.3–14.5)
SODIUM SERPL-SCNC: 135 MMOL/L — SIGNIFICANT CHANGE UP (ref 135–145)
WBC # BLD: 6.77 K/UL — SIGNIFICANT CHANGE UP (ref 3.8–10.5)
WBC # FLD AUTO: 6.77 K/UL — SIGNIFICANT CHANGE UP (ref 3.8–10.5)

## 2024-09-29 PROCEDURE — 99232 SBSQ HOSP IP/OBS MODERATE 35: CPT

## 2024-09-29 RX ORDER — ACETAMINOPHEN 325 MG
975 TABLET ORAL EVERY 6 HOURS
Refills: 0 | Status: DISCONTINUED | OUTPATIENT
Start: 2024-09-29 | End: 2024-10-02

## 2024-09-29 RX ORDER — METOPROLOL TARTRATE 50 MG
75 TABLET ORAL DAILY
Refills: 0 | Status: DISCONTINUED | OUTPATIENT
Start: 2024-09-30 | End: 2024-10-02

## 2024-09-29 RX ORDER — ASPIRIN 325 MG
325 TABLET ORAL
Refills: 0 | Status: DISCONTINUED | OUTPATIENT
Start: 2024-09-29 | End: 2024-09-29

## 2024-09-29 RX ORDER — SODIUM CHLORIDE 0.9 % (FLUSH) 0.9 %
250 SYRINGE (ML) INJECTION ONCE
Refills: 0 | Status: COMPLETED | OUTPATIENT
Start: 2024-09-29 | End: 2024-09-29

## 2024-09-29 RX ORDER — RANOLAZINE 500 MG/1
500 TABLET, EXTENDED RELEASE ORAL
Refills: 0 | Status: DISCONTINUED | OUTPATIENT
Start: 2024-09-29 | End: 2024-10-02

## 2024-09-29 RX ORDER — LIDOCAINE 50 MG/G
1 CREAM TOPICAL EVERY 24 HOURS
Refills: 0 | Status: DISCONTINUED | OUTPATIENT
Start: 2024-09-29 | End: 2024-10-02

## 2024-09-29 RX ADMIN — RANOLAZINE 500 MILLIGRAM(S): 500 TABLET, EXTENDED RELEASE ORAL at 19:20

## 2024-09-29 RX ADMIN — Medication 100 MILLIGRAM(S): at 06:25

## 2024-09-29 RX ADMIN — Medication 10 MILLIGRAM(S): at 12:27

## 2024-09-29 RX ADMIN — CETIRIZINE HYDROCHLORIDE 10 MILLIGRAM(S): 10 TABLET ORAL at 12:29

## 2024-09-29 RX ADMIN — Medication 250 MILLILITER(S): at 14:43

## 2024-09-29 RX ADMIN — ATORVASTATIN CALCIUM 20 MILLIGRAM(S): 10 TABLET, FILM COATED ORAL at 22:50

## 2024-09-29 RX ADMIN — Medication 10 MILLIGRAM(S): at 09:20

## 2024-09-29 RX ADMIN — LOSARTAN POTASSIUM 50 MILLIGRAM(S): 100 TABLET, FILM COATED ORAL at 06:25

## 2024-09-29 RX ADMIN — Medication 975 MILLIGRAM(S): at 19:18

## 2024-09-29 RX ADMIN — Medication 30 MILLILITER(S): at 14:47

## 2024-09-29 RX ADMIN — Medication 75 MILLIGRAM(S): at 12:28

## 2024-09-29 RX ADMIN — PANTOPRAZOLE SODIUM 40 MILLIGRAM(S): 40 TABLET, DELAYED RELEASE ORAL at 06:25

## 2024-09-29 RX ADMIN — Medication 975 MILLIGRAM(S): at 19:29

## 2024-09-29 RX ADMIN — Medication 200 MILLIGRAM(S): at 03:11

## 2024-09-29 RX ADMIN — Medication 60 MILLIGRAM(S): at 12:27

## 2024-09-29 RX ADMIN — LIDOCAINE 1 PATCH: 50 CREAM TOPICAL at 19:20

## 2024-09-29 NOTE — PROGRESS NOTE ADULT - PROBLEM SELECTOR PLAN 2
- Continue: Toprol 100 mg QD, Amlodipine 10 mg QD, Losartan 50 mg QD, Imdur 60 mg QD  - HOLD: Clonidine 0.1 mg QD; Spironolactone 25 mg QD; Doxazosin 0.4 mg QHS i/s/o orthostatic hypotension     ##Orthostatic hypotension   - STOPPED Doxazosin; Clonidine; Spironolactone   - s/p IVF hydration 9/27 + 9/28 w/ persistence of orthostasis - Continue: Toprol 100 mg QD, Amlodipine 10 mg QD, Losartan 50 mg QD, Imdur 60 mg QD  - HOLD: Clonidine 0.1 mg QD; Spironolactone 25 mg QD; Doxazosin 0.4 mg QHS i/s/o orthostatic hypotension     ##Orthostatic hypotension   - STOPPED Doxazosin; Clonidine; Spironolactone and Imdur   -STARTED   - Amyloid labs sent over the weekend to r/o amyloid i/s/o recurrent orthostatic hypotension   - s/p IVF hydration 9/27 + 9/28 w/ persistence of orthostasis - Continue: Toprol 100 mg QD, Amlodipine 10 mg QD, Losartan 50 mg QD and STOP Imdur 60 mg QD  - HOLD: Clonidine 0.1 mg QD; Spironolactone 25 mg QD; Doxazosin 0.4 mg QHS i/s/o orthostatic hypotension     ##Orthostatic hypotension   - STOPPED Doxazosin; Clonidine; Spironolactone and Imdur   - s/p IVF hydration 9/27 + 9/28 + 9/29 w/ persistence of orthostasis  - Amyloid labs sent over the weekend to r/o amyloid i/s/o recurrent orthostatic hypotension

## 2024-09-29 NOTE — PROGRESS NOTE ADULT - SUBJECTIVE AND OBJECTIVE BOX
Interventional Cardiology PA Adult Progress Note    Subjective Assessment:    ROS negative except as noted above.  	  MEDICATIONS:  amLODIPine   Tablet 10 milliGRAM(s) Oral every 24 hours  cloNIDine 0.1 milliGRAM(s) Oral daily  isosorbide   mononitrate ER Tablet (IMDUR) 60 milliGRAM(s) Oral daily  losartan 50 milliGRAM(s) Oral daily  metoprolol succinate  milliGRAM(s) Oral daily      cetirizine 10 milliGRAM(s) Oral daily  ipratropium    for Nebulization 500 MICROGram(s) Nebulizer every 6 hours PRN    acetaminophen     Tablet .. 650 milliGRAM(s) Oral once  cyclobenzaprine 5 milliGRAM(s) Oral daily PRN  escitalopram 10 milliGRAM(s) Oral daily  ibuprofen  Tablet. 200 milliGRAM(s) Oral every 6 hours    aluminum hydroxide/magnesium hydroxide/simethicone Suspension 30 milliLiter(s) Oral every 6 hours PRN  pantoprazole    Tablet 40 milliGRAM(s) Oral before breakfast    atorvastatin 20 milliGRAM(s) Oral at bedtime    clopidogrel Tablet 75 milliGRAM(s) Oral daily  enoxaparin Injectable 40 milliGRAM(s) SubCutaneous every 24 hours  influenza  Vaccine (HIGH DOSE) 0.5 milliLiter(s) IntraMuscular once  sodium chloride 0.9%. 1000 milliLiter(s) IV Continuous <Continuous>  sodium chloride 0.9%. 1000 milliLiter(s) IV Continuous <Continuous>  sodium chloride 0.9%. 1000 milliLiter(s) IV Continuous <Continuous>      	    [PHYSICAL EXAM:  TELEMETRY:  T(C): 36.8 (09-29-24 @ 05:00), Max: 36.8 (09-28-24 @ 10:25)  HR: 60 (09-29-24 @ 05:00) (60 - 78)  BP: 144/98 (09-29-24 @ 05:00) (138/64 - 182/81)  RR: 19 (09-29-24 @ 05:00) (19 - 20)  SpO2: 98% (09-29-24 @ 05:00) (96% - 98%)  Wt(kg): --  I&O's Summary    28 Sep 2024 07:01  -  29 Sep 2024 07:00  --------------------------------------------------------  IN: 300 mL / OUT: 300 mL / NET: 0 mL                                              Appearance: Normal	  HEENT:   Normal oral mucosa, PERRLA, EOMI	  Neck: Supple, + JVD/ - JVD; Carotid Bruit   Cardiovascular: Normal S1 S2, No JVD, No murmurs,   Respiratory: Lungs clear to auscultation/Decreased Breath Sounds/No Rales, Rhonchi, Wheezing	  Gastrointestinal:  Soft, Non-tender, + BS	  Skin: No rashes, No ecchymoses, No cyanosis  Extremities: Normal range of motion, No clubbing, cyanosis or edema  Vascular: Peripheral pulses palpable 2+ bilaterally  Neurologic: Non-focal  Psychiatry: A & O x 3, Mood & affect appropriate      	    ECG:  	  RADIOLOGY:   DIAGNOSTIC TESTING:  [ ] Echocardiogram:   [ ]  Catheterization:  [ ] Stress Test:    [ ] JUAN  OTHER: 	    LABS:	 	  CARDIAC MARKERS:                                  12.2   6.77  )-----------( 214      ( 29 Sep 2024 05:30 )             36.2     09-29    135  |  102  |  23  ----------------------------<  96  4.4   |  22  |  1.10    Ca    9.5      29 Sep 2024 05:30  Mg     2.2     09-29      proBNP:   Lipid Profile:   HgA1c:   TSH:    Cardiology PA Adult Progress Note    Subjective Assessment: patient seen and examined at bedside this AM using Citizen of [place]  #[id number]. No acute events overnight. Patient reports "lump in my throat" after swallowing her morning pills and one episode of sharp abdominal pain this am that resolved on its own. Patient denies chest pain, SOB, MILLARD, palpitations, dizziness, LOC, N/V/D, fever/chills/sick contact, diaphoresis, orthopnea/PND, and leg swelling.    ROS negative except as noted above.  	  MEDICATIONS:  amLODIPine   Tablet 10 milliGRAM(s) Oral every 24 hours  cloNIDine 0.1 milliGRAM(s) Oral daily  isosorbide   mononitrate ER Tablet (IMDUR) 60 milliGRAM(s) Oral daily  losartan 50 milliGRAM(s) Oral daily  metoprolol succinate  milliGRAM(s) Oral daily  cetirizine 10 milliGRAM(s) Oral daily  ipratropium    for Nebulization 500 MICROGram(s) Nebulizer every 6 hours PRN  acetaminophen     Tablet .. 650 milliGRAM(s) Oral once  cyclobenzaprine 5 milliGRAM(s) Oral daily PRN  escitalopram 10 milliGRAM(s) Oral daily  ibuprofen  Tablet. 200 milliGRAM(s) Oral every 6 hours  aluminum hydroxide/magnesium hydroxide/simethicone Suspension 30 milliLiter(s) Oral every 6 hours PRN  pantoprazole    Tablet 40 milliGRAM(s) Oral before breakfast  atorvastatin 20 milliGRAM(s) Oral at bedtime  clopidogrel Tablet 75 milliGRAM(s) Oral daily  enoxaparin Injectable 40 milliGRAM(s) SubCutaneous every 24 hours  influenza  Vaccine (HIGH DOSE) 0.5 milliLiter(s) IntraMuscular once  sodium chloride 0.9%. 1000 milliLiter(s) IV Continuous <Continuous>  sodium chloride 0.9%. 1000 milliLiter(s) IV Continuous <Continuous>  sodium chloride 0.9%. 1000 milliLiter(s) IV Continuous <Continuous>      	    [PHYSICAL EXAM:  TELEMETRY:  T(C): 36.8 (09-29-24 @ 05:00), Max: 36.8 (09-28-24 @ 10:25)  HR: 60 (09-29-24 @ 05:00) (60 - 78)  BP: 144/98 (09-29-24 @ 05:00) (138/64 - 182/81)  RR: 19 (09-29-24 @ 05:00) (19 - 20)  SpO2: 98% (09-29-24 @ 05:00) (96% - 98%)  Wt(kg): --  I&O's Summary    28 Sep 2024 07:01  -  29 Sep 2024 07:00  --------------------------------------------------------  IN: 300 mL / OUT: 300 mL / NET: 0 mL                                        Appearance: Normal	  HEENT:   Normal oral mucosa, PERRLA, EOMI	  Neck: Supple, - JVD; Carotid Bruit   Cardiovascular: Normal S1 S2, No JVD, No murmurs,   Respiratory: Lungs clear to auscultation  Gastrointestinal:  Soft, Non-tender, + BS	  Skin: No rashes, No ecchymoses, No cyanosis  Extremities: Normal range of motion, No clubbing, cyanosis or edema  Vascular: Peripheral pulses palpable 2+ bilaterally  Neurologic: Non-focal  Psychiatry: A & O x 3, Mood & affect appropriate    	    ECG:  	  RADIOLOGY:   DIAGNOSTIC TESTING:  [ ] Echocardiogram:   [ ]  Catheterization:  [ ] Stress Test:    [ ] JUAN  OTHER: 	    LABS:	 	  CARDIAC MARKERS:                          12.2   6.77  )-----------( 214      ( 29 Sep 2024 05:30 )             36.2     09-29    135  |  102  |  23  ----------------------------<  96  4.4   |  22  |  1.10    Ca    9.5      29 Sep 2024 05:30  Mg     2.2     09-29      proBNP:   Lipid Profile:   HgA1c:   TSH:

## 2024-09-29 NOTE — PROGRESS NOTE ADULT - ASSESSMENT
83 y/o Burkinan speaking  female  with PMHx of HTN, HLD, Gastritis (s/p EGD 8/2023) , h/o chronic lacunar infarcts, chronic HA, Anxiety, hx of L Breast Lumpectomy w/seed implant 5/10/24 CAD s/p  PCI GULSHAN LAD in 11/10/23  @[facility] w/ Dr. Gregory (d/c on  Aspirin and Plavix; now only on Plavix) who presented for CP that is reproducible, treating for costochondritis with ibuprofen course c/b orthostatic hypotension s/p IV hydration.   85 y/o Chadian speaking  female  with PMHx of HTN, HLD, Gastritis (s/p EGD 8/2023) , h/o chronic lacunar infarcts, chronic HA, Anxiety, hx of L Breast Lumpectomy w/seed implant 5/10/24 CAD s/p  PCI GULSHAN LAD in 11/10/23  @[facility] w/ Dr. Gregory (d/c on  Aspirin and Plavix; now only on Plavix) who presented for CP that is reproducible, treating for costochondritis with high dose tylenol (ASA allergy and hx of gastritis). course c/b recurrent orthostatic hypotension s/p IV hydration.

## 2024-09-29 NOTE — PROGRESS NOTE ADULT - PROBLEM SELECTOR PLAN 1
ECG NSR@61bpm with non specific ST-T wave change; Troponin T HS 9 neg; CK 57 CKMB 1.5  - s/p cardiac cath (11/10/23 at [facility] w/ Colt): DESx1 LAD--- only on Plavix (ASA allergy and reported did a desensitization test in 2023)   - TTE (1/22/24): EF 55% mild symmetric LVH, AS w/o significant stenosis, PASP 32mm Hg   - TTE (09/28/24): LVEF 60-65%, no significant valvular disease, no e/o PH, PASP 27 mmHg, no pericardial effusion, no significant change from prior.   - NST (1/2024): normal  - Continue: Plavix 75mg QD, Imdur M 60mg QD.    ###COSTOCHONDRITIS   - Continue: Flexeril 5mg and  ibuprofen 400mg q6 (increased from 200mg) **hx of gastritis-- monitor for sx during treatment   -c/w PPI daily ECG NSR@61bpm with non specific ST-T wave change; Troponin T HS 9 neg; CK 57 CKMB 1.5  - s/p cardiac cath (11/10/23 at [facility] w/ Kodra): DESx1 LAD--- only on Plavix (ASA allergy and reported did a desensitization test in 2023)   - TTE (1/22/24): EF 55% mild symmetric LVH, AS w/o significant stenosis, PASP 32mm Hg   - TTE (09/28/24): LVEF 60-65%, no significant valvular disease, no e/o PH, PASP 27 mmHg, no pericardial effusion, no significant change from prior.   - NST (1/2024): normal  - Continue: Plavix 75mg QD, Imdur M 60mg QD.    ###COSTOCHONDRITIS   - Continue: Flexeril 5mg and  START Tylenol 975mg PO q6 hrs   -c/w PPI daily ECG NSR@61bpm with non specific ST-T wave change; Troponin T HS 9 neg; CK 57 CKMB 1.5  - s/p cardiac cath (11/10/23 at [facility] w/ Kodyi): DESx1 LAD--- only on Plavix (ASA allergy and reported did a desensitization test in 2023)   - TTE (1/22/24): EF 55% mild symmetric LVH, AS w/o significant stenosis, PASP 32mm Hg   - TTE (09/28/24): LVEF 60-65%, no significant valvular disease, no e/o PH, PASP 27 mmHg, no pericardial effusion, no significant change from prior.   - NST (1/2024): normal  - Continue: Plavix 75mg QD, STOP Imdur M 60mg QD and START Ranexa 500mg PO BID     ###COSTOCHONDRITIS   - Continue: Flexeril 5mg and  START Tylenol 975mg PO q6 hrs, stopped ibuprofen 2/2 hx of gastritis

## 2024-09-30 LAB
ANION GAP SERPL CALC-SCNC: 10 MMOL/L — SIGNIFICANT CHANGE UP (ref 5–17)
BUN SERPL-MCNC: 23 MG/DL — SIGNIFICANT CHANGE UP (ref 7–23)
CALCIUM SERPL-MCNC: 9.2 MG/DL — SIGNIFICANT CHANGE UP (ref 8.4–10.5)
CHLORIDE SERPL-SCNC: 96 MMOL/L — SIGNIFICANT CHANGE UP (ref 96–108)
CO2 SERPL-SCNC: 22 MMOL/L — SIGNIFICANT CHANGE UP (ref 22–31)
CREAT SERPL-MCNC: 0.98 MG/DL — SIGNIFICANT CHANGE UP (ref 0.5–1.3)
EGFR: 57 ML/MIN/1.73M2 — LOW
GLUCOSE SERPL-MCNC: 97 MG/DL — SIGNIFICANT CHANGE UP (ref 70–99)
HCT VFR BLD CALC: 33.7 % — LOW (ref 34.5–45)
HGB BLD-MCNC: 11.1 G/DL — LOW (ref 11.5–15.5)
MAGNESIUM SERPL-MCNC: 2.1 MG/DL — SIGNIFICANT CHANGE UP (ref 1.6–2.6)
MCHC RBC-ENTMCNC: 29.4 PG — SIGNIFICANT CHANGE UP (ref 27–34)
MCHC RBC-ENTMCNC: 32.9 GM/DL — SIGNIFICANT CHANGE UP (ref 32–36)
MCV RBC AUTO: 89.4 FL — SIGNIFICANT CHANGE UP (ref 80–100)
NRBC # BLD: 0 /100 WBCS — SIGNIFICANT CHANGE UP (ref 0–0)
PLATELET # BLD AUTO: 211 K/UL — SIGNIFICANT CHANGE UP (ref 150–400)
POTASSIUM SERPL-MCNC: 4.6 MMOL/L — SIGNIFICANT CHANGE UP (ref 3.5–5.3)
POTASSIUM SERPL-SCNC: 4.6 MMOL/L — SIGNIFICANT CHANGE UP (ref 3.5–5.3)
PROT SERPL-MCNC: 7.3 G/DL — SIGNIFICANT CHANGE UP (ref 6–8.3)
RBC # BLD: 3.77 M/UL — LOW (ref 3.8–5.2)
RBC # FLD: 12.7 % — SIGNIFICANT CHANGE UP (ref 10.3–14.5)
SODIUM SERPL-SCNC: 128 MMOL/L — LOW (ref 135–145)
WBC # BLD: 7.04 K/UL — SIGNIFICANT CHANGE UP (ref 3.8–10.5)
WBC # FLD AUTO: 7.04 K/UL — SIGNIFICANT CHANGE UP (ref 3.8–10.5)

## 2024-09-30 PROCEDURE — 99232 SBSQ HOSP IP/OBS MODERATE 35: CPT

## 2024-09-30 RX ADMIN — PANTOPRAZOLE SODIUM 40 MILLIGRAM(S): 40 TABLET, DELAYED RELEASE ORAL at 06:04

## 2024-09-30 RX ADMIN — LOSARTAN POTASSIUM 50 MILLIGRAM(S): 100 TABLET, FILM COATED ORAL at 05:42

## 2024-09-30 RX ADMIN — Medication 975 MILLIGRAM(S): at 17:21

## 2024-09-30 RX ADMIN — Medication 5 MILLIGRAM(S): at 05:39

## 2024-09-30 RX ADMIN — Medication 975 MILLIGRAM(S): at 05:43

## 2024-09-30 RX ADMIN — Medication 975 MILLIGRAM(S): at 12:39

## 2024-09-30 RX ADMIN — Medication 10 MILLIGRAM(S): at 11:39

## 2024-09-30 RX ADMIN — Medication 10 MILLIGRAM(S): at 09:08

## 2024-09-30 RX ADMIN — Medication 75 MILLIGRAM(S): at 11:39

## 2024-09-30 RX ADMIN — CETIRIZINE HYDROCHLORIDE 10 MILLIGRAM(S): 10 TABLET ORAL at 11:39

## 2024-09-30 RX ADMIN — Medication 975 MILLIGRAM(S): at 06:43

## 2024-09-30 RX ADMIN — ATORVASTATIN CALCIUM 20 MILLIGRAM(S): 10 TABLET, FILM COATED ORAL at 21:21

## 2024-09-30 RX ADMIN — LIDOCAINE 1 PATCH: 50 CREAM TOPICAL at 18:32

## 2024-09-30 RX ADMIN — Medication 975 MILLIGRAM(S): at 18:21

## 2024-09-30 RX ADMIN — LIDOCAINE 1 PATCH: 50 CREAM TOPICAL at 18:42

## 2024-09-30 RX ADMIN — Medication 975 MILLIGRAM(S): at 11:39

## 2024-09-30 NOTE — PROGRESS NOTE ADULT - ASSESSMENT
83 y/o F, Lao speaking, ASA allergy s/p desensitization 11/2023, with PMHx of HTN, HLD, CAD s/p GULSHAN LAD (11/10/23  @[facility] w/ Dr. Gregory), gastritis (EGD 8/2023), h/o chronic lacunar infarcts, chronic HA, anxiety, hx of left breast lumpectomy w/seed implant 5/10/24, who presented for reproducible chest pain, currently treating for costochondritis with high dose Tylenol (ASA allergy and hx of gastritis). Course c/b recurrent orthostatic hypotension, elevated ENMANUEL Kappa and Lambda, now pending PYP scan.  85 y/o F, [place] Islander speaking, ASA allergy s/p desensitization 11/2023, with PMHx of HTN, HLD, CAD s/p GULSHAN LAD (11/10/23  @[facility] w/ Dr. Gregory), gastritis (EGD 8/2023), h/o chronic lacunar infarcts, chronic HA, anxiety, hx of left breast lumpectomy w/seed implant 5/10/24, who presented for reproducible chest pain, currently treating for costochondritis with high dose Tylenol (ASA allergy and hx of gastritis). Course c/b recurrent orthostatic hypotension and elevated SPEP/UPEP, pending PYP scan.

## 2024-09-30 NOTE — PROGRESS NOTE ADULT - PROBLEM SELECTOR PLAN 2
- Continue Amlodipine 10mg QD, Losartan 50mg QD, Toprol 75mg QD.      ##Orthostatic hypotension   - STOPPED Doxazosin, Clonidine, Spironolactone and Imdur   - s/p IVF hydration 9/27 + 9/28 + 9/29 w/ persistence of orthostasis  - Amyloid labs obtained to r/o amyloid i/s/o recurrent orthostatic hypotension: Elevated ENMANUEL kappa and ENMANUEL lambda  - Pending PYP scan - Continue Amlodipine 10mg QD, Losartan 50mg QD, Toprol 75mg QD.      ##Orthostatic hypotension   - s/p IVF hydration 9/27 + 9/28 + 9/29 w/ persistence of orthostasis  - STOPPED Doxazosin, Clonidine, Spironolactone and Imdur w resolution of orthostasis  - Amyloid labs obtained to r/o amyloid i/s/o recurrent orthostatic hypotension: SPEP/UPEP elevated but ratio normal  - Pending PYP scan

## 2024-09-30 NOTE — PROGRESS NOTE ADULT - PROBLEM SELECTOR PLAN 1
ECG NSR@61bpm with non specific ST-T wave change; Troponin T HS 9 neg; CK 57 CKMB 1.5  - Cardiac cath (11/10/23 at [facility] w/ Colt): DESx1 LAD - only on Plavix (ASA allergy and reported did a desensitization test in 2023)   - TTE (9/28/24): LVEF 60-65%, unchagned from prior  - NST (1/2024): normal  - Continue: Plavix 75mg QD, Ranexa 500mg PO BID     ###COSTOCHONDRITIS   - Continue: Flexeril 5mg and Tylenol 975mg PO q6 hrs,   - No NSAIDS 2/2 hx of gastritis ECG NSR@61bpm with non specific ST-T wave change; Troponin T HS 9 neg; CK 57 CKMB 1.5  - Cardiac cath (11/10/23 at [facility] w/ Colt): DESx1 LAD - only on Plavix (ASA allergy and reported did a desensitization test in 2023)   - TTE (9/28/24): LVEF 60-65%, unchanged from prior  - NST (1/2024): normal  - Continue: Plavix 75mg QD  - Ranexa 500mg PO BID and Lidocaine patch    ###COSTOCHONDRITIS   - Continue: Flexeril 5mg and Tylenol 975mg PO q6 hrs,   - No NSAIDS 2/2 hx of gastritis ECG NSR@61bpm with non specific ST-T wave change; Troponin T HS 9 neg; CK 57 CKMB 1.5  - Cardiac cath (11/10/23 at [facility] w/ Colt): DESx1 LAD - only on Plavix (ASA allergy and reported did a desensitization test in 2023)   - TTE (9/28/24): LVEF 60-65%, unchanged from prior  - NST (1/2024): normal  - Continue: Plavix 75mg QD, Ranexa 500mg BID  - Treating for costochondritis as below      ###COSTOCHONDRITIS   - Continue: Flexeril 5mg, Tylenol 975mg PO Q6h, Lidocaine patch PRN  - No NSAIDS 2/2 hx of gastritis

## 2024-09-30 NOTE — PROGRESS NOTE ADULT - SUBJECTIVE AND OBJECTIVE BOX
INCOMPLETE Cardiology PA Adult Progress Note    SUBJECTIVE ASSESSMENT: Met with and examined the pt at bedside this morning using [place]  ID#[id number]. Pt continues to complain of left sided chest pain, reproducible, that improved with Tylenol. Denies any other acute complaints at this time.     TELE: NSR 50-60s  	  MEDICATIONS:  amLODIPine   Tablet 10 milliGRAM(s) Oral every 24 hours  losartan 50 milliGRAM(s) Oral daily  metoprolol succinate ER 75 milliGRAM(s) Oral daily  ranolazine 500 milliGRAM(s) Oral two times a day  cetirizine 10 milliGRAM(s) Oral daily  ipratropium    for Nebulization 500 MICROGram(s) Nebulizer every 6 hours PRN  acetaminophen     Tablet .. 975 milliGRAM(s) Oral every 6 hours  cyclobenzaprine 5 milliGRAM(s) Oral daily PRN  escitalopram 10 milliGRAM(s) Oral daily  aluminum hydroxide/magnesium hydroxide/simethicone Suspension 30 milliLiter(s) Oral every 6 hours PRN  pantoprazole    Tablet 40 milliGRAM(s) Oral before breakfast  atorvastatin 20 milliGRAM(s) Oral at bedtime  clopidogrel Tablet 75 milliGRAM(s) Oral daily  enoxaparin Injectable 40 milliGRAM(s) SubCutaneous every 24 hours  influenza  Vaccine (HIGH DOSE) 0.5 milliLiter(s) IntraMuscular once  lidocaine   4% Patch 1 Patch Transdermal every 24 hours  sodium chloride 0.9%. 1000 milliLiter(s) IV Continuous <Continuous>  sodium chloride 0.9%. 1000 milliLiter(s) IV Continuous <Continuous>  sodium chloride 0.9%. 1000 milliLiter(s) IV Continuous <Continuous>    	  VITAL SIGNS:  T(C): 36.9 (09-30-24 @ 09:05), Max: 36.9 (09-30-24 @ 09:05)  HR: 70 (09-30-24 @ 09:05) (60 - 70)  BP: 146/65 (09-30-24 @ 09:05) (93/55 - 148/67)  RR: 18 (09-30-24 @ 09:05) (16 - 19)  SpO2: 98% (09-30-24 @ 09:05) (96% - 98%)  Wt(kg): --    I&O's Summary    29 Sep 2024 07:01  -  30 Sep 2024 07:00  --------------------------------------------------------  IN: 667 mL / OUT: 800 mL / NET: -133 mL    30 Sep 2024 07:01  -  30 Sep 2024 11:06  --------------------------------------------------------  IN: 180 mL / OUT: 0 mL / NET: 180 mL                                       PHYSICAL EXAM:  Appearance: Normal	  HEENT: Normal oral mucosa, PERRL, EOMI	  Neck: Supple, - JVD  Cardiovascular: Normal S1 S2, No murmurs  Respiratory: Lungs clear to auscultation. No Rales, Rhonchi, Wheezing	  Gastrointestinal:  Soft, Non-tender, + BS	  Skin: No rashes, No ecchymoses, No cyanosis  Extremities: Normal range of motion, No clubbing, cyanosis or edema  Vascular: Peripheral pulses palpable 2+ bilaterally  Neurologic: Non-focal  Psychiatry: A & O x 3, Mood & affect appropriate    LABS:	 	                          11.1   7.04  )-----------( 211      ( 30 Sep 2024 05:30 )             33.7     09-30    128[L]  |  96  |  23  ----------------------------<  97  4.6   |  22  |  0.98    Ca    9.2      30 Sep 2024 05:30  Mg     2.1     09-30

## 2024-10-01 LAB
ANION GAP SERPL CALC-SCNC: 14 MMOL/L — SIGNIFICANT CHANGE UP (ref 5–17)
BUN SERPL-MCNC: 14 MG/DL — SIGNIFICANT CHANGE UP (ref 7–23)
CALCIUM SERPL-MCNC: 10 MG/DL — SIGNIFICANT CHANGE UP (ref 8.4–10.5)
CHLORIDE SERPL-SCNC: 96 MMOL/L — SIGNIFICANT CHANGE UP (ref 96–108)
CO2 SERPL-SCNC: 22 MMOL/L — SIGNIFICANT CHANGE UP (ref 22–31)
CREAT SERPL-MCNC: 0.94 MG/DL — SIGNIFICANT CHANGE UP (ref 0.5–1.3)
EGFR: 59 ML/MIN/1.73M2 — LOW
GLUCOSE SERPL-MCNC: 110 MG/DL — HIGH (ref 70–99)
HCT VFR BLD CALC: 44 % — SIGNIFICANT CHANGE UP (ref 34.5–45)
HGB BLD-MCNC: 14.3 G/DL — SIGNIFICANT CHANGE UP (ref 11.5–15.5)
MAGNESIUM SERPL-MCNC: 2.2 MG/DL — SIGNIFICANT CHANGE UP (ref 1.6–2.6)
MCHC RBC-ENTMCNC: 28.3 PG — SIGNIFICANT CHANGE UP (ref 27–34)
MCHC RBC-ENTMCNC: 32.5 GM/DL — SIGNIFICANT CHANGE UP (ref 32–36)
MCV RBC AUTO: 86.8 FL — SIGNIFICANT CHANGE UP (ref 80–100)
PLATELET # BLD AUTO: 254 K/UL — SIGNIFICANT CHANGE UP (ref 150–400)
POTASSIUM SERPL-MCNC: 5 MMOL/L — SIGNIFICANT CHANGE UP (ref 3.5–5.3)
POTASSIUM SERPL-SCNC: 5 MMOL/L — SIGNIFICANT CHANGE UP (ref 3.5–5.3)
RBC # BLD: 5.07 M/UL — SIGNIFICANT CHANGE UP (ref 3.8–5.2)
RBC # FLD: 12.7 % — SIGNIFICANT CHANGE UP (ref 10.3–14.5)
SODIUM SERPL-SCNC: 132 MMOL/L — LOW (ref 135–145)
WBC # BLD: 8.57 K/UL — SIGNIFICANT CHANGE UP (ref 3.8–10.5)
WBC # FLD AUTO: 8.57 K/UL — SIGNIFICANT CHANGE UP (ref 3.8–10.5)

## 2024-10-01 PROCEDURE — 99232 SBSQ HOSP IP/OBS MODERATE 35: CPT

## 2024-10-01 RX ORDER — SODIUM CHLORIDE 0.9 % (FLUSH) 0.9 %
250 SYRINGE (ML) INJECTION ONCE
Refills: 0 | Status: COMPLETED | OUTPATIENT
Start: 2024-10-01 | End: 2024-10-01

## 2024-10-01 RX ADMIN — Medication 250 MILLILITER(S): at 12:17

## 2024-10-01 RX ADMIN — Medication 975 MILLIGRAM(S): at 06:16

## 2024-10-01 RX ADMIN — CETIRIZINE HYDROCHLORIDE 10 MILLIGRAM(S): 10 TABLET ORAL at 12:24

## 2024-10-01 RX ADMIN — LIDOCAINE 1 PATCH: 50 CREAM TOPICAL at 20:21

## 2024-10-01 RX ADMIN — ATORVASTATIN CALCIUM 20 MILLIGRAM(S): 10 TABLET, FILM COATED ORAL at 21:15

## 2024-10-01 RX ADMIN — RANOLAZINE 500 MILLIGRAM(S): 500 TABLET, EXTENDED RELEASE ORAL at 21:15

## 2024-10-01 RX ADMIN — LOSARTAN POTASSIUM 50 MILLIGRAM(S): 100 TABLET, FILM COATED ORAL at 05:16

## 2024-10-01 RX ADMIN — LIDOCAINE 1 PATCH: 50 CREAM TOPICAL at 17:41

## 2024-10-01 RX ADMIN — PANTOPRAZOLE SODIUM 40 MILLIGRAM(S): 40 TABLET, DELAYED RELEASE ORAL at 06:27

## 2024-10-01 RX ADMIN — Medication 975 MILLIGRAM(S): at 05:16

## 2024-10-01 RX ADMIN — Medication 30 MILLILITER(S): at 03:12

## 2024-10-01 RX ADMIN — Medication 75 MILLIGRAM(S): at 12:24

## 2024-10-01 RX ADMIN — Medication 10 MILLIGRAM(S): at 17:34

## 2024-10-01 RX ADMIN — Medication 75 MILLIGRAM(S): at 10:49

## 2024-10-01 RX ADMIN — RANOLAZINE 500 MILLIGRAM(S): 500 TABLET, EXTENDED RELEASE ORAL at 10:49

## 2024-10-01 RX ADMIN — LIDOCAINE 1 PATCH: 50 CREAM TOPICAL at 06:22

## 2024-10-01 RX ADMIN — Medication 10 MILLIGRAM(S): at 12:25

## 2024-10-01 NOTE — PROGRESS NOTE ADULT - PROBLEM SELECTOR PLAN 1
- ECG NSR 61bpm with non specific ST-T wave change; Troponin T HS 9 neg; CK 57 CKMB 1.5  - Cardiac cath (11/10/23 at [facility] w/ Colt): DESx1 LAD - only on Plavix (ASA allergy and reported did a desensitization test in 2023)   - TTE (9/28/24): LVEF 60-65%, unchanged from prior  - NST (1/2024): normal  - Continue: Plavix 75mg QD, Ranexa 500mg BID  - Treating for costochondritis as below      ###COSTOCHONDRITIS   - Continue: Flexeril 5mg, Tylenol 975mg PO Q6h, Lidocaine patch PRN  - No NSAIDS 2/2 hx of gastritis - ECG NSR 61bpm with non specific ST-T wave change; Troponin T HS 9 neg; CK 57 CKMB 1.5  - Cardiac cath (11/10/23 at [facility] w/ Colt): DESx1 LAD - only on Plavix (ASA allergy, s/p desensitization test in 2023)   - TTE (9/28/24): LVEF 60-65%, unchanged from prior  - NST (1/2024): normal  - Continue: Plavix 75mg QD, Ranexa 500mg BID  - Treating for costochondritis as below      ###COSTOCHONDRITIS   - Continue: Flexeril 5mg, Tylenol 975mg PO Q6h, Lidocaine patch PRN  - No NSAIDS 2/2 hx of gastritis

## 2024-10-01 NOTE — PROGRESS NOTE ADULT - PROBLEM SELECTOR PROBLEM 1
Atypical chest pain
Unstable angina
Atypical chest pain
Unstable angina
Atypical chest pain
Unstable angina

## 2024-10-01 NOTE — PROGRESS NOTE ADULT - PROBLEM SELECTOR PLAN 6
Lovenox 40mg SQ daily; ambulate as tolerated    DVT ppx: lovenox   F: s/p IVF   E: Keep K > 4, Mg > 2  N: DASH/TLC     Code: Full  Dispo: pending clinical progression     Case discussed with Dr. Quintanilla.
Lovenox 40mg SQ daily; ambulate as tolerated    F: None  E: Replete if K<4 or Mag<2  N: DASH Diet   GIppx: Pantoprazole   VTEppx: Lovenox   Code: Full   Dispo: pending clinical course   PT: Pending    Case discussed with Dr. Briones
Lovenox 40mg SQ daily; ambulate as tolerated    F: None  E: Replete if K<4 or Mag<2  N: DASH Diet   GIppx: Pantoprazole   VTEppx: Lovenox   Code: Full   Dispo: pending clinical course   PT: re-consult today 9/29
Lovenox 40mg SQ daily; ambulate as tolerated    F: None  E: Replete if K<4 or Mag<2  N: DASH Diet   GIppx: Pantoprazole   VTEppx: Lovenox   Code: Full   Dispo: pending clinical course   PT: Reconsulted    Case discussed with Dr. Briones
Lovenox 40mg SQ daily; ambulate as tolerated
Lovenox 40mg SQ daily; ambulate as tolerated    DVT ppx: lovenox   F: IV NS   E: Keep K > 4, Mg > 2  N: DASH/TLC     Code: Full  Dispo: pending clinical progression     Case discussed with Dr. Briones.

## 2024-10-01 NOTE — PROVIDER CONTACT NOTE (OTHER) - ACTION/TREATMENT ORDERED:
Patient ordered for orthostatic vital signs. Continue to monitor.
SANTIAGO Huddletson assessed pt, negative neuro deficits. RN to continue to monitor for further changes.

## 2024-10-01 NOTE — PROGRESS NOTE ADULT - NS ATTEND AMEND GEN_ALL_CORE FT
85 y/o Monegasque speaking  female  with PMHx of HTN, HLD, Gastritis (s/p EGD 8/2023) , h/o chronic lacunar infarcts, chronic HA, Anxiety, hx of L Breast Lumpectomy w/seed implant 5/10/24 CAD s/p  PCI GULSHAN LAD in 11/10/23  @[facility] w/ Dr. Gregory (d/c on  Aspirin and Plavix; now only on Plavix), and TTE 1/22/24 EF 55% mild symmetric LVH, AS w/o significant stenosis, PASP 32mm Hg presents this evening, 9/25/24, ER complaining of  intermittent minimal exertional  left-sided sharp and pressure-like chest pain radiating to the left arm and jaw X 4 days.    1. Atypical chest pain  similar chest pain from prior admission with some component of reproducibility on palpation. HS Troponin negative, stress test negative in 1.24  possibly related to BP.  ? chostocondritis, start tylenol flexeryl.    2. HTN  Discussed importance of compliance.   increase amlodipine, toprol, add aldactone.    During non face-to-face time, I reviewed relevant portions of the patient’s medical record; including vitals, labs, medications, cardiac studies, remaining additional imaging and consultant recommendations. During face-to-face time, I took a relevant history and examined the patient. I also explained to the patient their diagnoses, and specific cardiopulmonary management plan, which required a high level of medical decision making.  I answered all questions related to the patient's medical conditions.
84 year old female with history of HTN, Gastritis, Lacunar infarcts, Breast Lumpectomy with seed implant in May of 2024, CAD with PCI to LAD in Nov 2023 and NST in Jan 2024, presented with L sided sharp chest pressure, has an exertional component but is also reproducible. She does report components of jaw/neck pain.     -Atypical chest pain- this pain is reproducible, she does have history of CAD on plavix. Have attempted low dose ibuprofen without relief, Will attempt adding lidocaine patch, repeat EKG and troponin remain negative. Difficulty with ambulation. Will also get PT, I will try to optimize antianginals  -Orthostatic hypotension- as above remains orthostatic hypotension. Off alpha blockers, will adjust antihypertensive regimen PRN.   - Breast Lumpectomy- may be a component of why she has reproducible features. will try to optimize pain regimen.    During non face-to-face time, I reviewed relevant portions of the patient’s medical record; including vitals, labs, medications, cardiac studies, remaining additional imaging and consultant recommendations. During face-to-face time, I took a relevant history and examined the patient. I also explained to the patient their diagnoses, and specific cardiopulmonary management plan, which required a high level of medical decision making.  I answered all questions related to the patient's medical conditions.
84 year old female with history of HTN, Gastritis, Lacunar infarcts, Breast Lumpectomy with seed implant in May of 2024, CAD with PCI to LAD in Nov 2023 and NST in Jan 2024, presented with L sided sharp chest pressure, has an exertional component but is also reproducible. She does report components of jaw/neck pain.     Atypical chest pain- Improved. Pain is reproducible, she does have history of CAD on plavix. Have attempted low dose ibuprofen without relief, Will attempt adding lidocaine patch, repeat EKG and troponin remain negative. Difficulty with ambulation. Will also get PT, I will try to optimize antianginals  Orthostatic hypotension: recheck today, now off alpha blockers, imdur. SPEP/UPEP elevated but ratio normal. Doubt amyloid, but PYP scan in AM, neuro f.u as outpatient.  - Breast Lumpectomy- may be a component of why she has reproducible features. will try to optimize pain regimen.    24h notice.
84 year old female with history of HTN, Gastritis, Lacunar infarcts, Breast Lumpectomy with seed implant in May of 2024, CAD with PCI to LAD in Nov 2023 and NST in Jan 2024, presented with L sided sharp chest pressure, has an exertional component but is also reproducible. She does report components of jaw/neck pain.     Atypical chest pain- Improved. Pain is reproducible, she does have history of CAD on plavix. Have attempted low dose ibuprofen without relief, Will attempt adding lidocaine patch, repeat EKG and troponin remain negative. Difficulty with ambulation. Will also get PT, I will try to optimize antianginals  Orthostatic hypotension: recheck today, now off alpha blockers, imdur. SPEP/UPEP elevated but ratio normal. Doubt amyloid, but PYP scan in AM, neuro f.u as outpatient.  - Breast Lumpectomy- may be a component of why she has reproducible features. will try to optimize pain regimen.    During non face-to-face time, I reviewed relevant portions of the patient’s medical record; including vitals, labs, medications, cardiac studies, remaining additional imaging and consultant recommendations. During face-to-face time, I took a relevant history and examined the patient. I also explained to the patient their diagnoses, and specific cardiopulmonary management plan, which required a high level of medical decision making.  I answered all questions related to the patient's medical conditions.
Ms. Hope is a 84 year old female with history of HTN, Gastritis, Lacunar infarcts, Breast Lumpectomy with seed implant in May of 2024, CAD with PCI to LAD in Nov 2023 and NST in Jan 2024, presented with L sided sharp chest pressure, has an exertional component but is also reproducible. She does report components of jaw/neck pain.     Exam:  NAD  JVP normal  S1, S2 no murmurs, reproducible chest pain when presses on chest wall  Lungs CTA  WWP, no edema      -Atypical chest pain- this pain is reproducible, she does have history of CAD on plavix. Have attempted low dose ibuprofen without relief, Will attempt adding lidocaine patch, repeat EKG and troponin remain negative. Difficulty with ambulation. Will also get PT, I will try to optimize antianginals  -Orthostatic hypotension- as above remains orthostatic hypotension. Will send off Light chains and immunofixation, given persistent orthostatics and reports difficulty ambulating. PT to see today. Abdominal binder and compression stalkings. Off alpha blockers, will adjust antihypertensive regimen PRN.   - Breast Lumpectomy- may be a component of why she has reproducible features. will try to optimize pain regimen.
Ms. Hope is a 84 year old female with history of HTN, Gastritis, Lacunar infarcts, Breast Lumpectomy with seed implant in May of 2024, CAD with PCI to LAD in Nov 2023 and NST in Jan 2024, presented with L sided sharp chest pressure, has an exertional component but is also reproducible. She does report components of jaw/neck pain.     Exam:  NAD  JVP normal  S1, S2 no murmurs, reproducible chest pain when presses on chest wall  Lungs CTA  WWP, no edema      -Atypical chest pain- this pain is reproducible, she does have history of CAD on plavix. Will switch ibuprofen to high dose tylenol + lido patch. Add ranexa for antianginal likely stable angina and so that we can optimize antianginal regimen in setting of orthostatics. Trop has been negative and ekg stable. Recent neg nuclear.   -Orthostatic hypotension- Pending PT [id number] severely orthostatic and difficulty ambulating. As above attempting to adjust antihypertensive/anginals. D/c Nitrates, give additional fluids. Abdominal binder and compression stockings. Sent for Light chains and immunofixation, given persistent orthostatics and reports difficulty ambulating. PT to see today.    - Breast Lumpectomy- may be a component of why she has reproducible features. will try to optimize pain regimen.

## 2024-10-01 NOTE — PROGRESS NOTE ADULT - PROBLEM SELECTOR PROBLEM 6
No contraindication to deep vein thrombosis (DVT) prophylaxis

## 2024-10-01 NOTE — PROGRESS NOTE ADULT - PROBLEM SELECTOR PLAN 3
- Continue: Atorvastatin 20mg QHS   - Home med: Livalo
F/U lipid panel and LFT's  -on home Livalo 4mg QD; Atorvastatin 20mg daily

## 2024-10-01 NOTE — PROVIDER CONTACT NOTE (OTHER) - BACKGROUND
Pt had earlier c/o generalized stomach and chest pain along with headache and BP of 182/93. Tylenol and  losartan was given. RN came back to recheck pain level and BP and pt c/o L arm numbness

## 2024-10-01 NOTE — PROGRESS NOTE ADULT - PROBLEM SELECTOR PLAN 4
- Continue: Protonix 40 mg QD
- Continue: Protonix 40 mg QD
Protonix 40mg QD
- Continue: Protonix 40 mg QD

## 2024-10-01 NOTE — PROGRESS NOTE ADULT - PROBLEM SELECTOR PLAN 5
- Continue: Escitalopram 10mg QD
continue Escitalopram 10mg QD
- Continue: Escitalopram 10mg QD

## 2024-10-01 NOTE — PROGRESS NOTE ADULT - ASSESSMENT
83 y/o F, Guatemalan speaking, ASA allergy s/p desensitization 11/2023, with PMHx of HTN, HLD, CAD s/p GULSHAN LAD (11/10/23  @[facility] w/ Dr. Gregory), gastritis (EGD 8/2023), h/o chronic lacunar infarcts, chronic HA, anxiety, hx of left breast lumpectomy w/seed implant 5/10/24, who presented for reproducible chest pain, currently treating for costochondritis with high dose Tylenol (ASA allergy and hx of gastritis). Course c/b recurrent orthostatic hypotension and elevated SPEP/UPEP, planned for PYP scan however tracer unavailable until November.  83 y/o F, Turkmen speaking, ASA allergy s/p desensitization 11/2023, with PMHx of HTN, HLD, CAD s/p GULSHAN LAD (11/10/23  @[facility] w/ Dr. Gregory), gastritis (EGD 8/2023), h/o chronic lacunar infarcts, chronic HA, anxiety, hx of left breast lumpectomy w/seed implant 5/10/24, who presented for reproducible chest pain, currently treating for costochondritis with high dose Tylenol (ASA allergy and hx of gastritis). Course c/b recurrent orthostatic hypotension and elevated SPEP/UPEP, planned for PYP scan however tracer unavailable until November. Anticipate dc in AM, refusing JESÚS.

## 2024-10-01 NOTE — PROGRESS NOTE ADULT - PROBLEM SELECTOR PLAN 2
- Continue Amlodipine 10mg QD, Losartan 50mg QD, Toprol 75mg QD.      ##Orthostatic hypotension   - s/p IVF hydration 9/27 + 9/28 + 9/29 +10/1 w/ persistence of orthostasis  - STOPPED Doxazosin, Clonidine, Spironolactone and Imdur w resolution of orthostasis  - Amyloid labs obtained to r/o amyloid i/s/o recurrent orthostatic hypotension: SPEP/UPEP elevated but ratio normal  - Unable to obtain PYP scan inpatient as no tracer available until November. Pt will follow up outpt.

## 2024-10-01 NOTE — PROVIDER CONTACT NOTE (OTHER) - SITUATION
Pt is complaining of L arm numbness
Patient complaining of headache and lower abdominal pain. Patient complaining of new onset bilateral leg numbness. /84.

## 2024-10-01 NOTE — PROGRESS NOTE ADULT - SUBJECTIVE AND OBJECTIVE BOX
INCOMPLETE Cardiology PA Adult Progress Note    SUBJECTIVE ASSESSMENT: Met with and examined the pt at bedside this morning. She continues to complain of a headache, chest pain, and leg pain/numbness. Denies any dizziness, SOB, or palpitations.     TELE: NSR 60-70s  	  MEDICATIONS:  amLODIPine   Tablet 10 milliGRAM(s) Oral every 24 hours  losartan 50 milliGRAM(s) Oral daily  metoprolol succinate ER 75 milliGRAM(s) Oral daily  ranolazine 500 milliGRAM(s) Oral two times a day  cetirizine 10 milliGRAM(s) Oral daily  ipratropium    for Nebulization 500 MICROGram(s) Nebulizer every 6 hours PRN  acetaminophen     Tablet .. 975 milliGRAM(s) Oral every 6 hours  cyclobenzaprine 5 milliGRAM(s) Oral daily PRN  escitalopram 10 milliGRAM(s) Oral daily  aluminum hydroxide/magnesium hydroxide/simethicone Suspension 30 milliLiter(s) Oral every 6 hours PRN  pantoprazole    Tablet 40 milliGRAM(s) Oral before breakfast  atorvastatin 20 milliGRAM(s) Oral at bedtime  clopidogrel Tablet 75 milliGRAM(s) Oral daily  enoxaparin Injectable 40 milliGRAM(s) SubCutaneous every 24 hours  influenza  Vaccine (HIGH DOSE) 0.5 milliLiter(s) IntraMuscular once  lidocaine   4% Patch 1 Patch Transdermal every 24 hours  sodium chloride 0.9%. 1000 milliLiter(s) IV Continuous <Continuous>  sodium chloride 0.9%. 1000 milliLiter(s) IV Continuous <Continuous>  sodium chloride 0.9%. 1000 milliLiter(s) IV Continuous <Continuous>    	  VITAL SIGNS:  T(C): 36.6 (10-01-24 @ 05:08), Max: 36.8 (09-30-24 @ 17:31)  HR: 72 (10-01-24 @ 10:42) (62 - 73)  BP: 157/72 (10-01-24 @ 10:42) (141/67 - 182/93)  RR: 19 (10-01-24 @ 10:42) (16 - 19)  SpO2: 97% (10-01-24 @ 08:14) (97% - 99%)  Wt(kg): --    I&O's Summary    30 Sep 2024 07:01  -  01 Oct 2024 07:00  --------------------------------------------------------  IN: 780 mL / OUT: 900 mL / NET: -120 mL                                           PHYSICAL EXAM:  Appearance: Normal	  HEENT: Normal oral mucosa, EOMI	  Neck: Supple,- JVD  Cardiovascular: Normal S1 S2, No murmurs  Respiratory: Lungs clear to auscultation. No Rales, Rhonchi, Wheezing	  Gastrointestinal:  Soft, Non-tender, + BS	  Skin: No rashes, No ecchymoses, No cyanosis  Extremities: Normal range of motion, No clubbing, cyanosis or edema  Vascular: Peripheral pulses palpable 2+ bilaterally  Neurologic: Non-focal  Psychiatry: A & O x 3, Mood & affect appropriate    LABS:	 	                          14.3   8.57  )-----------( 254      ( 01 Oct 2024 08:18 )             44.0     10-01    132[L]  |  96  |  14  ----------------------------<  110[H]  5.0   |  22  |  0.94    Ca    10.0      01 Oct 2024 08:18  Mg     2.2     10-01      proBNP:   Lipid Profile:   HgA1c:   TSH:

## 2024-10-02 ENCOUNTER — NON-APPOINTMENT (OUTPATIENT)
Age: 84
End: 2024-10-02

## 2024-10-02 VITALS
HEART RATE: 65 BPM | TEMPERATURE: 98 F | SYSTOLIC BLOOD PRESSURE: 139 MMHG | OXYGEN SATURATION: 98 % | DIASTOLIC BLOOD PRESSURE: 65 MMHG | RESPIRATION RATE: 18 BRPM

## 2024-10-02 LAB
% ALBUMIN: 55.2 % — SIGNIFICANT CHANGE UP
% ALPHA 1: 3.4 % — SIGNIFICANT CHANGE UP
% ALPHA 2: 11.1 % — SIGNIFICANT CHANGE UP
% BETA: 11.1 % — SIGNIFICANT CHANGE UP
% GAMMA: 19.2 % — SIGNIFICANT CHANGE UP
ALBUMIN SERPL ELPH-MCNC: 4 G/DL — SIGNIFICANT CHANGE UP (ref 3.6–5.5)
ALBUMIN/GLOB SERPL ELPH: 1.2 RATIO — SIGNIFICANT CHANGE UP
ALPHA1 GLOB SERPL ELPH-MCNC: 0.2 G/DL — SIGNIFICANT CHANGE UP (ref 0.1–0.4)
ALPHA2 GLOB SERPL ELPH-MCNC: 0.8 G/DL — SIGNIFICANT CHANGE UP (ref 0.5–1)
ANION GAP SERPL CALC-SCNC: 10 MMOL/L — SIGNIFICANT CHANGE UP (ref 5–17)
B-GLOBULIN SERPL ELPH-MCNC: 0.8 G/DL — SIGNIFICANT CHANGE UP (ref 0.5–1)
BUN SERPL-MCNC: 15 MG/DL — SIGNIFICANT CHANGE UP (ref 7–23)
CALCIUM SERPL-MCNC: 9.9 MG/DL — SIGNIFICANT CHANGE UP (ref 8.4–10.5)
CHLORIDE SERPL-SCNC: 96 MMOL/L — SIGNIFICANT CHANGE UP (ref 96–108)
CO2 SERPL-SCNC: 22 MMOL/L — SIGNIFICANT CHANGE UP (ref 22–31)
CREAT SERPL-MCNC: 0.98 MG/DL — SIGNIFICANT CHANGE UP (ref 0.5–1.3)
EGFR: 57 ML/MIN/1.73M2 — LOW
GAMMA GLOBULIN: 1.4 G/DL — SIGNIFICANT CHANGE UP (ref 0.6–1.6)
GLUCOSE SERPL-MCNC: 92 MG/DL — SIGNIFICANT CHANGE UP (ref 70–99)
HCT VFR BLD CALC: 38.5 % — SIGNIFICANT CHANGE UP (ref 34.5–45)
HGB BLD-MCNC: 12.7 G/DL — SIGNIFICANT CHANGE UP (ref 11.5–15.5)
INTERPRETATION SERPL IFE-IMP: SIGNIFICANT CHANGE UP
MAGNESIUM SERPL-MCNC: 2.1 MG/DL — SIGNIFICANT CHANGE UP (ref 1.6–2.6)
MCHC RBC-ENTMCNC: 28.3 PG — SIGNIFICANT CHANGE UP (ref 27–34)
MCHC RBC-ENTMCNC: 33 GM/DL — SIGNIFICANT CHANGE UP (ref 32–36)
MCV RBC AUTO: 85.7 FL — SIGNIFICANT CHANGE UP (ref 80–100)
NRBC # BLD: 0 /100 WBCS — SIGNIFICANT CHANGE UP (ref 0–0)
PLATELET # BLD AUTO: 251 K/UL — SIGNIFICANT CHANGE UP (ref 150–400)
POTASSIUM SERPL-MCNC: 4.5 MMOL/L — SIGNIFICANT CHANGE UP (ref 3.5–5.3)
POTASSIUM SERPL-SCNC: 4.5 MMOL/L — SIGNIFICANT CHANGE UP (ref 3.5–5.3)
PROT PATTERN SERPL ELPH-IMP: SIGNIFICANT CHANGE UP
PROT SERPL-MCNC: 7.3 G/DL — SIGNIFICANT CHANGE UP (ref 6–8.3)
RBC # BLD: 4.49 M/UL — SIGNIFICANT CHANGE UP (ref 3.8–5.2)
RBC # FLD: 12.8 % — SIGNIFICANT CHANGE UP (ref 10.3–14.5)
SODIUM SERPL-SCNC: 128 MMOL/L — LOW (ref 135–145)
WBC # BLD: 7.75 K/UL — SIGNIFICANT CHANGE UP (ref 3.8–10.5)
WBC # FLD AUTO: 7.75 K/UL — SIGNIFICANT CHANGE UP (ref 3.8–10.5)

## 2024-10-02 PROCEDURE — 99231 SBSQ HOSP IP/OBS SF/LOW 25: CPT

## 2024-10-02 RX ORDER — RANOLAZINE 500 MG/1
1 TABLET, EXTENDED RELEASE ORAL
Qty: 60 | Refills: 2
Start: 2024-10-02 | End: 2024-12-30

## 2024-10-02 RX ORDER — ACETAMINOPHEN 325 MG
650 TABLET ORAL ONCE
Refills: 0 | Status: COMPLETED | OUTPATIENT
Start: 2024-10-02 | End: 2024-10-02

## 2024-10-02 RX ORDER — ACETAMINOPHEN 325 MG
3 TABLET ORAL
Qty: 0 | Refills: 0 | DISCHARGE
Start: 2024-10-02

## 2024-10-02 RX ORDER — CLONIDINE HYDROCHLORIDE 0.2 MG/1
1 TABLET ORAL
Refills: 0 | DISCHARGE

## 2024-10-02 RX ORDER — METOPROLOL TARTRATE 50 MG
1 TABLET ORAL
Qty: 30 | Refills: 2
Start: 2024-10-02 | End: 2024-12-30

## 2024-10-02 RX ADMIN — RANOLAZINE 500 MILLIGRAM(S): 500 TABLET, EXTENDED RELEASE ORAL at 05:46

## 2024-10-02 RX ADMIN — PANTOPRAZOLE SODIUM 40 MILLIGRAM(S): 40 TABLET, DELAYED RELEASE ORAL at 09:19

## 2024-10-02 RX ADMIN — Medication 975 MILLIGRAM(S): at 10:18

## 2024-10-02 RX ADMIN — Medication 975 MILLIGRAM(S): at 00:13

## 2024-10-02 RX ADMIN — CETIRIZINE HYDROCHLORIDE 10 MILLIGRAM(S): 10 TABLET ORAL at 13:17

## 2024-10-02 RX ADMIN — Medication 975 MILLIGRAM(S): at 01:13

## 2024-10-02 RX ADMIN — LIDOCAINE 1 PATCH: 50 CREAM TOPICAL at 05:48

## 2024-10-02 RX ADMIN — Medication 975 MILLIGRAM(S): at 09:18

## 2024-10-02 RX ADMIN — Medication 10 MILLIGRAM(S): at 13:17

## 2024-10-02 RX ADMIN — Medication 10 MILLIGRAM(S): at 09:19

## 2024-10-02 RX ADMIN — Medication 650 MILLIGRAM(S): at 13:16

## 2024-10-02 RX ADMIN — Medication 75 MILLIGRAM(S): at 13:17

## 2024-10-02 RX ADMIN — LOSARTAN POTASSIUM 50 MILLIGRAM(S): 100 TABLET, FILM COATED ORAL at 05:46

## 2024-10-02 NOTE — OCCUPATIONAL THERAPY INITIAL EVALUATION ADULT - NSOTDISCHREC_GEN_A_CORE
if pt. to be d/c home would benefit from HOT, assist for all ADLs and mobility, as well as RW and commode/Sub-acute Rehab

## 2024-10-02 NOTE — OCCUPATIONAL THERAPY INITIAL EVALUATION ADULT - MODIFIED CLINICAL TEST OF SENSORY INTEGRATION IN BALANCE TEST
pt. performed functional mob at EOB ~4 side steps w. Min A and Hand Held Assist, limited 2/2 orthostatics, noted unsteady

## 2024-10-02 NOTE — CHART NOTE - NSCHARTNOTEFT_GEN_A_CORE
Pt requires a recliner chair at home for a persistent/chronic orthostatic hypotension.
Patient will require a bedside commode because they are confined to a single room and has narrow spaces that her walker and wheelchair cannot enter. Patient with unsteady gait.

## 2024-10-02 NOTE — OCCUPATIONAL THERAPY INITIAL EVALUATION ADULT - ADDITIONAL COMMENTS
Info received from medical chart- res elevator building in [place], no karlie. PTA, patient ambulates with cane/rollator and was I in ADLS/IADLs, denies other AD/AE

## 2024-10-02 NOTE — OCCUPATIONAL THERAPY INITIAL EVALUATION ADULT - GENERAL OBSERVATIONS, REHAB EVAL
OT IE complete. RN Nona clearing pt. for session, received semi-supine in bed, +tele, +heplock in NAD, agreeable to session. Noted to have slight headache.

## 2024-10-02 NOTE — PROGRESS NOTE ADULT - REASON FOR ADMISSION
Unstable Angina

## 2024-10-02 NOTE — OCCUPATIONAL THERAPY INITIAL EVALUATION ADULT - PERTINENT HX OF CURRENT PROBLEM, REHAB EVAL
85 y/o Niuean speaking  female  with PMHx of HTN, HLD, Gastritis (s/p EGD 8/2023) , h/o chronic lacunar infarcts, chronic HA, Anxiety, hx of L Breast Lumpectomy w/seed implant 5/10/24 CAD s/p  PCI GULSHAN LAD in 11/10/23  @[facility] w/ Dr. Gregory (d/c on  Aspirin and Plavix; now only on Plavix), and TTE 1/22/24 EF 55% mild symmetric LVH, AS w/o significant stenosis, PASP 32mm Hg presents this evening, 9/25/24, ER complaining of  intermittent minimal exertional  left-sided sharp and pressure-like chest pain radiating to the left arm and jaw X 4 days.

## 2024-10-02 NOTE — OCCUPATIONAL THERAPY INITIAL EVALUATION ADULT - DIAGNOSIS, OT EVAL
Pt. admitted to [facility] for management of unstable angina and HTN. Upon assessment, pt. demo general deconditioning and impaired  balance and (+) orthostatics impacting engagement in ADLs and functional mob/transfers.

## 2024-10-10 DIAGNOSIS — I25.10 ATHEROSCLEROTIC HEART DISEASE OF NATIVE CORONARY ARTERY WITHOUT ANGINA PECTORIS: ICD-10-CM

## 2024-10-10 DIAGNOSIS — K29.70 GASTRITIS, UNSPECIFIED, WITHOUT BLEEDING: ICD-10-CM

## 2024-10-10 DIAGNOSIS — Z86.73 PERSONAL HISTORY OF TRANSIENT ISCHEMIC ATTACK (TIA), AND CEREBRAL INFARCTION WITHOUT RESIDUAL DEFICITS: ICD-10-CM

## 2024-10-10 DIAGNOSIS — F41.9 ANXIETY DISORDER, UNSPECIFIED: ICD-10-CM

## 2024-10-10 DIAGNOSIS — N63.0 UNSPECIFIED LUMP IN UNSPECIFIED BREAST: ICD-10-CM

## 2024-10-10 DIAGNOSIS — E78.5 HYPERLIPIDEMIA, UNSPECIFIED: ICD-10-CM

## 2024-10-10 DIAGNOSIS — I95.1 ORTHOSTATIC HYPOTENSION: ICD-10-CM

## 2024-10-10 DIAGNOSIS — Z95.5 PRESENCE OF CORONARY ANGIOPLASTY IMPLANT AND GRAFT: ICD-10-CM

## 2024-10-10 DIAGNOSIS — M94.0 CHONDROCOSTAL JUNCTION SYNDROME [TIETZE]: ICD-10-CM

## 2024-10-10 DIAGNOSIS — Z88.2 ALLERGY STATUS TO SULFONAMIDES: ICD-10-CM

## 2024-10-10 DIAGNOSIS — Z88.1 ALLERGY STATUS TO OTHER ANTIBIOTIC AGENTS: ICD-10-CM

## 2024-10-10 DIAGNOSIS — I10 ESSENTIAL (PRIMARY) HYPERTENSION: ICD-10-CM

## 2024-10-10 DIAGNOSIS — R51.9 HEADACHE, UNSPECIFIED: ICD-10-CM

## 2024-10-10 DIAGNOSIS — Z88.6 ALLERGY STATUS TO ANALGESIC AGENT: ICD-10-CM

## 2024-10-10 DIAGNOSIS — R42 DIZZINESS AND GIDDINESS: ICD-10-CM

## 2024-10-10 DIAGNOSIS — R07.9 CHEST PAIN, UNSPECIFIED: ICD-10-CM

## 2024-10-23 ENCOUNTER — APPOINTMENT (OUTPATIENT)
Dept: OTOLARYNGOLOGY | Facility: CLINIC | Age: 84
End: 2024-10-23

## 2024-10-23 DIAGNOSIS — R26.89 OTHER ABNORMALITIES OF GAIT AND MOBILITY: ICD-10-CM

## 2024-11-26 PROCEDURE — 85730 THROMBOPLASTIN TIME PARTIAL: CPT

## 2024-11-26 PROCEDURE — 81001 URINALYSIS AUTO W/SCOPE: CPT

## 2024-11-26 PROCEDURE — 82550 ASSAY OF CK (CPK): CPT

## 2024-11-26 PROCEDURE — 84165 PROTEIN E-PHORESIS SERUM: CPT

## 2024-11-26 PROCEDURE — 82248 BILIRUBIN DIRECT: CPT

## 2024-11-26 PROCEDURE — 83036 HEMOGLOBIN GLYCOSYLATED A1C: CPT

## 2024-11-26 PROCEDURE — 93306 TTE W/DOPPLER COMPLETE: CPT

## 2024-11-26 PROCEDURE — 82553 CREATINE MB FRACTION: CPT

## 2024-11-26 PROCEDURE — 85652 RBC SED RATE AUTOMATED: CPT

## 2024-11-26 PROCEDURE — 80048 BASIC METABOLIC PNL TOTAL CA: CPT

## 2024-11-26 PROCEDURE — 86140 C-REACTIVE PROTEIN: CPT

## 2024-11-26 PROCEDURE — 85025 COMPLETE CBC W/AUTO DIFF WBC: CPT

## 2024-11-26 PROCEDURE — 86334 IMMUNOFIX E-PHORESIS SERUM: CPT

## 2024-11-26 PROCEDURE — 83521 IG LIGHT CHAINS FREE EACH: CPT

## 2024-11-26 PROCEDURE — 83880 ASSAY OF NATRIURETIC PEPTIDE: CPT

## 2024-11-26 PROCEDURE — 71045 X-RAY EXAM CHEST 1 VIEW: CPT

## 2024-11-26 PROCEDURE — 83735 ASSAY OF MAGNESIUM: CPT

## 2024-11-26 PROCEDURE — 85027 COMPLETE CBC AUTOMATED: CPT

## 2024-11-26 PROCEDURE — 36415 COLL VENOUS BLD VENIPUNCTURE: CPT

## 2024-11-26 PROCEDURE — 80061 LIPID PANEL: CPT

## 2024-11-26 PROCEDURE — 96374 THER/PROPH/DIAG INJ IV PUSH: CPT

## 2024-11-26 PROCEDURE — 81003 URINALYSIS AUTO W/O SCOPE: CPT

## 2024-11-26 PROCEDURE — 97165 OT EVAL LOW COMPLEX 30 MIN: CPT

## 2024-11-26 PROCEDURE — 97116 GAIT TRAINING THERAPY: CPT

## 2024-11-26 PROCEDURE — 80053 COMPREHEN METABOLIC PANEL: CPT

## 2024-11-26 PROCEDURE — 84155 ASSAY OF PROTEIN SERUM: CPT

## 2024-11-26 PROCEDURE — 99285 EMERGENCY DEPT VISIT HI MDM: CPT | Mod: 25

## 2024-11-26 PROCEDURE — 82962 GLUCOSE BLOOD TEST: CPT

## 2024-11-26 PROCEDURE — 97161 PT EVAL LOW COMPLEX 20 MIN: CPT

## 2024-11-26 PROCEDURE — 84443 ASSAY THYROID STIM HORMONE: CPT

## 2024-11-26 PROCEDURE — 85610 PROTHROMBIN TIME: CPT

## 2024-11-26 PROCEDURE — 84484 ASSAY OF TROPONIN QUANT: CPT

## 2024-11-26 PROCEDURE — 70450 CT HEAD/BRAIN W/O DYE: CPT | Mod: MC

## 2024-11-26 PROCEDURE — 93005 ELECTROCARDIOGRAM TRACING: CPT

## 2025-02-11 ENCOUNTER — APPOINTMENT (OUTPATIENT)
Dept: NEUROLOGY | Facility: CLINIC | Age: 85
End: 2025-02-11